# Patient Record
Sex: FEMALE | Race: WHITE | NOT HISPANIC OR LATINO | Employment: OTHER | URBAN - METROPOLITAN AREA
[De-identification: names, ages, dates, MRNs, and addresses within clinical notes are randomized per-mention and may not be internally consistent; named-entity substitution may affect disease eponyms.]

---

## 2017-10-09 ENCOUNTER — CONVERSION ENCOUNTER (OUTPATIENT)
Dept: GENERAL RADIOLOGY | Facility: HOSPITAL | Age: 82
End: 2017-10-09

## 2019-02-08 ENCOUNTER — HOSPITAL ENCOUNTER (OUTPATIENT)
Dept: OTHER | Facility: HOSPITAL | Age: 84
Discharge: HOME | End: 2019-02-08
Attending: INTERNAL MEDICINE

## 2019-02-08 LAB
CRP SERPL HS-MCNC: 1.24 MG/DL (ref 0–0.5)
ERYTHROCYTE [SEDIMENTATION RATE] IN BLOOD: 12 MM/H (ref 0–30)
EST. AVERAGE GLUCOSE BLD GHB EST-MCNC: 180 MG/DL
HBA1C MFR BLD: 7.9 % (ref 3.5–5.7)

## 2019-03-22 ENCOUNTER — HOSPITAL ENCOUNTER (OUTPATIENT)
Dept: LAB | Facility: HOSPITAL | Age: 84
Discharge: HOME | End: 2019-03-22
Attending: INTERNAL MEDICINE

## 2019-03-22 LAB
25(OH)D3 SERPL-MCNC: 63.2 NG/ML (ref 30–100)
ALBUMIN SERPL-MCNC: 4 G/DL (ref 3.5–5)
ALBUMIN/GLOB SERPL: 1.7 {RATIO} (ref 1.4–2.6)
ALP SERPL-CCNC: 90 U/L (ref 43–160)
ALT SERPL-CCNC: 18 U/L (ref 10–40)
ANION GAP SERPL CALC-SCNC: 18 MMOL/L (ref 8–19)
APPEARANCE UR: CLEAR
AST SERPL-CCNC: 11 U/L (ref 15–50)
BASOPHILS # BLD AUTO: 0.04 10*3/UL (ref 0–0.2)
BASOPHILS NFR BLD AUTO: 0.6 % (ref 0–3)
BILIRUB SERPL-MCNC: 0.44 MG/DL (ref 0.2–1.3)
BILIRUB UR QL: NEGATIVE
BUN SERPL-MCNC: 15 MG/DL (ref 5–25)
BUN/CREAT SERPL: 19 {RATIO} (ref 6–20)
CALCIUM SERPL-MCNC: 10 MG/DL (ref 8.7–10.4)
CHLORIDE SERPL-SCNC: 105 MMOL/L (ref 99–111)
CHOLEST SERPL-MCNC: 162 MG/DL (ref 107–200)
CHOLEST/HDLC SERPL: 3.4 {RATIO} (ref 3–6)
COLOR UR: YELLOW
CONV ABS IMM GRAN: 0.03 10*3/UL (ref 0–0.2)
CONV BACTERIA: NEGATIVE
CONV CO2: 25 MMOL/L (ref 22–32)
CONV COLLECTION SOURCE (UA): ABNORMAL
CONV CREATININE URINE, RANDOM: 83.2 MG/DL (ref 10–300)
CONV IMMATURE GRAN: 0.4 % (ref 0–1.8)
CONV MICROALBUM.,U,RANDOM: <12 MG/L (ref 0–20)
CONV TOTAL PROTEIN: 6.4 G/DL (ref 6.3–8.2)
CONV UROBILINOGEN IN URINE BY AUTOMATED TEST STRIP: 0.2 {EHRLICHU}/DL (ref 0.1–1)
CREAT UR-MCNC: 0.8 MG/DL (ref 0.5–0.9)
CRP SERPL HS-MCNC: 0.51 MG/DL (ref 0–0.5)
DEPRECATED RDW RBC AUTO: 44.6 FL (ref 36.4–46.3)
EOSINOPHIL # BLD AUTO: 0.1 10*3/UL (ref 0–0.7)
EOSINOPHIL # BLD AUTO: 1.5 % (ref 0–7)
ERYTHROCYTE [DISTWIDTH] IN BLOOD BY AUTOMATED COUNT: 13.5 % (ref 11.7–14.4)
ERYTHROCYTE [SEDIMENTATION RATE] IN BLOOD: 7 MM/H (ref 0–30)
EST. AVERAGE GLUCOSE BLD GHB EST-MCNC: 189 MG/DL
FOLATE SERPL-MCNC: >20 NG/ML (ref 4.8–20)
GFR SERPLBLD BASED ON 1.73 SQ M-ARVRAT: >60 ML/MIN/{1.73_M2}
GLOBULIN UR ELPH-MCNC: 2.4 G/DL (ref 2–3.5)
GLUCOSE SERPL-MCNC: 152 MG/DL (ref 65–99)
GLUCOSE UR QL: NEGATIVE MG/DL
HBA1C MFR BLD: 13.2 G/DL (ref 12–16)
HBA1C MFR BLD: 8.2 % (ref 3.5–5.7)
HCT VFR BLD AUTO: 41.6 % (ref 37–47)
HDLC SERPL-MCNC: 48 MG/DL (ref 40–60)
HGB UR QL STRIP: NEGATIVE
KETONES UR QL STRIP: NEGATIVE MG/DL
LDLC SERPL CALC-MCNC: 84 MG/DL (ref 70–100)
LEUKOCYTE ESTERASE UR QL STRIP: ABNORMAL
LYMPHOCYTES # BLD AUTO: 1.76 10*3/UL (ref 1–5)
MCH RBC QN AUTO: 28.6 PG (ref 27–31)
MCHC RBC AUTO-ENTMCNC: 31.7 G/DL (ref 33–37)
MCV RBC AUTO: 90.2 FL (ref 81–99)
MICROALBUMIN/CREAT UR: 14.4 MG/G{CRE} (ref 0–35)
MONOCYTES # BLD AUTO: 0.78 10*3/UL (ref 0.2–1.2)
MONOCYTES NFR BLD AUTO: 11.4 % (ref 3–10)
NEUTROPHILS # BLD AUTO: 4.15 10*3/UL (ref 2–8)
NEUTROPHILS NFR BLD AUTO: 60.4 % (ref 30–85)
NITRITE UR QL STRIP: NEGATIVE
NRBC CBCN: 0 % (ref 0–0.7)
OSMOLALITY SERPL CALC.SUM OF ELEC: 302 MOSM/KG (ref 273–304)
PH UR STRIP.AUTO: 7.5 [PH] (ref 5–8)
PLATELET # BLD AUTO: 234 10*3/UL (ref 130–400)
PMV BLD AUTO: 10.1 FL (ref 9.4–12.3)
POTASSIUM SERPL-SCNC: 3.9 MMOL/L (ref 3.5–5.3)
PROT UR QL: NEGATIVE MG/DL
RBC # BLD AUTO: 4.61 10*6/UL (ref 4.2–5.4)
RBC #/AREA URNS HPF: ABNORMAL /[HPF]
SODIUM SERPL-SCNC: 144 MMOL/L (ref 135–147)
SP GR UR: 1.02 (ref 1–1.03)
SQUAMOUS SPT QL MICRO: ABNORMAL /[HPF]
TRIGL SERPL-MCNC: 149 MG/DL (ref 40–150)
VARIANT LYMPHS NFR BLD MANUAL: 25.7 % (ref 20–45)
VIT B12 SERPL-MCNC: 590 PG/ML (ref 211–911)
VLDLC SERPL-MCNC: 30 MG/DL (ref 5–37)
WBC # BLD AUTO: 6.86 10*3/UL (ref 4.8–10.8)
WBC #/AREA URNS HPF: ABNORMAL /[HPF]

## 2019-04-23 ENCOUNTER — HOSPITAL ENCOUNTER (OUTPATIENT)
Dept: SURGERY | Facility: HOSPITAL | Age: 84
Setting detail: HOSPITAL OUTPATIENT SURGERY
Discharge: HOME | End: 2019-04-23
Attending: OPHTHALMOLOGY

## 2019-04-23 LAB — GLUCOSE BLD-MCNC: 158 MG/DL (ref 65–99)

## 2019-04-24 ENCOUNTER — HOSPITAL ENCOUNTER (OUTPATIENT)
Dept: GENERAL RADIOLOGY | Facility: HOSPITAL | Age: 84
Discharge: HOME | End: 2019-04-24
Attending: INTERNAL MEDICINE

## 2019-05-07 ENCOUNTER — HOSPITAL ENCOUNTER (OUTPATIENT)
Dept: LAB | Facility: HOSPITAL | Age: 84
Discharge: HOME | End: 2019-05-07
Attending: INTERNAL MEDICINE

## 2019-05-07 LAB — ERYTHROCYTE [SEDIMENTATION RATE] IN BLOOD: 11 MM/H (ref 0–30)

## 2019-05-08 LAB — CRP SERPL HS-MCNC: 0.9 MG/DL (ref 0–0.5)

## 2019-05-14 ENCOUNTER — HOSPITAL ENCOUNTER (OUTPATIENT)
Dept: SURGERY | Facility: HOSPITAL | Age: 84
Setting detail: HOSPITAL OUTPATIENT SURGERY
Discharge: HOME | End: 2019-05-14
Attending: OPHTHALMOLOGY

## 2019-05-14 LAB — GLUCOSE BLD-MCNC: 189 MG/DL (ref 65–99)

## 2019-06-20 ENCOUNTER — HOSPITAL ENCOUNTER (OUTPATIENT)
Dept: LAB | Facility: HOSPITAL | Age: 84
Discharge: HOME | End: 2019-06-20
Attending: INTERNAL MEDICINE

## 2019-06-20 LAB
ALBUMIN SERPL-MCNC: 3.8 G/DL (ref 3.5–5)
ALBUMIN/GLOB SERPL: 1.5 {RATIO} (ref 1.4–2.6)
ALP SERPL-CCNC: 90 U/L (ref 43–160)
ALT SERPL-CCNC: 15 U/L (ref 10–40)
ANION GAP SERPL CALC-SCNC: 18 MMOL/L (ref 8–19)
AST SERPL-CCNC: 14 U/L (ref 15–50)
BASOPHILS # BLD AUTO: 0.04 10*3/UL (ref 0–0.2)
BASOPHILS NFR BLD AUTO: 0.6 % (ref 0–3)
BILIRUB SERPL-MCNC: 0.34 MG/DL (ref 0.2–1.3)
BUN SERPL-MCNC: 12 MG/DL (ref 5–25)
BUN/CREAT SERPL: 13 {RATIO} (ref 6–20)
CALCIUM SERPL-MCNC: 10 MG/DL (ref 8.7–10.4)
CHLORIDE SERPL-SCNC: 100 MMOL/L (ref 99–111)
CHOLEST SERPL-MCNC: 159 MG/DL (ref 107–200)
CHOLEST/HDLC SERPL: 3.9 {RATIO} (ref 3–6)
CONV ABS IMM GRAN: 0.02 10*3/UL (ref 0–0.2)
CONV CO2: 25 MMOL/L (ref 22–32)
CONV IMMATURE GRAN: 0.3 % (ref 0–1.8)
CONV TOTAL PROTEIN: 6.4 G/DL (ref 6.3–8.2)
CREAT UR-MCNC: 0.94 MG/DL (ref 0.5–0.9)
CRP SERPL HS-MCNC: 0.8 MG/DL (ref 0–0.5)
DEPRECATED RDW RBC AUTO: 44.7 FL (ref 36.4–46.3)
EOSINOPHIL # BLD AUTO: 0.07 10*3/UL (ref 0–0.7)
EOSINOPHIL # BLD AUTO: 1 % (ref 0–7)
ERYTHROCYTE [DISTWIDTH] IN BLOOD BY AUTOMATED COUNT: 13.3 % (ref 11.7–14.4)
ERYTHROCYTE [SEDIMENTATION RATE] IN BLOOD: 12 MM/H (ref 0–30)
EST. AVERAGE GLUCOSE BLD GHB EST-MCNC: 154 MG/DL
FOLATE SERPL-MCNC: >20 NG/ML (ref 4.8–20)
GFR SERPLBLD BASED ON 1.73 SQ M-ARVRAT: 56 ML/MIN/{1.73_M2}
GLOBULIN UR ELPH-MCNC: 2.6 G/DL (ref 2–3.5)
GLUCOSE SERPL-MCNC: 192 MG/DL (ref 65–99)
HBA1C MFR BLD: 13.3 G/DL (ref 12–16)
HBA1C MFR BLD: 7 % (ref 3.5–5.7)
HCT VFR BLD AUTO: 42 % (ref 37–47)
HDLC SERPL-MCNC: 41 MG/DL (ref 40–60)
LDLC SERPL CALC-MCNC: 77 MG/DL (ref 70–100)
LYMPHOCYTES # BLD AUTO: 1.45 10*3/UL (ref 1–5)
MAGNESIUM SERPL-MCNC: 1.91 MG/DL (ref 1.6–2.3)
MCH RBC QN AUTO: 29.1 PG (ref 27–31)
MCHC RBC AUTO-ENTMCNC: 31.7 G/DL (ref 33–37)
MCV RBC AUTO: 91.9 FL (ref 81–99)
MONOCYTES # BLD AUTO: 0.82 10*3/UL (ref 0.2–1.2)
MONOCYTES NFR BLD AUTO: 11.5 % (ref 3–10)
NEUTROPHILS # BLD AUTO: 4.74 10*3/UL (ref 2–8)
NEUTROPHILS NFR BLD AUTO: 66.3 % (ref 30–85)
NRBC CBCN: 0 % (ref 0–0.7)
OSMOLALITY SERPL CALC.SUM OF ELEC: 293 MOSM/KG (ref 273–304)
PLATELET # BLD AUTO: 238 10*3/UL (ref 130–400)
PMV BLD AUTO: 9.9 FL (ref 9.4–12.3)
POTASSIUM SERPL-SCNC: 4 MMOL/L (ref 3.5–5.3)
RBC # BLD AUTO: 4.57 10*6/UL (ref 4.2–5.4)
SODIUM SERPL-SCNC: 139 MMOL/L (ref 135–147)
T4 FREE SERPL-MCNC: 1.2 NG/DL (ref 0.9–1.8)
TRIGL SERPL-MCNC: 204 MG/DL (ref 40–150)
TSH SERPL-ACNC: 3.83 M[IU]/L (ref 0.27–4.2)
VARIANT LYMPHS NFR BLD MANUAL: 20.3 % (ref 20–45)
VIT B12 SERPL-MCNC: 664 PG/ML (ref 211–911)
VLDLC SERPL-MCNC: 41 MG/DL (ref 5–37)
WBC # BLD AUTO: 7.14 10*3/UL (ref 4.8–10.8)

## 2019-09-23 ENCOUNTER — HOSPITAL ENCOUNTER (OUTPATIENT)
Dept: LAB | Facility: HOSPITAL | Age: 84
Discharge: HOME | End: 2019-09-23
Attending: INTERNAL MEDICINE

## 2019-09-23 LAB
ALBUMIN SERPL-MCNC: 4.2 G/DL (ref 3.5–5)
ALBUMIN/GLOB SERPL: 1.7 {RATIO} (ref 1.4–2.6)
ALP SERPL-CCNC: 110 U/L (ref 43–160)
ALT SERPL-CCNC: 18 U/L (ref 10–40)
ANION GAP SERPL CALC-SCNC: 14 MMOL/L (ref 8–19)
AST SERPL-CCNC: 14 U/L (ref 15–50)
BASOPHILS # BLD AUTO: 0.04 10*3/UL (ref 0–0.2)
BASOPHILS NFR BLD AUTO: 0.6 % (ref 0–3)
BILIRUB SERPL-MCNC: 0.36 MG/DL (ref 0.2–1.3)
BUN SERPL-MCNC: 14 MG/DL (ref 5–25)
BUN/CREAT SERPL: 20 {RATIO} (ref 6–20)
CALCIUM SERPL-MCNC: 10 MG/DL (ref 8.7–10.4)
CHLORIDE SERPL-SCNC: 104 MMOL/L (ref 99–111)
CONV ABS IMM GRAN: 0.02 10*3/UL (ref 0–0.2)
CONV CO2: 27 MMOL/L (ref 22–32)
CONV IMMATURE GRAN: 0.3 % (ref 0–1.8)
CONV TOTAL PROTEIN: 6.7 G/DL (ref 6.3–8.2)
CREAT UR-MCNC: 0.69 MG/DL (ref 0.5–0.9)
CRP SERPL HS-MCNC: 0.74 MG/DL (ref 0–0.5)
DEPRECATED RDW RBC AUTO: 43.1 FL (ref 36.4–46.3)
EOSINOPHIL # BLD AUTO: 0.09 10*3/UL (ref 0–0.7)
EOSINOPHIL # BLD AUTO: 1.5 % (ref 0–7)
ERYTHROCYTE [DISTWIDTH] IN BLOOD BY AUTOMATED COUNT: 13.2 % (ref 11.7–14.4)
ERYTHROCYTE [SEDIMENTATION RATE] IN BLOOD: 11 MM/H (ref 0–30)
EST. AVERAGE GLUCOSE BLD GHB EST-MCNC: 154 MG/DL
GFR SERPLBLD BASED ON 1.73 SQ M-ARVRAT: >60 ML/MIN/{1.73_M2}
GLOBULIN UR ELPH-MCNC: 2.5 G/DL (ref 2–3.5)
GLUCOSE SERPL-MCNC: 173 MG/DL (ref 65–99)
HBA1C MFR BLD: 7 % (ref 3.5–5.7)
HCT VFR BLD AUTO: 40 % (ref 37–47)
HGB BLD-MCNC: 13.3 G/DL (ref 12–16)
LYMPHOCYTES # BLD AUTO: 1.49 10*3/UL (ref 1–5)
LYMPHOCYTES NFR BLD AUTO: 24 % (ref 20–45)
MCH RBC QN AUTO: 29.5 PG (ref 27–31)
MCHC RBC AUTO-ENTMCNC: 33.3 G/DL (ref 33–37)
MCV RBC AUTO: 88.7 FL (ref 81–99)
MONOCYTES # BLD AUTO: 0.77 10*3/UL (ref 0.2–1.2)
MONOCYTES NFR BLD AUTO: 12.4 % (ref 3–10)
NEUTROPHILS # BLD AUTO: 3.79 10*3/UL (ref 2–8)
NEUTROPHILS NFR BLD AUTO: 61.2 % (ref 30–85)
NRBC CBCN: 0 % (ref 0–0.7)
OSMOLALITY SERPL CALC.SUM OF ELEC: 297 MOSM/KG (ref 273–304)
PLATELET # BLD AUTO: 236 10*3/UL (ref 130–400)
PMV BLD AUTO: 9.7 FL (ref 9.4–12.3)
POTASSIUM SERPL-SCNC: 4.2 MMOL/L (ref 3.5–5.3)
RBC # BLD AUTO: 4.51 10*6/UL (ref 4.2–5.4)
SODIUM SERPL-SCNC: 141 MMOL/L (ref 135–147)
WBC # BLD AUTO: 6.2 10*3/UL (ref 4.8–10.8)

## 2019-10-18 ENCOUNTER — HOSPITAL ENCOUNTER (OUTPATIENT)
Facility: HOSPITAL | Age: 84
Setting detail: OBSERVATION
LOS: 1 days | Discharge: HOME | End: 2019-10-20
Attending: HOSPITALIST | Admitting: HOSPITALIST

## 2019-10-18 PROBLEM — S22.010A COMPRESSION FRACTURE OF T1 VERTEBRA (HCC): Status: ACTIVE | Noted: 2019-10-18

## 2019-10-18 PROBLEM — F32.A DEPRESSION: Status: ACTIVE | Noted: 2019-10-18

## 2019-10-18 PROBLEM — E11.9 DM (DIABETES MELLITUS): Status: ACTIVE | Noted: 2019-10-18

## 2019-10-18 PROBLEM — J45.909 ASTHMA: Status: ACTIVE | Noted: 2019-10-18

## 2019-10-18 PROBLEM — S09.90XA CLOSED HEAD INJURY: Status: ACTIVE | Noted: 2019-10-18

## 2019-10-18 PROBLEM — S00.03XA CONTUSION OF SCALP: Status: ACTIVE | Noted: 2019-10-18
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
## 2019-10-18 PROBLEM — I10 HTN (HYPERTENSION): Status: ACTIVE | Noted: 2019-10-18

## 2019-10-18 LAB
ALBUMIN SERPL-MCNC: 3.6 G/DL (ref 3.5–5.2)
ALBUMIN/GLOB SERPL: 1.2 G/DL
ALP SERPL-CCNC: 100 U/L (ref 39–117)
ALT SERPL W P-5'-P-CCNC: 22 U/L (ref 1–33)
ANION GAP SERPL CALCULATED.3IONS-SCNC: 12 MMOL/L (ref 5–15)
AST SERPL-CCNC: 16 U/L (ref 1–32)
BASOPHILS # BLD AUTO: 0.04 10*3/MM3 (ref 0–0.2)
BASOPHILS NFR BLD AUTO: 0.5 % (ref 0–1.5)
BILIRUB SERPL-MCNC: 0.6 MG/DL (ref 0.2–1.2)
BUN BLD-MCNC: 12 MG/DL (ref 8–23)
BUN/CREAT SERPL: 16 (ref 7–25)
CALCIUM SPEC-SCNC: 10.1 MG/DL (ref 8.6–10.5)
CHLORIDE SERPL-SCNC: 101 MMOL/L (ref 98–107)
CO2 SERPL-SCNC: 26 MMOL/L (ref 22–29)
CREAT BLD-MCNC: 0.75 MG/DL (ref 0.57–1)
DEPRECATED RDW RBC AUTO: 42 FL (ref 37–54)
EOSINOPHIL # BLD AUTO: 0.1 10*3/MM3 (ref 0–0.4)
EOSINOPHIL NFR BLD AUTO: 1.3 % (ref 0.3–6.2)
ERYTHROCYTE [DISTWIDTH] IN BLOOD BY AUTOMATED COUNT: 13.2 % (ref 12.3–15.4)
GFR SERPL CREATININE-BSD FRML MDRD: 74 ML/MIN/1.73
GLOBULIN UR ELPH-MCNC: 3 GM/DL
GLUCOSE BLD-MCNC: 114 MG/DL (ref 65–99)
GLUCOSE BLDC GLUCOMTR-MCNC: 116 MG/DL (ref 70–130)
HBA1C MFR BLD: 6.9 % (ref 4.8–5.6)
HCT VFR BLD AUTO: 37.8 % (ref 34–46.6)
HGB BLD-MCNC: 13.1 G/DL (ref 12–15.9)
IMM GRANULOCYTES # BLD AUTO: 0.03 10*3/MM3 (ref 0–0.05)
IMM GRANULOCYTES NFR BLD AUTO: 0.4 % (ref 0–0.5)
INR PPP: 1.01 (ref 0.9–1.1)
LYMPHOCYTES # BLD AUTO: 1.73 10*3/MM3 (ref 0.7–3.1)
LYMPHOCYTES NFR BLD AUTO: 22.8 % (ref 19.6–45.3)
MCH RBC QN AUTO: 30.1 PG (ref 26.6–33)
MCHC RBC AUTO-ENTMCNC: 34.7 G/DL (ref 31.5–35.7)
MCV RBC AUTO: 86.9 FL (ref 79–97)
MONOCYTES # BLD AUTO: 1.06 10*3/MM3 (ref 0.1–0.9)
MONOCYTES NFR BLD AUTO: 13.9 % (ref 5–12)
NEUTROPHILS # BLD AUTO: 4.64 10*3/MM3 (ref 1.7–7)
NEUTROPHILS NFR BLD AUTO: 61.1 % (ref 42.7–76)
NRBC BLD AUTO-RTO: 0 /100 WBC (ref 0–0.2)
PLATELET # BLD AUTO: 195 10*3/MM3 (ref 140–450)
PMV BLD AUTO: 9.5 FL (ref 6–12)
POTASSIUM BLD-SCNC: 3.7 MMOL/L (ref 3.5–5.2)
PROT SERPL-MCNC: 6.6 G/DL (ref 6–8.5)
PROTHROMBIN TIME: 13 SECONDS (ref 11.7–14.2)
RBC # BLD AUTO: 4.35 10*6/MM3 (ref 3.77–5.28)
SODIUM BLD-SCNC: 139 MMOL/L (ref 136–145)
WBC NRBC COR # BLD: 7.6 10*3/MM3 (ref 3.4–10.8)

## 2019-10-18 PROCEDURE — G0379 DIRECT REFER HOSPITAL OBSERV: HCPCS

## 2019-10-18 PROCEDURE — G0378 HOSPITAL OBSERVATION PER HR: HCPCS

## 2019-10-18 PROCEDURE — 85025 COMPLETE CBC W/AUTO DIFF WBC: CPT | Performed by: INTERNAL MEDICINE

## 2019-10-18 PROCEDURE — 80053 COMPREHEN METABOLIC PANEL: CPT | Performed by: INTERNAL MEDICINE

## 2019-10-18 PROCEDURE — 85610 PROTHROMBIN TIME: CPT | Performed by: INTERNAL MEDICINE

## 2019-10-18 PROCEDURE — 83036 HEMOGLOBIN GLYCOSYLATED A1C: CPT | Performed by: INTERNAL MEDICINE

## 2019-10-18 PROCEDURE — 82962 GLUCOSE BLOOD TEST: CPT

## 2019-10-18 RX ORDER — HYDROCHLOROTHIAZIDE 12.5 MG/1
12.5 TABLET ORAL 3 TIMES WEEKLY
COMMUNITY
End: 2021-11-01 | Stop reason: HOSPADM

## 2019-10-18 RX ORDER — SODIUM CHLORIDE 0.9 % (FLUSH) 0.9 %
10 SYRINGE (ML) INJECTION EVERY 12 HOURS SCHEDULED
Status: DISCONTINUED | OUTPATIENT
Start: 2019-10-18 | End: 2019-10-20 | Stop reason: HOSPADM

## 2019-10-18 RX ORDER — SODIUM CHLORIDE 0.9 % (FLUSH) 0.9 %
10 SYRINGE (ML) INJECTION AS NEEDED
Status: DISCONTINUED | OUTPATIENT
Start: 2019-10-18 | End: 2019-10-20 | Stop reason: HOSPADM

## 2019-10-18 RX ORDER — POLYETHYLENE GLYCOL 3350 17 G/17G
17 POWDER, FOR SOLUTION ORAL DAILY PRN
COMMUNITY
End: 2022-03-07 | Stop reason: SDUPTHER

## 2019-10-18 RX ORDER — FAMOTIDINE 20 MG/1
20 TABLET, FILM COATED ORAL 2 TIMES DAILY
Status: ON HOLD | COMMUNITY
End: 2021-10-21

## 2019-10-18 RX ORDER — GLIMEPIRIDE 4 MG/1
4 TABLET ORAL
COMMUNITY
End: 2021-10-14

## 2019-10-18 RX ORDER — POTASSIUM CHLORIDE 750 MG/1
10 CAPSULE, EXTENDED RELEASE ORAL 3 TIMES WEEKLY
Status: ON HOLD | COMMUNITY
End: 2021-10-21

## 2019-10-18 RX ORDER — ACETAMINOPHEN 325 MG/1
650 TABLET ORAL EVERY 4 HOURS PRN
Status: DISCONTINUED | OUTPATIENT
Start: 2019-10-18 | End: 2019-10-20 | Stop reason: HOSPADM

## 2019-10-18 RX ORDER — NICOTINE POLACRILEX 4 MG
15 LOZENGE BUCCAL
Status: DISCONTINUED | OUTPATIENT
Start: 2019-10-18 | End: 2019-10-20 | Stop reason: HOSPADM

## 2019-10-18 RX ORDER — DULOXETIN HYDROCHLORIDE 30 MG/1
30 CAPSULE, DELAYED RELEASE ORAL DAILY
COMMUNITY
End: 2022-02-09

## 2019-10-18 RX ORDER — DILTIAZEM HYDROCHLORIDE EXTENDED-RELEASE TABLETS 240 MG/1
240 TABLET, EXTENDED RELEASE ORAL DAILY
Status: ON HOLD | COMMUNITY
End: 2021-10-21

## 2019-10-18 RX ORDER — LISINOPRIL 20 MG/1
20 TABLET ORAL DAILY
Status: ON HOLD | COMMUNITY
End: 2021-10-21

## 2019-10-18 RX ORDER — METOPROLOL SUCCINATE 25 MG/1
25 TABLET, EXTENDED RELEASE ORAL DAILY
COMMUNITY
End: 2021-07-30 | Stop reason: SDUPTHER

## 2019-10-18 RX ORDER — ASPIRIN 81 MG/1
81 TABLET, CHEWABLE ORAL DAILY
COMMUNITY
End: 2022-03-07 | Stop reason: SDUPTHER

## 2019-10-18 RX ORDER — ACETAMINOPHEN 650 MG/1
650 SUPPOSITORY RECTAL EVERY 4 HOURS PRN
Status: DISCONTINUED | OUTPATIENT
Start: 2019-10-18 | End: 2019-10-20 | Stop reason: HOSPADM

## 2019-10-18 RX ORDER — NITROGLYCERIN 0.4 MG/1
0.4 TABLET SUBLINGUAL
Status: DISCONTINUED | OUTPATIENT
Start: 2019-10-18 | End: 2019-10-20 | Stop reason: HOSPADM

## 2019-10-18 RX ORDER — PREDNISONE 1 MG/1
1 TABLET ORAL DAILY
Status: ON HOLD | COMMUNITY
End: 2021-10-21

## 2019-10-18 RX ORDER — DEXTROSE MONOHYDRATE 25 G/50ML
25 INJECTION, SOLUTION INTRAVENOUS
Status: DISCONTINUED | OUTPATIENT
Start: 2019-10-18 | End: 2019-10-20 | Stop reason: HOSPADM

## 2019-10-18 RX ORDER — ONDANSETRON 2 MG/ML
4 INJECTION INTRAMUSCULAR; INTRAVENOUS EVERY 6 HOURS PRN
Status: DISCONTINUED | OUTPATIENT
Start: 2019-10-18 | End: 2019-10-20 | Stop reason: HOSPADM

## 2019-10-18 RX ORDER — ACETAMINOPHEN 160 MG/5ML
650 SOLUTION ORAL EVERY 4 HOURS PRN
Status: DISCONTINUED | OUTPATIENT
Start: 2019-10-18 | End: 2019-10-20 | Stop reason: HOSPADM

## 2019-10-18 RX ORDER — HYDROCODONE BITARTRATE AND ACETAMINOPHEN 5; 325 MG/1; MG/1
1 TABLET ORAL EVERY 4 HOURS PRN
Status: DISCONTINUED | OUTPATIENT
Start: 2019-10-18 | End: 2019-10-20 | Stop reason: HOSPADM

## 2019-10-18 RX ORDER — ATORVASTATIN CALCIUM 20 MG/1
20 TABLET, FILM COATED ORAL DAILY
COMMUNITY
End: 2021-09-30

## 2019-10-18 RX ADMIN — SODIUM CHLORIDE, PRESERVATIVE FREE 10 ML: 5 INJECTION INTRAVENOUS at 23:29

## 2019-10-19 ENCOUNTER — APPOINTMENT (OUTPATIENT)
Dept: MRI IMAGING | Facility: HOSPITAL | Age: 84
End: 2019-10-19

## 2019-10-19 ENCOUNTER — APPOINTMENT (OUTPATIENT)
Dept: CT IMAGING | Facility: HOSPITAL | Age: 84
End: 2019-10-19

## 2019-10-19 LAB
ALBUMIN SERPL-MCNC: 3.5 G/DL (ref 3.5–5.2)
ALBUMIN/GLOB SERPL: 1.3 G/DL
ALP SERPL-CCNC: 98 U/L (ref 39–117)
ALT SERPL W P-5'-P-CCNC: 20 U/L (ref 1–33)
ANION GAP SERPL CALCULATED.3IONS-SCNC: 10.6 MMOL/L (ref 5–15)
AST SERPL-CCNC: 16 U/L (ref 1–32)
BASOPHILS # BLD AUTO: 0.04 10*3/MM3 (ref 0–0.2)
BASOPHILS NFR BLD AUTO: 0.6 % (ref 0–1.5)
BILIRUB SERPL-MCNC: 0.5 MG/DL (ref 0.2–1.2)
BUN BLD-MCNC: 12 MG/DL (ref 8–23)
BUN/CREAT SERPL: 16.9 (ref 7–25)
CALCIUM SPEC-SCNC: 9.7 MG/DL (ref 8.6–10.5)
CHLORIDE SERPL-SCNC: 96 MMOL/L (ref 98–107)
CHOLEST SERPL-MCNC: 176 MG/DL (ref 0–200)
CK SERPL-CCNC: 75 U/L (ref 20–180)
CO2 SERPL-SCNC: 26.4 MMOL/L (ref 22–29)
CREAT BLD-MCNC: 0.71 MG/DL (ref 0.57–1)
DEPRECATED RDW RBC AUTO: 40.7 FL (ref 37–54)
EOSINOPHIL # BLD AUTO: 0.13 10*3/MM3 (ref 0–0.4)
EOSINOPHIL NFR BLD AUTO: 1.9 % (ref 0.3–6.2)
ERYTHROCYTE [DISTWIDTH] IN BLOOD BY AUTOMATED COUNT: 13 % (ref 12.3–15.4)
GFR SERPL CREATININE-BSD FRML MDRD: 79 ML/MIN/1.73
GLOBULIN UR ELPH-MCNC: 2.7 GM/DL
GLUCOSE BLD-MCNC: 161 MG/DL (ref 65–99)
GLUCOSE BLDC GLUCOMTR-MCNC: 163 MG/DL (ref 70–130)
GLUCOSE BLDC GLUCOMTR-MCNC: 177 MG/DL (ref 70–130)
GLUCOSE BLDC GLUCOMTR-MCNC: 194 MG/DL (ref 70–130)
GLUCOSE BLDC GLUCOMTR-MCNC: 225 MG/DL (ref 70–130)
HCT VFR BLD AUTO: 37.7 % (ref 34–46.6)
HDLC SERPL-MCNC: 40 MG/DL (ref 40–60)
HGB BLD-MCNC: 12.9 G/DL (ref 12–15.9)
IMM GRANULOCYTES # BLD AUTO: 0.04 10*3/MM3 (ref 0–0.05)
IMM GRANULOCYTES NFR BLD AUTO: 0.6 % (ref 0–0.5)
LDLC SERPL CALC-MCNC: 99 MG/DL (ref 0–100)
LDLC/HDLC SERPL: 2.47 {RATIO}
LYMPHOCYTES # BLD AUTO: 1.29 10*3/MM3 (ref 0.7–3.1)
LYMPHOCYTES NFR BLD AUTO: 19.1 % (ref 19.6–45.3)
MCH RBC QN AUTO: 29.9 PG (ref 26.6–33)
MCHC RBC AUTO-ENTMCNC: 34.2 G/DL (ref 31.5–35.7)
MCV RBC AUTO: 87.3 FL (ref 79–97)
MONOCYTES # BLD AUTO: 0.86 10*3/MM3 (ref 0.1–0.9)
MONOCYTES NFR BLD AUTO: 12.8 % (ref 5–12)
NEUTROPHILS # BLD AUTO: 4.38 10*3/MM3 (ref 1.7–7)
NEUTROPHILS NFR BLD AUTO: 65 % (ref 42.7–76)
NRBC BLD AUTO-RTO: 0 /100 WBC (ref 0–0.2)
PLATELET # BLD AUTO: 192 10*3/MM3 (ref 140–450)
PMV BLD AUTO: 9.6 FL (ref 6–12)
POTASSIUM BLD-SCNC: 3.8 MMOL/L (ref 3.5–5.2)
PROT SERPL-MCNC: 6.2 G/DL (ref 6–8.5)
RBC # BLD AUTO: 4.32 10*6/MM3 (ref 3.77–5.28)
SODIUM BLD-SCNC: 133 MMOL/L (ref 136–145)
TRIGL SERPL-MCNC: 186 MG/DL (ref 0–150)
VLDLC SERPL-MCNC: 37.2 MG/DL (ref 5–40)
WBC NRBC COR # BLD: 6.74 10*3/MM3 (ref 3.4–10.8)

## 2019-10-19 PROCEDURE — 80053 COMPREHEN METABOLIC PANEL: CPT | Performed by: INTERNAL MEDICINE

## 2019-10-19 PROCEDURE — 96374 THER/PROPH/DIAG INJ IV PUSH: CPT

## 2019-10-19 PROCEDURE — 85025 COMPLETE CBC W/AUTO DIFF WBC: CPT | Performed by: INTERNAL MEDICINE

## 2019-10-19 PROCEDURE — 25010000002 ONDANSETRON PER 1 MG: Performed by: INTERNAL MEDICINE

## 2019-10-19 PROCEDURE — G0378 HOSPITAL OBSERVATION PER HR: HCPCS

## 2019-10-19 PROCEDURE — 0 GADOBENATE DIMEGLUMINE 529 MG/ML SOLUTION: Performed by: HOSPITALIST

## 2019-10-19 PROCEDURE — 63710000001 INSULIN LISPRO (HUMAN) PER 5 UNITS: Performed by: INTERNAL MEDICINE

## 2019-10-19 PROCEDURE — 82962 GLUCOSE BLOOD TEST: CPT

## 2019-10-19 PROCEDURE — 72157 MRI CHEST SPINE W/O & W/DYE: CPT

## 2019-10-19 PROCEDURE — A9577 INJ MULTIHANCE: HCPCS | Performed by: HOSPITALIST

## 2019-10-19 PROCEDURE — 82550 ASSAY OF CK (CPK): CPT | Performed by: INTERNAL MEDICINE

## 2019-10-19 PROCEDURE — 70450 CT HEAD/BRAIN W/O DYE: CPT

## 2019-10-19 PROCEDURE — 80061 LIPID PANEL: CPT | Performed by: INTERNAL MEDICINE

## 2019-10-19 PROCEDURE — 99202 OFFICE O/P NEW SF 15 MIN: CPT | Performed by: NEUROLOGICAL SURGERY

## 2019-10-19 RX ADMIN — ONDANSETRON 4 MG: 2 INJECTION INTRAMUSCULAR; INTRAVENOUS at 01:52

## 2019-10-19 RX ADMIN — SODIUM CHLORIDE, PRESERVATIVE FREE 10 ML: 5 INJECTION INTRAVENOUS at 08:20

## 2019-10-19 RX ADMIN — SODIUM CHLORIDE, PRESERVATIVE FREE 10 ML: 5 INJECTION INTRAVENOUS at 21:56

## 2019-10-19 RX ADMIN — INSULIN LISPRO 2 UNITS: 100 INJECTION, SOLUTION INTRAVENOUS; SUBCUTANEOUS at 21:56

## 2019-10-19 RX ADMIN — GADOBENATE DIMEGLUMINE 15 ML: 529 INJECTION, SOLUTION INTRAVENOUS at 15:11

## 2019-10-19 RX ADMIN — INSULIN LISPRO 2 UNITS: 100 INJECTION, SOLUTION INTRAVENOUS; SUBCUTANEOUS at 08:20

## 2019-10-19 RX ADMIN — INSULIN LISPRO 2 UNITS: 100 INJECTION, SOLUTION INTRAVENOUS; SUBCUTANEOUS at 17:34

## 2019-10-19 RX ADMIN — INSULIN LISPRO 4 UNITS: 100 INJECTION, SOLUTION INTRAVENOUS; SUBCUTANEOUS at 12:02

## 2019-10-19 NOTE — PLAN OF CARE
Problem: Patient Care Overview  Goal: Plan of Care Review  Outcome: Ongoing (interventions implemented as appropriate)   10/19/19 3186   Coping/Psychosocial   Plan of Care Reviewed With patient   Plan of Care Review   Progress improving   OTHER   Outcome Summary Denies pain . Had Mri of thoracic spine. Remains on sliding scale. Usinf [place] for incontinence issues.

## 2019-10-19 NOTE — PLAN OF CARE
Problem: Patient Care Overview  Goal: Plan of Care Review  Outcome: Ongoing (interventions implemented as appropriate)   10/19/19 0521   Coping/Psychosocial   Plan of Care Reviewed With patient   Plan of Care Review   Progress improving   OTHER   Outcome Summary Diabetes treated with sliding scale.no c/o pain       Problem: Fall Risk (Adult)  Goal: Absence of Fall  Outcome: Ongoing (interventions implemented as appropriate)   10/19/19 0521   Fall Risk (Adult)   Absence of Fall making progress toward outcome

## 2019-10-19 NOTE — PROGRESS NOTES
"DAILY PROGRESS NOTE  [facility]    Patient Identification:  Name: Tita Jiménez  Age: 84 y.o.  Sex: female  :  1935  MRN: [id number]         Primary Care Physician: Angela Doherty MD    Subjective:  Interval History:She has no pain.    Objective:    Scheduled Meds:  insulin lispro 0-9 Units Subcutaneous 4x Daily With Meals & Nightly   sodium chloride 10 mL Intravenous Q12H     Continuous Infusions:     Vital signs in last 24 hours:  Temp:  [97.9 °F (36.6 °C)-99 °F (37.2 °C)] 97.9 °F (36.6 °C)  Heart Rate:  [100-110] 110  Resp:  [16-18] 18  BP: (147-169)/() 149/81    Intake/Output:  No intake or output data in the 24 hours ending 10/19/19 1114    Exam:  /81 (BP Location: Right arm, Patient Position: Lying)   Pulse 110   Temp 97.9 °F (36.6 °C) (Oral)   Resp 18   Ht 162.6 cm (64\")   Wt 77.3 kg (170 lb 6.4 oz)   SpO2 92%   BMI 29.25 kg/m²     General Appearance:    Alert, cooperative, no distress   Head:    Normocephalic, without obvious abnormality, atraumatic   Eyes:       Throat:   Lips, tongue, gums normal   Neck:   Supple, symmetrical, trachea midline, no JVD   Lungs:     Clear to auscultation bilaterally, respirations unlabored   Chest Wall:    No tenderness or deformity    Heart:    Regular rate and rhythm, S1 and S2 normal, no murmur,no  Rub or gallop   Abdomen:     Soft, non-tender, bowel sounds active, no masses, no organomegaly    Extremities:   Extremities normal, atraumatic, no cyanosis or edema   Pulses:      Skin:   Skin is warm and dry,  no rashes or palpable lesions   Neurologic:   no focal deficits noted      Lab Results (last 72 hours)     Procedure Component Value Units Date/Time    POC Glucose Once [[id number]]  (Abnormal) Collected:  10/19/19 1102    Specimen:  Blood Updated:  10/19/19 1104     Glucose 225 mg/dL     POC Glucose Once [[id number]]  (Abnormal) Collected:  10/19/19 0742    Specimen:  Blood Updated:  10/19/19 0743     Glucose 177 mg/dL     " Comprehensive Metabolic Panel [[id number]]  (Abnormal) Collected:  10/19/19 0444    Specimen:  Blood Updated:  10/19/19 0621     Glucose 161 mg/dL      BUN 12 mg/dL      Creatinine 0.71 mg/dL      Sodium 133 mmol/L      Potassium 3.8 mmol/L      Chloride 96 mmol/L      CO2 26.4 mmol/L      Calcium 9.7 mg/dL      Total Protein 6.2 g/dL      Albumin 3.50 g/dL      ALT (SGPT) 20 U/L      AST (SGOT) 16 U/L      Alkaline Phosphatase 98 U/L      Total Bilirubin 0.5 mg/dL      eGFR Non African Amer 79 mL/min/1.73      Globulin 2.7 gm/dL      A/G Ratio 1.3 g/dL      BUN/Creatinine Ratio 16.9     Anion Gap 10.6 mmol/L     Narrative:       GFR Normal >60  Chronic Kidney Disease <60  Kidney Failure <15    CK [[id number]]  (Normal) Collected:  10/19/19 0444    Specimen:  Blood Updated:  10/19/19 0621     Creatine Kinase 75 U/L     Lipid Panel [[id number]]  (Abnormal) Collected:  10/19/19 0444    Specimen:  Blood Updated:  10/19/19 0621     Total Cholesterol 176 mg/dL      Triglycerides 186 mg/dL      HDL Cholesterol 40 mg/dL      LDL Cholesterol  99 mg/dL      VLDL Cholesterol 37.2 mg/dL      LDL/HDL Ratio 2.47    Narrative:       Cholesterol Reference Ranges  (U.S. Department of Health and Human Services ATP III Classifications)    Desirable          <200 mg/dL  Borderline High    200-239 mg/dL  High Risk          >240 mg/dL      Triglyceride Reference Ranges  (U.S. Department of Health and Human Services ATP III Classifications)    Normal           <150 mg/dL  Borderline High  150-199 mg/dL  High             200-499 mg/dL  Very High        >500 mg/dL    HDL Reference Ranges  (U.S. Department of Health and Human Services ATP III Classifcations)    Low     <40 mg/dl (major risk factor for CHD)  High    >60 mg/dl ('negative' risk factor for CHD)        LDL Reference Ranges  (U.S. Department of Health and Human Services ATP III Classifcations)    Optimal          <100 mg/dL  Near Optimal     100-129 mg/dL  Borderline High  130-159  mg/dL  High             160-189 mg/dL  Very High        >189 mg/dL    CBC Auto Differential [[id number]]  (Abnormal) Collected:  10/19/19 0444    Specimen:  Blood Updated:  10/19/19 0541     WBC 6.74 10*3/mm3      RBC 4.32 10*6/mm3      Hemoglobin 12.9 g/dL      Hematocrit 37.7 %      MCV 87.3 fL      MCH 29.9 pg      MCHC 34.2 g/dL      RDW 13.0 %      RDW-SD 40.7 fl      MPV 9.6 fL      Platelets 192 10*3/mm3      Neutrophil % 65.0 %      Lymphocyte % 19.1 %      Monocyte % 12.8 %      Eosinophil % 1.9 %      Basophil % 0.6 %      Immature Grans % 0.6 %      Neutrophils, Absolute 4.38 10*3/mm3      Lymphocytes, Absolute 1.29 10*3/mm3      Monocytes, Absolute 0.86 10*3/mm3      Eosinophils, Absolute 0.13 10*3/mm3      Basophils, Absolute 0.04 10*3/mm3      Immature Grans, Absolute 0.04 10*3/mm3      nRBC 0.0 /100 WBC     Hemoglobin A1c [[id number]]  (Abnormal) Collected:  10/18/19 2242    Specimen:  Blood Updated:  10/18/19 2348     Hemoglobin A1C 6.90 %     Narrative:       Hemoglobin A1C Ranges:    Increased Risk for Diabetes  5.7% to 6.4%  Diabetes                     >= 6.5%  Diabetic Goal                < 7.0%    Comprehensive Metabolic Panel [[id number]]  (Abnormal) Collected:  10/18/19 2242    Specimen:  Blood Updated:  10/18/19 2319     Glucose 114 mg/dL      BUN 12 mg/dL      Creatinine 0.75 mg/dL      Sodium 139 mmol/L      Potassium 3.7 mmol/L      Chloride 101 mmol/L      CO2 26.0 mmol/L      Calcium 10.1 mg/dL      Total Protein 6.6 g/dL      Albumin 3.60 g/dL      ALT (SGPT) 22 U/L      AST (SGOT) 16 U/L      Alkaline Phosphatase 100 U/L      Total Bilirubin 0.6 mg/dL      eGFR Non African Amer 74 mL/min/1.73      Globulin 3.0 gm/dL      A/G Ratio 1.2 g/dL      BUN/Creatinine Ratio 16.0     Anion Gap 12.0 mmol/L     Narrative:       GFR Normal >60  Chronic Kidney Disease <60  Kidney Failure <15    Protime-INR [[id number]]  (Normal) Collected:  10/18/19 2242    Specimen:  Blood Updated:  10/18/19 2868      Protime 13.0 Seconds      INR 1.01    CBC Auto Differential [[id number]]  (Abnormal) Collected:  10/18/19 2242    Specimen:  Blood Updated:  10/18/19 2304     WBC 7.60 10*3/mm3      RBC 4.35 10*6/mm3      Hemoglobin 13.1 g/dL      Hematocrit 37.8 %      MCV 86.9 fL      MCH 30.1 pg      MCHC 34.7 g/dL      RDW 13.2 %      RDW-SD 42.0 fl      MPV 9.5 fL      Platelets 195 10*3/mm3      Neutrophil % 61.1 %      Lymphocyte % 22.8 %      Monocyte % 13.9 %      Eosinophil % 1.3 %      Basophil % 0.5 %      Immature Grans % 0.4 %      Neutrophils, Absolute 4.64 10*3/mm3      Lymphocytes, Absolute 1.73 10*3/mm3      Monocytes, Absolute 1.06 10*3/mm3      Eosinophils, Absolute 0.10 10*3/mm3      Basophils, Absolute 0.04 10*3/mm3      Immature Grans, Absolute 0.03 10*3/mm3      nRBC 0.0 /100 WBC     POC Glucose Once [[id number]]  (Normal) Collected:  10/18/19 2245    Specimen:  Blood Updated:  10/18/19 2247     Glucose 116 mg/dL         Data Review:  Results from last 7 days   Lab Units 10/19/19  0444 10/18/19  2242   SODIUM mmol/L 133* 139   POTASSIUM mmol/L 3.8 3.7   CHLORIDE mmol/L 96* 101   CO2 mmol/L 26.4 26.0   BUN mg/dL 12 12   CREATININE mg/dL 0.71 0.75   GLUCOSE mg/dL 161* 114*   CALCIUM mg/dL 9.7 10.1     Results from last 7 days   Lab Units 10/19/19  0444 10/18/19  2242   WBC 10*3/mm3 6.74 7.60   HEMOGLOBIN g/dL 12.9 13.1   HEMATOCRIT % 37.7 37.8   PLATELETS 10*3/mm3 192 195         Results from last 7 days   Lab Units 10/18/19  2242   HEMOGLOBIN A1C % 6.90*     No results found for: TROPONINT  Results from last 7 days   Lab Units 10/19/19  0444   CHOLESTEROL mg/dL 176   TRIGLYCERIDES mg/dL 186*   HDL CHOL mg/dL 40   LDL CHOL mg/dL 99     Results from last 7 days   Lab Units 10/19/19  0444 10/18/19  2242   ALK PHOS U/L 98 100   BILIRUBIN mg/dL 0.5 0.6   ALT (SGPT) U/L 20 22   AST (SGOT) U/L 16 16         Results from last 7 days   Lab Units 10/18/19  2242   HEMOGLOBIN A1C % 6.90*     Glucose   Date/Time Value Ref  Range Status   10/19/2019 1102 225 (H) 70 - 130 mg/dL Final   10/19/2019 0742 177 (H) 70 - 130 mg/dL Final   10/18/2019 2245 116 70 - 130 mg/dL Final     Results from last 7 days   Lab Units 10/18/19  2242   INR  1.01       Past Medical History:   Diagnosis Date   • Asthma    • Contusion     small on scalp. skin intact. D/T fall on 10/18/2019   • Depression    • Diabetes mellitus (CMS/HCC)    • Hyperlipidemia    • Hypertension    • Mitral valve problem    • Polymyalgia rheumatica (CMS/HCC)    • Stroke (CMS/HCC)     short term memory loss   • Urinary incontinence        Assessment:  Active Hospital Problems    Diagnosis  POA   • **Compression fracture of T1 -T2 vertebra (CMS/HCC) [S22.010A]  Unknown   • DM (diabetes mellitus) (CMS/HCC) [E11.9]  Unknown   • HTN (hypertension) [I10]  Unknown   • Fall [W19.XXXA]  Unknown   • Contusion of scalp [S00.03XA]  Unknown   • Closed head injury [S09.90XA]  Unknown   • Asthma [J45.909]  Unknown   • Depression [F32.9]  Unknown      Resolved Hospital Problems   No resolved problems to display.       Plan:  Await neurosurgery consult.    Will Holloway MD  10/19/2019  11:14 AM

## 2019-10-19 NOTE — CONSULTS
Patient Care Team:  Angela Doherty MD as PCP - General (Internal Medicine)    Chief complaint fall    Subjective .     History of present illness: This patient apparently had a fall yesterday.  After that she had some pain in the back of her head where she hit her head but no other pain.  Since that time the pain in her head is gone away and the goose egg is gone away as well.  She has no pain in her neck and no pain in her upper thoracic spine.  She has no other neurologic symptoms.    Review of Systems  All systems were reviewed and negative except for:  Cardiovascular: positive for  irregular pulse    History  Past Medical History:   Diagnosis Date   • Asthma    • Contusion     small on scalp. skin intact. D/T fall on 10/18/2019   • Depression    • Diabetes mellitus (CMS/HCC)    • Hyperlipidemia    • Hypertension    • Mitral valve problem    • Polymyalgia rheumatica (CMS/HCC)    • Stroke (CMS/HCC)     short term memory loss   • Urinary incontinence    ,   Past Surgical History:   Procedure Laterality Date   • ADENOIDECTOMY     • APPENDECTOMY     • BREAST SURGERY      ( L4-L5)   • COLON SURGERY      colon resection   • HYSTERECTOMY     • OOPHORECTOMY     • TONSILLECTOMY     , History reviewed. No pertinent family history.,   Social History     Tobacco Use   • Smoking status: Never Smoker   • Smokeless tobacco: Never Used   Substance Use Topics   • Alcohol use: No     Frequency: Never   • Drug use: No   ,   Medications Prior to Admission   Medication Sig Dispense Refill Last Dose   • aspirin 81 MG chewable tablet Chew 81 mg Daily.      • atorvastatin (LIPITOR) 20 MG tablet Take 20 mg by mouth Daily.      • Calcium Carb-Cholecalciferol (CALCIUM + D3 PO) Take 5,000 Units by mouth.      • dilTIAZem LA (CARDIZEM LA) 240 MG 24 hr tablet Take 240 mg by mouth Daily.      • DULoxetine (CYMBALTA) 30 MG capsule Take 30 mg by mouth Daily.      • famotidine (PEPCID) 20 MG tablet Take 20 mg by mouth 2 (Two) Times a Day.       • glimepiride (AMARYL) 4 MG tablet Take 8 mg by mouth Every Morning Before Breakfast.   10/18/2019 at Unknown time   • hydroCHLOROthiazide (HYDRODIURIL) 12.5 MG tablet Take 12.5 mg by mouth 3 (Three) Times a Week.      • lisinopril (PRINIVIL,ZESTRIL) 20 MG tablet Take 20 mg by mouth Daily.      • metFORMIN (GLUCOPHAGE) 500 MG tablet Take 500 mg by mouth Daily With Breakfast.      • metoprolol succinate XL (TOPROL-XL) 25 MG 24 hr tablet Take 25 mg by mouth Daily.      • Multiple Vitamins-Minerals (MULTIVITAMIN ADULT PO) Take  by mouth Daily.      • polyethylene glycol (MIRALAX) packet Take 17 g by mouth Daily As Needed.      • potassium chloride (MICRO-K) 10 MEQ CR capsule Take 10 mEq by mouth 3 (Three) Times a Week.      • predniSONE (DELTASONE) 1 MG tablet Take 1 mg by mouth Daily.      • SITagliptin (JANUVIA) 100 MG tablet Take 100 mg by mouth Daily.       and Allergies:  Patient has no known allergies.    Objective     Vital Signs   Temp:  [97.8 °F (36.6 °C)-99 °F (37.2 °C)] 97.8 °F (36.6 °C)  Heart Rate:  [100-116] 116  Resp:  [16-18] 18  BP: ()/() 97/54    Physical Exam:   Physical Exam   Constitutional: She is oriented to person, place, and time. She appears well-developed and well-nourished.   HENT:   Head: Normocephalic.   Eyes: EOM are normal. Pupils are equal, round, and reactive to light.   Neck: Normal range of motion.   Cardiovascular: Normal rate.   Pulmonary/Chest: Effort normal.   Abdominal: Soft.   Musculoskeletal: Normal range of motion.   Neurological: She is alert and oriented to person, place, and time. She displays normal reflexes. No cranial nerve deficit or sensory deficit. She exhibits normal muscle tone. Coordination normal.   Skin: Skin is warm and dry.   Psychiatric: She has a normal mood and affect.        Neurologic Exam     Mental Status   Oriented to person, place, and time.     Cranial Nerves     CN III, IV, VI   Pupils are equal, round, and reactive to  light.  Extraocular motions are normal.       Results Review:   I reviewed the patient's new clinical results.  I reviewed her CT of the head done at [facility] and another one done here this morning.  Both of these show no evidence of intracranial problems.  According the report from the ER doctor she had a compression fracture of T1 and T2 but they did not send those films.      Assessment/Plan       Compression fracture of T1 -T2 vertebra (CMS/HCC)    DM (diabetes mellitus) (CMS/HCC)    HTN (hypertension)    Fall    Contusion of scalp    Closed head injury    Asthma    Depression      I told the patient we should go ahead and check an MRI of her thoracic spine.  If that looks okay then we can probably just let her go.  I did debate on whether to do the MRI since she is completely asymptomatic but I think we need to be sure there is not some evidence of another process going on there.    I discussed the patients findings and my recommendations with patient and family    Jose Luis Torres MD  10/19/19  1:45 PM

## 2019-10-19 NOTE — H&P
"Internal medicine history and physical  INTERNAL MEDICINE   [facility]       Patient Identification:  Name: Tita Jiménez  Age: 84 y.o.  Sex: female  :  1935  MRN: [id number]                   Primary Care Physician: Angela Doherty MD                                   Chief Complaint: Transferred from [facility] emergency room for evaluation of T1 and T2 compression fracture noted in the imaging studies as a part of evaluation of the fall earlier today.    History of Present Illness:   Patient is a 84-year-old female who resides in the assisted care facility has history of hypertension, diabetes mellitus, previous CVA, history of polymyalgia rheumatica with episodes of neck discomfort and muscle spasms as well as history of asthma depression and mitral valve problems was in her usual state of health until 930 or so this morning when while combing her hair and changing her clothes she lost her balance and fell through the bathroom door on the floor and hit the back of her head and developed a goose egg.  \"It hurt like hell\" as she said at that time.  She did not lose consciousness and remembers the whole sequence of events.  EMS was called because she thinks that there is a rule at [facility] that if you hit your head you need to go to the emergency room.  In the emergency room patient was extensively evaluated and no specific intracranial acute abnormalities noted.  As a part of the fall protocol evaluation and history of headache and neck pain because of polymyalgia rheumatica patient had a CT scan of the C-spine performed which revealed T1 and T2 vertebral compression fracture of acute or subacute type.  Patient ironically does not have any upper back and root of the neck pain.  She is able to move her arm and legs and turn her neck either way without any problem.  She is in fact questioning why she is being referred to this  hospital instead of being sent back to [facility] as she " "does not think that she has any issues or problems at this time.  Patient denies any specific weakness of arm or legs.  And she thinks that the \"goose egg\" that she had on the back of her head after the fall earlier today is also improving.      Past Medical History:  No past medical history on file.  Past Surgical History:  No past surgical history on file.   Home Meds:  No medications prior to admission.     Current Meds:   No current facility-administered medications for this encounter.   Allergies:  Allergies not on file  Social History:   Social History     Tobacco Use   • Smoking status: Not on file   Substance Use Topics   • Alcohol use: Not on file      Family History:  No family history on file.       Review of Systems  See history of present illness and past medical history.   Constitutional: Remarkable for no fever or chills  Cardiovascular: Remarkable for no chest pain or shortness of breath  GI: Remarkable for no nausea vomiting or diarrhea  : Remarkable for no burning in urination frequency urgency  Muscular skeletal: Remarkable for she has polymyalgia rheumatica and she has chronic headache and neck pain that comes and go.  Neurological: Remarkable for headache and discomfort in the back of her left side of the head after the fall when she developed a knot/goose egg earlier today.  She denies any focal weakness of arm or legs and she denies any incontinence..      Vitals:   /100 (BP Location: Left arm, Patient Position: Lying)   Pulse 100   Temp 98.2 °F (36.8 °C) (Oral)   Resp 18   Wt 77.3 kg (170 lb 6.4 oz)   SpO2 94%   I/O: No intake or output data in the 24 hours ending 10/18/19 5777  Exam:  General Appearance:    Alert, cooperative, no distress, appears stated age does not appear to be toxic or septic.   Head:    Normocephalic, without obvious abnormality, mild bruising and tenderness in the left side of the occipital parietal area with no skin breakdown noted.   Eyes:    PERRL, " conjunctiva/corneas clear, EOM's intact, both eyes   Ears:    Normal external ear canals, both ears   Nose:   Nares normal, septum midline, mucosa normal, no drainage    or sinus tenderness   Throat:   Lips, tongue, gums normal; oral mucosa pink and moist   Neck:   Supple, nontender symmetrical, trachea midline, no adenopathy; preserved range of motion    thyroid:  no enlargement/tenderness/nodules; no carotid    bruit or JVD   Back:     Symmetric, no curvature, ROM normal, no CVA tenderness   Lungs:     Clear to auscultation bilaterally, respirations unlabored   Chest Wall:    No tenderness or deformity    Heart:    Regular rate and rhythm, S1 and S2 normal, no murmur, rub   or gallop   Abdomen:     Soft, non-tender, bowel sounds active all four quadrants,     no masses, no hepatomegaly, no splenomegaly   Extremities:   Extremities normal, atraumatic, no cyanosis or edema   Pulses:   Pulses palpable in all extremities; symmetric all extremities   Skin:   Skin color normal, Skin is warm and dry,  no rashes or palpable lesions   Neurologic:   CNII-XII intact, motor strength grossly intact, sensation grossly intact to light touch, no focal deficits noted       Data Review:      I reviewed the patient's new clinical results.    CT cervical spine shows acute to subacute mild compression fracture deformities at the superior endplates of T1 and T2 vertebral bodies there is no retroperitoneal of the fracture fragments.  There is no malalignment.  No evidence of acute fracture or subluxation of the cervical spine noted no evidence for significant acute soft tissue abnormality noted multilevel cervical spondylosis and degenerative uncovertebral joint changes are noted.  CT scan of the head did not show any acute intracranial abnormality moderate nonspecific white matter changes bilaterally due to chronic hemic changes.  Changes.        Assessment:  Active Hospital Problems    Diagnosis POA   • **Compression fracture of T1  -T2 vertebra (CMS/HCC) [S22.010A] Unknown   • DM (diabetes mellitus) (CMS/HCC) [E11.9] Unknown   • HTN (hypertension) [I10] Unknown   • Fall [W19.XXXA] Unknown   • Contusion of scalp [S00.03XA] Unknown   • Closed head injury [S09.90XA] Unknown   • Asthma [J45.909] Unknown   • Depression [F32.9] Unknown       Medical decision making:  Fall resulting in:   -Contusion of the scalp with closed head injury without loss of consciousness-initial imaging studies did not show any intracranial abnormalities except for soft tissue contusion in the left occipital area   -Possible nonsymptomatic T1-T2 endplate compression fracture of indeterminate acuity  Plan is to admit the patient perform neuro checks neuro spine consultation as case was discussed with Dr. Torres by the ER physician at [facility].  Will defer to neurosurgery service based on their assessment if further imaging studies are needed.  In the interim provide the patient with fall precautions.  Provide her with symptomatic relief for pain.  Diabetes mellitus-continue with her home regimen provided with Accu-Cheks and sliding scale coverage.  Check hemoglobin A1c.  Hypertension-continue her antihypertensive regimen and avoid hypotensive episodes.  History of asthma-continue with her as needed nebulizer treatment and provided with continuous pulse ox.  Polymyalgia rheumatica continue her low-dose prednisone.  Anxiety and depression-monitor and continue her regimen..  Adams Nicole MD   10/18/2019  9:53 PM  Much of this encounter note is an electronic transcription/translation of spoken language to printed text. The electronic translation of spoken language may permit erroneous, or at times, nonsensical words or phrases to be inadvertently transcribed; Although I have reviewed the note for such errors, some may still exist

## 2019-10-20 VITALS
BODY MASS INDEX: 29.09 KG/M2 | OXYGEN SATURATION: 95 % | WEIGHT: 170.4 LBS | DIASTOLIC BLOOD PRESSURE: 70 MMHG | RESPIRATION RATE: 18 BRPM | TEMPERATURE: 98.3 F | HEIGHT: 64 IN | SYSTOLIC BLOOD PRESSURE: 125 MMHG | HEART RATE: 116 BPM

## 2019-10-20 LAB
GLUCOSE BLDC GLUCOMTR-MCNC: 214 MG/DL (ref 70–130)
GLUCOSE BLDC GLUCOMTR-MCNC: 224 MG/DL (ref 70–130)

## 2019-10-20 PROCEDURE — 82962 GLUCOSE BLOOD TEST: CPT

## 2019-10-20 PROCEDURE — G0378 HOSPITAL OBSERVATION PER HR: HCPCS

## 2019-10-20 PROCEDURE — 63710000001 PREDNISONE PER 5 MG: Performed by: HOSPITALIST

## 2019-10-20 PROCEDURE — 63710000001 INSULIN LISPRO (HUMAN) PER 5 UNITS: Performed by: INTERNAL MEDICINE

## 2019-10-20 PROCEDURE — 99212 OFFICE O/P EST SF 10 MIN: CPT | Performed by: NEUROLOGICAL SURGERY

## 2019-10-20 RX ORDER — FAMOTIDINE 20 MG/1
20 TABLET, FILM COATED ORAL
Status: DISCONTINUED | OUTPATIENT
Start: 2019-10-20 | End: 2019-10-20 | Stop reason: HOSPADM

## 2019-10-20 RX ORDER — METOPROLOL SUCCINATE 25 MG/1
25 TABLET, EXTENDED RELEASE ORAL DAILY
Status: DISCONTINUED | OUTPATIENT
Start: 2019-10-20 | End: 2019-10-20 | Stop reason: HOSPADM

## 2019-10-20 RX ORDER — ATORVASTATIN CALCIUM 20 MG/1
20 TABLET, FILM COATED ORAL DAILY
Status: DISCONTINUED | OUTPATIENT
Start: 2019-10-20 | End: 2019-10-20 | Stop reason: HOSPADM

## 2019-10-20 RX ORDER — LISINOPRIL 20 MG/1
20 TABLET ORAL DAILY
Status: DISCONTINUED | OUTPATIENT
Start: 2019-10-20 | End: 2019-10-20 | Stop reason: HOSPADM

## 2019-10-20 RX ORDER — GLIPIZIDE 10 MG/1
10 TABLET ORAL
Status: DISCONTINUED | OUTPATIENT
Start: 2019-10-20 | End: 2019-10-20 | Stop reason: HOSPADM

## 2019-10-20 RX ORDER — DULOXETIN HYDROCHLORIDE 30 MG/1
30 CAPSULE, DELAYED RELEASE ORAL DAILY
Status: DISCONTINUED | OUTPATIENT
Start: 2019-10-20 | End: 2019-10-20 | Stop reason: HOSPADM

## 2019-10-20 RX ORDER — PREDNISONE 1 MG/1
1 TABLET ORAL DAILY
Status: DISCONTINUED | OUTPATIENT
Start: 2019-10-20 | End: 2019-10-20 | Stop reason: HOSPADM

## 2019-10-20 RX ORDER — DILTIAZEM HYDROCHLORIDE 240 MG/1
240 CAPSULE, COATED, EXTENDED RELEASE ORAL
Status: DISCONTINUED | OUTPATIENT
Start: 2019-10-20 | End: 2019-10-20 | Stop reason: HOSPADM

## 2019-10-20 RX ORDER — POTASSIUM CHLORIDE 750 MG/1
10 CAPSULE, EXTENDED RELEASE ORAL 3 TIMES WEEKLY
Status: DISCONTINUED | OUTPATIENT
Start: 2019-10-21 | End: 2019-10-20 | Stop reason: HOSPADM

## 2019-10-20 RX ORDER — ASPIRIN 81 MG/1
81 TABLET, CHEWABLE ORAL DAILY
Status: DISCONTINUED | OUTPATIENT
Start: 2019-10-20 | End: 2019-10-20 | Stop reason: HOSPADM

## 2019-10-20 RX ADMIN — LINAGLIPTIN 5 MG: 5 TABLET, FILM COATED ORAL at 11:24

## 2019-10-20 RX ADMIN — LISINOPRIL 20 MG: 20 TABLET ORAL at 11:24

## 2019-10-20 RX ADMIN — GLIPIZIDE 10 MG: 10 TABLET ORAL at 11:24

## 2019-10-20 RX ADMIN — SODIUM CHLORIDE, PRESERVATIVE FREE 10 ML: 5 INJECTION INTRAVENOUS at 08:40

## 2019-10-20 RX ADMIN — DILTIAZEM HYDROCHLORIDE 240 MG: 240 CAPSULE, COATED, EXTENDED RELEASE ORAL at 11:24

## 2019-10-20 RX ADMIN — DULOXETINE HYDROCHLORIDE 30 MG: 30 CAPSULE, DELAYED RELEASE ORAL at 11:25

## 2019-10-20 RX ADMIN — METFORMIN HYDROCHLORIDE 500 MG: 500 TABLET ORAL at 11:23

## 2019-10-20 RX ADMIN — PREDNISONE 1 MG: 1 TABLET ORAL at 11:23

## 2019-10-20 RX ADMIN — ASPIRIN 81 MG: 81 TABLET, CHEWABLE ORAL at 09:50

## 2019-10-20 RX ADMIN — INSULIN LISPRO 4 UNITS: 100 INJECTION, SOLUTION INTRAVENOUS; SUBCUTANEOUS at 11:53

## 2019-10-20 RX ADMIN — FAMOTIDINE 20 MG: 20 TABLET, FILM COATED ORAL at 11:23

## 2019-10-20 RX ADMIN — METOPROLOL SUCCINATE 25 MG: 25 TABLET, FILM COATED, EXTENDED RELEASE ORAL at 11:23

## 2019-10-20 RX ADMIN — INSULIN LISPRO 4 UNITS: 100 INJECTION, SOLUTION INTRAVENOUS; SUBCUTANEOUS at 08:39

## 2019-10-20 RX ADMIN — ATORVASTATIN CALCIUM 20 MG: 20 TABLET, FILM COATED ORAL at 11:24

## 2019-10-20 NOTE — PLAN OF CARE
Problem: Patient Care Overview  Goal: Plan of Care Review  Outcome: Ongoing (interventions implemented as appropriate)   10/20/19 6741   Coping/Psychosocial   Plan of Care Reviewed With patient   Plan of Care Review   Progress improving   OTHER   Outcome Summary Pt VSS, denies pain. Pt has been sleepy most of shift. MRI shows no fracture. D/C likely in AM, will continue to monitor.      Goal: Individualization and Mutuality  Outcome: Ongoing (interventions implemented as appropriate)    Goal: Discharge Needs Assessment  Outcome: Ongoing (interventions implemented as appropriate)    Goal: Interprofessional Rounds/Family Conf  Outcome: Ongoing (interventions implemented as appropriate)      Problem: Fall Risk (Adult)  Goal: Identify Related Risk Factors and Signs and Symptoms  Outcome: Ongoing (interventions implemented as appropriate)

## 2019-10-20 NOTE — DISCHARGE SUMMARY
PHYSICIAN DISCHARGE SUMMARY                                                                        [facility]    Patient Identification:  Name: Tita Jiménez  Age: 84 y.o.  Sex: female  :  1935  MRN: [id number]  Primary Care Physician: Angela Doherty MD    Admit date: 10/18/2019  Discharge date and time:10/20/2019  Discharged Condition: good    Discharge Diagnoses:  Active Hospital Problems    Diagnosis  POA   • **Fall [W19.XXXA]  Unknown   • DM (diabetes mellitus) (CMS/HCC) [E11.9]  Unknown   • HTN (hypertension) [I10]  Unknown   • Contusion of scalp [S00.03XA]  Unknown   • Closed head injury [S09.90XA]  Unknown   • Asthma [J45.909]  Unknown   • Depression [F32.9]  Unknown      Resolved Hospital Problems   No resolved problems to display.          PMHX:   Past Medical History:   Diagnosis Date   • Asthma    • Contusion     small on scalp. skin intact. D/T fall on 10/18/2019   • Depression    • Diabetes mellitus (CMS/HCC)    • Hyperlipidemia    • Hypertension    • Mitral valve problem    • Polymyalgia rheumatica (CMS/HCC)    • Stroke (CMS/HCC)     short term memory loss   • Urinary incontinence      PSHX:   Past Surgical History:   Procedure Laterality Date   • ADENOIDECTOMY     • APPENDECTOMY     • BREAST SURGERY      ( L4-L5)   • COLON SURGERY      colon resection   • HYSTERECTOMY     • OOPHORECTOMY     • TONSILLECTOMY         Hospital Course: Tita Jiménez  is a 84-year-old female who resides in the assisted care facility has history of hypertension, diabetes mellitus, previous CVA, history of polymyalgia rheumatica with episodes of neck discomfort and muscle spasms as well as history of asthma depression and mitral valve problems was in her usual state of health until 930 or so this morning when while combing her hair and changing her clothes she lost her balance and fell through the bathroom door on the floor and hit  "the back of her head and developed a goose egg.  \"It hurt like hell\" as she said at that time.  She did not lose consciousness and remembers the whole sequence of events.  EMS was called because she thinks that there is a rule at [facility] that if you hit your head you need to go to the emergency room.  In the emergency room patient was extensively evaluated and no specific intracranial acute abnormalities noted.  As a part of the fall protocol evaluation and history of headache and neck pain because of polymyalgia rheumatica patient had a CT scan of the C-spine performed which revealed T1 and T2 vertebral compression fracture of acute or subacute type.  Patient ironically does not have any upper back and root of the neck pain.  She is able to move her arm and legs and turn her neck either way without any problem.  She is in fact questioning why she is being referred to this  hospital instead of being sent back to [facility] as she does not think that she has any issues or problems at this time.  Patient denies any specific weakness of arm or legs.  And she thinks that the \"goose egg\" that she had on the back of her head after the fall earlier today is also improving.         The patient was admitted to the hospital and seen by neurosurgery.  She was not having any back pain and neurosurgery did MRI of thoracic spine which did not show any evidence of any acute compression fracture.  The patient also had CT of head which was negative for any significant head injury.  The patient looked well enough to go back home and she will follow-up with her primary care.  She will continue with her usual home medicines.      Consults:     Consults     Date and Time Order Name Status Description    10/18/2019 2202 Inpatient Neurosurgery Consult Completed         Results from last 7 days   Lab Units 10/19/19  0444   WBC 10*3/mm3 6.74   HEMOGLOBIN g/dL 12.9   HEMATOCRIT % 37.7   PLATELETS 10*3/mm3 192     Results from last 7 days   Lab " Units 10/19/19  0444   SODIUM mmol/L 133*   POTASSIUM mmol/L 3.8   CHLORIDE mmol/L 96*   CO2 mmol/L 26.4   BUN mg/dL 12   CREATININE mg/dL 0.71   GLUCOSE mg/dL 161*   CALCIUM mg/dL 9.7     Significant Diagnostic Studies:   Lab Results   Component Value Date    WBC 6.74 10/19/2019    HGB 12.9 10/19/2019    HCT 37.7 10/19/2019     10/19/2019     Lab Results   Component Value Date     (L) 10/19/2019    K 3.8 10/19/2019    CL 96 (L) 10/19/2019    CO2 26.4 10/19/2019    BUN 12 10/19/2019    CREATININE 0.71 10/19/2019    GLUCOSE 161 (H) 10/19/2019     Lab Results   Component Value Date    CALCIUM 9.7 10/19/2019     Lab Results   Component Value Date    AST 16 10/19/2019    ALT 20 10/19/2019    ALKPHOS 98 10/19/2019     Lab Results   Component Value Date    INR 1.01 10/18/2019     No results found for: COLORU, CLARITYU, SPECGRAV, PHUR, PROTEINUR, GLUCOSEU, KETONESU, BLOODU, NITRITE, LEUKOCYTESUR, BILIRUBINUR, UROBILINOGEN, RBCUA, WBCUA, BACTERIA, UACOMMENT  No results found for: TROPONINT, TROPONINI, BNP  No components found for: HGBA1C;2  No components found for: TSH;2  Imaging Results (all)     Procedure Component Value Units Date/Time    MRI Thoracic Spine With & Without Contrast [[id number]] Collected:  10/19/19 1549     Updated:  10/19/19 1602    Narrative:       EXAMINATION: MRI THORACIC SPINE WITHOUT CONTRAST     HISTORY: 84-year-old female with a suspected thoracic spine fracture,  status post fall a couple of days ago with back pain.     TECHNIQUE: Multiplanar and multisequence images of the thoracic spine  were obtained pre and post intravenous administration of gadolinium.     COMPARISON: None.     FINDINGS: There is mild rounded kyphosis of the thoracic spine that is  not focal. There is preservation of normal vertebral body height at  every level of the thoracic spine. There is no retropulsion. A 1.3 cm  T11 vertebral body hemangioma is noted. No other abnormality of the  thoracic spinal cord is  appreciated.       Impression:       There is no evidence for an active or acute abnormality of  the thoracic spine. Specifically, there is no evidence for a fracture of  the thoracic spine. The T11 vertebral body 1.3 cm hemangioma is noted     This report was finalized on 10/19/2019 3:59 PM by Dr. Renny Cuellar M.D.       CT Head Without Contrast [[id number]] Collected:  10/19/19 0823     Updated:  10/19/19 0841    Narrative:       CT BRAIN WITHOUT CONTRAST     HISTORY: Recent fall with posterior head trauma. Headache.     TECHNIQUE/FINDINGS: The CT scan was performed through the brain without  contrast. There is moderate diffuse atrophy and chronic small vessel  ischemic change. There is localized scalp hematoma high in the left  posterior parietal region, but no underlying fracture is seen. The  overall appearance shows no change from yesterday's outside CT scan.  There is no evidence of intracranial hemorrhage or mass effect and the  visualized sinuses are clear.     Radiation dose reduction techniques were utilized, including automated  exposure control and exposure modulation based on body size.     This report was finalized on 10/19/2019 8:38 AM by Dr. Austin Perales M.D.           Lab Results (last 7 days)     Procedure Component Value Units Date/Time    POC Glucose Once [[id number]]  (Abnormal) Collected:  10/20/19 0729    Specimen:  Blood Updated:  10/20/19 0732     Glucose 214 mg/dL     POC Glucose Once [[id number]]  (Abnormal) Collected:  10/19/19 2038    Specimen:  Blood Updated:  10/19/19 2045     Glucose 194 mg/dL     POC Glucose Once [[id number]]  (Abnormal) Collected:  10/19/19 1647    Specimen:  Blood Updated:  10/19/19 1648     Glucose 163 mg/dL     POC Glucose Once [[id number]]  (Abnormal) Collected:  10/19/19 1102    Specimen:  Blood Updated:  10/19/19 1104     Glucose 225 mg/dL     POC Glucose Once [[id number]]  (Abnormal) Collected:  10/19/19 0742    Specimen:  Blood Updated:  10/19/19 0743      Glucose 177 mg/dL     Comprehensive Metabolic Panel [[id number]]  (Abnormal) Collected:  10/19/19 0444    Specimen:  Blood Updated:  10/19/19 0621     Glucose 161 mg/dL      BUN 12 mg/dL      Creatinine 0.71 mg/dL      Sodium 133 mmol/L      Potassium 3.8 mmol/L      Chloride 96 mmol/L      CO2 26.4 mmol/L      Calcium 9.7 mg/dL      Total Protein 6.2 g/dL      Albumin 3.50 g/dL      ALT (SGPT) 20 U/L      AST (SGOT) 16 U/L      Alkaline Phosphatase 98 U/L      Total Bilirubin 0.5 mg/dL      eGFR Non African Amer 79 mL/min/1.73      Globulin 2.7 gm/dL      A/G Ratio 1.3 g/dL      BUN/Creatinine Ratio 16.9     Anion Gap 10.6 mmol/L     Narrative:       GFR Normal >60  Chronic Kidney Disease <60  Kidney Failure <15    CK [[id number]]  (Normal) Collected:  10/19/19 0444    Specimen:  Blood Updated:  10/19/19 0621     Creatine Kinase 75 U/L     Lipid Panel [[id number]]  (Abnormal) Collected:  10/19/19 0444    Specimen:  Blood Updated:  10/19/19 0621     Total Cholesterol 176 mg/dL      Triglycerides 186 mg/dL      HDL Cholesterol 40 mg/dL      LDL Cholesterol  99 mg/dL      VLDL Cholesterol 37.2 mg/dL      LDL/HDL Ratio 2.47    Narrative:       Cholesterol Reference Ranges  (U.S. Department of Health and Human Services ATP III Classifications)    Desirable          <200 mg/dL  Borderline High    200-239 mg/dL  High Risk          >240 mg/dL      Triglyceride Reference Ranges  (U.S. Department of Health and Human Services ATP III Classifications)    Normal           <150 mg/dL  Borderline High  150-199 mg/dL  High             200-499 mg/dL  Very High        >500 mg/dL    HDL Reference Ranges  (U.S. Department of Health and Human Services ATP III Classifcations)    Low     <40 mg/dl (major risk factor for CHD)  High    >60 mg/dl ('negative' risk factor for CHD)        LDL Reference Ranges  (U.S. Department of Health and Human Services ATP III Classifcations)    Optimal          <100 mg/dL  Near Optimal     100-129  mg/dL  Borderline High  130-159 mg/dL  High             160-189 mg/dL  Very High        >189 mg/dL    CBC Auto Differential [[id number]]  (Abnormal) Collected:  10/19/19 0444    Specimen:  Blood Updated:  10/19/19 0541     WBC 6.74 10*3/mm3      RBC 4.32 10*6/mm3      Hemoglobin 12.9 g/dL      Hematocrit 37.7 %      MCV 87.3 fL      MCH 29.9 pg      MCHC 34.2 g/dL      RDW 13.0 %      RDW-SD 40.7 fl      MPV 9.6 fL      Platelets 192 10*3/mm3      Neutrophil % 65.0 %      Lymphocyte % 19.1 %      Monocyte % 12.8 %      Eosinophil % 1.9 %      Basophil % 0.6 %      Immature Grans % 0.6 %      Neutrophils, Absolute 4.38 10*3/mm3      Lymphocytes, Absolute 1.29 10*3/mm3      Monocytes, Absolute 0.86 10*3/mm3      Eosinophils, Absolute 0.13 10*3/mm3      Basophils, Absolute 0.04 10*3/mm3      Immature Grans, Absolute 0.04 10*3/mm3      nRBC 0.0 /100 WBC     Hemoglobin A1c [[id number]]  (Abnormal) Collected:  10/18/19 2242    Specimen:  Blood Updated:  10/18/19 2348     Hemoglobin A1C 6.90 %     Narrative:       Hemoglobin A1C Ranges:    Increased Risk for Diabetes  5.7% to 6.4%  Diabetes                     >= 6.5%  Diabetic Goal                < 7.0%    Comprehensive Metabolic Panel [[id number]]  (Abnormal) Collected:  10/18/19 2242    Specimen:  Blood Updated:  10/18/19 2319     Glucose 114 mg/dL      BUN 12 mg/dL      Creatinine 0.75 mg/dL      Sodium 139 mmol/L      Potassium 3.7 mmol/L      Chloride 101 mmol/L      CO2 26.0 mmol/L      Calcium 10.1 mg/dL      Total Protein 6.6 g/dL      Albumin 3.60 g/dL      ALT (SGPT) 22 U/L      AST (SGOT) 16 U/L      Alkaline Phosphatase 100 U/L      Total Bilirubin 0.6 mg/dL      eGFR Non African Amer 74 mL/min/1.73      Globulin 3.0 gm/dL      A/G Ratio 1.2 g/dL      BUN/Creatinine Ratio 16.0     Anion Gap 12.0 mmol/L     Narrative:       GFR Normal >60  Chronic Kidney Disease <60  Kidney Failure <15    Protime-INR [[id number]]  (Normal) Collected:  10/18/19 9170    Specimen:   "Blood Updated:  10/18/19 2307     Protime 13.0 Seconds      INR 1.01    CBC Auto Differential [[id number]]  (Abnormal) Collected:  10/18/19 2242    Specimen:  Blood Updated:  10/18/19 2304     WBC 7.60 10*3/mm3      RBC 4.35 10*6/mm3      Hemoglobin 13.1 g/dL      Hematocrit 37.8 %      MCV 86.9 fL      MCH 30.1 pg      MCHC 34.7 g/dL      RDW 13.2 %      RDW-SD 42.0 fl      MPV 9.5 fL      Platelets 195 10*3/mm3      Neutrophil % 61.1 %      Lymphocyte % 22.8 %      Monocyte % 13.9 %      Eosinophil % 1.3 %      Basophil % 0.5 %      Immature Grans % 0.4 %      Neutrophils, Absolute 4.64 10*3/mm3      Lymphocytes, Absolute 1.73 10*3/mm3      Monocytes, Absolute 1.06 10*3/mm3      Eosinophils, Absolute 0.10 10*3/mm3      Basophils, Absolute 0.04 10*3/mm3      Immature Grans, Absolute 0.03 10*3/mm3      nRBC 0.0 /100 WBC     POC Glucose Once [[id number]]  (Normal) Collected:  10/18/19 2245    Specimen:  Blood Updated:  10/18/19 2247     Glucose 116 mg/dL         /70   Pulse 116   Temp 98.3 °F (36.8 °C) (Oral)   Resp 18   Ht 162.6 cm (64\")   Wt 77.3 kg (170 lb 6.4 oz)   SpO2 95%   BMI 29.25 kg/m²     Discharge Exam:  General Appearance:    Alert, cooperative, no distress                          Head:    Normocephalic, without obvious abnormality, atraumatic                          Eyes:                            Throat:   Lips, tongue, gums normal                          Neck:   Supple, symmetrical, trachea midline, no JVD                        Lungs:     Clear to auscultation bilaterally, respirations unlabored                Chest Wall:    No tenderness or deformity                        Heart:    Regular rate and rhythm, S1 and S2 normal, no murmur,no  Rub or gallop                  Abdomen:     Soft, non-tender, bowel sounds active, no masses, no organomegaly                  Extremities:   Extremities normal, atraumatic, no cyanosis or edema                             Skin:   Skin is warm and " dry,  no rashes or palpable lesions                  Neurologic:   no focal deficits noted     Disposition:  Home    Patient Instructions:      Discharge Medications      Continue These Medications      Instructions Start Date   aspirin 81 MG chewable tablet   81 mg, Oral, Daily      atorvastatin 20 MG tablet  Commonly known as:  LIPITOR   20 mg, Oral, Daily      CALCIUM + D3 PO   5,000 Units, Oral      CARDIZEM  MG 24 hr tablet  Generic drug:  dilTIAZem LA   240 mg, Oral, Daily      DULoxetine 30 MG capsule  Commonly known as:  CYMBALTA   30 mg, Oral, Daily      famotidine 20 MG tablet  Commonly known as:  PEPCID   20 mg, Oral, 2 Times Daily      glimepiride 4 MG tablet  Commonly known as:  AMARYL   8 mg, Oral, Every Morning Before Breakfast      hydroCHLOROthiazide 12.5 MG tablet  Commonly known as:  HYDRODIURIL   12.5 mg, Oral, 3 Times Weekly      lisinopril 20 MG tablet  Commonly known as:  PRINIVIL,ZESTRIL   20 mg, Oral, Daily      metFORMIN 500 MG tablet  Commonly known as:  GLUCOPHAGE   500 mg, Oral, Daily With Breakfast      metoprolol succinate XL 25 MG 24 hr tablet  Commonly known as:  TOPROL-XL   25 mg, Oral, Daily      MULTIVITAMIN ADULT PO   Oral, Daily      polyethylene glycol packet  Commonly known as:  MIRALAX   17 g, Oral, Daily PRN      potassium chloride 10 MEQ CR capsule  Commonly known as:  MICRO-K   10 mEq, Oral, 3 Times Weekly      predniSONE 1 MG tablet  Commonly known as:  DELTASONE   1 mg, Oral, Daily      SITagliptin 100 MG tablet  Commonly known as:  JANUVIA   100 mg, Oral, Daily           No future appointments.  Follow-up Information     Angela Doherty MD Follow up in 1 week(s).    Specialty:  Internal Medicine  Contact information:  [address]  [address] [id number] [phone]                 Discharge Order (From admission, onward)    Start     Ordered    10/20/19 1148  Discharge patient  Once     Expected Discharge Date:  10/20/19    Discharge Disposition:   Home or Self Care    Physician of Record for Attribution - Please select from Treatment Team:  WILL HOLLOWAY [[id number]]    Review needed by CMO to determine Physician of Record:  No       Question Answer Comment   Physician of Record for Attribution - Please select from Treatment Team WILL HOLLOWAY    Review needed by CMO to determine Physician of Record No        10/20/19 1148          Total time spent discharging patient including evaluation,post hospitalization follow up,  medication and post hospitalization instructions and education total time exceeds 30 minutes.    Signed:  Will Holloway MD  10/20/2019  11:49 AM

## 2019-10-20 NOTE — PROGRESS NOTES
LOS: 1 day   Patient Care Team:  Angela Doherty MD as PCP - General (Internal Medicine)    Chief Complaint: Fall    Subjective     This patient continues with no complaints of pain.    Interval History:     History taken from: patient chart    Objective      She has good movement in all 4 extremities.    Vital Signs  Temp:  [97.5 °F (36.4 °C)-99.6 °F (37.6 °C)] 98.3 °F (36.8 °C)  Heart Rate:  [114-123] 117  Resp:  [16-18] 18  BP: ()/(54-94) 125/70       Results Review:     I reviewed the patient's new clinical results.  I reviewed her thoracic MRI.  This shows no evidence of fracture at all.      Assessment/Plan       Compression fracture of T1 -T2 vertebra (CMS/HCC)    DM (diabetes mellitus) (CMS/HCC)    HTN (hypertension)    Fall    Contusion of scalp    Closed head injury    Asthma    Depression      I told the patient about the imaging.  I told her that from my point of view she does not require any follow-up.  She did have some head trauma but no abnormalities on the CT of her head at all.  Consequently I think she can return to normal activities and just be careful about falling again.  She is in agreement with that plan.      Jose Luis Torres MD  10/20/19  11:16 AM

## 2019-10-21 NOTE — PROGRESS NOTES
Case Management Discharge Note    Final Note: Discharged home         Transportation Services  Private: Car    Final Discharge Disposition Code: 01 - home or self-care

## 2020-01-02 ENCOUNTER — HOSPITAL ENCOUNTER (OUTPATIENT)
Dept: LAB | Facility: HOSPITAL | Age: 85
Discharge: HOME | End: 2020-01-02
Attending: INTERNAL MEDICINE

## 2020-01-02 LAB
25(OH)D3 SERPL-MCNC: 52 NG/ML (ref 30–100)
ALBUMIN SERPL-MCNC: 3.9 G/DL (ref 3.5–5)
ALBUMIN/GLOB SERPL: 1.4 {RATIO} (ref 1.4–2.6)
ALP SERPL-CCNC: 124 U/L (ref 43–160)
ALT SERPL-CCNC: 25 U/L (ref 10–40)
ANION GAP SERPL CALC-SCNC: 18 MMOL/L (ref 8–19)
AST SERPL-CCNC: 23 U/L (ref 15–50)
BASOPHILS # BLD AUTO: 0.04 10*3/UL (ref 0–0.2)
BASOPHILS NFR BLD AUTO: 0.6 % (ref 0–3)
BILIRUB SERPL-MCNC: 0.35 MG/DL (ref 0.2–1.3)
BUN SERPL-MCNC: 12 MG/DL (ref 5–25)
BUN/CREAT SERPL: 16 {RATIO} (ref 6–20)
CALCIUM SERPL-MCNC: 9.9 MG/DL (ref 8.7–10.4)
CHLORIDE SERPL-SCNC: 99 MMOL/L (ref 99–111)
CHOLEST SERPL-MCNC: 182 MG/DL (ref 107–200)
CHOLEST/HDLC SERPL: 5.7 {RATIO} (ref 3–6)
CONV ABS IMM GRAN: 0.02 10*3/UL (ref 0–0.2)
CONV CO2: 24 MMOL/L (ref 22–32)
CONV IMMATURE GRAN: 0.3 % (ref 0–1.8)
CONV TOTAL PROTEIN: 6.6 G/DL (ref 6.3–8.2)
CREAT UR-MCNC: 0.75 MG/DL (ref 0.5–0.9)
CRP SERPL HS-MCNC: 0.92 MG/DL (ref 0–0.5)
DEPRECATED RDW RBC AUTO: 43 FL (ref 36.4–46.3)
EOSINOPHIL # BLD AUTO: 0.15 10*3/UL (ref 0–0.7)
EOSINOPHIL # BLD AUTO: 2.2 % (ref 0–7)
ERYTHROCYTE [DISTWIDTH] IN BLOOD BY AUTOMATED COUNT: 13.5 % (ref 11.7–14.4)
ERYTHROCYTE [SEDIMENTATION RATE] IN BLOOD: 16 MM/H (ref 0–30)
EST. AVERAGE GLUCOSE BLD GHB EST-MCNC: 154 MG/DL
FOLATE SERPL-MCNC: >20 NG/ML (ref 4.8–20)
GFR SERPLBLD BASED ON 1.73 SQ M-ARVRAT: >60 ML/MIN/{1.73_M2}
GLOBULIN UR ELPH-MCNC: 2.7 G/DL (ref 2–3.5)
GLUCOSE SERPL-MCNC: 179 MG/DL (ref 65–99)
HBA1C MFR BLD: 7 % (ref 3.5–5.7)
HCT VFR BLD AUTO: 40.1 % (ref 37–47)
HDLC SERPL-MCNC: 32 MG/DL (ref 40–60)
HGB BLD-MCNC: 13.5 G/DL (ref 12–16)
LDLC SERPL CALC-MCNC: 108 MG/DL (ref 70–100)
LYMPHOCYTES # BLD AUTO: 1.53 10*3/UL (ref 1–5)
LYMPHOCYTES NFR BLD AUTO: 22.4 % (ref 20–45)
MAGNESIUM SERPL-MCNC: 1.76 MG/DL (ref 1.6–2.3)
MCH RBC QN AUTO: 29.2 PG (ref 27–31)
MCHC RBC AUTO-ENTMCNC: 33.7 G/DL (ref 33–37)
MCV RBC AUTO: 86.8 FL (ref 81–99)
MONOCYTES # BLD AUTO: 0.78 10*3/UL (ref 0.2–1.2)
MONOCYTES NFR BLD AUTO: 11.4 % (ref 3–10)
NEUTROPHILS # BLD AUTO: 4.31 10*3/UL (ref 2–8)
NEUTROPHILS NFR BLD AUTO: 63.1 % (ref 30–85)
NRBC CBCN: 0 % (ref 0–0.7)
OSMOLALITY SERPL CALC.SUM OF ELEC: 288 MOSM/KG (ref 273–304)
PLATELET # BLD AUTO: 269 10*3/UL (ref 130–400)
PMV BLD AUTO: 10 FL (ref 9.4–12.3)
POTASSIUM SERPL-SCNC: 4.2 MMOL/L (ref 3.5–5.3)
RBC # BLD AUTO: 4.62 10*6/UL (ref 4.2–5.4)
SODIUM SERPL-SCNC: 137 MMOL/L (ref 135–147)
T4 FREE SERPL-MCNC: 1.2 NG/DL (ref 0.9–1.8)
TRIGL SERPL-MCNC: 209 MG/DL (ref 40–150)
TSH SERPL-ACNC: 4.43 M[IU]/L (ref 0.27–4.2)
VIT B12 SERPL-MCNC: 645 PG/ML (ref 211–911)
VLDLC SERPL-MCNC: 42 MG/DL (ref 5–37)
WBC # BLD AUTO: 6.83 10*3/UL (ref 4.8–10.8)

## 2020-07-10 ENCOUNTER — HOSPITAL ENCOUNTER (OUTPATIENT)
Dept: LAB | Facility: HOSPITAL | Age: 85
Discharge: HOME | End: 2020-07-10
Attending: INTERNAL MEDICINE

## 2020-07-10 LAB
25(OH)D3 SERPL-MCNC: 56.7 NG/ML (ref 30–100)
ALBUMIN SERPL-MCNC: 3.9 G/DL (ref 3.5–5)
ALBUMIN/GLOB SERPL: 1.4 {RATIO} (ref 1.4–2.6)
ALP SERPL-CCNC: 139 U/L (ref 43–160)
ALT SERPL-CCNC: 24 U/L (ref 10–40)
ANION GAP SERPL CALC-SCNC: 18 MMOL/L (ref 8–19)
APPEARANCE UR: ABNORMAL
AST SERPL-CCNC: 16 U/L (ref 15–50)
BASOPHILS # BLD AUTO: 0.03 10*3/UL (ref 0–0.2)
BASOPHILS NFR BLD AUTO: 0.4 % (ref 0–3)
BILIRUB SERPL-MCNC: 0.58 MG/DL (ref 0.2–1.3)
BILIRUB UR QL: NEGATIVE
BUN SERPL-MCNC: 15 MG/DL (ref 5–25)
BUN/CREAT SERPL: 19 {RATIO} (ref 6–20)
CALCIUM SERPL-MCNC: 10.4 MG/DL (ref 8.7–10.4)
CHLORIDE SERPL-SCNC: 104 MMOL/L (ref 99–111)
CHOLEST SERPL-MCNC: 180 MG/DL (ref 107–200)
CHOLEST/HDLC SERPL: 4.7 {RATIO} (ref 3–6)
COLOR UR: YELLOW
CONV ABS IMM GRAN: 0.02 10*3/UL (ref 0–0.2)
CONV BACTERIA: ABNORMAL
CONV CO2: 21 MMOL/L (ref 22–32)
CONV COLLECTION SOURCE (UA): ABNORMAL
CONV CREATININE URINE, RANDOM: 110.9 MG/DL (ref 10–300)
CONV CRYSTALS: ABNORMAL /[HPF]
CONV IMMATURE GRAN: 0.3 % (ref 0–1.8)
CONV MICROALBUM.,U,RANDOM: 45.3 MG/L (ref 0–20)
CONV TOTAL PROTEIN: 6.6 G/DL (ref 6.3–8.2)
CONV UROBILINOGEN IN URINE BY AUTOMATED TEST STRIP: 1 {EHRLICHU}/DL (ref 0.1–1)
CREAT UR-MCNC: 0.81 MG/DL (ref 0.5–0.9)
CRP SERPL HS-MCNC: 1.28 MG/DL (ref 0–0.5)
DEPRECATED RDW RBC AUTO: 46.8 FL (ref 36.4–46.3)
EOSINOPHIL # BLD AUTO: 0.14 10*3/UL (ref 0–0.7)
EOSINOPHIL # BLD AUTO: 2.1 % (ref 0–7)
ERYTHROCYTE [DISTWIDTH] IN BLOOD BY AUTOMATED COUNT: 15 % (ref 11.7–14.4)
ERYTHROCYTE [SEDIMENTATION RATE] IN BLOOD: 15 MM/H (ref 0–30)
EST. AVERAGE GLUCOSE BLD GHB EST-MCNC: 177 MG/DL
FOLATE SERPL-MCNC: >20 NG/ML (ref 4.8–20)
GFR SERPLBLD BASED ON 1.73 SQ M-ARVRAT: >60 ML/MIN/{1.73_M2}
GLOBULIN UR ELPH-MCNC: 2.7 G/DL (ref 2–3.5)
GLUCOSE SERPL-MCNC: 197 MG/DL (ref 65–99)
GLUCOSE UR QL: NEGATIVE MG/DL
HBA1C MFR BLD: 7.8 % (ref 3.5–5.7)
HCT VFR BLD AUTO: 42.2 % (ref 37–47)
HDLC SERPL-MCNC: 38 MG/DL (ref 40–60)
HGB BLD-MCNC: 13.4 G/DL (ref 12–16)
HGB UR QL STRIP: NEGATIVE
KETONES UR QL STRIP: NEGATIVE MG/DL
LDLC SERPL CALC-MCNC: 99 MG/DL (ref 70–100)
LEUKOCYTE ESTERASE UR QL STRIP: ABNORMAL
LYMPHOCYTES # BLD AUTO: 1.33 10*3/UL (ref 1–5)
LYMPHOCYTES NFR BLD AUTO: 19.7 % (ref 20–45)
MAGNESIUM SERPL-MCNC: 1.79 MG/DL (ref 1.6–2.3)
MCH RBC QN AUTO: 27.2 PG (ref 27–31)
MCHC RBC AUTO-ENTMCNC: 31.8 G/DL (ref 33–37)
MCV RBC AUTO: 85.8 FL (ref 81–99)
MICROALBUMIN/CREAT UR: 40.8 MG/G{CRE} (ref 0–35)
MONOCYTES # BLD AUTO: 0.83 10*3/UL (ref 0.2–1.2)
MONOCYTES NFR BLD AUTO: 12.3 % (ref 3–10)
NEUTROPHILS # BLD AUTO: 4.39 10*3/UL (ref 2–8)
NEUTROPHILS NFR BLD AUTO: 65.2 % (ref 30–85)
NITRITE UR QL STRIP: NEGATIVE
NRBC CBCN: 0 % (ref 0–0.7)
OSMOLALITY SERPL CALC.SUM OF ELEC: 294 MOSM/KG (ref 273–304)
PH UR STRIP.AUTO: 6 [PH] (ref 5–8)
PLATELET # BLD AUTO: 243 10*3/UL (ref 130–400)
PMV BLD AUTO: 10.4 FL (ref 9.4–12.3)
POTASSIUM SERPL-SCNC: 4.2 MMOL/L (ref 3.5–5.3)
PROT UR QL: NEGATIVE MG/DL
RBC # BLD AUTO: 4.92 10*6/UL (ref 4.2–5.4)
RBC #/AREA URNS HPF: ABNORMAL /[HPF]
SODIUM SERPL-SCNC: 139 MMOL/L (ref 135–147)
SP GR UR: 1.02 (ref 1–1.03)
TRIGL SERPL-MCNC: 217 MG/DL (ref 40–150)
VIT B12 SERPL-MCNC: 769 PG/ML (ref 211–911)
VLDLC SERPL-MCNC: 43 MG/DL (ref 5–37)
WBC # BLD AUTO: 6.74 10*3/UL (ref 4.8–10.8)
WBC #/AREA URNS HPF: ABNORMAL /[HPF]

## 2020-10-26 ENCOUNTER — HOSPITAL ENCOUNTER (OUTPATIENT)
Dept: LAB | Facility: HOSPITAL | Age: 85
Discharge: HOME | End: 2020-10-26
Attending: INTERNAL MEDICINE

## 2020-10-26 LAB
ALBUMIN SERPL-MCNC: 3.8 G/DL (ref 3.5–5)
ALBUMIN/GLOB SERPL: 1.4 {RATIO} (ref 1.4–2.6)
ALP SERPL-CCNC: 140 U/L (ref 43–160)
ALT SERPL-CCNC: 17 U/L (ref 10–40)
ANION GAP SERPL CALC-SCNC: 17 MMOL/L (ref 8–19)
AST SERPL-CCNC: 31 U/L (ref 15–50)
BASOPHILS # BLD AUTO: 0.04 10*3/UL (ref 0–0.2)
BASOPHILS NFR BLD AUTO: 0.6 % (ref 0–3)
BILIRUB SERPL-MCNC: 0.45 MG/DL (ref 0.2–1.3)
BUN SERPL-MCNC: 15 MG/DL (ref 5–25)
BUN/CREAT SERPL: 17 {RATIO} (ref 6–20)
CALCIUM SERPL-MCNC: 10 MG/DL (ref 8.7–10.4)
CHLORIDE SERPL-SCNC: 101 MMOL/L (ref 99–111)
CHOLEST SERPL-MCNC: 178 MG/DL (ref 107–200)
CHOLEST/HDLC SERPL: 4.6 {RATIO} (ref 3–6)
CONV ABS IMM GRAN: 0.02 10*3/UL (ref 0–0.2)
CONV CO2: 24 MMOL/L (ref 22–32)
CONV IMMATURE GRAN: 0.3 % (ref 0–1.8)
CONV TOTAL PROTEIN: 6.6 G/DL (ref 6.3–8.2)
CREAT UR-MCNC: 0.87 MG/DL (ref 0.5–0.9)
CRP SERPL HS-MCNC: 1.16 MG/DL (ref 0–0.5)
DEPRECATED RDW RBC AUTO: 44.9 FL (ref 36.4–46.3)
EOSINOPHIL # BLD AUTO: 0.12 10*3/UL (ref 0–0.7)
EOSINOPHIL # BLD AUTO: 1.9 % (ref 0–7)
ERYTHROCYTE [DISTWIDTH] IN BLOOD BY AUTOMATED COUNT: 14.1 % (ref 11.7–14.4)
ERYTHROCYTE [SEDIMENTATION RATE] IN BLOOD: 13 MM/H (ref 0–30)
EST. AVERAGE GLUCOSE BLD GHB EST-MCNC: 171 MG/DL
GFR SERPLBLD BASED ON 1.73 SQ M-ARVRAT: >60 ML/MIN/{1.73_M2}
GLOBULIN UR ELPH-MCNC: 2.8 G/DL (ref 2–3.5)
GLUCOSE SERPL-MCNC: 210 MG/DL (ref 65–99)
HBA1C MFR BLD: 7.6 % (ref 3.5–5.7)
HCT VFR BLD AUTO: 42.5 % (ref 37–47)
HDLC SERPL-MCNC: 39 MG/DL (ref 40–60)
HGB BLD-MCNC: 13.4 G/DL (ref 12–16)
LDLC SERPL CALC-MCNC: 104 MG/DL (ref 70–100)
LYMPHOCYTES # BLD AUTO: 1.41 10*3/UL (ref 1–5)
LYMPHOCYTES NFR BLD AUTO: 22 % (ref 20–45)
MCH RBC QN AUTO: 27.6 PG (ref 27–31)
MCHC RBC AUTO-ENTMCNC: 31.5 G/DL (ref 33–37)
MCV RBC AUTO: 87.6 FL (ref 81–99)
MONOCYTES # BLD AUTO: 0.81 10*3/UL (ref 0.2–1.2)
MONOCYTES NFR BLD AUTO: 12.7 % (ref 3–10)
NEUTROPHILS # BLD AUTO: 4 10*3/UL (ref 2–8)
NEUTROPHILS NFR BLD AUTO: 62.5 % (ref 30–85)
NRBC CBCN: 0 % (ref 0–0.7)
OSMOLALITY SERPL CALC.SUM OF ELEC: 291 MOSM/KG (ref 273–304)
PLATELET # BLD AUTO: 256 10*3/UL (ref 130–400)
PMV BLD AUTO: 10.3 FL (ref 9.4–12.3)
POTASSIUM SERPL-SCNC: 4.5 MMOL/L (ref 3.5–5.3)
RBC # BLD AUTO: 4.85 10*6/UL (ref 4.2–5.4)
SODIUM SERPL-SCNC: 137 MMOL/L (ref 135–147)
TRIGL SERPL-MCNC: 174 MG/DL (ref 40–150)
VLDLC SERPL-MCNC: 35 MG/DL (ref 5–37)
WBC # BLD AUTO: 6.4 10*3/UL (ref 4.8–10.8)

## 2020-10-27 LAB
FOLATE SERPL-MCNC: >20 NG/ML (ref 4.8–20)
VIT B12 SERPL-MCNC: 772 PG/ML (ref 211–911)

## 2021-03-11 ENCOUNTER — HOSPITAL ENCOUNTER (OUTPATIENT)
Dept: LAB | Facility: HOSPITAL | Age: 86
Discharge: HOME | End: 2021-03-11
Attending: INTERNAL MEDICINE

## 2021-03-11 LAB
25(OH)D3 SERPL-MCNC: 55.8 NG/ML (ref 30–100)
ALBUMIN SERPL-MCNC: 3.7 G/DL (ref 3.5–5)
ALBUMIN/GLOB SERPL: 1.4 {RATIO} (ref 1.4–2.6)
ALP SERPL-CCNC: 119 U/L (ref 43–160)
ALT SERPL-CCNC: 17 U/L (ref 10–40)
ANION GAP SERPL CALC-SCNC: 15 MMOL/L (ref 8–19)
AST SERPL-CCNC: 14 U/L (ref 15–50)
BASOPHILS # BLD AUTO: 0.04 10*3/UL (ref 0–0.2)
BASOPHILS NFR BLD AUTO: 0.7 % (ref 0–3)
BILIRUB SERPL-MCNC: 0.44 MG/DL (ref 0.2–1.3)
BUN SERPL-MCNC: 15 MG/DL (ref 5–25)
BUN/CREAT SERPL: 19 {RATIO} (ref 6–20)
CALCIUM SERPL-MCNC: 9.9 MG/DL (ref 8.7–10.4)
CHLORIDE SERPL-SCNC: 103 MMOL/L (ref 99–111)
CHOLEST SERPL-MCNC: 171 MG/DL (ref 107–200)
CHOLEST/HDLC SERPL: 4.2 {RATIO} (ref 3–6)
CONV ABS IMM GRAN: 0.02 10*3/UL (ref 0–0.2)
CONV CO2: 24 MMOL/L (ref 22–32)
CONV IMMATURE GRAN: 0.4 % (ref 0–1.8)
CONV TOTAL PROTEIN: 6.3 G/DL (ref 6.3–8.2)
CREAT UR-MCNC: 0.77 MG/DL (ref 0.5–0.9)
CRP SERPL HS-MCNC: 0.56 MG/DL (ref 0–0.5)
DEPRECATED RDW RBC AUTO: 44.6 FL (ref 36.4–46.3)
EOSINOPHIL # BLD AUTO: 0.11 10*3/UL (ref 0–0.7)
EOSINOPHIL # BLD AUTO: 2 % (ref 0–7)
ERYTHROCYTE [DISTWIDTH] IN BLOOD BY AUTOMATED COUNT: 13.7 % (ref 11.7–14.4)
EST. AVERAGE GLUCOSE BLD GHB EST-MCNC: 157 MG/DL
GFR SERPLBLD BASED ON 1.73 SQ M-ARVRAT: >60 ML/MIN/{1.73_M2}
GLOBULIN UR ELPH-MCNC: 2.6 G/DL (ref 2–3.5)
GLUCOSE SERPL-MCNC: 172 MG/DL (ref 65–99)
HBA1C MFR BLD: 7.1 % (ref 3.5–5.7)
HCT VFR BLD AUTO: 41.7 % (ref 37–47)
HDLC SERPL-MCNC: 41 MG/DL (ref 40–60)
HGB BLD-MCNC: 13.2 G/DL (ref 12–16)
LDLC SERPL CALC-MCNC: 92 MG/DL (ref 70–100)
LYMPHOCYTES # BLD AUTO: 1.33 10*3/UL (ref 1–5)
LYMPHOCYTES NFR BLD AUTO: 24.6 % (ref 20–45)
MAGNESIUM SERPL-MCNC: 1.81 MG/DL (ref 1.6–2.3)
MCH RBC QN AUTO: 28 PG (ref 27–31)
MCHC RBC AUTO-ENTMCNC: 31.7 G/DL (ref 33–37)
MCV RBC AUTO: 88.3 FL (ref 81–99)
MONOCYTES # BLD AUTO: 0.59 10*3/UL (ref 0.2–1.2)
MONOCYTES NFR BLD AUTO: 10.9 % (ref 3–10)
NEUTROPHILS # BLD AUTO: 3.31 10*3/UL (ref 2–8)
NEUTROPHILS NFR BLD AUTO: 61.4 % (ref 30–85)
NRBC CBCN: 0 % (ref 0–0.7)
OSMOLALITY SERPL CALC.SUM OF ELEC: 291 MOSM/KG (ref 273–304)
PLATELET # BLD AUTO: 242 10*3/UL (ref 130–400)
PMV BLD AUTO: 10.2 FL (ref 9.4–12.3)
POTASSIUM SERPL-SCNC: 4.3 MMOL/L (ref 3.5–5.3)
RBC # BLD AUTO: 4.72 10*6/UL (ref 4.2–5.4)
SODIUM SERPL-SCNC: 138 MMOL/L (ref 135–147)
T4 FREE SERPL-MCNC: 1.1 NG/DL (ref 0.9–1.8)
TRIGL SERPL-MCNC: 190 MG/DL (ref 40–150)
TSH SERPL-ACNC: 4.78 M[IU]/L (ref 0.27–4.2)
VLDLC SERPL-MCNC: 38 MG/DL (ref 5–37)
WBC # BLD AUTO: 5.4 10*3/UL (ref 4.8–10.8)

## 2021-09-15 RX ORDER — LISINOPRIL 10 MG/1
TABLET ORAL
Qty: 30 TABLET | Refills: 6 | Status: SHIPPED | OUTPATIENT
Start: 2021-09-15 | End: 2022-03-07 | Stop reason: SDUPTHER

## 2021-09-17 ENCOUNTER — LAB (OUTPATIENT)
Dept: LAB | Facility: HOSPITAL | Age: 86
End: 2021-09-17

## 2021-09-17 ENCOUNTER — TRANSCRIBE ORDERS (OUTPATIENT)
Dept: LAB | Facility: HOSPITAL | Age: 86
End: 2021-09-17

## 2021-09-17 DIAGNOSIS — R63.5 ABNORMAL WEIGHT GAIN: ICD-10-CM

## 2021-09-17 DIAGNOSIS — I10 ESSENTIAL HYPERTENSION, MALIGNANT: ICD-10-CM

## 2021-09-17 DIAGNOSIS — E11.9 DIABETES MELLITUS WITHOUT COMPLICATION (HCC): ICD-10-CM

## 2021-09-17 DIAGNOSIS — Z79.899 ENCOUNTER FOR LONG-TERM (CURRENT) USE OF OTHER MEDICATIONS: ICD-10-CM

## 2021-09-17 DIAGNOSIS — E11.9 DIABETES MELLITUS WITHOUT COMPLICATION (HCC): Primary | ICD-10-CM

## 2021-09-17 DIAGNOSIS — E78.5 HYPERLIPIDEMIA, UNSPECIFIED HYPERLIPIDEMIA TYPE: ICD-10-CM

## 2021-09-17 LAB
ALBUMIN SERPL-MCNC: 4 G/DL (ref 3.5–5.2)
ALBUMIN/GLOB SERPL: 1.7 G/DL
ALP SERPL-CCNC: 136 U/L (ref 39–117)
ALT SERPL W P-5'-P-CCNC: 20 U/L (ref 1–33)
ANION GAP SERPL CALCULATED.3IONS-SCNC: 11 MMOL/L (ref 5–15)
AST SERPL-CCNC: 11 U/L (ref 1–32)
BASOPHILS # BLD AUTO: 0.04 10*3/MM3 (ref 0–0.2)
BASOPHILS NFR BLD AUTO: 0.7 % (ref 0–1.5)
BILIRUB SERPL-MCNC: 0.5 MG/DL (ref 0–1.2)
BUN SERPL-MCNC: 13 MG/DL (ref 8–23)
BUN/CREAT SERPL: 16 (ref 7–25)
CALCIUM SPEC-SCNC: 9.9 MG/DL (ref 8.6–10.5)
CHLORIDE SERPL-SCNC: 102 MMOL/L (ref 98–107)
CHOLEST SERPL-MCNC: 192 MG/DL (ref 0–200)
CO2 SERPL-SCNC: 23 MMOL/L (ref 22–29)
CREAT SERPL-MCNC: 0.81 MG/DL (ref 0.57–1)
CRP SERPL-MCNC: 1.41 MG/DL (ref 0–0.5)
DEPRECATED RDW RBC AUTO: 42.1 FL (ref 37–54)
EOSINOPHIL # BLD AUTO: 0.13 10*3/MM3 (ref 0–0.4)
EOSINOPHIL NFR BLD AUTO: 2.1 % (ref 0.3–6.2)
ERYTHROCYTE [DISTWIDTH] IN BLOOD BY AUTOMATED COUNT: 13.1 % (ref 12.3–15.4)
ERYTHROCYTE [SEDIMENTATION RATE] IN BLOOD: 15 MM/HR (ref 0–30)
FOLATE SERPL-MCNC: >20 NG/ML (ref 4.78–24.2)
GFR SERPL CREATININE-BSD FRML MDRD: 67 ML/MIN/1.73
GLOBULIN UR ELPH-MCNC: 2.4 GM/DL
GLUCOSE SERPL-MCNC: 243 MG/DL (ref 65–99)
HBA1C MFR BLD: 9.63 % (ref 4.8–5.6)
HCT VFR BLD AUTO: 43.3 % (ref 34–46.6)
HDLC SERPL-MCNC: 41 MG/DL (ref 40–60)
HGB BLD-MCNC: 13.9 G/DL (ref 12–15.9)
IMM GRANULOCYTES # BLD AUTO: 0.02 10*3/MM3 (ref 0–0.05)
IMM GRANULOCYTES NFR BLD AUTO: 0.3 % (ref 0–0.5)
IRON 24H UR-MRATE: 67 MCG/DL (ref 37–145)
IRON SATN MFR SERPL: 15 % (ref 20–50)
LDLC SERPL CALC-MCNC: 120 MG/DL (ref 0–100)
LDLC/HDLC SERPL: 2.82 {RATIO}
LYMPHOCYTES # BLD AUTO: 1.44 10*3/MM3 (ref 0.7–3.1)
LYMPHOCYTES NFR BLD AUTO: 23.5 % (ref 19.6–45.3)
MAGNESIUM SERPL-MCNC: 1.9 MG/DL (ref 1.6–2.4)
MCH RBC QN AUTO: 28.2 PG (ref 26.6–33)
MCHC RBC AUTO-ENTMCNC: 32.1 G/DL (ref 31.5–35.7)
MCV RBC AUTO: 87.8 FL (ref 79–97)
MONOCYTES # BLD AUTO: 0.82 10*3/MM3 (ref 0.1–0.9)
MONOCYTES NFR BLD AUTO: 13.4 % (ref 5–12)
NEUTROPHILS NFR BLD AUTO: 3.68 10*3/MM3 (ref 1.7–7)
NEUTROPHILS NFR BLD AUTO: 60 % (ref 42.7–76)
NRBC BLD AUTO-RTO: 0 /100 WBC (ref 0–0.2)
PLATELET # BLD AUTO: 242 10*3/MM3 (ref 140–450)
PMV BLD AUTO: 9.9 FL (ref 6–12)
POTASSIUM SERPL-SCNC: 4.5 MMOL/L (ref 3.5–5.2)
PROT SERPL-MCNC: 6.4 G/DL (ref 6–8.5)
RBC # BLD AUTO: 4.93 10*6/MM3 (ref 3.77–5.28)
SODIUM SERPL-SCNC: 136 MMOL/L (ref 136–145)
T4 FREE SERPL-MCNC: 1.16 NG/DL (ref 0.93–1.7)
TIBC SERPL-MCNC: 443 MCG/DL (ref 298–536)
TRANSFERRIN SERPL-MCNC: 297 MG/DL (ref 200–360)
TRIGL SERPL-MCNC: 176 MG/DL (ref 0–150)
TSH SERPL DL<=0.05 MIU/L-ACNC: 3.35 UIU/ML (ref 0.27–4.2)
VIT B12 BLD-MCNC: 1026 PG/ML (ref 211–946)
VLDLC SERPL-MCNC: 31 MG/DL (ref 5–40)
WBC # BLD AUTO: 6.13 10*3/MM3 (ref 3.4–10.8)

## 2021-09-17 PROCEDURE — 85652 RBC SED RATE AUTOMATED: CPT

## 2021-09-17 PROCEDURE — 84443 ASSAY THYROID STIM HORMONE: CPT

## 2021-09-17 PROCEDURE — 86140 C-REACTIVE PROTEIN: CPT

## 2021-09-17 PROCEDURE — 84466 ASSAY OF TRANSFERRIN: CPT

## 2021-09-17 PROCEDURE — 80053 COMPREHEN METABOLIC PANEL: CPT

## 2021-09-17 PROCEDURE — 85025 COMPLETE CBC W/AUTO DIFF WBC: CPT

## 2021-09-17 PROCEDURE — 82607 VITAMIN B-12: CPT

## 2021-09-17 PROCEDURE — 36415 COLL VENOUS BLD VENIPUNCTURE: CPT

## 2021-09-17 PROCEDURE — 83735 ASSAY OF MAGNESIUM: CPT

## 2021-09-17 PROCEDURE — 82746 ASSAY OF FOLIC ACID SERUM: CPT

## 2021-09-17 PROCEDURE — 84439 ASSAY OF FREE THYROXINE: CPT

## 2021-09-17 PROCEDURE — 83036 HEMOGLOBIN GLYCOSYLATED A1C: CPT

## 2021-09-17 PROCEDURE — 80061 LIPID PANEL: CPT

## 2021-09-17 PROCEDURE — 83540 ASSAY OF IRON: CPT

## 2021-09-23 ENCOUNTER — OFFICE VISIT (OUTPATIENT)
Dept: INTERNAL MEDICINE | Facility: CLINIC | Age: 86
End: 2021-09-23

## 2021-09-23 VITALS
SYSTOLIC BLOOD PRESSURE: 126 MMHG | RESPIRATION RATE: 16 BRPM | WEIGHT: 158 LBS | HEIGHT: 64 IN | TEMPERATURE: 97.4 F | HEART RATE: 84 BPM | DIASTOLIC BLOOD PRESSURE: 78 MMHG | BODY MASS INDEX: 26.98 KG/M2

## 2021-09-23 DIAGNOSIS — R63.5 WEIGHT GAIN: ICD-10-CM

## 2021-09-23 DIAGNOSIS — Z12.39 ENCOUNTER FOR SCREENING FOR MALIGNANT NEOPLASM OF BREAST, UNSPECIFIED SCREENING MODALITY: ICD-10-CM

## 2021-09-23 DIAGNOSIS — E11.9 TYPE 2 DIABETES MELLITUS WITHOUT COMPLICATION, UNSPECIFIED WHETHER LONG TERM INSULIN USE (HCC): ICD-10-CM

## 2021-09-23 DIAGNOSIS — E78.5 HYPERLIPIDEMIA, UNSPECIFIED HYPERLIPIDEMIA TYPE: ICD-10-CM

## 2021-09-23 DIAGNOSIS — E53.8 B12 DEFICIENCY: Primary | ICD-10-CM

## 2021-09-23 DIAGNOSIS — R27.0 ATAXIA: ICD-10-CM

## 2021-09-23 DIAGNOSIS — Z79.899 ENCOUNTER FOR LONG-TERM (CURRENT) USE OF OTHER MEDICATIONS: ICD-10-CM

## 2021-09-23 DIAGNOSIS — E61.1 IRON DEFICIENCY: ICD-10-CM

## 2021-09-23 DIAGNOSIS — E55.9 VITAMIN D DEFICIENCY: ICD-10-CM

## 2021-09-23 DIAGNOSIS — F34.1 DYSTHYMIA: ICD-10-CM

## 2021-09-23 DIAGNOSIS — E83.42 HYPOMAGNESEMIA: ICD-10-CM

## 2021-09-23 PROBLEM — K21.9 GERD (GASTROESOPHAGEAL REFLUX DISEASE): Status: ACTIVE | Noted: 2021-09-23

## 2021-09-23 PROBLEM — R15.9 BOWEL INCONTINENCE: Status: ACTIVE | Noted: 2021-09-23

## 2021-09-23 PROBLEM — G25.0 BENIGN ESSENTIAL TREMOR: Status: ACTIVE | Noted: 2021-09-23

## 2021-09-23 PROBLEM — R32 BLADDER INCONTINENCE: Status: ACTIVE | Noted: 2021-09-23

## 2021-09-23 PROBLEM — K21.00 REFLUX ESOPHAGITIS: Status: ACTIVE | Noted: 2021-09-23

## 2021-09-23 PROBLEM — K51.90 ULCERATIVE COLITIS: Status: ACTIVE | Noted: 2021-09-23

## 2021-09-23 PROBLEM — I10 HYPERTENSION: Status: ACTIVE | Noted: 2021-09-23

## 2021-09-23 PROBLEM — E78.00 HIGH CHOLESTEROL: Status: ACTIVE | Noted: 2021-09-23

## 2021-09-23 PROCEDURE — 99214 OFFICE O/P EST MOD 30 MIN: CPT | Performed by: INTERNAL MEDICINE

## 2021-09-23 RX ORDER — LISINOPRIL 2.5 MG/1
10 TABLET ORAL DAILY
Status: ON HOLD | COMMUNITY
End: 2021-10-21

## 2021-09-23 RX ORDER — MULTIVITAMIN WITH IRON
TABLET ORAL
Status: ON HOLD | COMMUNITY
End: 2021-10-21

## 2021-09-23 RX ORDER — ASPIRIN 81 MG/1
81 TABLET ORAL DAILY
Status: ON HOLD | COMMUNITY
End: 2021-10-21

## 2021-09-23 RX ORDER — DILTIAZEM HYDROCHLORIDE 240 MG/1
240 CAPSULE, COATED, EXTENDED RELEASE ORAL
COMMUNITY
Start: 2021-07-15 | End: 2021-10-19

## 2021-09-23 RX ORDER — PANTOPRAZOLE SODIUM 40 MG/1
40 TABLET, DELAYED RELEASE ORAL DAILY
COMMUNITY
Start: 2021-08-29 | End: 2022-02-21

## 2021-09-23 RX ORDER — FAMOTIDINE 20 MG/1
20 TABLET, FILM COATED ORAL DAILY
COMMUNITY

## 2021-09-23 RX ORDER — DULOXETIN HYDROCHLORIDE 30 MG/1
30 CAPSULE, DELAYED RELEASE ORAL DAILY
Status: ON HOLD | COMMUNITY
End: 2021-10-21

## 2021-09-23 NOTE — ASSESSMENT & PLAN NOTE
Patient's depression is doing fair.  She remains on Cymbalta 30 mg daily but is having some peripheral neuropathy symptoms.  Plan: Increase Cymbalta to 60 mg daily.  New prescription sent.  May help her depression as well as her peripheral neuropathy pain.

## 2021-09-23 NOTE — ASSESSMENT & PLAN NOTE
Hypertension is well controlled today.  Plan: Continue Cardizem  mg daily, lisinopril 20 mg daily, Toprol-XL 25 mg daily.  Reviewed medications with the sister who manages her medications.

## 2021-09-23 NOTE — PROGRESS NOTES
"Chief Complaint  Labs Only and Depression    Subjective      Tita Jiménez presents to [facility]  History of Present Illness  Patient comes in routine visit.  Her sister comes with her.  She adjust her medications.  Noncompliant with diet.  No falls.  Memory is about the same.  Her sister has to handle all of her affairs.  Objective   Vital Signs:   /78 (BP Location: Left arm, Patient Position: Sitting, Cuff Size: Adult)   Pulse 84   Temp 97.4 °F (36.3 °C) (Temporal)   Resp 16   Ht 162.6 cm (64\")   Wt 71.7 kg (158 lb)   BMI 27.12 kg/m²     Physical Exam  Vitals and nursing note reviewed.   Constitutional:       Appearance: Normal appearance.   HENT:      Head: Normocephalic.   Eyes:      Extraocular Movements: Extraocular movements intact.      Conjunctiva/sclera: Conjunctivae normal.   Cardiovascular:      Rate and Rhythm: Normal rate and regular rhythm.      Heart sounds: Normal heart sounds. No murmur heard.     Pulmonary:      Effort: Pulmonary effort is normal.      Breath sounds: Normal breath sounds. No wheezing or rales.   Abdominal:      General: Bowel sounds are normal.      Palpations: Abdomen is soft.      Tenderness: There is no abdominal tenderness. There is no guarding.   Musculoskeletal:         General: No swelling. Normal range of motion.   Skin:     General: Skin is warm and dry.   Neurological:      Mental Status: She is alert.      Comments: Patient has mild dysarthria.  She tends to ramble.  Is pleasant.  Has some pleasant sense of humor about getting older.  She is very unsteady when she gets up to walk.  Has peripheral neuropathy.  Has DJD and previous back surgery all causing difficulty with her gait.   Psychiatric:         Mood and Affect: Mood normal.         Thought Content: Thought content normal.        Result Review :  The following data was reviewed by: Angela Doherty MD on 09/23/2021:  CMP    CMP 10/26/20 3/11/21 9/17/21 "   Glucose   243 (A)   Glucose 210 (A) 172 (A)    BUN 15 15 13   Creatinine 0.87 0.77 0.81   eGFR Non African Am   67   Sodium 137 138 136   Potassium 4.5 4.3 4.5   Chloride 101 103 102   Calcium 10.0 9.9 9.9   Albumin 3.8 3.7 4.00   Total Bilirubin 0.45 0.44 0.5   Alkaline Phosphatase 140 119 136 (A)   AST (SGOT) 31 14 (A) 11   ALT (SGPT) 17 17 20   (A) Abnormal value       Comments are available for some flowsheets but are not being displayed.           CBC    CBC 10/26/20 3/11/21 9/17/21   WBC 6.40 5.40 6.13   RBC 4.85 4.72 4.93   Hemoglobin 13.4 13.2 13.9   Hematocrit 42.5 41.7 43.3   MCV 87.6 88.3 87.8   MCH 27.6 28.0 28.2   MCHC 31.5 (A) 31.7 (A) 32.1   RDW 14.1 13.7 13.1   Platelets 256 242 242   (A) Abnormal value            CBC w/diff    CBC w/Diff 10/26/20 3/11/21 9/17/21   WBC 6.40 5.40 6.13   RBC 4.85 4.72 4.93   Hemoglobin 13.4 13.2 13.9   Hematocrit 42.5 41.7 43.3   MCV 87.6 88.3 87.8   MCH 27.6 28.0 28.2   MCHC 31.5 (A) 31.7 (A) 32.1   RDW 14.1 13.7 13.1   Platelets 256 242 242   Neutrophil Rel % 62.5 61.4 60.0   Immature Granulocyte Rel %   0.3   Lymphocyte Rel % 22.0 24.6 23.5   Monocyte Rel % 12.7 (A) 10.9 (A) 13.4 (A)   Eosinophil Rel % 1.9 2.0 2.1   Basophil Rel % 0.6 0.7 0.7   (A) Abnormal value            Lipid Panel    Lipid Panel 10/26/20 3/11/21 9/17/21   Total Cholesterol   192   Total Cholesterol 178 171    Triglycerides 174 (A) 190 (A) 176 (A)   HDL Cholesterol 39 (A) 41 41   VLDL Cholesterol 35 38 (A) 31   LDL Cholesterol  104 (A) 92 120 (A)   LDL/HDL Ratio   2.82   (A) Abnormal value       Comments are available for some flowsheets but are not being displayed.           TSH    TSH 3/11/21 9/17/21   TSH 4.780 (A) 3.350   (A) Abnormal value            Most Recent A1C    HGBA1C Most Recent 9/17/21   Hemoglobin A1C 9.63 (A)   (A) Abnormal value                                      Assessment and Plan   Diagnoses and all orders for this visit:    1. B12 deficiency (Primary)  -     Vitamin  B12; Future  -     Folate; Future    2. Encounter for long-term (current) use of other medications  -     CBC w AUTO Differential; Future  -     Comprehensive metabolic panel; Future    3. Type 2 diabetes mellitus without complication, unspecified whether long term insulin use (HCC)  Assessment & Plan:  Diabetes mellitus is not well controlled.  Hemoglobin A1c is over 9.0.  The patient admits to eating sweets every day.  She lives in assisted living at [facility].  Ice cream every day  She is on Amaryl 4 mg daily and Januvia 100 mg daily.  She is on Metformin 500 mg daily.  Assessment: Diabetes mellitus type 2 not controlled in part due to dietary noncompliance.  Plan: Increase Metformin to 1000 mg daily and minimize sweets.    Orders:  -     Hemoglobin A1c; Future    4. Iron deficiency  -     Iron and TIBC; Future  -     Ferritin; Future    5. Hyperlipidemia, unspecified hyperlipidemia type  -     Lipid panel; Future    6. Hypomagnesemia  -     Magnesium; Future    7. Weight gain  -     TSH+Free T4; Future    8. Vitamin D deficiency  -     Vitamin D 25 hydroxy; Future    9. Dysthymia  Assessment & Plan:  Patient's depression is doing fair.  She remains on Cymbalta 30 mg daily but is having some peripheral neuropathy symptoms.  Plan: Increase Cymbalta to 60 mg daily.  New prescription sent.  May help her depression as well as her peripheral neuropathy pain.      10. Ataxia    11. Encounter for screening for malignant neoplasm of breast, unspecified screening modality  Assessment & Plan:  Patient politely declines to get another mammogram.  Her last one was in 2019.  She also declines bone density.  She thinks she is getting too old get mammograms bone densities.          Follow Up Return in about 3 months (around 12/23/2021).  Patient was given instructions and counseling regarding her condition or for health maintenance advice. Please see specific information pulled into the AVS if appropriate.

## 2021-09-23 NOTE — ASSESSMENT & PLAN NOTE
Patient politely declines to get another mammogram.  Her last one was in 2019.  She also declines bone density.  She thinks she is getting too old get mammograms bone densities.

## 2021-09-23 NOTE — ASSESSMENT & PLAN NOTE
Diabetes mellitus is not well controlled.  Hemoglobin A1c is over 9.0.  The patient admits to eating sweets every day.  She lives in assisted living at [facility].  Ice cream every day  She is on Amaryl 4 mg daily and Januvia 100 mg daily.  She is on Metformin 500 mg daily.  Assessment: Diabetes mellitus type 2 not controlled in part due to dietary noncompliance.  Plan: Increase Metformin to 1000 mg daily and minimize sweets.

## 2021-09-30 RX ORDER — ATORVASTATIN CALCIUM 20 MG/1
TABLET, FILM COATED ORAL
Qty: 90 TABLET | Refills: 3 | Status: SHIPPED | OUTPATIENT
Start: 2021-09-30 | End: 2022-03-07 | Stop reason: SDUPTHER

## 2021-10-14 RX ORDER — GLIMEPIRIDE 4 MG/1
TABLET ORAL
Qty: 90 TABLET | Refills: 3 | Status: SHIPPED | OUTPATIENT
Start: 2021-10-14 | End: 2021-11-01 | Stop reason: HOSPADM

## 2021-10-19 RX ORDER — DILTIAZEM HYDROCHLORIDE 240 MG/1
CAPSULE, COATED, EXTENDED RELEASE ORAL
Qty: 90 CAPSULE | Refills: 1 | Status: SHIPPED | OUTPATIENT
Start: 2021-10-19 | End: 2022-03-07 | Stop reason: SDUPTHER

## 2021-10-20 ENCOUNTER — HOSPITAL ENCOUNTER (INPATIENT)
Facility: HOSPITAL | Age: 86
LOS: 12 days | Discharge: NURSING, REHAB OR LONG-TERM CARE | End: 2021-11-01
Attending: EMERGENCY MEDICINE | Admitting: FAMILY MEDICINE

## 2021-10-20 ENCOUNTER — APPOINTMENT (OUTPATIENT)
Dept: GENERAL RADIOLOGY | Facility: HOSPITAL | Age: 86
End: 2021-10-20

## 2021-10-20 ENCOUNTER — APPOINTMENT (OUTPATIENT)
Dept: CT IMAGING | Facility: HOSPITAL | Age: 86
End: 2021-10-20

## 2021-10-20 DIAGNOSIS — R29.6 MULTIPLE FALLS: ICD-10-CM

## 2021-10-20 DIAGNOSIS — R53.1 WEAKNESS: ICD-10-CM

## 2021-10-20 DIAGNOSIS — R09.02 HYPOXIA: ICD-10-CM

## 2021-10-20 DIAGNOSIS — Z78.9 DECREASED ACTIVITIES OF DAILY LIVING (ADL): ICD-10-CM

## 2021-10-20 DIAGNOSIS — R11.2 NON-INTRACTABLE VOMITING WITH NAUSEA, UNSPECIFIED VOMITING TYPE: ICD-10-CM

## 2021-10-20 DIAGNOSIS — U07.1 COVID-19: Primary | ICD-10-CM

## 2021-10-20 DIAGNOSIS — R26.2 DIFFICULTY WALKING: ICD-10-CM

## 2021-10-20 LAB
ALBUMIN SERPL-MCNC: 4.1 G/DL (ref 3.5–5.2)
ALBUMIN/GLOB SERPL: 1.4 G/DL
ALP SERPL-CCNC: 139 U/L (ref 39–117)
ALT SERPL W P-5'-P-CCNC: 28 U/L (ref 1–33)
ANION GAP SERPL CALCULATED.3IONS-SCNC: 13.4 MMOL/L (ref 5–15)
AST SERPL-CCNC: 25 U/L (ref 1–32)
BACTERIA UR QL AUTO: ABNORMAL /HPF
BASOPHILS # BLD AUTO: 0.02 10*3/MM3 (ref 0–0.2)
BASOPHILS NFR BLD AUTO: 0.3 % (ref 0–1.5)
BILIRUB SERPL-MCNC: 0.5 MG/DL (ref 0–1.2)
BILIRUB UR QL STRIP: NEGATIVE
BUN SERPL-MCNC: 15 MG/DL (ref 8–23)
BUN/CREAT SERPL: 18.1 (ref 7–25)
CALCIUM SPEC-SCNC: 9.6 MG/DL (ref 8.6–10.5)
CHLORIDE SERPL-SCNC: 98 MMOL/L (ref 98–107)
CLARITY UR: CLEAR
CO2 SERPL-SCNC: 22.6 MMOL/L (ref 22–29)
COLOR UR: YELLOW
CREAT SERPL-MCNC: 0.83 MG/DL (ref 0.57–1)
D-LACTATE SERPL-SCNC: 2.2 MMOL/L (ref 0.5–2)
DEPRECATED RDW RBC AUTO: 42.1 FL (ref 37–54)
EOSINOPHIL # BLD AUTO: 0.06 10*3/MM3 (ref 0–0.4)
EOSINOPHIL NFR BLD AUTO: 0.9 % (ref 0.3–6.2)
ERYTHROCYTE [DISTWIDTH] IN BLOOD BY AUTOMATED COUNT: 13.4 % (ref 12.3–15.4)
FLUAV AG NPH QL: NEGATIVE
FLUBV AG NPH QL IA: NEGATIVE
GFR SERPL CREATININE-BSD FRML MDRD: 65 ML/MIN/1.73
GLOBULIN UR ELPH-MCNC: 2.9 GM/DL
GLUCOSE SERPL-MCNC: 250 MG/DL (ref 65–99)
GLUCOSE UR STRIP-MCNC: ABNORMAL MG/DL
HCT VFR BLD AUTO: 40.6 % (ref 34–46.6)
HGB BLD-MCNC: 13.6 G/DL (ref 12–15.9)
HGB UR QL STRIP.AUTO: NEGATIVE
HOLD SPECIMEN: NORMAL
HOLD SPECIMEN: NORMAL
HYALINE CASTS UR QL AUTO: ABNORMAL /LPF
IMM GRANULOCYTES # BLD AUTO: 0.02 10*3/MM3 (ref 0–0.05)
IMM GRANULOCYTES NFR BLD AUTO: 0.3 % (ref 0–0.5)
KETONES UR QL STRIP: ABNORMAL
LEUKOCYTE ESTERASE UR QL STRIP.AUTO: NEGATIVE
LIPASE SERPL-CCNC: 32 U/L (ref 13–60)
LYMPHOCYTES # BLD AUTO: 0.43 10*3/MM3 (ref 0.7–3.1)
LYMPHOCYTES NFR BLD AUTO: 6.8 % (ref 19.6–45.3)
MAGNESIUM SERPL-MCNC: 1.8 MG/DL (ref 1.6–2.4)
MCH RBC QN AUTO: 28.8 PG (ref 26.6–33)
MCHC RBC AUTO-ENTMCNC: 33.5 G/DL (ref 31.5–35.7)
MCV RBC AUTO: 85.8 FL (ref 79–97)
MONOCYTES # BLD AUTO: 0.97 10*3/MM3 (ref 0.1–0.9)
MONOCYTES NFR BLD AUTO: 15.3 % (ref 5–12)
NEUTROPHILS NFR BLD AUTO: 4.86 10*3/MM3 (ref 1.7–7)
NEUTROPHILS NFR BLD AUTO: 76.4 % (ref 42.7–76)
NITRITE UR QL STRIP: NEGATIVE
NRBC BLD AUTO-RTO: 0 /100 WBC (ref 0–0.2)
PH UR STRIP.AUTO: 6.5 [PH] (ref 5–8)
PLATELET # BLD AUTO: 174 10*3/MM3 (ref 140–450)
PMV BLD AUTO: 9.6 FL (ref 6–12)
POTASSIUM SERPL-SCNC: 3.8 MMOL/L (ref 3.5–5.2)
PROT SERPL-MCNC: 7 G/DL (ref 6–8.5)
PROT UR QL STRIP: ABNORMAL
RBC # BLD AUTO: 4.73 10*6/MM3 (ref 3.77–5.28)
RBC # UR: ABNORMAL /HPF
REF LAB TEST METHOD: ABNORMAL
SARS-COV-2 N GENE RESP QL NAA+PROBE: DETECTED
SODIUM SERPL-SCNC: 134 MMOL/L (ref 136–145)
SP GR UR STRIP: 1.02 (ref 1–1.03)
SQUAMOUS #/AREA URNS HPF: ABNORMAL /HPF
TROPONIN T SERPL-MCNC: <0.01 NG/ML (ref 0–0.03)
UROBILINOGEN UR QL STRIP: ABNORMAL
WBC # BLD AUTO: 6.36 10*3/MM3 (ref 3.4–10.8)
WBC UR QL AUTO: ABNORMAL /HPF
WHOLE BLOOD HOLD SPECIMEN: NORMAL
WHOLE BLOOD HOLD SPECIMEN: NORMAL

## 2021-10-20 PROCEDURE — 71045 X-RAY EXAM CHEST 1 VIEW: CPT

## 2021-10-20 PROCEDURE — 81001 URINALYSIS AUTO W/SCOPE: CPT | Performed by: EMERGENCY MEDICINE

## 2021-10-20 PROCEDURE — 99285 EMERGENCY DEPT VISIT HI MDM: CPT

## 2021-10-20 PROCEDURE — 85025 COMPLETE CBC W/AUTO DIFF WBC: CPT | Performed by: EMERGENCY MEDICINE

## 2021-10-20 PROCEDURE — 83735 ASSAY OF MAGNESIUM: CPT | Performed by: EMERGENCY MEDICINE

## 2021-10-20 PROCEDURE — 80053 COMPREHEN METABOLIC PANEL: CPT | Performed by: EMERGENCY MEDICINE

## 2021-10-20 PROCEDURE — 99223 1ST HOSP IP/OBS HIGH 75: CPT | Performed by: FAMILY MEDICINE

## 2021-10-20 PROCEDURE — 70450 CT HEAD/BRAIN W/O DYE: CPT

## 2021-10-20 PROCEDURE — 87804 INFLUENZA ASSAY W/OPTIC: CPT | Performed by: EMERGENCY MEDICINE

## 2021-10-20 PROCEDURE — 25010000002 ONDANSETRON PER 1 MG: Performed by: EMERGENCY MEDICINE

## 2021-10-20 PROCEDURE — 87635 SARS-COV-2 COVID-19 AMP PRB: CPT | Performed by: EMERGENCY MEDICINE

## 2021-10-20 PROCEDURE — 25010000002 DEXAMETHASONE PER 1 MG: Performed by: EMERGENCY MEDICINE

## 2021-10-20 PROCEDURE — P9612 CATHETERIZE FOR URINE SPEC: HCPCS

## 2021-10-20 PROCEDURE — 83605 ASSAY OF LACTIC ACID: CPT | Performed by: EMERGENCY MEDICINE

## 2021-10-20 PROCEDURE — 84484 ASSAY OF TROPONIN QUANT: CPT | Performed by: EMERGENCY MEDICINE

## 2021-10-20 PROCEDURE — 36415 COLL VENOUS BLD VENIPUNCTURE: CPT

## 2021-10-20 PROCEDURE — 93005 ELECTROCARDIOGRAM TRACING: CPT | Performed by: EMERGENCY MEDICINE

## 2021-10-20 PROCEDURE — 83690 ASSAY OF LIPASE: CPT | Performed by: EMERGENCY MEDICINE

## 2021-10-20 RX ORDER — SODIUM CHLORIDE 0.9 % (FLUSH) 0.9 %
10 SYRINGE (ML) INJECTION AS NEEDED
Status: DISCONTINUED | OUTPATIENT
Start: 2021-10-20 | End: 2021-11-01 | Stop reason: HOSPADM

## 2021-10-20 RX ORDER — DEXAMETHASONE SODIUM PHOSPHATE 10 MG/ML
10 INJECTION INTRAMUSCULAR; INTRAVENOUS ONCE
Status: COMPLETED | OUTPATIENT
Start: 2021-10-20 | End: 2021-10-20

## 2021-10-20 RX ORDER — ONDANSETRON 2 MG/ML
4 INJECTION INTRAMUSCULAR; INTRAVENOUS ONCE
Status: COMPLETED | OUTPATIENT
Start: 2021-10-20 | End: 2021-10-20

## 2021-10-20 RX ORDER — FAMOTIDINE 10 MG/ML
20 INJECTION, SOLUTION INTRAVENOUS ONCE
Status: COMPLETED | OUTPATIENT
Start: 2021-10-20 | End: 2021-10-20

## 2021-10-20 RX ADMIN — DEXAMETHASONE SODIUM PHOSPHATE 10 MG: 10 INJECTION INTRAMUSCULAR; INTRAVENOUS at 21:39

## 2021-10-20 RX ADMIN — FAMOTIDINE 20 MG: 10 INJECTION INTRAVENOUS at 15:40

## 2021-10-20 RX ADMIN — ONDANSETRON 4 MG: 2 INJECTION INTRAMUSCULAR; INTRAVENOUS at 15:40

## 2021-10-21 LAB
BASOPHILS # BLD AUTO: 0.01 10*3/MM3 (ref 0–0.2)
BASOPHILS # BLD AUTO: 0.01 10*3/MM3 (ref 0–0.2)
BASOPHILS NFR BLD AUTO: 0.2 % (ref 0–1.5)
BASOPHILS NFR BLD AUTO: 0.3 % (ref 0–1.5)
CRP SERPL-MCNC: 6.24 MG/DL (ref 0–0.5)
D DIMER PPP FEU-MCNC: 0.56 MG/L (FEU) (ref 0–0.59)
D-LACTATE SERPL-SCNC: 0.8 MMOL/L (ref 0.5–2)
DEPRECATED RDW RBC AUTO: 41.4 FL (ref 37–54)
DEPRECATED RDW RBC AUTO: 42.4 FL (ref 37–54)
EOSINOPHIL # BLD AUTO: 0 10*3/MM3 (ref 0–0.4)
EOSINOPHIL # BLD AUTO: 0.02 10*3/MM3 (ref 0–0.4)
EOSINOPHIL NFR BLD AUTO: 0 % (ref 0.3–6.2)
EOSINOPHIL NFR BLD AUTO: 0.6 % (ref 0.3–6.2)
ERYTHROCYTE [DISTWIDTH] IN BLOOD BY AUTOMATED COUNT: 13.4 % (ref 12.3–15.4)
ERYTHROCYTE [DISTWIDTH] IN BLOOD BY AUTOMATED COUNT: 13.6 % (ref 12.3–15.4)
GLUCOSE BLDC GLUCOMTR-MCNC: 233 MG/DL (ref 70–99)
GLUCOSE BLDC GLUCOMTR-MCNC: 271 MG/DL (ref 70–99)
GLUCOSE BLDC GLUCOMTR-MCNC: 282 MG/DL (ref 70–99)
GLUCOSE BLDC GLUCOMTR-MCNC: 302 MG/DL (ref 70–99)
HCT VFR BLD AUTO: 41.8 % (ref 34–46.6)
HCT VFR BLD AUTO: 42 % (ref 34–46.6)
HGB BLD-MCNC: 14 G/DL (ref 12–15.9)
HGB BLD-MCNC: 14.2 G/DL (ref 12–15.9)
IMM GRANULOCYTES # BLD AUTO: 0.01 10*3/MM3 (ref 0–0.05)
IMM GRANULOCYTES # BLD AUTO: 0.02 10*3/MM3 (ref 0–0.05)
IMM GRANULOCYTES NFR BLD AUTO: 0.3 % (ref 0–0.5)
IMM GRANULOCYTES NFR BLD AUTO: 0.4 % (ref 0–0.5)
LARGE PLATELETS: NORMAL
LYMPHOCYTES # BLD AUTO: 0.47 10*3/MM3 (ref 0.7–3.1)
LYMPHOCYTES # BLD AUTO: 0.53 10*3/MM3 (ref 0.7–3.1)
LYMPHOCYTES NFR BLD AUTO: 11.4 % (ref 19.6–45.3)
LYMPHOCYTES NFR BLD AUTO: 13.1 % (ref 19.6–45.3)
MCH RBC QN AUTO: 28.7 PG (ref 26.6–33)
MCH RBC QN AUTO: 29 PG (ref 26.6–33)
MCHC RBC AUTO-ENTMCNC: 33.5 G/DL (ref 31.5–35.7)
MCHC RBC AUTO-ENTMCNC: 33.8 G/DL (ref 31.5–35.7)
MCV RBC AUTO: 85.7 FL (ref 79–97)
MCV RBC AUTO: 85.9 FL (ref 79–97)
MONOCYTES # BLD AUTO: 0.22 10*3/MM3 (ref 0.1–0.9)
MONOCYTES # BLD AUTO: 0.38 10*3/MM3 (ref 0.1–0.9)
MONOCYTES NFR BLD AUTO: 6.1 % (ref 5–12)
MONOCYTES NFR BLD AUTO: 8.2 % (ref 5–12)
NEUTROPHILS NFR BLD AUTO: 2.87 10*3/MM3 (ref 1.7–7)
NEUTROPHILS NFR BLD AUTO: 3.72 10*3/MM3 (ref 1.7–7)
NEUTROPHILS NFR BLD AUTO: 79.6 % (ref 42.7–76)
NEUTROPHILS NFR BLD AUTO: 79.8 % (ref 42.7–76)
NRBC BLD AUTO-RTO: 0 /100 WBC (ref 0–0.2)
NRBC BLD AUTO-RTO: 0 /100 WBC (ref 0–0.2)
PLATELET # BLD AUTO: 182 10*3/MM3 (ref 140–450)
PLATELET # BLD AUTO: 185 10*3/MM3 (ref 140–450)
PMV BLD AUTO: 10.1 FL (ref 6–12)
PMV BLD AUTO: 9.5 FL (ref 6–12)
PROCALCITONIN SERPL-MCNC: 0.07 NG/ML (ref 0–0.25)
RBC # BLD AUTO: 4.88 10*6/MM3 (ref 3.77–5.28)
RBC # BLD AUTO: 4.89 10*6/MM3 (ref 3.77–5.28)
RBC MORPH BLD: NORMAL
SMALL PLATELETS BLD QL SMEAR: ADEQUATE
WBC # BLD AUTO: 3.6 10*3/MM3 (ref 3.4–10.8)
WBC # BLD AUTO: 4.66 10*3/MM3 (ref 3.4–10.8)
WBC MORPH BLD: NORMAL

## 2021-10-21 PROCEDURE — 94799 UNLISTED PULMONARY SVC/PX: CPT

## 2021-10-21 PROCEDURE — 94760 N-INVAS EAR/PLS OXIMETRY 1: CPT

## 2021-10-21 PROCEDURE — XW033E5 INTRODUCTION OF REMDESIVIR ANTI-INFECTIVE INTO PERIPHERAL VEIN, PERCUTANEOUS APPROACH, NEW TECHNOLOGY GROUP 5: ICD-10-PCS | Performed by: INTERNAL MEDICINE

## 2021-10-21 PROCEDURE — 85025 COMPLETE CBC W/AUTO DIFF WBC: CPT | Performed by: INTERNAL MEDICINE

## 2021-10-21 PROCEDURE — 25010000002 DEXAMETHASONE PER 1 MG: Performed by: FAMILY MEDICINE

## 2021-10-21 PROCEDURE — 82962 GLUCOSE BLOOD TEST: CPT

## 2021-10-21 PROCEDURE — 84145 PROCALCITONIN (PCT): CPT | Performed by: INTERNAL MEDICINE

## 2021-10-21 PROCEDURE — 85025 COMPLETE CBC W/AUTO DIFF WBC: CPT | Performed by: FAMILY MEDICINE

## 2021-10-21 PROCEDURE — 63710000001 INSULIN LISPRO (HUMAN) PER 5 UNITS: Performed by: FAMILY MEDICINE

## 2021-10-21 PROCEDURE — 97161 PT EVAL LOW COMPLEX 20 MIN: CPT

## 2021-10-21 PROCEDURE — 99233 SBSQ HOSP IP/OBS HIGH 50: CPT | Performed by: INTERNAL MEDICINE

## 2021-10-21 PROCEDURE — 86140 C-REACTIVE PROTEIN: CPT | Performed by: INTERNAL MEDICINE

## 2021-10-21 PROCEDURE — 85007 BL SMEAR W/DIFF WBC COUNT: CPT | Performed by: FAMILY MEDICINE

## 2021-10-21 PROCEDURE — 25010000002 ENOXAPARIN PER 10 MG: Performed by: INTERNAL MEDICINE

## 2021-10-21 PROCEDURE — 85379 FIBRIN DEGRADATION QUANT: CPT | Performed by: INTERNAL MEDICINE

## 2021-10-21 RX ORDER — DEXAMETHASONE SODIUM PHOSPHATE 10 MG/ML
6 INJECTION INTRAMUSCULAR; INTRAVENOUS DAILY
Status: DISCONTINUED | OUTPATIENT
Start: 2021-10-21 | End: 2021-10-24

## 2021-10-21 RX ORDER — METOPROLOL SUCCINATE 25 MG/1
25 TABLET, EXTENDED RELEASE ORAL DAILY
Status: DISCONTINUED | OUTPATIENT
Start: 2021-10-21 | End: 2021-11-01 | Stop reason: HOSPADM

## 2021-10-21 RX ORDER — ACETAMINOPHEN 650 MG/1
650 SUPPOSITORY RECTAL EVERY 4 HOURS PRN
Status: DISCONTINUED | OUTPATIENT
Start: 2021-10-21 | End: 2021-11-01 | Stop reason: HOSPADM

## 2021-10-21 RX ORDER — GLIPIZIDE 10 MG/1
10 TABLET ORAL
Refills: 3 | Status: DISCONTINUED | OUTPATIENT
Start: 2021-10-21 | End: 2021-11-01 | Stop reason: HOSPADM

## 2021-10-21 RX ORDER — POLYETHYLENE GLYCOL 3350 17 G/17G
17 POWDER, FOR SOLUTION ORAL DAILY PRN
Status: DISCONTINUED | OUTPATIENT
Start: 2021-10-21 | End: 2021-11-01 | Stop reason: HOSPADM

## 2021-10-21 RX ORDER — BISACODYL 10 MG
10 SUPPOSITORY, RECTAL RECTAL DAILY PRN
Status: DISCONTINUED | OUTPATIENT
Start: 2021-10-21 | End: 2021-11-01 | Stop reason: HOSPADM

## 2021-10-21 RX ORDER — ACETAMINOPHEN 160 MG/5ML
650 SOLUTION ORAL EVERY 4 HOURS PRN
Status: DISCONTINUED | OUTPATIENT
Start: 2021-10-21 | End: 2021-11-01 | Stop reason: HOSPADM

## 2021-10-21 RX ORDER — ACETAMINOPHEN 325 MG/1
650 TABLET ORAL EVERY 4 HOURS PRN
Status: DISCONTINUED | OUTPATIENT
Start: 2021-10-21 | End: 2021-11-01 | Stop reason: HOSPADM

## 2021-10-21 RX ORDER — DILTIAZEM HYDROCHLORIDE 240 MG/1
240 CAPSULE, COATED, EXTENDED RELEASE ORAL
Status: DISCONTINUED | OUTPATIENT
Start: 2021-10-21 | End: 2021-11-01 | Stop reason: HOSPADM

## 2021-10-21 RX ORDER — ONDANSETRON 2 MG/ML
4 INJECTION INTRAMUSCULAR; INTRAVENOUS EVERY 6 HOURS PRN
Status: DISCONTINUED | OUTPATIENT
Start: 2021-10-21 | End: 2021-11-01 | Stop reason: HOSPADM

## 2021-10-21 RX ORDER — CHOLECALCIFEROL (VITAMIN D3) 125 MCG
5 CAPSULE ORAL NIGHTLY PRN
Status: DISCONTINUED | OUTPATIENT
Start: 2021-10-21 | End: 2021-11-01 | Stop reason: HOSPADM

## 2021-10-21 RX ORDER — DULOXETIN HYDROCHLORIDE 30 MG/1
30 CAPSULE, DELAYED RELEASE ORAL DAILY
Status: DISCONTINUED | OUTPATIENT
Start: 2021-10-21 | End: 2021-11-01 | Stop reason: HOSPADM

## 2021-10-21 RX ORDER — BISACODYL 5 MG/1
5 TABLET, DELAYED RELEASE ORAL DAILY PRN
Status: DISCONTINUED | OUTPATIENT
Start: 2021-10-21 | End: 2021-11-01 | Stop reason: HOSPADM

## 2021-10-21 RX ORDER — SODIUM CHLORIDE 0.9 % (FLUSH) 0.9 %
10 SYRINGE (ML) INJECTION EVERY 12 HOURS SCHEDULED
Status: DISCONTINUED | OUTPATIENT
Start: 2021-10-21 | End: 2021-11-01 | Stop reason: HOSPADM

## 2021-10-21 RX ORDER — AMOXICILLIN 250 MG
2 CAPSULE ORAL 2 TIMES DAILY
Status: DISCONTINUED | OUTPATIENT
Start: 2021-10-21 | End: 2021-11-01 | Stop reason: HOSPADM

## 2021-10-21 RX ORDER — PANTOPRAZOLE SODIUM 40 MG/1
40 TABLET, DELAYED RELEASE ORAL DAILY
Status: DISCONTINUED | OUTPATIENT
Start: 2021-10-21 | End: 2021-11-01 | Stop reason: HOSPADM

## 2021-10-21 RX ORDER — ASPIRIN 81 MG/1
81 TABLET ORAL DAILY
Status: DISCONTINUED | OUTPATIENT
Start: 2021-10-21 | End: 2021-11-01 | Stop reason: HOSPADM

## 2021-10-21 RX ORDER — DEXTROSE MONOHYDRATE 100 MG/ML
25 INJECTION, SOLUTION INTRAVENOUS
Status: DISCONTINUED | OUTPATIENT
Start: 2021-10-21 | End: 2021-11-01 | Stop reason: HOSPADM

## 2021-10-21 RX ORDER — ATORVASTATIN CALCIUM 20 MG/1
20 TABLET, FILM COATED ORAL NIGHTLY
Status: DISCONTINUED | OUTPATIENT
Start: 2021-10-21 | End: 2021-11-01 | Stop reason: HOSPADM

## 2021-10-21 RX ORDER — LISINOPRIL 10 MG/1
10 TABLET ORAL DAILY
Status: DISCONTINUED | OUTPATIENT
Start: 2021-10-21 | End: 2021-11-01 | Stop reason: HOSPADM

## 2021-10-21 RX ORDER — FAMOTIDINE 20 MG/1
20 TABLET, FILM COATED ORAL DAILY
Status: DISCONTINUED | OUTPATIENT
Start: 2021-10-21 | End: 2021-11-01 | Stop reason: HOSPADM

## 2021-10-21 RX ORDER — NICOTINE POLACRILEX 4 MG
15 LOZENGE BUCCAL
Status: DISCONTINUED | OUTPATIENT
Start: 2021-10-21 | End: 2021-11-01 | Stop reason: HOSPADM

## 2021-10-21 RX ORDER — SODIUM CHLORIDE 0.9 % (FLUSH) 0.9 %
10 SYRINGE (ML) INJECTION AS NEEDED
Status: DISCONTINUED | OUTPATIENT
Start: 2021-10-21 | End: 2021-11-01 | Stop reason: HOSPADM

## 2021-10-21 RX ORDER — HYDROCHLOROTHIAZIDE 12.5 MG/1
12.5 TABLET ORAL 3 TIMES WEEKLY
Status: DISCONTINUED | OUTPATIENT
Start: 2021-10-22 | End: 2021-10-28

## 2021-10-21 RX ADMIN — DILTIAZEM HYDROCHLORIDE 240 MG: 240 CAPSULE, COATED, EXTENDED RELEASE ORAL at 11:21

## 2021-10-21 RX ADMIN — INSULIN LISPRO 7 UNITS: 100 INJECTION, SOLUTION INTRAVENOUS; SUBCUTANEOUS at 17:16

## 2021-10-21 RX ADMIN — GLIPIZIDE 10 MG: 10 TABLET ORAL at 11:21

## 2021-10-21 RX ADMIN — INSULIN LISPRO 6 UNITS: 100 INJECTION, SOLUTION INTRAVENOUS; SUBCUTANEOUS at 12:29

## 2021-10-21 RX ADMIN — INSULIN LISPRO 6 UNITS: 100 INJECTION, SOLUTION INTRAVENOUS; SUBCUTANEOUS at 08:38

## 2021-10-21 RX ADMIN — REMDESIVIR 200 MG: 100 INJECTION, POWDER, LYOPHILIZED, FOR SOLUTION INTRAVENOUS at 11:49

## 2021-10-21 RX ADMIN — SODIUM CHLORIDE, PRESERVATIVE FREE 10 ML: 5 INJECTION INTRAVENOUS at 08:40

## 2021-10-21 RX ADMIN — SODIUM CHLORIDE, PRESERVATIVE FREE 10 ML: 5 INJECTION INTRAVENOUS at 21:15

## 2021-10-21 RX ADMIN — FAMOTIDINE 20 MG: 20 TABLET, FILM COATED ORAL at 11:21

## 2021-10-21 RX ADMIN — METOPROLOL SUCCINATE 25 MG: 25 TABLET, EXTENDED RELEASE ORAL at 11:21

## 2021-10-21 RX ADMIN — ATORVASTATIN CALCIUM 20 MG: 20 TABLET, FILM COATED ORAL at 21:15

## 2021-10-21 RX ADMIN — DULOXETINE HYDROCHLORIDE 30 MG: 30 CAPSULE, DELAYED RELEASE ORAL at 11:21

## 2021-10-21 RX ADMIN — ASPIRIN 81 MG: 81 TABLET, COATED ORAL at 11:21

## 2021-10-21 RX ADMIN — PANTOPRAZOLE SODIUM 40 MG: 40 TABLET, DELAYED RELEASE ORAL at 11:21

## 2021-10-21 RX ADMIN — DEXAMETHASONE SODIUM PHOSPHATE 6 MG: 10 INJECTION INTRAMUSCULAR; INTRAVENOUS at 08:39

## 2021-10-21 RX ADMIN — ENOXAPARIN SODIUM 40 MG: 40 INJECTION SUBCUTANEOUS at 11:48

## 2021-10-21 RX ADMIN — LINAGLIPTIN 5 MG: 5 TABLET, FILM COATED ORAL at 11:21

## 2021-10-21 RX ADMIN — LISINOPRIL 10 MG: 10 TABLET ORAL at 11:21

## 2021-10-22 LAB
GLUCOSE BLDC GLUCOMTR-MCNC: 155 MG/DL (ref 70–99)
GLUCOSE BLDC GLUCOMTR-MCNC: 201 MG/DL (ref 70–99)
GLUCOSE BLDC GLUCOMTR-MCNC: 216 MG/DL (ref 70–99)
GLUCOSE BLDC GLUCOMTR-MCNC: 231 MG/DL (ref 70–99)

## 2021-10-22 PROCEDURE — 97530 THERAPEUTIC ACTIVITIES: CPT

## 2021-10-22 PROCEDURE — 25010000002 DEXAMETHASONE PER 1 MG: Performed by: FAMILY MEDICINE

## 2021-10-22 PROCEDURE — 25010000002 ENOXAPARIN PER 10 MG: Performed by: INTERNAL MEDICINE

## 2021-10-22 PROCEDURE — 99232 SBSQ HOSP IP/OBS MODERATE 35: CPT | Performed by: INTERNAL MEDICINE

## 2021-10-22 PROCEDURE — 94799 UNLISTED PULMONARY SVC/PX: CPT

## 2021-10-22 PROCEDURE — 97110 THERAPEUTIC EXERCISES: CPT

## 2021-10-22 PROCEDURE — 97165 OT EVAL LOW COMPLEX 30 MIN: CPT

## 2021-10-22 PROCEDURE — 82962 GLUCOSE BLOOD TEST: CPT

## 2021-10-22 PROCEDURE — 63710000001 INSULIN LISPRO (HUMAN) PER 5 UNITS: Performed by: FAMILY MEDICINE

## 2021-10-22 RX ADMIN — METOPROLOL SUCCINATE 25 MG: 25 TABLET, EXTENDED RELEASE ORAL at 09:47

## 2021-10-22 RX ADMIN — LISINOPRIL 10 MG: 10 TABLET ORAL at 09:44

## 2021-10-22 RX ADMIN — ATORVASTATIN CALCIUM 20 MG: 20 TABLET, FILM COATED ORAL at 21:23

## 2021-10-22 RX ADMIN — HYDROCHLOROTHIAZIDE 12.5 MG: 12.5 CAPSULE ORAL at 09:44

## 2021-10-22 RX ADMIN — LINAGLIPTIN 5 MG: 5 TABLET, FILM COATED ORAL at 09:45

## 2021-10-22 RX ADMIN — DULOXETINE HYDROCHLORIDE 30 MG: 30 CAPSULE, DELAYED RELEASE ORAL at 09:44

## 2021-10-22 RX ADMIN — SODIUM CHLORIDE, PRESERVATIVE FREE 10 ML: 5 INJECTION INTRAVENOUS at 09:47

## 2021-10-22 RX ADMIN — SODIUM CHLORIDE, PRESERVATIVE FREE 10 ML: 5 INJECTION INTRAVENOUS at 21:23

## 2021-10-22 RX ADMIN — PANTOPRAZOLE SODIUM 40 MG: 40 TABLET, DELAYED RELEASE ORAL at 09:45

## 2021-10-22 RX ADMIN — FAMOTIDINE 20 MG: 20 TABLET, FILM COATED ORAL at 09:45

## 2021-10-22 RX ADMIN — ASPIRIN 81 MG: 81 TABLET, COATED ORAL at 09:45

## 2021-10-22 RX ADMIN — Medication 5 MG: at 21:23

## 2021-10-22 RX ADMIN — INSULIN LISPRO 6 UNITS: 100 INJECTION, SOLUTION INTRAVENOUS; SUBCUTANEOUS at 13:58

## 2021-10-22 RX ADMIN — GLIPIZIDE 10 MG: 10 TABLET ORAL at 09:44

## 2021-10-22 RX ADMIN — ENOXAPARIN SODIUM 40 MG: 40 INJECTION SUBCUTANEOUS at 09:44

## 2021-10-22 RX ADMIN — DEXAMETHASONE SODIUM PHOSPHATE 6 MG: 10 INJECTION INTRAMUSCULAR; INTRAVENOUS at 09:44

## 2021-10-22 RX ADMIN — INSULIN LISPRO 4 UNITS: 100 INJECTION, SOLUTION INTRAVENOUS; SUBCUTANEOUS at 09:45

## 2021-10-22 RX ADMIN — DOCUSATE SODIUM 50MG AND SENNOSIDES 8.6MG 2 TABLET: 8.6; 5 TABLET, FILM COATED ORAL at 21:23

## 2021-10-22 RX ADMIN — DOCUSATE SODIUM 50MG AND SENNOSIDES 8.6MG 2 TABLET: 8.6; 5 TABLET, FILM COATED ORAL at 09:45

## 2021-10-22 RX ADMIN — REMDESIVIR 100 MG: 100 INJECTION, POWDER, LYOPHILIZED, FOR SOLUTION INTRAVENOUS at 13:55

## 2021-10-22 RX ADMIN — DILTIAZEM HYDROCHLORIDE 240 MG: 240 CAPSULE, COATED, EXTENDED RELEASE ORAL at 09:45

## 2021-10-23 LAB
ALBUMIN SERPL-MCNC: 3.3 G/DL (ref 3.5–5.2)
ALP SERPL-CCNC: 110 U/L (ref 39–117)
ALT SERPL W P-5'-P-CCNC: 25 U/L (ref 1–33)
AST SERPL-CCNC: 27 U/L (ref 1–32)
BILIRUB CONJ SERPL-MCNC: <0.2 MG/DL (ref 0–0.3)
BILIRUB INDIRECT SERPL-MCNC: ABNORMAL MG/DL
BILIRUB SERPL-MCNC: 0.3 MG/DL (ref 0–1.2)
CREAT SERPL-MCNC: 0.81 MG/DL (ref 0.57–1)
GFR SERPL CREATININE-BSD FRML MDRD: 67 ML/MIN/1.73
GLUCOSE BLDC GLUCOMTR-MCNC: 168 MG/DL (ref 70–99)
GLUCOSE BLDC GLUCOMTR-MCNC: 179 MG/DL (ref 70–99)
GLUCOSE BLDC GLUCOMTR-MCNC: 191 MG/DL (ref 70–99)
PROT SERPL-MCNC: 5.8 G/DL (ref 6–8.5)
QT INTERVAL: 370 MS

## 2021-10-23 PROCEDURE — 94799 UNLISTED PULMONARY SVC/PX: CPT

## 2021-10-23 PROCEDURE — 97530 THERAPEUTIC ACTIVITIES: CPT

## 2021-10-23 PROCEDURE — 25010000002 DEXAMETHASONE PER 1 MG: Performed by: FAMILY MEDICINE

## 2021-10-23 PROCEDURE — 63710000001 INSULIN LISPRO (HUMAN) PER 5 UNITS: Performed by: FAMILY MEDICINE

## 2021-10-23 PROCEDURE — 25010000002 ENOXAPARIN PER 10 MG: Performed by: INTERNAL MEDICINE

## 2021-10-23 PROCEDURE — 82962 GLUCOSE BLOOD TEST: CPT

## 2021-10-23 PROCEDURE — 99232 SBSQ HOSP IP/OBS MODERATE 35: CPT | Performed by: INTERNAL MEDICINE

## 2021-10-23 PROCEDURE — 94760 N-INVAS EAR/PLS OXIMETRY 1: CPT

## 2021-10-23 PROCEDURE — 80076 HEPATIC FUNCTION PANEL: CPT | Performed by: INTERNAL MEDICINE

## 2021-10-23 PROCEDURE — 82565 ASSAY OF CREATININE: CPT | Performed by: INTERNAL MEDICINE

## 2021-10-23 RX ADMIN — LISINOPRIL 10 MG: 10 TABLET ORAL at 08:28

## 2021-10-23 RX ADMIN — FAMOTIDINE 20 MG: 20 TABLET, FILM COATED ORAL at 08:28

## 2021-10-23 RX ADMIN — DULOXETINE HYDROCHLORIDE 30 MG: 30 CAPSULE, DELAYED RELEASE ORAL at 08:28

## 2021-10-23 RX ADMIN — ATORVASTATIN CALCIUM 20 MG: 20 TABLET, FILM COATED ORAL at 20:04

## 2021-10-23 RX ADMIN — PANTOPRAZOLE SODIUM 40 MG: 40 TABLET, DELAYED RELEASE ORAL at 08:28

## 2021-10-23 RX ADMIN — SODIUM CHLORIDE, PRESERVATIVE FREE 10 ML: 5 INJECTION INTRAVENOUS at 20:04

## 2021-10-23 RX ADMIN — INSULIN LISPRO 2 UNITS: 100 INJECTION, SOLUTION INTRAVENOUS; SUBCUTANEOUS at 17:29

## 2021-10-23 RX ADMIN — ENOXAPARIN SODIUM 40 MG: 40 INJECTION SUBCUTANEOUS at 12:20

## 2021-10-23 RX ADMIN — DEXAMETHASONE SODIUM PHOSPHATE 6 MG: 10 INJECTION INTRAMUSCULAR; INTRAVENOUS at 08:28

## 2021-10-23 RX ADMIN — Medication 5 MG: at 20:04

## 2021-10-23 RX ADMIN — INSULIN LISPRO 2 UNITS: 100 INJECTION, SOLUTION INTRAVENOUS; SUBCUTANEOUS at 12:20

## 2021-10-23 RX ADMIN — SODIUM CHLORIDE, PRESERVATIVE FREE 10 ML: 5 INJECTION INTRAVENOUS at 08:29

## 2021-10-23 RX ADMIN — DOCUSATE SODIUM 50MG AND SENNOSIDES 8.6MG 2 TABLET: 8.6; 5 TABLET, FILM COATED ORAL at 20:04

## 2021-10-23 RX ADMIN — DOCUSATE SODIUM 50MG AND SENNOSIDES 8.6MG 2 TABLET: 8.6; 5 TABLET, FILM COATED ORAL at 08:28

## 2021-10-23 RX ADMIN — DILTIAZEM HYDROCHLORIDE 240 MG: 240 CAPSULE, COATED, EXTENDED RELEASE ORAL at 08:29

## 2021-10-23 RX ADMIN — METOPROLOL SUCCINATE 25 MG: 25 TABLET, EXTENDED RELEASE ORAL at 08:28

## 2021-10-23 RX ADMIN — LINAGLIPTIN 5 MG: 5 TABLET, FILM COATED ORAL at 08:28

## 2021-10-23 RX ADMIN — GLIPIZIDE 10 MG: 10 TABLET ORAL at 08:28

## 2021-10-23 RX ADMIN — INSULIN LISPRO 2 UNITS: 100 INJECTION, SOLUTION INTRAVENOUS; SUBCUTANEOUS at 08:27

## 2021-10-23 RX ADMIN — REMDESIVIR 100 MG: 100 INJECTION, POWDER, LYOPHILIZED, FOR SOLUTION INTRAVENOUS at 17:29

## 2021-10-23 RX ADMIN — ASPIRIN 81 MG: 81 TABLET, COATED ORAL at 08:28

## 2021-10-24 LAB
ALBUMIN SERPL-MCNC: 2.9 G/DL (ref 3.5–5.2)
ALBUMIN/GLOB SERPL: 1.1 G/DL
ALP SERPL-CCNC: 106 U/L (ref 39–117)
ALT SERPL W P-5'-P-CCNC: 23 U/L (ref 1–33)
ANION GAP SERPL CALCULATED.3IONS-SCNC: 13.8 MMOL/L (ref 5–15)
AST SERPL-CCNC: 20 U/L (ref 1–32)
BASOPHILS # BLD AUTO: 0.02 10*3/MM3 (ref 0–0.2)
BASOPHILS NFR BLD AUTO: 0.5 % (ref 0–1.5)
BILIRUB CONJ SERPL-MCNC: <0.2 MG/DL (ref 0–0.3)
BILIRUB SERPL-MCNC: 0.2 MG/DL (ref 0–1.2)
BUN SERPL-MCNC: 18 MG/DL (ref 8–23)
BUN/CREAT SERPL: 28.6 (ref 7–25)
CALCIUM SPEC-SCNC: 8.7 MG/DL (ref 8.6–10.5)
CHLORIDE SERPL-SCNC: 102 MMOL/L (ref 98–107)
CO2 SERPL-SCNC: 18.2 MMOL/L (ref 22–29)
CREAT SERPL-MCNC: 0.63 MG/DL (ref 0.57–1)
DEPRECATED RDW RBC AUTO: 43.6 FL (ref 37–54)
EOSINOPHIL # BLD AUTO: 0.1 10*3/MM3 (ref 0–0.4)
EOSINOPHIL NFR BLD AUTO: 2.6 % (ref 0.3–6.2)
ERYTHROCYTE [DISTWIDTH] IN BLOOD BY AUTOMATED COUNT: 13.5 % (ref 12.3–15.4)
GFR SERPL CREATININE-BSD FRML MDRD: 90 ML/MIN/1.73
GLOBULIN UR ELPH-MCNC: 2.6 GM/DL
GLUCOSE BLDC GLUCOMTR-MCNC: 199 MG/DL (ref 70–99)
GLUCOSE BLDC GLUCOMTR-MCNC: 292 MG/DL (ref 70–99)
GLUCOSE BLDC GLUCOMTR-MCNC: 457 MG/DL (ref 70–99)
GLUCOSE BLDC GLUCOMTR-MCNC: 469 MG/DL (ref 70–99)
GLUCOSE SERPL-MCNC: 220 MG/DL (ref 65–99)
HCT VFR BLD AUTO: 40.6 % (ref 34–46.6)
HGB BLD-MCNC: 13.2 G/DL (ref 12–15.9)
IMM GRANULOCYTES # BLD AUTO: 0.01 10*3/MM3 (ref 0–0.05)
IMM GRANULOCYTES NFR BLD AUTO: 0.3 % (ref 0–0.5)
LYMPHOCYTES # BLD AUTO: 1 10*3/MM3 (ref 0.7–3.1)
LYMPHOCYTES NFR BLD AUTO: 25.5 % (ref 19.6–45.3)
MAGNESIUM SERPL-MCNC: 1.8 MG/DL (ref 1.6–2.4)
MCH RBC QN AUTO: 28.7 PG (ref 26.6–33)
MCHC RBC AUTO-ENTMCNC: 32.5 G/DL (ref 31.5–35.7)
MCV RBC AUTO: 88.3 FL (ref 79–97)
MONOCYTES # BLD AUTO: 0.59 10*3/MM3 (ref 0.1–0.9)
MONOCYTES NFR BLD AUTO: 15.1 % (ref 5–12)
NEUTROPHILS NFR BLD AUTO: 2.2 10*3/MM3 (ref 1.7–7)
NEUTROPHILS NFR BLD AUTO: 56 % (ref 42.7–76)
NRBC BLD AUTO-RTO: 0 /100 WBC (ref 0–0.2)
PLATELET # BLD AUTO: 157 10*3/MM3 (ref 140–450)
PMV BLD AUTO: 9.5 FL (ref 6–12)
POTASSIUM SERPL-SCNC: 3.4 MMOL/L (ref 3.5–5.2)
PROT SERPL-MCNC: 5.5 G/DL (ref 6–8.5)
RBC # BLD AUTO: 4.6 10*6/MM3 (ref 3.77–5.28)
SODIUM SERPL-SCNC: 134 MMOL/L (ref 136–145)
WBC # BLD AUTO: 3.92 10*3/MM3 (ref 3.4–10.8)

## 2021-10-24 PROCEDURE — 25010000002 ENOXAPARIN PER 10 MG: Performed by: INTERNAL MEDICINE

## 2021-10-24 PROCEDURE — 99233 SBSQ HOSP IP/OBS HIGH 50: CPT | Performed by: INTERNAL MEDICINE

## 2021-10-24 PROCEDURE — 97530 THERAPEUTIC ACTIVITIES: CPT

## 2021-10-24 PROCEDURE — 82962 GLUCOSE BLOOD TEST: CPT

## 2021-10-24 PROCEDURE — 94760 N-INVAS EAR/PLS OXIMETRY 1: CPT

## 2021-10-24 PROCEDURE — 94799 UNLISTED PULMONARY SVC/PX: CPT

## 2021-10-24 PROCEDURE — 80053 COMPREHEN METABOLIC PANEL: CPT | Performed by: INTERNAL MEDICINE

## 2021-10-24 PROCEDURE — 63710000001 INSULIN DETEMIR PER 5 UNITS: Performed by: INTERNAL MEDICINE

## 2021-10-24 PROCEDURE — 83735 ASSAY OF MAGNESIUM: CPT | Performed by: INTERNAL MEDICINE

## 2021-10-24 PROCEDURE — 82248 BILIRUBIN DIRECT: CPT | Performed by: INTERNAL MEDICINE

## 2021-10-24 PROCEDURE — 25010000002 DEXAMETHASONE PER 1 MG: Performed by: FAMILY MEDICINE

## 2021-10-24 PROCEDURE — 63710000001 INSULIN REGULAR HUMAN PER 5 UNITS: Performed by: INTERNAL MEDICINE

## 2021-10-24 PROCEDURE — 63710000001 INSULIN LISPRO (HUMAN) PER 5 UNITS: Performed by: FAMILY MEDICINE

## 2021-10-24 PROCEDURE — 25010000002 DEXAMETHASONE PER 1 MG: Performed by: INTERNAL MEDICINE

## 2021-10-24 PROCEDURE — 85025 COMPLETE CBC W/AUTO DIFF WBC: CPT | Performed by: INTERNAL MEDICINE

## 2021-10-24 RX ORDER — DEXAMETHASONE SODIUM PHOSPHATE 10 MG/ML
6 INJECTION INTRAMUSCULAR; INTRAVENOUS DAILY
Status: DISCONTINUED | OUTPATIENT
Start: 2021-10-24 | End: 2021-10-27

## 2021-10-24 RX ADMIN — GLIPIZIDE 10 MG: 10 TABLET ORAL at 08:12

## 2021-10-24 RX ADMIN — ASPIRIN 81 MG: 81 TABLET, COATED ORAL at 08:11

## 2021-10-24 RX ADMIN — DOCUSATE SODIUM 50MG AND SENNOSIDES 8.6MG 2 TABLET: 8.6; 5 TABLET, FILM COATED ORAL at 08:11

## 2021-10-24 RX ADMIN — ATORVASTATIN CALCIUM 20 MG: 20 TABLET, FILM COATED ORAL at 20:21

## 2021-10-24 RX ADMIN — DILTIAZEM HYDROCHLORIDE 240 MG: 240 CAPSULE, COATED, EXTENDED RELEASE ORAL at 08:11

## 2021-10-24 RX ADMIN — FAMOTIDINE 20 MG: 20 TABLET, FILM COATED ORAL at 08:11

## 2021-10-24 RX ADMIN — ENOXAPARIN SODIUM 40 MG: 40 INJECTION SUBCUTANEOUS at 11:46

## 2021-10-24 RX ADMIN — Medication 5 MG: at 20:21

## 2021-10-24 RX ADMIN — INSULIN LISPRO 2 UNITS: 100 INJECTION, SOLUTION INTRAVENOUS; SUBCUTANEOUS at 08:10

## 2021-10-24 RX ADMIN — DULOXETINE HYDROCHLORIDE 30 MG: 30 CAPSULE, DELAYED RELEASE ORAL at 08:10

## 2021-10-24 RX ADMIN — DEXAMETHASONE SODIUM PHOSPHATE 6 MG: 10 INJECTION INTRAMUSCULAR; INTRAVENOUS at 08:10

## 2021-10-24 RX ADMIN — SODIUM CHLORIDE, PRESERVATIVE FREE 10 ML: 5 INJECTION INTRAVENOUS at 08:12

## 2021-10-24 RX ADMIN — SODIUM CHLORIDE, PRESERVATIVE FREE 10 ML: 5 INJECTION INTRAVENOUS at 20:21

## 2021-10-24 RX ADMIN — INSULIN HUMAN 3 UNITS: 100 INJECTION, SOLUTION PARENTERAL at 16:39

## 2021-10-24 RX ADMIN — METOPROLOL SUCCINATE 25 MG: 25 TABLET, EXTENDED RELEASE ORAL at 08:11

## 2021-10-24 RX ADMIN — INSULIN DETEMIR 10 UNITS: 100 INJECTION, SOLUTION SUBCUTANEOUS at 16:39

## 2021-10-24 RX ADMIN — LISINOPRIL 10 MG: 10 TABLET ORAL at 08:10

## 2021-10-24 RX ADMIN — LINAGLIPTIN 5 MG: 5 TABLET, FILM COATED ORAL at 08:12

## 2021-10-24 RX ADMIN — DEXAMETHASONE SODIUM PHOSPHATE 6 MG: 10 INJECTION INTRAMUSCULAR; INTRAVENOUS at 16:40

## 2021-10-24 RX ADMIN — INSULIN LISPRO 9 UNITS: 100 INJECTION, SOLUTION INTRAVENOUS; SUBCUTANEOUS at 16:39

## 2021-10-24 RX ADMIN — PANTOPRAZOLE SODIUM 40 MG: 40 TABLET, DELAYED RELEASE ORAL at 08:10

## 2021-10-24 RX ADMIN — INSULIN LISPRO 6 UNITS: 100 INJECTION, SOLUTION INTRAVENOUS; SUBCUTANEOUS at 11:46

## 2021-10-24 RX ADMIN — REMDESIVIR 100 MG: 100 INJECTION, POWDER, LYOPHILIZED, FOR SOLUTION INTRAVENOUS at 11:47

## 2021-10-25 LAB
ALBUMIN SERPL-MCNC: 3.2 G/DL (ref 3.5–5.2)
ALBUMIN/GLOB SERPL: 1.2 G/DL
ALP SERPL-CCNC: 111 U/L (ref 39–117)
ALT SERPL W P-5'-P-CCNC: 24 U/L (ref 1–33)
ANION GAP SERPL CALCULATED.3IONS-SCNC: 12.4 MMOL/L (ref 5–15)
AST SERPL-CCNC: 21 U/L (ref 1–32)
BASOPHILS # BLD AUTO: 0 10*3/MM3 (ref 0–0.2)
BASOPHILS NFR BLD AUTO: 0 % (ref 0–1.5)
BILIRUB CONJ SERPL-MCNC: <0.2 MG/DL (ref 0–0.3)
BILIRUB SERPL-MCNC: 0.3 MG/DL (ref 0–1.2)
BUN SERPL-MCNC: 19 MG/DL (ref 8–23)
BUN/CREAT SERPL: 31.7 (ref 7–25)
CALCIUM SPEC-SCNC: 9 MG/DL (ref 8.6–10.5)
CHLORIDE SERPL-SCNC: 100 MMOL/L (ref 98–107)
CO2 SERPL-SCNC: 21.6 MMOL/L (ref 22–29)
CREAT SERPL-MCNC: 0.6 MG/DL (ref 0.57–1)
DEPRECATED RDW RBC AUTO: 40.3 FL (ref 37–54)
EOSINOPHIL # BLD AUTO: 0 10*3/MM3 (ref 0–0.4)
EOSINOPHIL NFR BLD AUTO: 0 % (ref 0.3–6.2)
ERYTHROCYTE [DISTWIDTH] IN BLOOD BY AUTOMATED COUNT: 13.2 % (ref 12.3–15.4)
GFR SERPL CREATININE-BSD FRML MDRD: 95 ML/MIN/1.73
GLOBULIN UR ELPH-MCNC: 2.7 GM/DL
GLUCOSE BLDC GLUCOMTR-MCNC: 250 MG/DL (ref 70–99)
GLUCOSE BLDC GLUCOMTR-MCNC: 267 MG/DL (ref 70–99)
GLUCOSE BLDC GLUCOMTR-MCNC: 301 MG/DL (ref 70–99)
GLUCOSE BLDC GLUCOMTR-MCNC: 329 MG/DL (ref 70–99)
GLUCOSE SERPL-MCNC: 275 MG/DL (ref 65–99)
HCT VFR BLD AUTO: 37.9 % (ref 34–46.6)
HGB BLD-MCNC: 13 G/DL (ref 12–15.9)
IMM GRANULOCYTES # BLD AUTO: 0.01 10*3/MM3 (ref 0–0.05)
IMM GRANULOCYTES NFR BLD AUTO: 0.3 % (ref 0–0.5)
LYMPHOCYTES # BLD AUTO: 0.55 10*3/MM3 (ref 0.7–3.1)
LYMPHOCYTES NFR BLD AUTO: 18.8 % (ref 19.6–45.3)
MAGNESIUM SERPL-MCNC: 1.9 MG/DL (ref 1.6–2.4)
MCH RBC QN AUTO: 28.6 PG (ref 26.6–33)
MCHC RBC AUTO-ENTMCNC: 34.3 G/DL (ref 31.5–35.7)
MCV RBC AUTO: 83.5 FL (ref 79–97)
MONOCYTES # BLD AUTO: 0.37 10*3/MM3 (ref 0.1–0.9)
MONOCYTES NFR BLD AUTO: 12.7 % (ref 5–12)
NEUTROPHILS NFR BLD AUTO: 1.99 10*3/MM3 (ref 1.7–7)
NEUTROPHILS NFR BLD AUTO: 68.2 % (ref 42.7–76)
NRBC BLD AUTO-RTO: 0 /100 WBC (ref 0–0.2)
PLATELET # BLD AUTO: 199 10*3/MM3 (ref 140–450)
PMV BLD AUTO: 10 FL (ref 6–12)
POTASSIUM SERPL-SCNC: 3.8 MMOL/L (ref 3.5–5.2)
PROT SERPL-MCNC: 5.9 G/DL (ref 6–8.5)
RBC # BLD AUTO: 4.54 10*6/MM3 (ref 3.77–5.28)
SODIUM SERPL-SCNC: 134 MMOL/L (ref 136–145)
WBC # BLD AUTO: 2.92 10*3/MM3 (ref 3.4–10.8)

## 2021-10-25 PROCEDURE — 99232 SBSQ HOSP IP/OBS MODERATE 35: CPT | Performed by: INTERNAL MEDICINE

## 2021-10-25 PROCEDURE — 94799 UNLISTED PULMONARY SVC/PX: CPT

## 2021-10-25 PROCEDURE — 25010000002 ENOXAPARIN PER 10 MG: Performed by: INTERNAL MEDICINE

## 2021-10-25 PROCEDURE — 63710000001 INSULIN DETEMIR PER 5 UNITS: Performed by: INTERNAL MEDICINE

## 2021-10-25 PROCEDURE — 82962 GLUCOSE BLOOD TEST: CPT

## 2021-10-25 PROCEDURE — 83735 ASSAY OF MAGNESIUM: CPT | Performed by: INTERNAL MEDICINE

## 2021-10-25 PROCEDURE — 97110 THERAPEUTIC EXERCISES: CPT

## 2021-10-25 PROCEDURE — 63710000001 INSULIN LISPRO (HUMAN) PER 5 UNITS: Performed by: FAMILY MEDICINE

## 2021-10-25 PROCEDURE — 80053 COMPREHEN METABOLIC PANEL: CPT | Performed by: INTERNAL MEDICINE

## 2021-10-25 PROCEDURE — 63710000001 INSULIN REGULAR HUMAN PER 5 UNITS: Performed by: INTERNAL MEDICINE

## 2021-10-25 PROCEDURE — 85025 COMPLETE CBC W/AUTO DIFF WBC: CPT | Performed by: INTERNAL MEDICINE

## 2021-10-25 PROCEDURE — 25010000002 DEXAMETHASONE PER 1 MG: Performed by: INTERNAL MEDICINE

## 2021-10-25 PROCEDURE — 82248 BILIRUBIN DIRECT: CPT | Performed by: INTERNAL MEDICINE

## 2021-10-25 PROCEDURE — 97116 GAIT TRAINING THERAPY: CPT

## 2021-10-25 PROCEDURE — 97535 SELF CARE MNGMENT TRAINING: CPT

## 2021-10-25 RX ADMIN — DOCUSATE SODIUM 50MG AND SENNOSIDES 8.6MG 2 TABLET: 8.6; 5 TABLET, FILM COATED ORAL at 08:41

## 2021-10-25 RX ADMIN — METOPROLOL SUCCINATE 25 MG: 25 TABLET, EXTENDED RELEASE ORAL at 08:40

## 2021-10-25 RX ADMIN — LISINOPRIL 10 MG: 10 TABLET ORAL at 08:40

## 2021-10-25 RX ADMIN — DILTIAZEM HYDROCHLORIDE 240 MG: 240 CAPSULE, COATED, EXTENDED RELEASE ORAL at 08:41

## 2021-10-25 RX ADMIN — LINAGLIPTIN 5 MG: 5 TABLET, FILM COATED ORAL at 08:41

## 2021-10-25 RX ADMIN — GLIPIZIDE 10 MG: 10 TABLET ORAL at 08:41

## 2021-10-25 RX ADMIN — DEXAMETHASONE SODIUM PHOSPHATE 6 MG: 10 INJECTION INTRAMUSCULAR; INTRAVENOUS at 08:41

## 2021-10-25 RX ADMIN — INSULIN LISPRO 6 UNITS: 100 INJECTION, SOLUTION INTRAVENOUS; SUBCUTANEOUS at 08:42

## 2021-10-25 RX ADMIN — SODIUM CHLORIDE, PRESERVATIVE FREE 10 ML: 5 INJECTION INTRAVENOUS at 08:43

## 2021-10-25 RX ADMIN — HYDROCHLOROTHIAZIDE 12.5 MG: 12.5 CAPSULE ORAL at 08:40

## 2021-10-25 RX ADMIN — INSULIN HUMAN 3 UNITS: 100 INJECTION, SOLUTION PARENTERAL at 17:27

## 2021-10-25 RX ADMIN — REMDESIVIR 100 MG: 100 INJECTION, POWDER, LYOPHILIZED, FOR SOLUTION INTRAVENOUS at 14:33

## 2021-10-25 RX ADMIN — INSULIN HUMAN 3 UNITS: 100 INJECTION, SOLUTION PARENTERAL at 08:42

## 2021-10-25 RX ADMIN — INSULIN DETEMIR 10 UNITS: 100 INJECTION, SOLUTION SUBCUTANEOUS at 20:25

## 2021-10-25 RX ADMIN — DULOXETINE HYDROCHLORIDE 30 MG: 30 CAPSULE, DELAYED RELEASE ORAL at 08:41

## 2021-10-25 RX ADMIN — ASPIRIN 81 MG: 81 TABLET, COATED ORAL at 08:41

## 2021-10-25 RX ADMIN — INSULIN HUMAN 3 UNITS: 100 INJECTION, SOLUTION PARENTERAL at 12:47

## 2021-10-25 RX ADMIN — INSULIN LISPRO 6 UNITS: 100 INJECTION, SOLUTION INTRAVENOUS; SUBCUTANEOUS at 17:21

## 2021-10-25 RX ADMIN — ENOXAPARIN SODIUM 40 MG: 40 INJECTION SUBCUTANEOUS at 12:47

## 2021-10-25 RX ADMIN — PANTOPRAZOLE SODIUM 40 MG: 40 TABLET, DELAYED RELEASE ORAL at 08:41

## 2021-10-25 RX ADMIN — SODIUM CHLORIDE, PRESERVATIVE FREE 10 ML: 5 INJECTION INTRAVENOUS at 20:25

## 2021-10-25 RX ADMIN — INSULIN LISPRO 7 UNITS: 100 INJECTION, SOLUTION INTRAVENOUS; SUBCUTANEOUS at 12:47

## 2021-10-25 RX ADMIN — ATORVASTATIN CALCIUM 20 MG: 20 TABLET, FILM COATED ORAL at 20:25

## 2021-10-25 RX ADMIN — FAMOTIDINE 20 MG: 20 TABLET, FILM COATED ORAL at 08:40

## 2021-10-26 LAB
GLUCOSE BLDC GLUCOMTR-MCNC: 203 MG/DL (ref 70–99)
GLUCOSE BLDC GLUCOMTR-MCNC: 219 MG/DL (ref 70–99)
GLUCOSE BLDC GLUCOMTR-MCNC: 424 MG/DL (ref 70–99)

## 2021-10-26 PROCEDURE — 97116 GAIT TRAINING THERAPY: CPT

## 2021-10-26 PROCEDURE — 25010000002 DEXAMETHASONE SODIUM PHOSPHATE 10 MG/ML SOLUTION: Performed by: INTERNAL MEDICINE

## 2021-10-26 PROCEDURE — 94799 UNLISTED PULMONARY SVC/PX: CPT

## 2021-10-26 PROCEDURE — 99232 SBSQ HOSP IP/OBS MODERATE 35: CPT | Performed by: INTERNAL MEDICINE

## 2021-10-26 PROCEDURE — 97110 THERAPEUTIC EXERCISES: CPT

## 2021-10-26 PROCEDURE — 63710000001 INSULIN REGULAR HUMAN PER 5 UNITS: Performed by: INTERNAL MEDICINE

## 2021-10-26 PROCEDURE — 25010000002 ENOXAPARIN PER 10 MG: Performed by: INTERNAL MEDICINE

## 2021-10-26 PROCEDURE — 82962 GLUCOSE BLOOD TEST: CPT

## 2021-10-26 PROCEDURE — 63710000001 INSULIN DETEMIR PER 5 UNITS: Performed by: INTERNAL MEDICINE

## 2021-10-26 PROCEDURE — 63710000001 INSULIN LISPRO (HUMAN) PER 5 UNITS: Performed by: FAMILY MEDICINE

## 2021-10-26 RX ADMIN — METOPROLOL SUCCINATE 25 MG: 25 TABLET, EXTENDED RELEASE ORAL at 09:26

## 2021-10-26 RX ADMIN — LINAGLIPTIN 5 MG: 5 TABLET, FILM COATED ORAL at 09:25

## 2021-10-26 RX ADMIN — ENOXAPARIN SODIUM 40 MG: 40 INJECTION SUBCUTANEOUS at 09:24

## 2021-10-26 RX ADMIN — SODIUM CHLORIDE, PRESERVATIVE FREE 10 ML: 5 INJECTION INTRAVENOUS at 09:24

## 2021-10-26 RX ADMIN — PANTOPRAZOLE SODIUM 40 MG: 40 TABLET, DELAYED RELEASE ORAL at 09:25

## 2021-10-26 RX ADMIN — INSULIN LISPRO 4 UNITS: 100 INJECTION, SOLUTION INTRAVENOUS; SUBCUTANEOUS at 09:23

## 2021-10-26 RX ADMIN — ENOXAPARIN SODIUM 40 MG: 40 INJECTION SUBCUTANEOUS at 09:33

## 2021-10-26 RX ADMIN — DULOXETINE HYDROCHLORIDE 30 MG: 30 CAPSULE, DELAYED RELEASE ORAL at 09:25

## 2021-10-26 RX ADMIN — SODIUM CHLORIDE, PRESERVATIVE FREE 10 ML: 5 INJECTION INTRAVENOUS at 21:22

## 2021-10-26 RX ADMIN — DEXAMETHASONE SODIUM PHOSPHATE 6 MG: 10 INJECTION INTRAMUSCULAR; INTRAVENOUS at 09:25

## 2021-10-26 RX ADMIN — DOCUSATE SODIUM 50MG AND SENNOSIDES 8.6MG 2 TABLET: 8.6; 5 TABLET, FILM COATED ORAL at 09:25

## 2021-10-26 RX ADMIN — INSULIN HUMAN 3 UNITS: 100 INJECTION, SOLUTION PARENTERAL at 11:35

## 2021-10-26 RX ADMIN — FAMOTIDINE 20 MG: 20 TABLET, FILM COATED ORAL at 09:26

## 2021-10-26 RX ADMIN — ATORVASTATIN CALCIUM 20 MG: 20 TABLET, FILM COATED ORAL at 21:17

## 2021-10-26 RX ADMIN — INSULIN HUMAN 3 UNITS: 100 INJECTION, SOLUTION PARENTERAL at 09:25

## 2021-10-26 RX ADMIN — INSULIN HUMAN 3 UNITS: 100 INJECTION, SOLUTION PARENTERAL at 21:17

## 2021-10-26 RX ADMIN — INSULIN LISPRO 9 UNITS: 100 INJECTION, SOLUTION INTRAVENOUS; SUBCUTANEOUS at 21:17

## 2021-10-26 RX ADMIN — INSULIN DETEMIR 10 UNITS: 100 INJECTION, SOLUTION SUBCUTANEOUS at 21:17

## 2021-10-26 RX ADMIN — INSULIN LISPRO 4 UNITS: 100 INJECTION, SOLUTION INTRAVENOUS; SUBCUTANEOUS at 11:35

## 2021-10-26 RX ADMIN — LISINOPRIL 10 MG: 10 TABLET ORAL at 09:25

## 2021-10-26 RX ADMIN — ASPIRIN 81 MG: 81 TABLET, COATED ORAL at 09:26

## 2021-10-26 RX ADMIN — GLIPIZIDE 10 MG: 10 TABLET ORAL at 09:25

## 2021-10-26 RX ADMIN — DILTIAZEM HYDROCHLORIDE 240 MG: 240 CAPSULE, COATED, EXTENDED RELEASE ORAL at 09:25

## 2021-10-27 LAB
ALBUMIN SERPL-MCNC: 3.5 G/DL (ref 3.5–5.2)
ANION GAP SERPL CALCULATED.3IONS-SCNC: 9.6 MMOL/L (ref 5–15)
BASOPHILS # BLD AUTO: 0 10*3/MM3 (ref 0–0.2)
BASOPHILS NFR BLD AUTO: 0 % (ref 0–1.5)
BUN SERPL-MCNC: 21 MG/DL (ref 8–23)
BUN/CREAT SERPL: 27.3 (ref 7–25)
CALCIUM SPEC-SCNC: 9.5 MG/DL (ref 8.6–10.5)
CHLORIDE SERPL-SCNC: 100 MMOL/L (ref 98–107)
CO2 SERPL-SCNC: 25.4 MMOL/L (ref 22–29)
CREAT SERPL-MCNC: 0.77 MG/DL (ref 0.57–1)
DEPRECATED RDW RBC AUTO: 42.3 FL (ref 37–54)
EOSINOPHIL # BLD AUTO: 0 10*3/MM3 (ref 0–0.4)
EOSINOPHIL NFR BLD AUTO: 0 % (ref 0.3–6.2)
ERYTHROCYTE [DISTWIDTH] IN BLOOD BY AUTOMATED COUNT: 13.5 % (ref 12.3–15.4)
GFR SERPL CREATININE-BSD FRML MDRD: 71 ML/MIN/1.73
GLUCOSE BLDC GLUCOMTR-MCNC: 261 MG/DL (ref 70–99)
GLUCOSE BLDC GLUCOMTR-MCNC: 310 MG/DL (ref 70–99)
GLUCOSE BLDC GLUCOMTR-MCNC: 394 MG/DL (ref 70–99)
GLUCOSE BLDC GLUCOMTR-MCNC: 394 MG/DL (ref 70–99)
GLUCOSE BLDC GLUCOMTR-MCNC: 460 MG/DL (ref 70–99)
GLUCOSE SERPL-MCNC: 299 MG/DL (ref 65–99)
HCT VFR BLD AUTO: 40.6 % (ref 34–46.6)
HGB BLD-MCNC: 13.6 G/DL (ref 12–15.9)
IMM GRANULOCYTES # BLD AUTO: 0.07 10*3/MM3 (ref 0–0.05)
IMM GRANULOCYTES NFR BLD AUTO: 1 % (ref 0–0.5)
LYMPHOCYTES # BLD AUTO: 0.82 10*3/MM3 (ref 0.7–3.1)
LYMPHOCYTES NFR BLD AUTO: 12.1 % (ref 19.6–45.3)
MCH RBC QN AUTO: 28.5 PG (ref 26.6–33)
MCHC RBC AUTO-ENTMCNC: 33.5 G/DL (ref 31.5–35.7)
MCV RBC AUTO: 85.1 FL (ref 79–97)
MONOCYTES # BLD AUTO: 0.68 10*3/MM3 (ref 0.1–0.9)
MONOCYTES NFR BLD AUTO: 10.1 % (ref 5–12)
NEUTROPHILS NFR BLD AUTO: 5.18 10*3/MM3 (ref 1.7–7)
NEUTROPHILS NFR BLD AUTO: 76.8 % (ref 42.7–76)
NRBC BLD AUTO-RTO: 0 /100 WBC (ref 0–0.2)
PHOSPHATE SERPL-MCNC: 3.1 MG/DL (ref 2.5–4.5)
PLATELET # BLD AUTO: 229 10*3/MM3 (ref 140–450)
PMV BLD AUTO: 9.8 FL (ref 6–12)
POTASSIUM SERPL-SCNC: 4.3 MMOL/L (ref 3.5–5.2)
RBC # BLD AUTO: 4.77 10*6/MM3 (ref 3.77–5.28)
SODIUM SERPL-SCNC: 135 MMOL/L (ref 136–145)
WBC # BLD AUTO: 6.75 10*3/MM3 (ref 3.4–10.8)

## 2021-10-27 PROCEDURE — 25010000002 ENOXAPARIN PER 10 MG: Performed by: INTERNAL MEDICINE

## 2021-10-27 PROCEDURE — 85025 COMPLETE CBC W/AUTO DIFF WBC: CPT | Performed by: INTERNAL MEDICINE

## 2021-10-27 PROCEDURE — 63710000001 INSULIN REGULAR HUMAN PER 5 UNITS: Performed by: INTERNAL MEDICINE

## 2021-10-27 PROCEDURE — 82962 GLUCOSE BLOOD TEST: CPT

## 2021-10-27 PROCEDURE — 63710000001 INSULIN LISPRO (HUMAN) PER 5 UNITS: Performed by: FAMILY MEDICINE

## 2021-10-27 PROCEDURE — 99232 SBSQ HOSP IP/OBS MODERATE 35: CPT | Performed by: INTERNAL MEDICINE

## 2021-10-27 PROCEDURE — 63710000001 INSULIN DETEMIR PER 5 UNITS: Performed by: INTERNAL MEDICINE

## 2021-10-27 PROCEDURE — 80069 RENAL FUNCTION PANEL: CPT | Performed by: INTERNAL MEDICINE

## 2021-10-27 PROCEDURE — 25010000002 DEXAMETHASONE PER 1 MG: Performed by: INTERNAL MEDICINE

## 2021-10-27 PROCEDURE — 94799 UNLISTED PULMONARY SVC/PX: CPT

## 2021-10-27 PROCEDURE — 97535 SELF CARE MNGMENT TRAINING: CPT

## 2021-10-27 RX ADMIN — INSULIN LISPRO 8 UNITS: 100 INJECTION, SOLUTION INTRAVENOUS; SUBCUTANEOUS at 11:42

## 2021-10-27 RX ADMIN — LINAGLIPTIN 5 MG: 5 TABLET, FILM COATED ORAL at 08:56

## 2021-10-27 RX ADMIN — SODIUM CHLORIDE, PRESERVATIVE FREE 10 ML: 5 INJECTION INTRAVENOUS at 08:57

## 2021-10-27 RX ADMIN — GLIPIZIDE 10 MG: 10 TABLET ORAL at 08:56

## 2021-10-27 RX ADMIN — DULOXETINE HYDROCHLORIDE 30 MG: 30 CAPSULE, DELAYED RELEASE ORAL at 08:57

## 2021-10-27 RX ADMIN — ASPIRIN 81 MG: 81 TABLET, COATED ORAL at 08:56

## 2021-10-27 RX ADMIN — DILTIAZEM HYDROCHLORIDE 240 MG: 240 CAPSULE, COATED, EXTENDED RELEASE ORAL at 08:56

## 2021-10-27 RX ADMIN — LISINOPRIL 10 MG: 10 TABLET ORAL at 08:56

## 2021-10-27 RX ADMIN — INSULIN HUMAN 5 UNITS: 100 INJECTION, SOLUTION PARENTERAL at 11:41

## 2021-10-27 RX ADMIN — INSULIN HUMAN 3 UNITS: 100 INJECTION, SOLUTION PARENTERAL at 08:55

## 2021-10-27 RX ADMIN — SODIUM CHLORIDE, PRESERVATIVE FREE 10 ML: 5 INJECTION INTRAVENOUS at 21:11

## 2021-10-27 RX ADMIN — FAMOTIDINE 20 MG: 20 TABLET, FILM COATED ORAL at 08:57

## 2021-10-27 RX ADMIN — DEXAMETHASONE SODIUM PHOSPHATE 6 MG: 10 INJECTION INTRAMUSCULAR; INTRAVENOUS at 08:56

## 2021-10-27 RX ADMIN — ATORVASTATIN CALCIUM 20 MG: 20 TABLET, FILM COATED ORAL at 21:11

## 2021-10-27 RX ADMIN — INSULIN LISPRO 6 UNITS: 100 INJECTION, SOLUTION INTRAVENOUS; SUBCUTANEOUS at 08:55

## 2021-10-27 RX ADMIN — INSULIN LISPRO 8 UNITS: 100 INJECTION, SOLUTION INTRAVENOUS; SUBCUTANEOUS at 17:44

## 2021-10-27 RX ADMIN — INSULIN DETEMIR 15 UNITS: 100 INJECTION, SOLUTION SUBCUTANEOUS at 21:10

## 2021-10-27 RX ADMIN — HYDROCHLOROTHIAZIDE 12.5 MG: 12.5 CAPSULE ORAL at 08:56

## 2021-10-27 RX ADMIN — METOPROLOL SUCCINATE 25 MG: 25 TABLET, EXTENDED RELEASE ORAL at 08:57

## 2021-10-27 RX ADMIN — DOCUSATE SODIUM 50MG AND SENNOSIDES 8.6MG 2 TABLET: 8.6; 5 TABLET, FILM COATED ORAL at 00:30

## 2021-10-27 RX ADMIN — ENOXAPARIN SODIUM 40 MG: 40 INJECTION SUBCUTANEOUS at 11:41

## 2021-10-27 RX ADMIN — INSULIN HUMAN 5 UNITS: 100 INJECTION, SOLUTION PARENTERAL at 17:43

## 2021-10-27 RX ADMIN — PANTOPRAZOLE SODIUM 40 MG: 40 TABLET, DELAYED RELEASE ORAL at 08:56

## 2021-10-28 LAB
ALBUMIN SERPL-MCNC: 3.4 G/DL (ref 3.5–5.2)
ANION GAP SERPL CALCULATED.3IONS-SCNC: 11.6 MMOL/L (ref 5–15)
ANION GAP SERPL CALCULATED.3IONS-SCNC: 9.8 MMOL/L (ref 5–15)
BACTERIA UR QL AUTO: ABNORMAL /HPF
BASOPHILS # BLD AUTO: 0.01 10*3/MM3 (ref 0–0.2)
BASOPHILS NFR BLD AUTO: 0.1 % (ref 0–1.5)
BILIRUB UR QL STRIP: NEGATIVE
BUN SERPL-MCNC: 21 MG/DL (ref 8–23)
BUN SERPL-MCNC: 24 MG/DL (ref 8–23)
BUN/CREAT SERPL: 24.1 (ref 7–25)
BUN/CREAT SERPL: 28.2 (ref 7–25)
CALCIUM SPEC-SCNC: 9.2 MG/DL (ref 8.6–10.5)
CALCIUM SPEC-SCNC: 9.5 MG/DL (ref 8.6–10.5)
CHLORIDE SERPL-SCNC: 94 MMOL/L (ref 98–107)
CHLORIDE SERPL-SCNC: 94 MMOL/L (ref 98–107)
CLARITY UR: CLEAR
CO2 SERPL-SCNC: 20.4 MMOL/L (ref 22–29)
CO2 SERPL-SCNC: 23.2 MMOL/L (ref 22–29)
COLOR UR: YELLOW
CREAT SERPL-MCNC: 0.85 MG/DL (ref 0.57–1)
CREAT SERPL-MCNC: 0.87 MG/DL (ref 0.57–1)
DEPRECATED RDW RBC AUTO: 41.3 FL (ref 37–54)
EOSINOPHIL # BLD AUTO: 0.01 10*3/MM3 (ref 0–0.4)
EOSINOPHIL NFR BLD AUTO: 0.1 % (ref 0.3–6.2)
ERYTHROCYTE [DISTWIDTH] IN BLOOD BY AUTOMATED COUNT: 13.4 % (ref 12.3–15.4)
GFR SERPL CREATININE-BSD FRML MDRD: 62 ML/MIN/1.73
GFR SERPL CREATININE-BSD FRML MDRD: 64 ML/MIN/1.73
GLUCOSE BLDC GLUCOMTR-MCNC: 219 MG/DL (ref 70–99)
GLUCOSE BLDC GLUCOMTR-MCNC: 275 MG/DL (ref 70–99)
GLUCOSE BLDC GLUCOMTR-MCNC: 282 MG/DL (ref 70–99)
GLUCOSE BLDC GLUCOMTR-MCNC: 358 MG/DL (ref 70–99)
GLUCOSE SERPL-MCNC: 369 MG/DL (ref 65–99)
GLUCOSE SERPL-MCNC: 376 MG/DL (ref 65–99)
GLUCOSE UR STRIP-MCNC: ABNORMAL MG/DL
HCT VFR BLD AUTO: 43.4 % (ref 34–46.6)
HGB BLD-MCNC: 14.7 G/DL (ref 12–15.9)
HGB UR QL STRIP.AUTO: NEGATIVE
HYALINE CASTS UR QL AUTO: ABNORMAL /LPF
IMM GRANULOCYTES # BLD AUTO: 0.14 10*3/MM3 (ref 0–0.05)
IMM GRANULOCYTES NFR BLD AUTO: 1.4 % (ref 0–0.5)
KETONES UR QL STRIP: NEGATIVE
LEUKOCYTE ESTERASE UR QL STRIP.AUTO: ABNORMAL
LYMPHOCYTES # BLD AUTO: 1.39 10*3/MM3 (ref 0.7–3.1)
LYMPHOCYTES NFR BLD AUTO: 14.2 % (ref 19.6–45.3)
MCH RBC QN AUTO: 28.6 PG (ref 26.6–33)
MCHC RBC AUTO-ENTMCNC: 33.9 G/DL (ref 31.5–35.7)
MCV RBC AUTO: 84.4 FL (ref 79–97)
MONOCYTES # BLD AUTO: 1.04 10*3/MM3 (ref 0.1–0.9)
MONOCYTES NFR BLD AUTO: 10.6 % (ref 5–12)
NEUTROPHILS NFR BLD AUTO: 7.22 10*3/MM3 (ref 1.7–7)
NEUTROPHILS NFR BLD AUTO: 73.6 % (ref 42.7–76)
NITRITE UR QL STRIP: NEGATIVE
NRBC BLD AUTO-RTO: 0 /100 WBC (ref 0–0.2)
PH UR STRIP.AUTO: 6.5 [PH] (ref 5–8)
PHOSPHATE SERPL-MCNC: 2.7 MG/DL (ref 2.5–4.5)
PLATELET # BLD AUTO: 288 10*3/MM3 (ref 140–450)
PMV BLD AUTO: 9.6 FL (ref 6–12)
POTASSIUM SERPL-SCNC: 4 MMOL/L (ref 3.5–5.2)
POTASSIUM SERPL-SCNC: 4.1 MMOL/L (ref 3.5–5.2)
PROT UR QL STRIP: NEGATIVE
RBC # BLD AUTO: 5.14 10*6/MM3 (ref 3.77–5.28)
RBC # UR: ABNORMAL /HPF
REF LAB TEST METHOD: ABNORMAL
SODIUM SERPL-SCNC: 126 MMOL/L (ref 136–145)
SODIUM SERPL-SCNC: 127 MMOL/L (ref 136–145)
SP GR UR STRIP: 1.01 (ref 1–1.03)
SQUAMOUS #/AREA URNS HPF: ABNORMAL /HPF
UROBILINOGEN UR QL STRIP: ABNORMAL
WBC # BLD AUTO: 9.81 10*3/MM3 (ref 3.4–10.8)
WBC UR QL AUTO: ABNORMAL /HPF

## 2021-10-28 PROCEDURE — 25010000002 CEFTRIAXONE PER 250 MG: Performed by: INTERNAL MEDICINE

## 2021-10-28 PROCEDURE — 80048 BASIC METABOLIC PNL TOTAL CA: CPT | Performed by: INTERNAL MEDICINE

## 2021-10-28 PROCEDURE — 87086 URINE CULTURE/COLONY COUNT: CPT | Performed by: INTERNAL MEDICINE

## 2021-10-28 PROCEDURE — 63710000001 INSULIN LISPRO (HUMAN) PER 5 UNITS: Performed by: FAMILY MEDICINE

## 2021-10-28 PROCEDURE — 82962 GLUCOSE BLOOD TEST: CPT

## 2021-10-28 PROCEDURE — 25010000002 ENOXAPARIN PER 10 MG: Performed by: INTERNAL MEDICINE

## 2021-10-28 PROCEDURE — 99232 SBSQ HOSP IP/OBS MODERATE 35: CPT | Performed by: INTERNAL MEDICINE

## 2021-10-28 PROCEDURE — 97530 THERAPEUTIC ACTIVITIES: CPT

## 2021-10-28 PROCEDURE — 81001 URINALYSIS AUTO W/SCOPE: CPT | Performed by: INTERNAL MEDICINE

## 2021-10-28 PROCEDURE — 80069 RENAL FUNCTION PANEL: CPT | Performed by: INTERNAL MEDICINE

## 2021-10-28 PROCEDURE — 63710000001 INSULIN REGULAR HUMAN PER 5 UNITS: Performed by: INTERNAL MEDICINE

## 2021-10-28 PROCEDURE — 94799 UNLISTED PULMONARY SVC/PX: CPT

## 2021-10-28 PROCEDURE — 63710000001 INSULIN DETEMIR PER 5 UNITS: Performed by: INTERNAL MEDICINE

## 2021-10-28 PROCEDURE — 97110 THERAPEUTIC EXERCISES: CPT

## 2021-10-28 PROCEDURE — 85025 COMPLETE CBC W/AUTO DIFF WBC: CPT | Performed by: INTERNAL MEDICINE

## 2021-10-28 RX ORDER — SODIUM CHLORIDE 9 MG/ML
75 INJECTION, SOLUTION INTRAVENOUS ONCE
Status: COMPLETED | OUTPATIENT
Start: 2021-10-28 | End: 2021-10-28

## 2021-10-28 RX ORDER — CEFTRIAXONE SODIUM 1 G/50ML
1 INJECTION, SOLUTION INTRAVENOUS EVERY 24 HOURS
Status: DISCONTINUED | OUTPATIENT
Start: 2021-10-28 | End: 2021-11-01

## 2021-10-28 RX ADMIN — LISINOPRIL 10 MG: 10 TABLET ORAL at 09:03

## 2021-10-28 RX ADMIN — METOPROLOL SUCCINATE 25 MG: 25 TABLET, EXTENDED RELEASE ORAL at 09:03

## 2021-10-28 RX ADMIN — SODIUM CHLORIDE 75 ML/HR: 9 INJECTION, SOLUTION INTRAVENOUS at 12:40

## 2021-10-28 RX ADMIN — SODIUM CHLORIDE, PRESERVATIVE FREE 10 ML: 5 INJECTION INTRAVENOUS at 20:28

## 2021-10-28 RX ADMIN — DULOXETINE HYDROCHLORIDE 30 MG: 30 CAPSULE, DELAYED RELEASE ORAL at 09:03

## 2021-10-28 RX ADMIN — INSULIN HUMAN 5 UNITS: 100 INJECTION, SOLUTION PARENTERAL at 09:05

## 2021-10-28 RX ADMIN — PANTOPRAZOLE SODIUM 40 MG: 40 TABLET, DELAYED RELEASE ORAL at 09:03

## 2021-10-28 RX ADMIN — ENOXAPARIN SODIUM 40 MG: 40 INJECTION SUBCUTANEOUS at 10:56

## 2021-10-28 RX ADMIN — INSULIN LISPRO 6 UNITS: 100 INJECTION, SOLUTION INTRAVENOUS; SUBCUTANEOUS at 09:05

## 2021-10-28 RX ADMIN — INSULIN LISPRO 6 UNITS: 100 INJECTION, SOLUTION INTRAVENOUS; SUBCUTANEOUS at 17:54

## 2021-10-28 RX ADMIN — INSULIN HUMAN 5 UNITS: 100 INJECTION, SOLUTION PARENTERAL at 12:39

## 2021-10-28 RX ADMIN — DILTIAZEM HYDROCHLORIDE 240 MG: 240 CAPSULE, COATED, EXTENDED RELEASE ORAL at 09:03

## 2021-10-28 RX ADMIN — DOCUSATE SODIUM 50MG AND SENNOSIDES 8.6MG 2 TABLET: 8.6; 5 TABLET, FILM COATED ORAL at 20:28

## 2021-10-28 RX ADMIN — FAMOTIDINE 20 MG: 20 TABLET, FILM COATED ORAL at 09:03

## 2021-10-28 RX ADMIN — INSULIN DETEMIR 15 UNITS: 100 INJECTION, SOLUTION SUBCUTANEOUS at 20:30

## 2021-10-28 RX ADMIN — ATORVASTATIN CALCIUM 20 MG: 20 TABLET, FILM COATED ORAL at 20:28

## 2021-10-28 RX ADMIN — SODIUM CHLORIDE, PRESERVATIVE FREE 10 ML: 5 INJECTION INTRAVENOUS at 09:06

## 2021-10-28 RX ADMIN — INSULIN LISPRO 8 UNITS: 100 INJECTION, SOLUTION INTRAVENOUS; SUBCUTANEOUS at 12:39

## 2021-10-28 RX ADMIN — LINAGLIPTIN 5 MG: 5 TABLET, FILM COATED ORAL at 09:03

## 2021-10-28 RX ADMIN — GLIPIZIDE 10 MG: 10 TABLET ORAL at 06:30

## 2021-10-28 RX ADMIN — ASPIRIN 81 MG: 81 TABLET, COATED ORAL at 09:03

## 2021-10-28 RX ADMIN — INSULIN HUMAN 5 UNITS: 100 INJECTION, SOLUTION PARENTERAL at 17:54

## 2021-10-28 RX ADMIN — CEFTRIAXONE SODIUM 1 G: 1 INJECTION, SOLUTION INTRAVENOUS at 10:57

## 2021-10-29 ENCOUNTER — APPOINTMENT (OUTPATIENT)
Dept: CT IMAGING | Facility: HOSPITAL | Age: 86
End: 2021-10-29

## 2021-10-29 LAB
ALBUMIN SERPL-MCNC: 2.9 G/DL (ref 3.5–5.2)
ANION GAP SERPL CALCULATED.3IONS-SCNC: 9.6 MMOL/L (ref 5–15)
BACTERIA SPEC AEROBE CULT: NORMAL
BASOPHILS # BLD AUTO: 0.04 10*3/MM3 (ref 0–0.2)
BASOPHILS NFR BLD AUTO: 0.5 % (ref 0–1.5)
BUN SERPL-MCNC: 25 MG/DL (ref 8–23)
BUN/CREAT SERPL: 27.8 (ref 7–25)
CALCIUM SPEC-SCNC: 9 MG/DL (ref 8.6–10.5)
CHLORIDE SERPL-SCNC: 101 MMOL/L (ref 98–107)
CO2 SERPL-SCNC: 23.4 MMOL/L (ref 22–29)
CREAT SERPL-MCNC: 0.9 MG/DL (ref 0.57–1)
DEPRECATED RDW RBC AUTO: 41.1 FL (ref 37–54)
EOSINOPHIL # BLD AUTO: 0.06 10*3/MM3 (ref 0–0.4)
EOSINOPHIL NFR BLD AUTO: 0.8 % (ref 0.3–6.2)
ERYTHROCYTE [DISTWIDTH] IN BLOOD BY AUTOMATED COUNT: 13.3 % (ref 12.3–15.4)
GFR SERPL CREATININE-BSD FRML MDRD: 60 ML/MIN/1.73
GLUCOSE BLDC GLUCOMTR-MCNC: 155 MG/DL (ref 70–99)
GLUCOSE BLDC GLUCOMTR-MCNC: 242 MG/DL (ref 70–99)
GLUCOSE BLDC GLUCOMTR-MCNC: 258 MG/DL (ref 70–99)
GLUCOSE BLDC GLUCOMTR-MCNC: 297 MG/DL (ref 70–99)
GLUCOSE SERPL-MCNC: 191 MG/DL (ref 65–99)
HCT VFR BLD AUTO: 41.4 % (ref 34–46.6)
HGB BLD-MCNC: 13.9 G/DL (ref 12–15.9)
IMM GRANULOCYTES # BLD AUTO: 0.16 10*3/MM3 (ref 0–0.05)
IMM GRANULOCYTES NFR BLD AUTO: 2.1 % (ref 0–0.5)
LYMPHOCYTES # BLD AUTO: 2.04 10*3/MM3 (ref 0.7–3.1)
LYMPHOCYTES NFR BLD AUTO: 26.7 % (ref 19.6–45.3)
MCH RBC QN AUTO: 28.7 PG (ref 26.6–33)
MCHC RBC AUTO-ENTMCNC: 33.6 G/DL (ref 31.5–35.7)
MCV RBC AUTO: 85.4 FL (ref 79–97)
MONOCYTES # BLD AUTO: 0.94 10*3/MM3 (ref 0.1–0.9)
MONOCYTES NFR BLD AUTO: 12.3 % (ref 5–12)
NEUTROPHILS NFR BLD AUTO: 4.39 10*3/MM3 (ref 1.7–7)
NEUTROPHILS NFR BLD AUTO: 57.6 % (ref 42.7–76)
NRBC BLD AUTO-RTO: 0 /100 WBC (ref 0–0.2)
PHOSPHATE SERPL-MCNC: 3.1 MG/DL (ref 2.5–4.5)
PLATELET # BLD AUTO: 221 10*3/MM3 (ref 140–450)
PMV BLD AUTO: 9.6 FL (ref 6–12)
POTASSIUM SERPL-SCNC: 4.3 MMOL/L (ref 3.5–5.2)
RBC # BLD AUTO: 4.85 10*6/MM3 (ref 3.77–5.28)
SODIUM SERPL-SCNC: 134 MMOL/L (ref 136–145)
WBC # BLD AUTO: 7.63 10*3/MM3 (ref 3.4–10.8)

## 2021-10-29 PROCEDURE — 63710000001 INSULIN LISPRO (HUMAN) PER 5 UNITS: Performed by: FAMILY MEDICINE

## 2021-10-29 PROCEDURE — 63710000001 INSULIN DETEMIR PER 5 UNITS: Performed by: INTERNAL MEDICINE

## 2021-10-29 PROCEDURE — 25010000002 ENOXAPARIN PER 10 MG: Performed by: INTERNAL MEDICINE

## 2021-10-29 PROCEDURE — 99232 SBSQ HOSP IP/OBS MODERATE 35: CPT | Performed by: INTERNAL MEDICINE

## 2021-10-29 PROCEDURE — 85025 COMPLETE CBC W/AUTO DIFF WBC: CPT | Performed by: INTERNAL MEDICINE

## 2021-10-29 PROCEDURE — 63710000001 INSULIN REGULAR HUMAN PER 5 UNITS: Performed by: INTERNAL MEDICINE

## 2021-10-29 PROCEDURE — 82962 GLUCOSE BLOOD TEST: CPT

## 2021-10-29 PROCEDURE — 97110 THERAPEUTIC EXERCISES: CPT

## 2021-10-29 PROCEDURE — 70450 CT HEAD/BRAIN W/O DYE: CPT

## 2021-10-29 PROCEDURE — 80069 RENAL FUNCTION PANEL: CPT | Performed by: INTERNAL MEDICINE

## 2021-10-29 PROCEDURE — 25010000002 CEFTRIAXONE PER 250 MG: Performed by: INTERNAL MEDICINE

## 2021-10-29 PROCEDURE — 94799 UNLISTED PULMONARY SVC/PX: CPT

## 2021-10-29 RX ADMIN — LINAGLIPTIN 5 MG: 5 TABLET, FILM COATED ORAL at 09:35

## 2021-10-29 RX ADMIN — DOCUSATE SODIUM 50MG AND SENNOSIDES 8.6MG 2 TABLET: 8.6; 5 TABLET, FILM COATED ORAL at 20:12

## 2021-10-29 RX ADMIN — LISINOPRIL 10 MG: 10 TABLET ORAL at 09:36

## 2021-10-29 RX ADMIN — CEFTRIAXONE SODIUM 1 G: 1 INJECTION, SOLUTION INTRAVENOUS at 13:43

## 2021-10-29 RX ADMIN — INSULIN HUMAN 3 UNITS: 100 INJECTION, SOLUTION PARENTERAL at 17:36

## 2021-10-29 RX ADMIN — ASPIRIN 81 MG: 81 TABLET, COATED ORAL at 09:35

## 2021-10-29 RX ADMIN — PANTOPRAZOLE SODIUM 40 MG: 40 TABLET, DELAYED RELEASE ORAL at 09:35

## 2021-10-29 RX ADMIN — DULOXETINE HYDROCHLORIDE 30 MG: 30 CAPSULE, DELAYED RELEASE ORAL at 09:36

## 2021-10-29 RX ADMIN — METOPROLOL SUCCINATE 25 MG: 25 TABLET, EXTENDED RELEASE ORAL at 09:36

## 2021-10-29 RX ADMIN — DILTIAZEM HYDROCHLORIDE 240 MG: 240 CAPSULE, COATED, EXTENDED RELEASE ORAL at 09:35

## 2021-10-29 RX ADMIN — INSULIN LISPRO 4 UNITS: 100 INJECTION, SOLUTION INTRAVENOUS; SUBCUTANEOUS at 13:52

## 2021-10-29 RX ADMIN — INSULIN DETEMIR 12 UNITS: 100 INJECTION, SOLUTION SUBCUTANEOUS at 20:18

## 2021-10-29 RX ADMIN — INSULIN LISPRO 6 UNITS: 100 INJECTION, SOLUTION INTRAVENOUS; SUBCUTANEOUS at 17:36

## 2021-10-29 RX ADMIN — ENOXAPARIN SODIUM 40 MG: 40 INJECTION SUBCUTANEOUS at 09:36

## 2021-10-29 RX ADMIN — INSULIN HUMAN 3 UNITS: 100 INJECTION, SOLUTION PARENTERAL at 13:52

## 2021-10-29 RX ADMIN — ATORVASTATIN CALCIUM 20 MG: 20 TABLET, FILM COATED ORAL at 20:12

## 2021-10-29 RX ADMIN — INSULIN LISPRO 2 UNITS: 100 INJECTION, SOLUTION INTRAVENOUS; SUBCUTANEOUS at 09:37

## 2021-10-29 RX ADMIN — SODIUM CHLORIDE, PRESERVATIVE FREE 10 ML: 5 INJECTION INTRAVENOUS at 20:13

## 2021-10-29 RX ADMIN — GLIPIZIDE 10 MG: 10 TABLET ORAL at 09:34

## 2021-10-30 LAB
ALBUMIN SERPL-MCNC: 3 G/DL (ref 3.5–5.2)
ALBUMIN/GLOB SERPL: 1.4 G/DL
ALP SERPL-CCNC: 97 U/L (ref 39–117)
ALT SERPL W P-5'-P-CCNC: 19 U/L (ref 1–33)
ANION GAP SERPL CALCULATED.3IONS-SCNC: 10.2 MMOL/L (ref 5–15)
AST SERPL-CCNC: 9 U/L (ref 1–32)
BASOPHILS # BLD AUTO: 0.01 10*3/MM3 (ref 0–0.2)
BASOPHILS NFR BLD AUTO: 0.1 % (ref 0–1.5)
BILIRUB SERPL-MCNC: 0.4 MG/DL (ref 0–1.2)
BUN SERPL-MCNC: 21 MG/DL (ref 8–23)
BUN/CREAT SERPL: 26.3 (ref 7–25)
CALCIUM SPEC-SCNC: 9 MG/DL (ref 8.6–10.5)
CHLORIDE SERPL-SCNC: 102 MMOL/L (ref 98–107)
CO2 SERPL-SCNC: 22.8 MMOL/L (ref 22–29)
CREAT SERPL-MCNC: 0.8 MG/DL (ref 0.57–1)
DEPRECATED RDW RBC AUTO: 42.4 FL (ref 37–54)
EOSINOPHIL # BLD AUTO: 0.12 10*3/MM3 (ref 0–0.4)
EOSINOPHIL NFR BLD AUTO: 1.8 % (ref 0.3–6.2)
ERYTHROCYTE [DISTWIDTH] IN BLOOD BY AUTOMATED COUNT: 13.6 % (ref 12.3–15.4)
GFR SERPL CREATININE-BSD FRML MDRD: 68 ML/MIN/1.73
GLOBULIN UR ELPH-MCNC: 2.2 GM/DL
GLUCOSE BLDC GLUCOMTR-MCNC: 210 MG/DL (ref 70–99)
GLUCOSE BLDC GLUCOMTR-MCNC: 227 MG/DL (ref 70–99)
GLUCOSE BLDC GLUCOMTR-MCNC: 253 MG/DL (ref 70–99)
GLUCOSE BLDC GLUCOMTR-MCNC: 258 MG/DL (ref 70–99)
GLUCOSE SERPL-MCNC: 238 MG/DL (ref 65–99)
HCT VFR BLD AUTO: 40.2 % (ref 34–46.6)
HGB BLD-MCNC: 13.5 G/DL (ref 12–15.9)
IMM GRANULOCYTES # BLD AUTO: 0.22 10*3/MM3 (ref 0–0.05)
IMM GRANULOCYTES NFR BLD AUTO: 3.2 % (ref 0–0.5)
LYMPHOCYTES # BLD AUTO: 1.72 10*3/MM3 (ref 0.7–3.1)
LYMPHOCYTES NFR BLD AUTO: 25.4 % (ref 19.6–45.3)
MCH RBC QN AUTO: 28.6 PG (ref 26.6–33)
MCHC RBC AUTO-ENTMCNC: 33.6 G/DL (ref 31.5–35.7)
MCV RBC AUTO: 85.2 FL (ref 79–97)
MONOCYTES # BLD AUTO: 0.78 10*3/MM3 (ref 0.1–0.9)
MONOCYTES NFR BLD AUTO: 11.5 % (ref 5–12)
NEUTROPHILS NFR BLD AUTO: 3.92 10*3/MM3 (ref 1.7–7)
NEUTROPHILS NFR BLD AUTO: 58 % (ref 42.7–76)
NRBC BLD AUTO-RTO: 0 /100 WBC (ref 0–0.2)
PLATELET # BLD AUTO: 212 10*3/MM3 (ref 140–450)
PMV BLD AUTO: 9.7 FL (ref 6–12)
POTASSIUM SERPL-SCNC: 4.3 MMOL/L (ref 3.5–5.2)
PROT SERPL-MCNC: 5.2 G/DL (ref 6–8.5)
RBC # BLD AUTO: 4.72 10*6/MM3 (ref 3.77–5.28)
SODIUM SERPL-SCNC: 135 MMOL/L (ref 136–145)
WBC # BLD AUTO: 6.77 10*3/MM3 (ref 3.4–10.8)

## 2021-10-30 PROCEDURE — 80053 COMPREHEN METABOLIC PANEL: CPT | Performed by: INTERNAL MEDICINE

## 2021-10-30 PROCEDURE — 99232 SBSQ HOSP IP/OBS MODERATE 35: CPT | Performed by: INTERNAL MEDICINE

## 2021-10-30 PROCEDURE — 85025 COMPLETE CBC W/AUTO DIFF WBC: CPT | Performed by: INTERNAL MEDICINE

## 2021-10-30 PROCEDURE — 63710000001 INSULIN DETEMIR PER 5 UNITS: Performed by: INTERNAL MEDICINE

## 2021-10-30 PROCEDURE — 25010000002 CEFTRIAXONE PER 250 MG: Performed by: INTERNAL MEDICINE

## 2021-10-30 PROCEDURE — 25010000002 ENOXAPARIN PER 10 MG: Performed by: INTERNAL MEDICINE

## 2021-10-30 PROCEDURE — 82962 GLUCOSE BLOOD TEST: CPT

## 2021-10-30 PROCEDURE — 94799 UNLISTED PULMONARY SVC/PX: CPT

## 2021-10-30 PROCEDURE — 63710000001 INSULIN REGULAR HUMAN PER 5 UNITS: Performed by: INTERNAL MEDICINE

## 2021-10-30 PROCEDURE — 94760 N-INVAS EAR/PLS OXIMETRY 1: CPT

## 2021-10-30 PROCEDURE — 63710000001 INSULIN LISPRO (HUMAN) PER 5 UNITS: Performed by: FAMILY MEDICINE

## 2021-10-30 RX ADMIN — INSULIN LISPRO 4 UNITS: 100 INJECTION, SOLUTION INTRAVENOUS; SUBCUTANEOUS at 12:12

## 2021-10-30 RX ADMIN — DOCUSATE SODIUM 50MG AND SENNOSIDES 8.6MG 2 TABLET: 8.6; 5 TABLET, FILM COATED ORAL at 08:43

## 2021-10-30 RX ADMIN — DILTIAZEM HYDROCHLORIDE 240 MG: 240 CAPSULE, COATED, EXTENDED RELEASE ORAL at 08:43

## 2021-10-30 RX ADMIN — GLIPIZIDE 10 MG: 10 TABLET ORAL at 08:43

## 2021-10-30 RX ADMIN — LISINOPRIL 10 MG: 10 TABLET ORAL at 08:43

## 2021-10-30 RX ADMIN — PANTOPRAZOLE SODIUM 40 MG: 40 TABLET, DELAYED RELEASE ORAL at 08:43

## 2021-10-30 RX ADMIN — DULOXETINE HYDROCHLORIDE 30 MG: 30 CAPSULE, DELAYED RELEASE ORAL at 08:43

## 2021-10-30 RX ADMIN — INSULIN DETEMIR 12 UNITS: 100 INJECTION, SOLUTION SUBCUTANEOUS at 20:07

## 2021-10-30 RX ADMIN — INSULIN HUMAN 3 UNITS: 100 INJECTION, SOLUTION PARENTERAL at 08:44

## 2021-10-30 RX ADMIN — ENOXAPARIN SODIUM 40 MG: 40 INJECTION SUBCUTANEOUS at 10:33

## 2021-10-30 RX ADMIN — INSULIN LISPRO 4 UNITS: 100 INJECTION, SOLUTION INTRAVENOUS; SUBCUTANEOUS at 08:44

## 2021-10-30 RX ADMIN — INSULIN HUMAN 3 UNITS: 100 INJECTION, SOLUTION PARENTERAL at 12:12

## 2021-10-30 RX ADMIN — FAMOTIDINE 20 MG: 20 TABLET, FILM COATED ORAL at 08:43

## 2021-10-30 RX ADMIN — SODIUM CHLORIDE, PRESERVATIVE FREE 10 ML: 5 INJECTION INTRAVENOUS at 08:44

## 2021-10-30 RX ADMIN — METOPROLOL SUCCINATE 25 MG: 25 TABLET, EXTENDED RELEASE ORAL at 08:43

## 2021-10-30 RX ADMIN — SODIUM CHLORIDE, PRESERVATIVE FREE 10 ML: 5 INJECTION INTRAVENOUS at 20:05

## 2021-10-30 RX ADMIN — LINAGLIPTIN 5 MG: 5 TABLET, FILM COATED ORAL at 08:43

## 2021-10-30 RX ADMIN — ASPIRIN 81 MG: 81 TABLET, COATED ORAL at 08:43

## 2021-10-30 RX ADMIN — INSULIN HUMAN 3 UNITS: 100 INJECTION, SOLUTION PARENTERAL at 17:02

## 2021-10-30 RX ADMIN — ATORVASTATIN CALCIUM 20 MG: 20 TABLET, FILM COATED ORAL at 20:05

## 2021-10-30 RX ADMIN — INSULIN LISPRO 6 UNITS: 100 INJECTION, SOLUTION INTRAVENOUS; SUBCUTANEOUS at 17:02

## 2021-10-30 RX ADMIN — DOCUSATE SODIUM 50MG AND SENNOSIDES 8.6MG 2 TABLET: 8.6; 5 TABLET, FILM COATED ORAL at 20:05

## 2021-10-30 RX ADMIN — CEFTRIAXONE SODIUM 1 G: 1 INJECTION, SOLUTION INTRAVENOUS at 10:34

## 2021-10-31 ENCOUNTER — APPOINTMENT (OUTPATIENT)
Dept: GENERAL RADIOLOGY | Facility: HOSPITAL | Age: 86
End: 2021-10-31

## 2021-10-31 LAB
ALBUMIN SERPL-MCNC: 3.1 G/DL (ref 3.5–5.2)
ALBUMIN SERPL-MCNC: 3.1 G/DL (ref 3.5–5.2)
ALP SERPL-CCNC: 90 U/L (ref 39–117)
ALT SERPL W P-5'-P-CCNC: 15 U/L (ref 1–33)
ANION GAP SERPL CALCULATED.3IONS-SCNC: 10 MMOL/L (ref 5–15)
AST SERPL-CCNC: 14 U/L (ref 1–32)
BASOPHILS # BLD AUTO: 0.02 10*3/MM3 (ref 0–0.2)
BASOPHILS NFR BLD AUTO: 0.2 % (ref 0–1.5)
BILIRUB CONJ SERPL-MCNC: <0.2 MG/DL (ref 0–0.3)
BILIRUB INDIRECT SERPL-MCNC: ABNORMAL MG/DL
BILIRUB SERPL-MCNC: 0.5 MG/DL (ref 0–1.2)
BILIRUB UR QL STRIP: NEGATIVE
BUN SERPL-MCNC: 16 MG/DL (ref 8–23)
BUN/CREAT SERPL: 23.2 (ref 7–25)
CALCIUM SPEC-SCNC: 9.2 MG/DL (ref 8.6–10.5)
CHLORIDE SERPL-SCNC: 100 MMOL/L (ref 98–107)
CLARITY UR: CLEAR
CO2 SERPL-SCNC: 23 MMOL/L (ref 22–29)
COLOR UR: YELLOW
CREAT SERPL-MCNC: 0.69 MG/DL (ref 0.57–1)
DEPRECATED RDW RBC AUTO: 42.4 FL (ref 37–54)
EOSINOPHIL # BLD AUTO: 0.15 10*3/MM3 (ref 0–0.4)
EOSINOPHIL NFR BLD AUTO: 1.7 % (ref 0.3–6.2)
ERYTHROCYTE [DISTWIDTH] IN BLOOD BY AUTOMATED COUNT: 13.8 % (ref 12.3–15.4)
GFR SERPL CREATININE-BSD FRML MDRD: 81 ML/MIN/1.73
GLUCOSE BLDC GLUCOMTR-MCNC: 185 MG/DL (ref 70–99)
GLUCOSE BLDC GLUCOMTR-MCNC: 259 MG/DL (ref 70–99)
GLUCOSE BLDC GLUCOMTR-MCNC: 270 MG/DL (ref 70–99)
GLUCOSE BLDC GLUCOMTR-MCNC: 279 MG/DL (ref 70–99)
GLUCOSE SERPL-MCNC: 206 MG/DL (ref 65–99)
GLUCOSE UR STRIP-MCNC: ABNORMAL MG/DL
HCT VFR BLD AUTO: 39.8 % (ref 34–46.6)
HGB BLD-MCNC: 13.5 G/DL (ref 12–15.9)
HGB UR QL STRIP.AUTO: NEGATIVE
IMM GRANULOCYTES # BLD AUTO: 0.18 10*3/MM3 (ref 0–0.05)
IMM GRANULOCYTES NFR BLD AUTO: 2.1 % (ref 0–0.5)
KETONES UR QL STRIP: NEGATIVE
LEUKOCYTE ESTERASE UR QL STRIP.AUTO: NEGATIVE
LYMPHOCYTES # BLD AUTO: 2.04 10*3/MM3 (ref 0.7–3.1)
LYMPHOCYTES NFR BLD AUTO: 23.4 % (ref 19.6–45.3)
MCH RBC QN AUTO: 28.8 PG (ref 26.6–33)
MCHC RBC AUTO-ENTMCNC: 33.9 G/DL (ref 31.5–35.7)
MCV RBC AUTO: 84.9 FL (ref 79–97)
MONOCYTES # BLD AUTO: 1 10*3/MM3 (ref 0.1–0.9)
MONOCYTES NFR BLD AUTO: 11.5 % (ref 5–12)
NEUTROPHILS NFR BLD AUTO: 5.32 10*3/MM3 (ref 1.7–7)
NEUTROPHILS NFR BLD AUTO: 61.1 % (ref 42.7–76)
NITRITE UR QL STRIP: NEGATIVE
NRBC BLD AUTO-RTO: 0 /100 WBC (ref 0–0.2)
PH UR STRIP.AUTO: 6 [PH] (ref 5–8)
PHOSPHATE SERPL-MCNC: 3.3 MG/DL (ref 2.5–4.5)
PLATELET # BLD AUTO: 213 10*3/MM3 (ref 140–450)
PMV BLD AUTO: 9.5 FL (ref 6–12)
POTASSIUM SERPL-SCNC: 4.4 MMOL/L (ref 3.5–5.2)
PROT SERPL-MCNC: 5.5 G/DL (ref 6–8.5)
PROT UR QL STRIP: NEGATIVE
RBC # BLD AUTO: 4.69 10*6/MM3 (ref 3.77–5.28)
SODIUM SERPL-SCNC: 133 MMOL/L (ref 136–145)
SP GR UR STRIP: 1.02 (ref 1–1.03)
UROBILINOGEN UR QL STRIP: ABNORMAL
WBC # BLD AUTO: 8.71 10*3/MM3 (ref 3.4–10.8)

## 2021-10-31 PROCEDURE — 80069 RENAL FUNCTION PANEL: CPT | Performed by: INTERNAL MEDICINE

## 2021-10-31 PROCEDURE — 97116 GAIT TRAINING THERAPY: CPT

## 2021-10-31 PROCEDURE — 74019 RADEX ABDOMEN 2 VIEWS: CPT

## 2021-10-31 PROCEDURE — 97164 PT RE-EVAL EST PLAN CARE: CPT

## 2021-10-31 PROCEDURE — 25010000002 ENOXAPARIN PER 10 MG: Performed by: INTERNAL MEDICINE

## 2021-10-31 PROCEDURE — 81003 URINALYSIS AUTO W/O SCOPE: CPT | Performed by: INTERNAL MEDICINE

## 2021-10-31 PROCEDURE — 99232 SBSQ HOSP IP/OBS MODERATE 35: CPT | Performed by: INTERNAL MEDICINE

## 2021-10-31 PROCEDURE — 85025 COMPLETE CBC W/AUTO DIFF WBC: CPT | Performed by: INTERNAL MEDICINE

## 2021-10-31 PROCEDURE — 63710000001 INSULIN REGULAR HUMAN PER 5 UNITS: Performed by: INTERNAL MEDICINE

## 2021-10-31 PROCEDURE — 82962 GLUCOSE BLOOD TEST: CPT

## 2021-10-31 PROCEDURE — 80076 HEPATIC FUNCTION PANEL: CPT | Performed by: INTERNAL MEDICINE

## 2021-10-31 PROCEDURE — 94799 UNLISTED PULMONARY SVC/PX: CPT

## 2021-10-31 PROCEDURE — 63710000001 INSULIN LISPRO (HUMAN) PER 5 UNITS: Performed by: FAMILY MEDICINE

## 2021-10-31 PROCEDURE — 63710000001 INSULIN DETEMIR PER 5 UNITS: Performed by: INTERNAL MEDICINE

## 2021-10-31 PROCEDURE — 25010000002 CEFTRIAXONE PER 250 MG: Performed by: INTERNAL MEDICINE

## 2021-10-31 RX ORDER — SITAGLIPTIN 100 MG/1
TABLET, FILM COATED ORAL
Qty: 90 TABLET | Refills: 3 | Status: SHIPPED | OUTPATIENT
Start: 2021-10-31 | End: 2022-02-23 | Stop reason: DRUGHIGH

## 2021-10-31 RX ORDER — BISACODYL 10 MG
10 SUPPOSITORY, RECTAL RECTAL ONCE AS NEEDED
Status: COMPLETED | OUTPATIENT
Start: 2021-10-31 | End: 2021-10-31

## 2021-10-31 RX ADMIN — INSULIN LISPRO 4 UNITS: 100 INJECTION, SOLUTION INTRAVENOUS; SUBCUTANEOUS at 09:02

## 2021-10-31 RX ADMIN — DILTIAZEM HYDROCHLORIDE 240 MG: 240 CAPSULE, COATED, EXTENDED RELEASE ORAL at 09:05

## 2021-10-31 RX ADMIN — INSULIN LISPRO 6 UNITS: 100 INJECTION, SOLUTION INTRAVENOUS; SUBCUTANEOUS at 17:28

## 2021-10-31 RX ADMIN — PANTOPRAZOLE SODIUM 40 MG: 40 TABLET, DELAYED RELEASE ORAL at 09:01

## 2021-10-31 RX ADMIN — SODIUM CHLORIDE, PRESERVATIVE FREE 10 ML: 5 INJECTION INTRAVENOUS at 11:46

## 2021-10-31 RX ADMIN — DULOXETINE HYDROCHLORIDE 30 MG: 30 CAPSULE, DELAYED RELEASE ORAL at 09:06

## 2021-10-31 RX ADMIN — GLIPIZIDE 10 MG: 10 TABLET ORAL at 09:06

## 2021-10-31 RX ADMIN — BISACODYL 10 MG: 10 SUPPOSITORY RECTAL at 23:43

## 2021-10-31 RX ADMIN — POLYETHYLENE GLYCOL 3350 17 G: 17 POWDER, FOR SOLUTION ORAL at 09:00

## 2021-10-31 RX ADMIN — LISINOPRIL 10 MG: 10 TABLET ORAL at 09:05

## 2021-10-31 RX ADMIN — INSULIN HUMAN 3 UNITS: 100 INJECTION, SOLUTION PARENTERAL at 09:01

## 2021-10-31 RX ADMIN — INSULIN HUMAN 3 UNITS: 100 INJECTION, SOLUTION PARENTERAL at 17:22

## 2021-10-31 RX ADMIN — FAMOTIDINE 20 MG: 20 TABLET, FILM COATED ORAL at 09:01

## 2021-10-31 RX ADMIN — ATORVASTATIN CALCIUM 20 MG: 20 TABLET, FILM COATED ORAL at 20:14

## 2021-10-31 RX ADMIN — INSULIN DETEMIR 12 UNITS: 100 INJECTION, SOLUTION SUBCUTANEOUS at 20:14

## 2021-10-31 RX ADMIN — DOCUSATE SODIUM 50MG AND SENNOSIDES 8.6MG 2 TABLET: 8.6; 5 TABLET, FILM COATED ORAL at 09:01

## 2021-10-31 RX ADMIN — ASPIRIN 81 MG: 81 TABLET, COATED ORAL at 09:01

## 2021-10-31 RX ADMIN — METOPROLOL SUCCINATE 25 MG: 25 TABLET, EXTENDED RELEASE ORAL at 09:01

## 2021-10-31 RX ADMIN — INSULIN HUMAN 3 UNITS: 100 INJECTION, SOLUTION PARENTERAL at 11:48

## 2021-10-31 RX ADMIN — SODIUM CHLORIDE, PRESERVATIVE FREE 10 ML: 5 INJECTION INTRAVENOUS at 20:24

## 2021-10-31 RX ADMIN — SODIUM CHLORIDE, PRESERVATIVE FREE 10 ML: 5 INJECTION INTRAVENOUS at 09:02

## 2021-10-31 RX ADMIN — INSULIN LISPRO 2 UNITS: 100 INJECTION, SOLUTION INTRAVENOUS; SUBCUTANEOUS at 12:38

## 2021-10-31 RX ADMIN — LINAGLIPTIN 5 MG: 5 TABLET, FILM COATED ORAL at 09:05

## 2021-10-31 RX ADMIN — CEFTRIAXONE SODIUM 1 G: 1 INJECTION, SOLUTION INTRAVENOUS at 11:46

## 2021-10-31 RX ADMIN — BISACODYL 5 MG: 5 TABLET, COATED ORAL at 14:04

## 2021-10-31 RX ADMIN — DOCUSATE SODIUM 50MG AND SENNOSIDES 8.6MG 2 TABLET: 8.6; 5 TABLET, FILM COATED ORAL at 20:14

## 2021-10-31 RX ADMIN — ENOXAPARIN SODIUM 40 MG: 40 INJECTION SUBCUTANEOUS at 11:46

## 2021-11-01 VITALS
RESPIRATION RATE: 16 BRPM | OXYGEN SATURATION: 95 % | BODY MASS INDEX: 27.51 KG/M2 | DIASTOLIC BLOOD PRESSURE: 78 MMHG | TEMPERATURE: 97.9 F | SYSTOLIC BLOOD PRESSURE: 142 MMHG | HEIGHT: 64 IN | WEIGHT: 161.16 LBS | HEART RATE: 91 BPM

## 2021-11-01 PROBLEM — D89.831 CYTOKINE RELEASE SYNDROME, GRADE 1: Status: ACTIVE | Noted: 2021-11-01

## 2021-11-01 LAB
GLUCOSE BLDC GLUCOMTR-MCNC: 169 MG/DL (ref 70–99)
GLUCOSE BLDC GLUCOMTR-MCNC: 206 MG/DL (ref 70–99)

## 2021-11-01 PROCEDURE — 25010000002 ENOXAPARIN PER 10 MG: Performed by: INTERNAL MEDICINE

## 2021-11-01 PROCEDURE — 82962 GLUCOSE BLOOD TEST: CPT

## 2021-11-01 PROCEDURE — 63710000001 INSULIN LISPRO (HUMAN) PER 5 UNITS: Performed by: FAMILY MEDICINE

## 2021-11-01 PROCEDURE — 97530 THERAPEUTIC ACTIVITIES: CPT

## 2021-11-01 PROCEDURE — 99239 HOSP IP/OBS DSCHRG MGMT >30: CPT | Performed by: FAMILY MEDICINE

## 2021-11-01 PROCEDURE — 63710000001 INSULIN REGULAR HUMAN PER 5 UNITS: Performed by: INTERNAL MEDICINE

## 2021-11-01 RX ORDER — GLIPIZIDE 10 MG/1
10 TABLET ORAL
Start: 2021-11-02

## 2021-11-01 RX ADMIN — LINAGLIPTIN 5 MG: 5 TABLET, FILM COATED ORAL at 08:46

## 2021-11-01 RX ADMIN — INSULIN LISPRO 4 UNITS: 100 INJECTION, SOLUTION INTRAVENOUS; SUBCUTANEOUS at 11:32

## 2021-11-01 RX ADMIN — DILTIAZEM HYDROCHLORIDE 240 MG: 240 CAPSULE, COATED, EXTENDED RELEASE ORAL at 08:46

## 2021-11-01 RX ADMIN — LISINOPRIL 10 MG: 10 TABLET ORAL at 08:47

## 2021-11-01 RX ADMIN — ASPIRIN 81 MG: 81 TABLET, COATED ORAL at 08:46

## 2021-11-01 RX ADMIN — DOCUSATE SODIUM 50MG AND SENNOSIDES 8.6MG 2 TABLET: 8.6; 5 TABLET, FILM COATED ORAL at 08:47

## 2021-11-01 RX ADMIN — GLIPIZIDE 10 MG: 10 TABLET ORAL at 08:47

## 2021-11-01 RX ADMIN — INSULIN LISPRO 2 UNITS: 100 INJECTION, SOLUTION INTRAVENOUS; SUBCUTANEOUS at 08:45

## 2021-11-01 RX ADMIN — INSULIN HUMAN 3 UNITS: 100 INJECTION, SOLUTION PARENTERAL at 11:32

## 2021-11-01 RX ADMIN — PANTOPRAZOLE SODIUM 40 MG: 40 TABLET, DELAYED RELEASE ORAL at 08:46

## 2021-11-01 RX ADMIN — INSULIN HUMAN 3 UNITS: 100 INJECTION, SOLUTION PARENTERAL at 08:46

## 2021-11-01 RX ADMIN — FAMOTIDINE 20 MG: 20 TABLET, FILM COATED ORAL at 08:47

## 2021-11-01 RX ADMIN — METOPROLOL SUCCINATE 25 MG: 25 TABLET, EXTENDED RELEASE ORAL at 08:47

## 2021-11-01 RX ADMIN — DULOXETINE HYDROCHLORIDE 30 MG: 30 CAPSULE, DELAYED RELEASE ORAL at 08:46

## 2021-11-01 RX ADMIN — ENOXAPARIN SODIUM 40 MG: 40 INJECTION SUBCUTANEOUS at 11:33

## 2021-11-01 NOTE — DISCHARGE SUMMARY
[facility]         HOSPITALIST  DISCHARGE SUMMARY    Patient Name: Tita Jiménez  : 1935  MRN: [id number]    Date of Admission: 10/20/2021  Date of Discharge:  2021    Primary Care Physician: Angela Doherty MD    Consults     Date and Time Order Name Status Description    10/20/2021  9:36 PM Inpatient Hospitalist Consult Completed           Active and Resolved Hospital Problems:  Active Hospital Problems    Diagnosis POA   • Cytokine release syndrome, grade 1 [D89.831] Unknown   • Hypoxia [R09.02] Yes      Resolved Hospital Problems   No resolved problems to display.       Hospital Course     Hospital Course:  86-year-old female was hospitalized on 10/20/2021 with acute hypoxemic respiratory failure, COVID-19 pneumonia, SARS-CoV-2 infection, with other associated symptoms of several falls at home, vomiting, shortness of breath and cough.  Supplemental oxygen, steroids, had some delirium associated with steroids, remdesivir, nebs, concern for urinary tract infection, placed on antibiotics, symptoms improved, weaned down to room air, disposition to rehab prior to returning back to assisted living facility, to follow up with PCP after DC from rehab.      Day of Discharge     Vital Signs:  Temp:  [97.7 °F (36.5 °C)-98.7 °F (37.1 °C)] 97.7 °F (36.5 °C)  Heart Rate:  [76-97] 97  Resp:  [14-18] 16  BP: (107-148)/(54-82) 148/82    Discharge Details        Discharge Medications      New Medications      Instructions Start Date   glipizide 10 MG tablet  Commonly known as: GLUCOTROL  Replaces: glimepiride 4 MG tablet   10 mg, Oral, Every Morning Before Breakfast   Start Date: 2021     insulin detemir 100 UNIT/ML injection  Commonly known as: LEVEMIR   12 Units, Subcutaneous, Nightly         Changes to Medications      Instructions Start Date   metFORMIN 500 MG tablet  Commonly known as: GLUCOPHAGE  What changed:   how much to take  when to take this   TAKE 1 TABLET BY MOUTH  TWICE DAILY         Continue These Medications      Instructions Start Date   aspirin 81 MG chewable tablet   81 mg, Oral, Daily      atorvastatin 20 MG tablet  Commonly known as: LIPITOR   TAKE 1 TABLET BY MOUTH DAILY AT BEDTIME      CALCIUM + D3 PO   5,000 Units, Oral      dilTIAZem  MG 24 hr capsule  Commonly known as: CARDIZEM CD   TAKE 1 CAPSULE BY MOUTH EVERY NIGHT AT BEDTIME      DULoxetine 30 MG capsule  Commonly known as: CYMBALTA   30 mg, Oral, Daily      famotidine 20 MG tablet  Commonly known as: PEPCID   20 mg, Oral, Daily      Januvia 100 MG tablet  Generic drug: SITagliptin   TAKE 1 TABLET BY MOUTH DAILY      lisinopril 10 MG tablet  Commonly known as: PRINIVIL,ZESTRIL   TAKE 1 TABLET(10 MG) BY MOUTH EVERY DAY      metoprolol succinate XL 25 MG 24 hr tablet  Commonly known as: TOPROL-XL   25 mg, Oral, Daily      multivitamin with minerals tablet tablet   Oral, Daily      pantoprazole 40 MG EC tablet  Commonly known as: PROTONIX   40 mg, Oral, Daily      polyethylene glycol packet  Commonly known as: MIRALAX   17 g, Oral, Daily PRN         Stop These Medications    glimepiride 4 MG tablet  Commonly known as: AMARYL  Replaced by: glipizide 10 MG tablet     hydroCHLOROthiazide 12.5 MG tablet  Commonly known as: HYDRODIURIL            No Known Allergies    Discharge Disposition:  Rehab Facility or Unit (DC - External)    Diet:  Hospital:  Diet Order   Procedures   • Diet Regular; Consistent Carbohydrate       Discharge Activity: As tolerates      CODE STATUS:  Code Status and Medical Interventions:   Ordered at: 10/21/21 0204     Code Status:    CPR     Medical Interventions (Level of Support Prior to Arrest):    Full         Future Appointments   Date Time Provider Department Center   1/19/2022 11:00 AM Angela Doherty MD [facility]       Additional Instructions for the Follow-ups that You Need to Schedule     Discharge Follow-up with PCP   As directed       Currently Documented PCP:    Bessy  Angela REA MD    PCP Phone Number:    [phone]     Follow Up Details: 3 to 7 days               Pertinent  and/or Most Recent Results     PROCEDURES:   XR Abdomen Flat & Upright    Result Date: 10/31/2021  PROCEDURE: XR ABDOMEN FLAT AND UPRIGHT  COMPARISON: None  INDICATIONS: WEAKNESS, VOMITING  FINDINGS:  No free air.  Nonobstructed bowel gas pattern.  Moderately large to large colonic stool burden.  CONCLUSION: Constipation.  No obstruction     JANETH NOLAN MD       Electronically Signed and Approved By: JANETH NOLAN MD on 10/31/2021 at 13:36             CT Head Without Contrast    Result Date: 10/29/2021  PROCEDURE: CT HEAD WO CONTRAST  COMPARISON:  [facility], CT, CT HEAD WO CONTRAST, 10/20/2021, 15:55. INDICATIONS: Dizziness, unsteady gait  PROTOCOL:   Standard imaging protocol performed    RADIATION:   DLP: 999mGy*cm   MA and/or KV was adjusted to minimize radiation dose.     TECHNIQUE: After obtaining the patient's consent, CT images were obtained without non-ionic intravenous contrast material.  FINDINGS:  There is no evidence for acute intracranial hemorrhage. No definitive acute focal ischemia is observed. Nonspecific white matter changes are noted bilaterally likely due to chronic small vessel ischemic changes or age related changes. Associated diffuse volume loss is noted.  There is evidence for remote infarct involving the region of the right basal ganglia.  There is no evidence for abnormal cerebral edema. No mass effect or midline shift is seen. The basilar cisterns are patent. The skull is intact. The paranasal sinuses and mastoid air cells are clear.  CONCLUSION:  1. No evidence for acute intracranial abnormality. 2. Nonspecific white matter changes bilaterally likely related to chronic small vessel ischemic changes or age related changes. Associated diffuse volume loss is noted. 3. Evidence for remote infarction involving the right basal ganglia.    SARAH CERON MD        Electronically Signed and Approved By: SARAH CERON MD on 10/29/2021 at 19:06             CT Head Without Contrast    Result Date: 10/20/2021  PROCEDURE: CT HEAD WO CONTRAST  COMPARISON:  [facility], CT, HEAD W/O CSPINE W/O CONTRAST, 10/18/2019, 11:47. INDICATIONS: trauma. generalized weakness, vomiting, h/o cva  PROTOCOL:   Standard imaging protocol performed    RADIATION:   DLP: 1017.2mGy*cm   MA and/or KV was adjusted to minimize radiation dose.     TECHNIQUE: After obtaining the patient's consent, CT images were obtained without non-ionic intravenous contrast material.  FINDINGS:  There is enlargement of the sulci, fissures, ventricles, and basal cisterns indicating volume loss due to atrophy.  There are areas of decreased density in the white matter tracts and basal ganglia consistent with a combination of chronic microvascular ischemia and old lacunar infarcts.  There is no acute hemorrhage, midline shift, or suspicious extra-axial fluid collections.  There are atherosclerotic vascular calcifications in the cavernous carotid arteries, vertebral arteries, and basilar artery.  The patient has had prior cataract surgery.  There is mucosal thickening in the paranasal sinuses indicating chronic sinus disease.  The mastoid sinuses are clear.  CONCLUSION:  1. No acute findings. 2. Volume loss due to atrophy. 3. Chronic ischemic changes. 4. Chronic paranasal sinus disease.     GAGANDEEP GABRIEL MD       Electronically Signed and Approved By: GAGANDEEP GABRIEL MD on 10/20/2021 at 16:04             XR Chest 1 View    Result Date: 10/20/2021  PROCEDURE: XR CHEST 1 VW  COMPARISON: [facility], CR, CHEST PA/AP & LAT 2V, 12/13/2018, 18:53.  INDICATIONS: cough  FINDINGS:  There is no pneumothorax, pleural effusion or focal airspace consolidation. The heart size and pulmonary vasculature appear within normal limits. There are no acute osseous abnormalities.  CONCLUSION: No acute cardiopulmonary abnormality.        TAYA DUBOSE MD       Electronically Signed and Approved By: TAYA DUBOSE MD on 10/20/2021 at 16:38               LAB RESULTS:      Lab 10/31/21  0559 10/30/21  0538 10/29/21  0532 10/28/21  0950 10/27/21  0633   WBC 8.71 6.77 7.63 9.81 6.75   HEMOGLOBIN 13.5 13.5 13.9 14.7 13.6   HEMATOCRIT 39.8 40.2 41.4 43.4 40.6   PLATELETS 213 212 221 288 229   NEUTROS ABS 5.32 3.92 4.39 7.22* 5.18   IMMATURE GRANS (ABS) 0.18* 0.22* 0.16* 0.14* 0.07*   LYMPHS ABS 2.04 1.72 2.04 1.39 0.82   MONOS ABS 1.00* 0.78 0.94* 1.04* 0.68   EOS ABS 0.15 0.12 0.06 0.01 0.00   MCV 84.9 85.2 85.4 84.4 85.1         Lab 10/31/21  0559 10/30/21  0538 10/29/21  0532 10/28/21  1826 10/28/21  0950 10/27/21  0633 10/27/21  0633   SODIUM 133* 135* 134* 126* 127*   < > 135*   POTASSIUM 4.4 4.3 4.3 4.0 4.1   < > 4.3   CHLORIDE 100 102 101 94* 94*   < > 100   CO2 23.0 22.8 23.4 20.4* 23.2   < > 25.4   ANION GAP 10.0 10.2 9.6 11.6 9.8   < > 9.6   BUN 16 21 25* 24* 21   < > 21   CREATININE 0.69 0.80 0.90 0.85 0.87   < > 0.77   GLUCOSE 206* 238* 191* 369* 376*   < > 299*   CALCIUM 9.2 9.0 9.0 9.2 9.5   < > 9.5   PHOSPHORUS 3.3  --  3.1  --  2.7  --  3.1    < > = values in this interval not displayed.         Lab 10/31/21  0559 10/30/21  0538 10/29/21  0532 10/28/21  0950 10/27/21  0633   TOTAL PROTEIN 5.5* 5.2*  --   --   --    ALBUMIN 3.10*  3.10* 3.00* 2.90* 3.40* 3.50   GLOBULIN  --  2.2  --   --   --    ALT (SGPT) 15 19  --   --   --    AST (SGOT) 14 9  --   --   --    BILIRUBIN 0.5 0.4  --   --   --    BILIRUBIN DIRECT <0.2  --   --   --   --    ALK PHOS 90 97  --   --   --                      Brief Urine Lab Results  (Last result in the past 365 days)      Color   Clarity   Blood   Leuk Est   Nitrite   Protein   CREAT   Urine HCG        10/31/21 1600 Yellow   Clear   Negative   Negative   Negative   Negative               Microbiology Results (last 10 days)     Procedure Component Value - Date/Time    Urine Culture - Urine, Urine, Clean Catch  [[id number]] Collected: 10/28/21 0433    Lab Status: Final result Specimen: Urine, Clean Catch Updated: 10/29/21 1423     Urine Culture >100,000 CFU/mL Mixed Anuj Isolated    Narrative:      Specimen contains mixed organisms of questionable pathogenicity which indicates contamination with commensal anuj.  Further identification is unlikely to provide clinically useful information.  Suggest recollection.            Time spent on Discharge including face to face service:  36 minutes    Electronically signed by Sonia Monroe MD, 11/01/21, 9:59 AM EDT.

## 2021-11-01 NOTE — SIGNIFICANT NOTE
11/01/21 0906   Plan   Plan Comments Patient has a rehab bed today at [facility]. MD is aware. Pt to discharge to their facility today.   Final Discharge Disposition Code 62 - inpatient rehab facility

## 2021-11-01 NOTE — THERAPY RE-EVALUATION
Patient Name: Tita Jiménez  : 1935    MRN: [id number]                              Today's Date: 2021       Admit Date: 10/20/2021    Visit Dx:     ICD-10-CM ICD-9-CM   1. COVID-19  U07.1 079.89   2. Hypoxia  R09.02 799.02   3. Weakness  R53.1 780.79   4. Multiple falls  R29.6 V15.88   5. Non-intractable vomiting with nausea, unspecified vomiting type  R11.2 787.01   6. Difficulty walking  R26.2 719.7   7. Decreased activities of daily living (ADL)  Z78.9 V49.89     Patient Active Problem List   Diagnosis   • Compression fracture of T1 -T2 vertebra (CMS/HCC)   • HTN (hypertension)   • Fall   • Contusion of scalp   • Closed head injury   • Asthma   • Depression   • Reflux esophagitis   • Bladder incontinence   • Bowel incontinence   • High cholesterol   • Diabetes mellitus, type II (HCC)   • Hypertension   • Ulcerative colitis (HCC)   • GERD (gastroesophageal reflux disease)   • Benign essential tremor   • Carpal tunnel syndrome   • Ataxia   • Breast cancer screening   • Hypoxia   • Cytokine release syndrome, grade 1     Past Medical History:   Diagnosis Date   • Asthma    • Benign essential tremor 2021   • Carpal tunnel syndrome    • Contusion     small on scalp. skin intact. D/T fall on 10/18/2019   • Depression    • Diabetes mellitus (HCC)    • GERD (gastroesophageal reflux disease) 2021   • Hyperlipidemia    • Hypertension    • Mitral valve problem    • Polymyalgia rheumatica (HCC)    • Stroke (HCC)     short term memory loss   • Ulcerative colitis (HCC) 2021   • Urinary incontinence      Past Surgical History:   Procedure Laterality Date   • ADENOIDECTOMY     • APPENDECTOMY     • BREAST SURGERY      ( L4-L5)   • COLON SURGERY      colon resection   • HYSTERECTOMY     • OOPHORECTOMY     • TONSILLECTOMY        General Information     Row Name 21 1306 21 1304       OT Time and Intention    Document Type re-evaluation  -PG therapy note (daily note)  -PG    Mode of  Treatment -- individual therapy; occupational therapy  -PG          User Key  (r) = Recorded By, (t) = Taken By, (c) = Cosigned By    Initials Name Provider Type    PG Damion Mathews OT Occupational Therapist                 Mobility/ADL's     Row Name 11/01/21 1304          Transfers    Transfers bed-chair transfer; sit-stand transfer; toilet transfer  -PG     Bed-Chair [place] (Transfers) contact guard  -PG     Assistive Device (Bed-Chair Transfers) walker, front-wheeled  -PG     [place] Level (Toilet Transfer) verbal cues; contact guard  -PG     Assistive Device (Toilet Transfer) commode, 3-in-1  -PG     Row Name 11/01/21 1304          Toilet Transfer    Type (Toilet Transfer) sit-stand  -PG           User Key  (r) = Recorded By, (t) = Taken By, (c) = Cosigned By    Initials Name Provider Type    PG Damion Mathews OT Occupational Therapist               Obj/Interventions    No documentation.                Goals/Plan     Row Name 11/01/21 1307          Transfer Goal 1 (OT)    Progress/Outcome (Transfer Goal 1, OT) good progress toward goal  -PG     Row Name 11/01/21 1307          Bathing Goal 1 (OT)    Progress/Outcomes (Bathing Goal 1, OT) good progress toward goal  -PG     Row Name 11/01/21 1307          Dressing Goal 1 (OT)    Progress/Outcome (Dressing Goal 1, OT) good progress toward goal  -PG     Row Name 11/01/21 1307          Toileting Goal 1 (OT)    Progress/Outcome (Toileting Goal 1, OT) good progress toward goal  -PG     Row Name 11/01/21 1307          Grooming Goal 1 (OT)    Progress/Outcome (Grooming Goal 1, OT) good progress toward goal  -PG           User Key  (r) = Recorded By, (t) = Taken By, (c) = Cosigned By    Initials Name Provider Type    PG Damion Mathews OT Occupational Therapist               Clinical Impression     Row Name 11/01/21 1305          Plan of Care Review    Progress no change  -PG           User Key  (r) = Recorded By, (t) = Taken By, (c) = Cosigned By    Initials  Name Provider Type    PG Damion Mathews OT Occupational Therapist               Outcome Measures     Row Name 11/01/21 1305          How much help from another is currently needed...    Putting on and taking off regular lower body clothing? 2  -PG     Bathing (including washing, rinsing, and drying) 2  -PG     Toileting (which includes using toilet bed pan or urinal) 2  -PG     Putting on and taking off regular upper body clothing 3  -PG     Taking care of personal grooming (such as brushing teeth) 3  -PG     Eating meals 4  -PG     AM-PAC 6 Clicks Score (OT) 16  -PG     Row Name 11/01/21 0830          How much help from another person do you currently need...    Turning from your back to your side while in flat bed without using bedrails? 4  -TB     Moving from lying on back to sitting on the side of a flat bed without bedrails? 4  -TB     Moving to and from a bed to a chair (including a wheelchair)? 3  -TB     Standing up from a chair using your arms (e.g., wheelchair, bedside chair)? 3  -TB     Climbing 3-5 steps with a railing? 3  -TB     To walk in hospital room? 3  -TB     AM-PAC 6 Clicks Score (PT) 20  -TB     Row Name 11/01/21 1305          Functional Assessment    Outcome Measure Options AM-PAC 6 Clicks Daily Activity (OT); Optimal Instrument  -PG     Row Name 11/01/21 1305          Optimal Instrument    Optimal Instrument Optimal - 3  -PG     Bending/Stooping 3  -PG     Standing 2  -PG     Reaching 1  -PG           User Key  (r) = Recorded By, (t) = Taken By, (c) = Cosigned By    Initials Name Provider Type    TB Lupe Givens, RN Registered Nurse    Damion Martinez OT Occupational Therapist                Occupational Therapy Education                 Title: PT OT SLP Therapies (Done)     Topic: Occupational Therapy (Done)     Point: ADL training (Done)     Description:   Instruct learner(s) on proper safety adaptation and remediation techniques during self care or transfers.   Instruct in proper use of  assistive devices.              Learning Progress Summary           Patient Acceptance, E,TB, VU by  at 10/27/2021 0553      Show all documentation for this point (4)                 Point: Home exercise program (Done)     Description:   Instruct learner(s) on appropriate technique for monitoring, assisting and/or progressing therapeutic exercises/activities.              Learning Progress Summary           Patient Acceptance, E,TB, VU by  at 10/27/2021 0553      Show all documentation for this point (4)                 Point: Precautions (Done)     Description:   Instruct learner(s) on prescribed precautions during self-care and functional transfers.              Learning Progress Summary           Patient Acceptance, E,TB, VU by  at 10/27/2021 0553      Show all documentation for this point (4)                 Point: Body mechanics (Done)     Description:   Instruct learner(s) on proper positioning and spine alignment during self-care, functional mobility activities and/or exercises.              Learning Progress Summary           Patient Acceptance, E,TB, VU by  at 10/27/2021 0553      Show all documentation for this point (4)                             User Key     Initials Effective Dates Name Provider Type Discipline     06/16/21 -  Trinidad Anthony, RN Registered Nurse Nurse              OT Recommendation and Plan  Planned Therapy Interventions (OT): activity tolerance training, BADL retraining, occupation/activity based interventions, transfer/mobility retraining, strengthening exercise, patient/caregiver education/training  Therapy Frequency (OT): 5 times/wk  Plan of Care Review  Plan of Care Reviewed With: patient  Progress: no change  Outcome Summary: Patient remains confused but was able to walk throughout the room over 50 ft. and complete grooming activity in standing with contact-guard assistance     Time Calculation:    Time Calculation- OT     Row Name 11/01/21 1306             Time  Calculation- OT    OT Received On 11/01/21  -PG      OT Goal Re-Cert Due Date 11/10/21  -PG              Timed Charges    19791 - OT Therapeutic Activity Minutes 15  -PG              Total Minutes    Timed Charges Total Minutes 15  -PG       Total Minutes 15  -PG            User Key  (r) = Recorded By, (t) = Taken By, (c) = Cosigned By    Initials Name Provider Type    PG Damion Mathews OT Occupational Therapist              Therapy Charges for Today     Code Description Service Date Service Provider Modifiers Qty    [id number]  OT THERAPEUTIC ACT EA 15 MIN 11/1/2021 Damion Mathews OT GO 1               Damion Mathews OT  11/1/2021

## 2021-11-01 NOTE — PROGRESS NOTES
[facility]   Hospitalist Progress Note  Date: 2021  Patient Name: Tita Jiménez  : 1935  MRN: [id number]  Date of admission: 10/20/2021      Subjective   Subjective     Admitted: 10/20/2021  Chief complaint: Shortness of breath  Antibiotics: Ceftriaxone  Summary:  86-year-old female was hospitalized on 10/20/2021 with acute hypoxemic respiratory failure, COVID-19 pneumonia, SARS-CoV-2 infection, with other associated symptoms of several falls at home, vomiting, shortness of breath and cough.  Supplemental oxygen, steroids, had some delirium associated with steroids, remdesivir, nebs, concern for urinary tract infection, placed on antibiotics, symptoms improved, weaned down to room air, anticipate disposition to rehab prior to returning back to assisted living facility    Interval follow-up: Patient seen and examined this morning, no acute distress, no acute major overnight events, patient woken up from sleeping, she continues to have significant tremors, continues to have improving cough and shortness of breath, she is not on nasal cannula oxygen, her sats have been stable, she denied any chest pain or palpitations, possible disposition later today to acute rehab.  Urine culture is grown mixed anuj, not further speciated by lab blood sugars have been trending better.    Review of Systems:  All systems were reviewed and negative except for tremors, cough, shortness of breath with exertion, fatigue, weakness.    Objective   Objective     Vitals:   Temp:  [97.7 °F (36.5 °C)-98.7 °F (37.1 °C)] 98.7 °F (37.1 °C)  Heart Rate:  [76-93] 80  Resp:  [14-18] 18  BP: (107-132)/(54-89) 132/75  Physical Exam    Constitutional: No acute distress, awoken from sleep, speaking full sentences              Eyes: Pupils equal, sclerae anicteric, no conjunctival injection              HENT: NCAT, mucous membranes moist              Neck: Supple, full range of motion              Respiratory: Air entry bilaterally,  coarse breath sounds throughout              Cardiovascular: RRR, no murmurs, rubs, or gallops, palpable pedal pulses bilaterally              Gastrointestinal: Positive bowel sounds, soft, nontender, nondistended              Musculoskeletal: No bilateral ankle edema, no clubbing or cyanosis to extremities              Psychiatric: Mood and affect appropriate              Neurologic: Alert and oriented x3 today, no focal major deficits, cranial nerves II through XII intact, speech clear, strength equal intact bilaterally, tremors bilaterally unintentional              Skin: No rashes     Result Review    Result Review:  I have personally reviewed the results from the time of this admission to 11/1/2021 06:04 EDT and agree with these findings:  [x]  Laboratory  [x]  Microbiology  [x]  Radiology  [x]  EKG/Telemetry   []  Cardiology/Vascular   []  Pathology  [x]  Old records  []  Other:    Assessment/Plan   Assessment / Plan     Assessment/Plan:  Assessment:  Acute hypoxemic respiratory failure secondary COVID-19  SARS-CoV-2 infection  COVID-19 pneumonia  Diabetes mellitus with hyperglycemia  Essential hypertension  Dyslipidemia  Delirium steroid-induced  History of falls  Concern for urinary tract infection  Depression  GERD without esophagitis  Short-term memory issues    Plan:  Labs and imaging reviewed  Continue with PT/OT  Continue to follow blood sugars  Continue glipizide 10 mg daily  Continue insulin sliding scale coverage  Continue 3 units of Humulin R 3 times a day with meals  Continue Tradjenta 5 mg daily  Continue metoprolol 25 mg daily  Continue diltiazem 240 mg daily  Discontinued ceftriaxone 1 g completed 3 days for concern for urinary tract infection, uncomplicated  Possible disposition to acute rehab later today if confirmed by social work  A.m. labs  Full code  VTE prophylaxis with Lovenox  Clinical course to dictate further management  Discussed with nurse at the bedside.    DVT  prophylaxis:  Medical and mechanical DVT prophylaxis orders are present.    CODE STATUS:   Code Status: CPR  Medical Interventions (Level of Support Prior to Arrest): Full        Electronically signed by Sonia Monroe MD, 11/01/21, 6:04 AM EDT.

## 2021-11-18 ENCOUNTER — OFFICE VISIT (OUTPATIENT)
Dept: INTERNAL MEDICINE | Facility: CLINIC | Age: 86
End: 2021-11-18

## 2021-11-18 VITALS
OXYGEN SATURATION: 96 % | TEMPERATURE: 97.8 F | SYSTOLIC BLOOD PRESSURE: 140 MMHG | DIASTOLIC BLOOD PRESSURE: 82 MMHG | HEART RATE: 84 BPM | WEIGHT: 160.8 LBS | BODY MASS INDEX: 27.45 KG/M2 | RESPIRATION RATE: 16 BRPM | HEIGHT: 64 IN

## 2021-11-18 DIAGNOSIS — J12.82 PNEUMONIA DUE TO COVID-19 VIRUS: ICD-10-CM

## 2021-11-18 DIAGNOSIS — E11.9 TYPE 2 DIABETES MELLITUS WITHOUT COMPLICATION, UNSPECIFIED WHETHER LONG TERM INSULIN USE (HCC): ICD-10-CM

## 2021-11-18 DIAGNOSIS — Z79.899 ENCOUNTER FOR LONG-TERM (CURRENT) USE OF OTHER MEDICATIONS: ICD-10-CM

## 2021-11-18 DIAGNOSIS — U07.1 PNEUMONIA DUE TO COVID-19 VIRUS: ICD-10-CM

## 2021-11-18 DIAGNOSIS — E78.5 HYPERLIPIDEMIA, UNSPECIFIED HYPERLIPIDEMIA TYPE: ICD-10-CM

## 2021-11-18 DIAGNOSIS — E83.42 HYPOMAGNESEMIA: ICD-10-CM

## 2021-11-18 DIAGNOSIS — E55.9 VITAMIN D DEFICIENCY: ICD-10-CM

## 2021-11-18 DIAGNOSIS — J45.21 MILD INTERMITTENT ASTHMA WITH ACUTE EXACERBATION: ICD-10-CM

## 2021-11-18 DIAGNOSIS — E53.8 B12 DEFICIENCY: Primary | ICD-10-CM

## 2021-11-18 DIAGNOSIS — E61.1 IRON DEFICIENCY: ICD-10-CM

## 2021-11-18 DIAGNOSIS — R63.5 WEIGHT GAIN: ICD-10-CM

## 2021-11-18 PROCEDURE — 99214 OFFICE O/P EST MOD 30 MIN: CPT | Performed by: INTERNAL MEDICINE

## 2021-11-18 RX ORDER — MULTIVITAMIN WITH IRON
250 TABLET ORAL AS NEEDED
COMMUNITY
End: 2022-03-07 | Stop reason: SDUPTHER

## 2021-11-18 RX ORDER — MOMETASONE FUROATE AND FORMOTEROL FUMARATE DIHYDRATE 200; 5 UG/1; UG/1
2 AEROSOL RESPIRATORY (INHALATION)
Qty: 1 EACH | Refills: 1 | Status: SHIPPED | OUTPATIENT
Start: 2021-11-18 | End: 2021-12-27

## 2021-11-18 RX ORDER — ALBUTEROL SULFATE 90 UG/1
2 AEROSOL, METERED RESPIRATORY (INHALATION) EVERY 4 HOURS PRN
Qty: 6.7 G | Refills: 1 | Status: SHIPPED | OUTPATIENT
Start: 2021-11-18 | End: 2022-03-07 | Stop reason: SDUPTHER

## 2021-11-18 RX ORDER — MONTELUKAST SODIUM 10 MG/1
10 TABLET ORAL NIGHTLY
Qty: 30 TABLET | Refills: 1 | Status: SHIPPED | OUTPATIENT
Start: 2021-11-18 | End: 2021-12-14

## 2021-11-18 NOTE — ASSESSMENT & PLAN NOTE
Patient had recent Covid pneumonia was hospitalized in October.  She then went to [facility] rehab hospital.  She finally was strong enough to go back to [facility] living where she resides.  She is much happier there.  She is using a Rollator.  She is getting home health nurse with PT and OT.  She is still wheezing and short of breath.  I reviewed her chest x-ray from her hospital admission.  A chest CT was not done.  Her Covid test was positive.  Head CT was done and just showed atrophy small vessel ischemic changes.  She did have confusion in the hospital and still has some confusion.  Hopefully that will continue to improve.  Assessment: Covid pneumonia slowly getting better.  Exacerbation of asthma that has not resolved still short of breath.  Plan: Add Singulair 10 mg daily, albuterol MDI, Dulera MDI twice daily,.  Use an AeroChamber

## 2021-11-18 NOTE — ASSESSMENT & PLAN NOTE
Patient needs a refill on albuterol MDI.  She has been having more wheezing.  She is short of breath.  Does not have a nebulizer.  No sputum production.  She was in the hospital with Covid pneumonia in late October 2021.  She then went to [facility] rehab hospital.  She did not have a nice experience there.  She eventually went back to [facility] living.  Assessment: Asthma exacerbation by Covid pneumonia recently.  Plan: New prescription sent for albuterol MDI, Dulera 200, 2 puffs twice daily.  Have the patient's sister  an AeroChamber for the MDIs.  Add Singulair 10 mg daily which was also sent to the pharmacy.  Will not add prednisone because she has diabetes.  Keep her routine appointment in January unless she does get worse or does not clear in the next few weeks.

## 2021-11-18 NOTE — PROGRESS NOTES
"Chief Complaint  Hospital Follow Up Visit (Pt was hospitalized with COVID-19. Pt had went into ER due to falls and vomiting and was tested for covid-19 there which was positive and was them admitted. ) and Shortness of Breath (Pt states she is still SOA and has had several asthma attacks.)    Subjective      Tita Jiménez presents to [facility]  History of Present Illness  Patient is making progress after being quite ill with Covid pneumonia.  Was in the hospital.  Received IV remdesivir, nebulizers, steroids, oxygen, antibiotics.  She went to rehab.  Slowly improved.  Had brain fog still.  Confusion in the hospital.  Has some cognitive impairment prior to gait coming ill.  Objective   Vital Signs:   /82 (BP Location: Left arm, Patient Position: Sitting, Cuff Size: Adult)   Pulse 84   Temp 97.8 °F (36.6 °C) (Temporal)   Resp 16   Ht 162.6 cm (64\")   Wt 72.9 kg (160 lb 12.8 oz)   SpO2 96%   BMI 27.60 kg/m²     Physical Exam  Vitals and nursing note reviewed.   Constitutional:       Appearance: Normal appearance.   HENT:      Head: Normocephalic.   Eyes:      Extraocular Movements: Extraocular movements intact.      Conjunctiva/sclera: Conjunctivae normal.   Cardiovascular:      Rate and Rhythm: Normal rate and regular rhythm.      Heart sounds: Normal heart sounds. No murmur heard.      Pulmonary:      Effort: Pulmonary effort is normal.      Breath sounds: Normal breath sounds. No wheezing or rales.   Abdominal:      General: Bowel sounds are normal.      Palpations: Abdomen is soft.      Tenderness: There is no abdominal tenderness. There is no guarding.   Musculoskeletal:         General: No swelling. Normal range of motion.   Skin:     General: Skin is warm and dry.   Neurological:      Mental Status: She is alert.      Motor: Weakness present.      Gait: Gait abnormal.      Comments: Patient is almost back to baseline.  She is pleasantly forgetful.  Acts like " "she has \"brain fog\"   Psychiatric:         Mood and Affect: Mood normal.         Thought Content: Thought content normal.        Result Review :                    Assessment and Plan   Diagnoses and all orders for this visit:    1. B12 deficiency (Primary)  -     Vitamin B12; Future  -     Folate; Future    2. Encounter for long-term (current) use of other medications  -     CBC w AUTO Differential; Future  -     Comprehensive metabolic panel; Future    3. Type 2 diabetes mellitus without complication, unspecified whether long term insulin use (HCC)  -     MicroAlbumin, Urine, Random - Urine, Clean Catch; Future  -     Hemoglobin A1c; Future    4. Iron deficiency  -     Iron and TIBC; Future  -     Ferritin; Future    5. Hyperlipidemia, unspecified hyperlipidemia type  -     Lipid panel; Future    6. Hypomagnesemia  -     Magnesium; Future    7. Weight gain  -     TSH+Free T4; Future    8. Vitamin D deficiency  -     Vitamin D 25 hydroxy; Future    9. Mild intermittent asthma with acute exacerbation  Assessment & Plan:  Patient needs a refill on albuterol MDI.  She has been having more wheezing.  She is short of breath.  Does not have a nebulizer.  No sputum production.  She was in the hospital with Covid pneumonia in late October 2021.  She then went to [facility] rehab hospital.  She did not have a nice experience there.  She eventually went back to [facility] living.  Assessment: Asthma exacerbation by Covid pneumonia recently.  Plan: New prescription sent for albuterol MDI, Dulera 200, 2 puffs twice daily.  Have the patient's sister  an AeroChamber for the MDIs.  Add Singulair 10 mg daily which was also sent to the pharmacy.  Will not add prednisone because she has diabetes.  Keep her routine appointment in January unless she does get worse or does not clear in the next few weeks.      Orders:  -     mometasone-formoterol (Dulera) 200-5 MCG/ACT inhaler; Inhale 2 puffs 2 (Two) Times a Day.  Dispense: 1 " each; Refill: 1  -     albuterol sulfate  (90 Base) MCG/ACT inhaler; Inhale 2 puffs Every 4 (Four) Hours As Needed for Wheezing.  Dispense: 6.7 g; Refill: 1  -     montelukast (Singulair) 10 MG tablet; Take 1 tablet by mouth Every Night.  Dispense: 30 tablet; Refill: 1  -     XR Chest PA & Lateral; Future    10. Pneumonia due to COVID-19 virus  Assessment & Plan:  Patient had recent Covid pneumonia was hospitalized in October.  She then went to [facility] rehab hospital.  She finally was strong enough to go back to [facility] where she resides.  She is much happier there.  She is using a Rollator.  She is getting home health nurse with PT and OT.  She is still wheezing and short of breath.  I reviewed her chest x-ray from her hospital admission.  A chest CT was not done.  Her Covid test was positive.  Head CT was done and just showed atrophy small vessel ischemic changes.  She did have confusion in the hospital and still has some confusion.  Hopefully that will continue to improve.  Assessment: Covid pneumonia slowly getting better.  Exacerbation of asthma that has not resolved still short of breath.  Plan: Add Singulair 10 mg daily, albuterol MDI, Dulera MDI twice daily,.  Use an AeroChamber          Follow Up Return for Next scheduled follow up.  Patient was given instructions and counseling regarding her condition or for health maintenance advice. Please see specific information pulled into the AVS if appropriate.

## 2021-11-19 ENCOUNTER — TELEPHONE (OUTPATIENT)
Dept: INTERNAL MEDICINE | Facility: CLINIC | Age: 86
End: 2021-11-19

## 2021-11-19 NOTE — TELEPHONE ENCOUNTER
Patient's sister Tessa came by. [facility] will cover the cost of an AeroChamber. Would like a Rx sent please.

## 2021-12-06 ENCOUNTER — HOSPITAL ENCOUNTER (OUTPATIENT)
Dept: GENERAL RADIOLOGY | Facility: HOSPITAL | Age: 86
Discharge: HOME | End: 2021-12-06
Admitting: INTERNAL MEDICINE

## 2021-12-06 DIAGNOSIS — J45.21 MILD INTERMITTENT ASTHMA WITH ACUTE EXACERBATION: ICD-10-CM

## 2021-12-06 PROCEDURE — 71046 X-RAY EXAM CHEST 2 VIEWS: CPT

## 2021-12-13 ENCOUNTER — LAB REQUISITION (OUTPATIENT)
Dept: LAB | Facility: HOSPITAL | Age: 86
End: 2021-12-13

## 2021-12-13 DIAGNOSIS — N39.0 URINARY TRACT INFECTION, SITE NOT SPECIFIED: ICD-10-CM

## 2021-12-13 LAB
BACTERIA UR QL AUTO: ABNORMAL /HPF
BILIRUB UR QL STRIP: NEGATIVE
CLARITY UR: ABNORMAL
COLOR UR: YELLOW
GLUCOSE UR STRIP-MCNC: ABNORMAL MG/DL
HGB UR QL STRIP.AUTO: ABNORMAL
HYALINE CASTS UR QL AUTO: ABNORMAL /LPF
KETONES UR QL STRIP: NEGATIVE
LEUKOCYTE ESTERASE UR QL STRIP.AUTO: ABNORMAL
NITRITE UR QL STRIP: NEGATIVE
PH UR STRIP.AUTO: 7.5 [PH] (ref 5–8)
PROT UR QL STRIP: ABNORMAL
RBC # UR STRIP: ABNORMAL /HPF
REF LAB TEST METHOD: ABNORMAL
SP GR UR STRIP: 1.02 (ref 1–1.03)
SQUAMOUS #/AREA URNS HPF: ABNORMAL /HPF
UROBILINOGEN UR QL STRIP: ABNORMAL
WBC # UR STRIP: ABNORMAL /HPF
WBC CLUMPS # UR AUTO: ABNORMAL /HPF

## 2021-12-13 PROCEDURE — 81001 URINALYSIS AUTO W/SCOPE: CPT | Performed by: INTERNAL MEDICINE

## 2021-12-13 PROCEDURE — 87086 URINE CULTURE/COLONY COUNT: CPT | Performed by: INTERNAL MEDICINE

## 2021-12-14 DIAGNOSIS — J45.21 MILD INTERMITTENT ASTHMA WITH ACUTE EXACERBATION: ICD-10-CM

## 2021-12-14 LAB — BACTERIA SPEC AEROBE CULT: NORMAL

## 2021-12-14 RX ORDER — MONTELUKAST SODIUM 10 MG/1
10 TABLET ORAL NIGHTLY
Qty: 30 TABLET | Refills: 1 | Status: SHIPPED | OUTPATIENT
Start: 2021-12-14 | End: 2022-01-19 | Stop reason: SDUPTHER

## 2021-12-23 ENCOUNTER — TELEPHONE (OUTPATIENT)
Dept: INTERNAL MEDICINE | Facility: CLINIC | Age: 86
End: 2021-12-23

## 2021-12-23 PROCEDURE — 81001 URINALYSIS AUTO W/SCOPE: CPT | Performed by: INTERNAL MEDICINE

## 2021-12-23 PROCEDURE — 87086 URINE CULTURE/COLONY COUNT: CPT | Performed by: INTERNAL MEDICINE

## 2021-12-23 NOTE — TELEPHONE ENCOUNTER
Liliam with [facility] health called and states that pt has U/A and culture done on 12/13/21 and does have UTI. Please advise,    [phone]    Pt uses [facility] on [facility].

## 2021-12-24 ENCOUNTER — LAB REQUISITION (OUTPATIENT)
Dept: LAB | Facility: HOSPITAL | Age: 86
End: 2021-12-24

## 2021-12-24 DIAGNOSIS — R82.998 OTHER ABNORMAL FINDINGS IN URINE: ICD-10-CM

## 2021-12-24 LAB
BACTERIA UR QL AUTO: ABNORMAL /HPF
BILIRUB UR QL STRIP: NEGATIVE
CLARITY UR: ABNORMAL
COD CRY URNS QL: ABNORMAL /HPF
COLOR UR: YELLOW
GLUCOSE UR STRIP-MCNC: ABNORMAL MG/DL
HGB UR QL STRIP.AUTO: NEGATIVE
HYALINE CASTS UR QL AUTO: ABNORMAL /LPF
KETONES UR QL STRIP: ABNORMAL
LEUKOCYTE ESTERASE UR QL STRIP.AUTO: ABNORMAL
NITRITE UR QL STRIP: NEGATIVE
PH UR STRIP.AUTO: 5.5 [PH] (ref 5–8)
PROT UR QL STRIP: ABNORMAL
RBC # UR STRIP: ABNORMAL /HPF
REF LAB TEST METHOD: ABNORMAL
SP GR UR STRIP: >=1.03 (ref 1–1.03)
SQUAMOUS #/AREA URNS HPF: ABNORMAL /HPF
UROBILINOGEN UR QL STRIP: ABNORMAL
WBC # UR STRIP: ABNORMAL /HPF
WBC CLUMPS # UR AUTO: ABNORMAL /HPF

## 2021-12-25 DIAGNOSIS — J45.21 MILD INTERMITTENT ASTHMA WITH ACUTE EXACERBATION: ICD-10-CM

## 2021-12-26 LAB
BACTERIA UR CULT: NORMAL
BACTERIA UR CULT: NORMAL

## 2021-12-27 RX ORDER — MOMETASONE FUROATE AND FORMOTEROL FUMARATE DIHYDRATE 200; 5 UG/1; UG/1
AEROSOL RESPIRATORY (INHALATION)
Qty: 13 G | Refills: 3 | Status: SHIPPED | OUTPATIENT
Start: 2021-12-27 | End: 2022-03-07 | Stop reason: SDUPTHER

## 2021-12-27 NOTE — TELEPHONE ENCOUNTER
Patient's urine culture was contaminated and no sensitivity was ordered.  Unless the patient is symptomatic, we will not add antibiotic at this time.

## 2022-01-14 ENCOUNTER — TELEPHONE (OUTPATIENT)
Dept: INTERNAL MEDICINE | Facility: CLINIC | Age: 87
End: 2022-01-14

## 2022-01-14 NOTE — TELEPHONE ENCOUNTER
REMI FROM [employer] CALLED. STATED SHE WAS CONTACTED BY FAMILY TO START UP HOME HEALTH AS PATIENT IS MORE CONFUSED, SLEEPING A LOT, AND LETHARGIC.  [facility] IS ASKING FOR UA WITH C&S RIGHT AWAY TO R/O UTI. VERBAL OK GIVE BY UMESH AGUILA.      ALSO ASKING FOR SOME BLOOD WORK ORDERS - BMP CBC AND ANYTHING ELSE SHE MIGHT NEED.      ASKS TO ADMIT FOR HOME HEALTH FOR ALL OF THE ABOVE. VERBAL OK GIVEN BY MYSELF.      REMI @ [employer] # [phone]

## 2022-01-15 ENCOUNTER — LAB REQUISITION (OUTPATIENT)
Dept: LAB | Facility: HOSPITAL | Age: 87
End: 2022-01-15

## 2022-01-15 DIAGNOSIS — N39.0 URINARY TRACT INFECTION, SITE NOT SPECIFIED: ICD-10-CM

## 2022-01-15 DIAGNOSIS — R53.1 WEAKNESS: ICD-10-CM

## 2022-01-15 DIAGNOSIS — Z79.899 OTHER LONG TERM (CURRENT) DRUG THERAPY: ICD-10-CM

## 2022-01-15 LAB
BILIRUB UR QL STRIP: NEGATIVE
CLARITY UR: ABNORMAL
COLOR UR: YELLOW
GLUCOSE UR STRIP-MCNC: ABNORMAL MG/DL
HGB UR QL STRIP.AUTO: NEGATIVE
KETONES UR QL STRIP: NEGATIVE
LEUKOCYTE ESTERASE UR QL STRIP.AUTO: NEGATIVE
NITRITE UR QL STRIP: NEGATIVE
PH UR STRIP.AUTO: 6.5 [PH] (ref 5–8)
PROT UR QL STRIP: NEGATIVE
SP GR UR STRIP: 1.02 (ref 1–1.03)
UROBILINOGEN UR QL STRIP: ABNORMAL

## 2022-01-15 PROCEDURE — 81003 URINALYSIS AUTO W/O SCOPE: CPT | Performed by: INTERNAL MEDICINE

## 2022-01-19 ENCOUNTER — LAB (OUTPATIENT)
Dept: LAB | Facility: HOSPITAL | Age: 87
End: 2022-01-19

## 2022-01-19 ENCOUNTER — HOSPITAL ENCOUNTER (OUTPATIENT)
Dept: CT IMAGING | Facility: HOSPITAL | Age: 87
Discharge: HOME | End: 2022-01-19

## 2022-01-19 ENCOUNTER — TELEPHONE (OUTPATIENT)
Dept: INTERNAL MEDICINE | Facility: CLINIC | Age: 87
End: 2022-01-19

## 2022-01-19 ENCOUNTER — OFFICE VISIT (OUTPATIENT)
Dept: INTERNAL MEDICINE | Facility: CLINIC | Age: 87
End: 2022-01-19

## 2022-01-19 VITALS
SYSTOLIC BLOOD PRESSURE: 120 MMHG | HEIGHT: 64 IN | TEMPERATURE: 98.2 F | RESPIRATION RATE: 16 BRPM | OXYGEN SATURATION: 97 % | HEART RATE: 67 BPM | WEIGHT: 160.8 LBS | DIASTOLIC BLOOD PRESSURE: 68 MMHG | BODY MASS INDEX: 27.45 KG/M2

## 2022-01-19 DIAGNOSIS — J45.21 MILD INTERMITTENT ASTHMA WITH ACUTE EXACERBATION: ICD-10-CM

## 2022-01-19 DIAGNOSIS — R10.30 LOWER ABDOMINAL PAIN: ICD-10-CM

## 2022-01-19 DIAGNOSIS — E78.5 HYPERLIPIDEMIA, UNSPECIFIED HYPERLIPIDEMIA TYPE: ICD-10-CM

## 2022-01-19 DIAGNOSIS — E53.8 B12 DEFICIENCY: ICD-10-CM

## 2022-01-19 DIAGNOSIS — E11.43 TYPE 2 DIABETES MELLITUS WITH DIABETIC AUTONOMIC NEUROPATHY, WITHOUT LONG-TERM CURRENT USE OF INSULIN: ICD-10-CM

## 2022-01-19 DIAGNOSIS — Z79.899 ENCOUNTER FOR LONG-TERM (CURRENT) USE OF OTHER MEDICATIONS: ICD-10-CM

## 2022-01-19 DIAGNOSIS — E78.00 HIGH CHOLESTEROL: ICD-10-CM

## 2022-01-19 DIAGNOSIS — E55.9 VITAMIN D DEFICIENCY: ICD-10-CM

## 2022-01-19 DIAGNOSIS — Z12.31 ENCOUNTER FOR SCREENING MAMMOGRAM FOR MALIGNANT NEOPLASM OF BREAST: ICD-10-CM

## 2022-01-19 DIAGNOSIS — I10 PRIMARY HYPERTENSION: ICD-10-CM

## 2022-01-19 DIAGNOSIS — M85.852 OSTEOPENIA OF BOTH HIPS: ICD-10-CM

## 2022-01-19 DIAGNOSIS — E11.9 TYPE 2 DIABETES MELLITUS WITHOUT COMPLICATION, UNSPECIFIED WHETHER LONG TERM INSULIN USE: ICD-10-CM

## 2022-01-19 DIAGNOSIS — M85.851 OSTEOPENIA OF BOTH HIPS: ICD-10-CM

## 2022-01-19 DIAGNOSIS — Z13.9 SCREENING DUE: Primary | ICD-10-CM

## 2022-01-19 DIAGNOSIS — E83.42 HYPOMAGNESEMIA: ICD-10-CM

## 2022-01-19 DIAGNOSIS — E61.1 IRON DEFICIENCY: ICD-10-CM

## 2022-01-19 DIAGNOSIS — R63.5 WEIGHT GAIN: ICD-10-CM

## 2022-01-19 DIAGNOSIS — I69.314 FRONTAL LOBE AND EXECUTIVE FUNCTION DEFICIT FOLLOWING CEREBRAL INFARCTION: ICD-10-CM

## 2022-01-19 PROBLEM — Z91.89 AT RISK FOR VENOUS THROMBOEMBOLISM (VTE): Status: ACTIVE | Noted: 2021-11-02

## 2022-01-19 PROBLEM — R41.89 IMPAIRED COGNITION: Status: ACTIVE | Noted: 2022-01-19

## 2022-01-19 PROBLEM — R53.1 WEAKNESS: Status: ACTIVE | Noted: 2022-01-19

## 2022-01-19 PROBLEM — R26.2 DIFFICULTY WALKING: Status: ACTIVE | Noted: 2022-01-19

## 2022-01-19 LAB
25(OH)D3 SERPL-MCNC: 55.2 NG/ML (ref 30–100)
ALBUMIN SERPL-MCNC: 4 G/DL (ref 3.5–5.2)
ALBUMIN/GLOB SERPL: 1.7 G/DL
ALP SERPL-CCNC: 125 U/L (ref 39–117)
ALT SERPL W P-5'-P-CCNC: 15 U/L (ref 1–33)
ANION GAP SERPL CALCULATED.3IONS-SCNC: 12.8 MMOL/L (ref 5–15)
AST SERPL-CCNC: 10 U/L (ref 1–32)
BASOPHILS # BLD AUTO: 0.03 10*3/MM3 (ref 0–0.2)
BASOPHILS NFR BLD AUTO: 0.5 % (ref 0–1.5)
BILIRUB SERPL-MCNC: 0.4 MG/DL (ref 0–1.2)
BUN SERPL-MCNC: 15 MG/DL (ref 8–23)
BUN/CREAT SERPL: 18.1 (ref 7–25)
CALCIUM SPEC-SCNC: 10.1 MG/DL (ref 8.6–10.5)
CHLORIDE SERPL-SCNC: 101 MMOL/L (ref 98–107)
CHOLEST SERPL-MCNC: 183 MG/DL (ref 0–200)
CO2 SERPL-SCNC: 23.2 MMOL/L (ref 22–29)
CREAT SERPL-MCNC: 0.83 MG/DL (ref 0.57–1)
DEPRECATED RDW RBC AUTO: 40.9 FL (ref 37–54)
EOSINOPHIL # BLD AUTO: 0.07 10*3/MM3 (ref 0–0.4)
EOSINOPHIL NFR BLD AUTO: 1.3 % (ref 0.3–6.2)
ERYTHROCYTE [DISTWIDTH] IN BLOOD BY AUTOMATED COUNT: 13.2 % (ref 12.3–15.4)
FERRITIN SERPL-MCNC: 68.7 NG/ML (ref 13–150)
FOLATE SERPL-MCNC: >20 NG/ML (ref 4.78–24.2)
GFR SERPL CREATININE-BSD FRML MDRD: 65 ML/MIN/1.73
GLOBULIN UR ELPH-MCNC: 2.3 GM/DL
GLUCOSE SERPL-MCNC: 204 MG/DL (ref 65–99)
HBA1C MFR BLD: 9.2 % (ref 4.8–5.6)
HCT VFR BLD AUTO: 41.4 % (ref 34–46.6)
HDLC SERPL-MCNC: 43 MG/DL (ref 40–60)
HGB BLD-MCNC: 13.8 G/DL (ref 12–15.9)
IMM GRANULOCYTES # BLD AUTO: 0.02 10*3/MM3 (ref 0–0.05)
IMM GRANULOCYTES NFR BLD AUTO: 0.4 % (ref 0–0.5)
IRON 24H UR-MRATE: 58 MCG/DL (ref 37–145)
IRON SATN MFR SERPL: 12 % (ref 20–50)
LDLC SERPL CALC-MCNC: 114 MG/DL (ref 0–100)
LDLC/HDLC SERPL: 2.57 {RATIO}
LYMPHOCYTES # BLD AUTO: 1.31 10*3/MM3 (ref 0.7–3.1)
LYMPHOCYTES NFR BLD AUTO: 23.8 % (ref 19.6–45.3)
MAGNESIUM SERPL-MCNC: 2 MG/DL (ref 1.6–2.4)
MCH RBC QN AUTO: 28.7 PG (ref 26.6–33)
MCHC RBC AUTO-ENTMCNC: 33.3 G/DL (ref 31.5–35.7)
MCV RBC AUTO: 86.1 FL (ref 79–97)
MONOCYTES # BLD AUTO: 0.81 10*3/MM3 (ref 0.1–0.9)
MONOCYTES NFR BLD AUTO: 14.7 % (ref 5–12)
NEUTROPHILS NFR BLD AUTO: 3.26 10*3/MM3 (ref 1.7–7)
NEUTROPHILS NFR BLD AUTO: 59.3 % (ref 42.7–76)
NRBC BLD AUTO-RTO: 0 /100 WBC (ref 0–0.2)
PLATELET # BLD AUTO: 287 10*3/MM3 (ref 140–450)
PMV BLD AUTO: 9.7 FL (ref 6–12)
POTASSIUM SERPL-SCNC: 4.1 MMOL/L (ref 3.5–5.2)
PROT SERPL-MCNC: 6.3 G/DL (ref 6–8.5)
RBC # BLD AUTO: 4.81 10*6/MM3 (ref 3.77–5.28)
SODIUM SERPL-SCNC: 137 MMOL/L (ref 136–145)
T4 FREE SERPL-MCNC: 1.22 NG/DL (ref 0.93–1.7)
TIBC SERPL-MCNC: 483 MCG/DL (ref 298–536)
TRANSFERRIN SERPL-MCNC: 324 MG/DL (ref 200–360)
TRIGL SERPL-MCNC: 148 MG/DL (ref 0–150)
TSH SERPL DL<=0.05 MIU/L-ACNC: 3 UIU/ML (ref 0.27–4.2)
VIT B12 BLD-MCNC: 645 PG/ML (ref 211–946)
VLDLC SERPL-MCNC: 26 MG/DL (ref 5–40)
WBC NRBC COR # BLD: 5.5 10*3/MM3 (ref 3.4–10.8)

## 2022-01-19 PROCEDURE — 84443 ASSAY THYROID STIM HORMONE: CPT

## 2022-01-19 PROCEDURE — 82607 VITAMIN B-12: CPT

## 2022-01-19 PROCEDURE — 80053 COMPREHEN METABOLIC PANEL: CPT

## 2022-01-19 PROCEDURE — 83540 ASSAY OF IRON: CPT

## 2022-01-19 PROCEDURE — 36415 COLL VENOUS BLD VENIPUNCTURE: CPT

## 2022-01-19 PROCEDURE — 84439 ASSAY OF FREE THYROXINE: CPT

## 2022-01-19 PROCEDURE — 82746 ASSAY OF FOLIC ACID SERUM: CPT

## 2022-01-19 PROCEDURE — 85025 COMPLETE CBC W/AUTO DIFF WBC: CPT

## 2022-01-19 PROCEDURE — 80061 LIPID PANEL: CPT

## 2022-01-19 PROCEDURE — 83036 HEMOGLOBIN GLYCOSYLATED A1C: CPT

## 2022-01-19 PROCEDURE — 82728 ASSAY OF FERRITIN: CPT

## 2022-01-19 PROCEDURE — 84466 ASSAY OF TRANSFERRIN: CPT

## 2022-01-19 PROCEDURE — 82306 VITAMIN D 25 HYDROXY: CPT

## 2022-01-19 PROCEDURE — 74176 CT ABD & PELVIS W/O CONTRAST: CPT

## 2022-01-19 PROCEDURE — 83735 ASSAY OF MAGNESIUM: CPT

## 2022-01-19 PROCEDURE — 99214 OFFICE O/P EST MOD 30 MIN: CPT | Performed by: INTERNAL MEDICINE

## 2022-01-19 RX ORDER — MONTELUKAST SODIUM 10 MG/1
10 TABLET ORAL NIGHTLY
Qty: 30 TABLET | Refills: 1 | Status: SHIPPED | OUTPATIENT
Start: 2022-01-19 | End: 2022-03-07 | Stop reason: SDUPTHER

## 2022-01-19 RX ORDER — METRONIDAZOLE 250 MG/1
250 TABLET ORAL 3 TIMES DAILY
Qty: 21 TABLET | Refills: 0 | Status: SHIPPED | OUTPATIENT
Start: 2022-01-19

## 2022-01-19 RX ORDER — CEPHALEXIN 500 MG/1
500 CAPSULE ORAL 3 TIMES DAILY
Qty: 21 CAPSULE | Refills: 0 | Status: SHIPPED | OUTPATIENT
Start: 2022-01-19

## 2022-01-20 ENCOUNTER — TELEPHONE (OUTPATIENT)
Dept: INTERNAL MEDICINE | Facility: CLINIC | Age: 87
End: 2022-01-20

## 2022-01-20 NOTE — TELEPHONE ENCOUNTER
YIMI FROM [facility] CALLED.  STATED PT WAS SEEN HERE YESTERDAY (01/19)  AND A  UA WAS ORDERED BUT PT UNABLE TO OBTAIN. [facility] STATES THEY OBTAINED A UA  ON PT ON 01/15 - SO THEY WOULD LIKE VERIFICATION IF ANOTHER ONE IS REALLY NEEDED...AND IF SO THEY NEED AN ORDER.      YIMI @ [facility] # [phone]

## 2022-01-24 ENCOUNTER — TELEPHONE (OUTPATIENT)
Dept: INTERNAL MEDICINE | Facility: CLINIC | Age: 87
End: 2022-01-24

## 2022-01-24 ENCOUNTER — LAB REQUISITION (OUTPATIENT)
Dept: LAB | Facility: HOSPITAL | Age: 87
End: 2022-01-24

## 2022-01-24 DIAGNOSIS — E11.9 TYPE 2 DIABETES MELLITUS WITHOUT COMPLICATIONS: ICD-10-CM

## 2022-01-24 DIAGNOSIS — R30.0 DYSURIA: ICD-10-CM

## 2022-01-24 LAB
AMORPH URATE CRY URNS QL MICRO: ABNORMAL /HPF
ANION GAP SERPL CALCULATED.3IONS-SCNC: 7.2 MMOL/L (ref 5–15)
BACTERIA UR QL AUTO: ABNORMAL /HPF
BILIRUB UR QL STRIP: NEGATIVE
BUN SERPL-MCNC: 14 MG/DL (ref 8–23)
BUN/CREAT SERPL: 14.7 (ref 7–25)
CALCIUM SPEC-SCNC: 10.7 MG/DL (ref 8.6–10.5)
CHLORIDE SERPL-SCNC: 101 MMOL/L (ref 98–107)
CLARITY UR: ABNORMAL
CO2 SERPL-SCNC: 26.8 MMOL/L (ref 22–29)
COLOR UR: YELLOW
CREAT SERPL-MCNC: 0.95 MG/DL (ref 0.57–1)
GFR SERPL CREATININE-BSD FRML MDRD: 56 ML/MIN/1.73
GLUCOSE SERPL-MCNC: 233 MG/DL (ref 65–99)
GLUCOSE UR STRIP-MCNC: ABNORMAL MG/DL
HBA1C MFR BLD: 9.71 % (ref 4.8–5.6)
HGB UR QL STRIP.AUTO: NEGATIVE
HYALINE CASTS UR QL AUTO: ABNORMAL /LPF
KETONES UR QL STRIP: NEGATIVE
LEUKOCYTE ESTERASE UR QL STRIP.AUTO: ABNORMAL
NITRITE UR QL STRIP: NEGATIVE
PH UR STRIP.AUTO: 6.5 [PH] (ref 5–8)
POTASSIUM SERPL-SCNC: 4.3 MMOL/L (ref 3.5–5.2)
PROT UR QL STRIP: NEGATIVE
RBC # UR STRIP: ABNORMAL /HPF
REF LAB TEST METHOD: ABNORMAL
SODIUM SERPL-SCNC: 135 MMOL/L (ref 136–145)
SP GR UR STRIP: 1.02 (ref 1–1.03)
SQUAMOUS #/AREA URNS HPF: ABNORMAL /HPF
UROBILINOGEN UR QL STRIP: ABNORMAL
WBC # UR STRIP: ABNORMAL /HPF
YEAST URNS QL MICRO: ABNORMAL /HPF

## 2022-01-24 PROCEDURE — 80048 BASIC METABOLIC PNL TOTAL CA: CPT | Performed by: INTERNAL MEDICINE

## 2022-01-24 PROCEDURE — 81001 URINALYSIS AUTO W/SCOPE: CPT | Performed by: INTERNAL MEDICINE

## 2022-01-24 PROCEDURE — 83036 HEMOGLOBIN GLYCOSYLATED A1C: CPT | Performed by: INTERNAL MEDICINE

## 2022-01-24 NOTE — TELEPHONE ENCOUNTER
I called and spoke to Zully.  Blood sugar was drawn at random after she had eaten.  Lab work from today is pending

## 2022-01-24 NOTE — TELEPHONE ENCOUNTER
Caller: VICKY    Relationship: [facility]    Best call back number: [phone]      Additional notes: BLOOD SUGAR  ; AND RECEIVED LABS, AND SHOULD BE FAXED OVER BEFORE PATIENT'S NEXT APPOINTMENT.

## 2022-01-25 ENCOUNTER — TELEPHONE (OUTPATIENT)
Dept: INTERNAL MEDICINE | Facility: CLINIC | Age: 87
End: 2022-01-25

## 2022-01-25 NOTE — TELEPHONE ENCOUNTER
.    Pharmacy Name:  [facility]     Pharmacy representative name: EVITA     Pharmacy representative phone number: [phone]    What medication are you calling in regards to: BRACE      What question does the pharmacy have: PHARMACY STATES THAT AN ORDER HAS BEEN FAXED -[phone] MULTIPLE TIMES AND NO ONE HAS SINGED IT AND SENT IT BACK     Who is the provider that prescribed the medication: PATIENT ORDERED IT     Additional notes: PHARMACY DID NOT WANT THE BRACE TO SAY KNEE OR BACK. THEREFORE, HUB JUST LISTED BRACE AS PHARMACY SUGGESTED

## 2022-01-26 NOTE — TELEPHONE ENCOUNTER
Called pharmacy and no answer. I looked through the faxes and basket and did not see an order for brace. I will try to call the pharmacy again.

## 2022-01-27 ENCOUNTER — OFFICE VISIT (OUTPATIENT)
Dept: INTERNAL MEDICINE | Facility: CLINIC | Age: 87
End: 2022-01-27

## 2022-01-27 VITALS
BODY MASS INDEX: 27.93 KG/M2 | TEMPERATURE: 96.4 F | DIASTOLIC BLOOD PRESSURE: 82 MMHG | HEIGHT: 64 IN | RESPIRATION RATE: 16 BRPM | SYSTOLIC BLOOD PRESSURE: 130 MMHG | WEIGHT: 163.6 LBS | HEART RATE: 76 BPM | OXYGEN SATURATION: 96 %

## 2022-01-27 DIAGNOSIS — E78.00 HIGH CHOLESTEROL: ICD-10-CM

## 2022-01-27 DIAGNOSIS — E61.1 IRON DEFICIENCY: ICD-10-CM

## 2022-01-27 DIAGNOSIS — E83.42 HYPOMAGNESEMIA: ICD-10-CM

## 2022-01-27 DIAGNOSIS — E11.65 TYPE 2 DIABETES MELLITUS WITH HYPERGLYCEMIA, WITHOUT LONG-TERM CURRENT USE OF INSULIN: ICD-10-CM

## 2022-01-27 DIAGNOSIS — E55.9 VITAMIN D DEFICIENCY: ICD-10-CM

## 2022-01-27 DIAGNOSIS — R10.30 LOWER ABDOMINAL PAIN: ICD-10-CM

## 2022-01-27 DIAGNOSIS — Z79.899 ENCOUNTER FOR LONG-TERM (CURRENT) USE OF OTHER MEDICATIONS: ICD-10-CM

## 2022-01-27 DIAGNOSIS — E03.9 HYPOTHYROIDISM, UNSPECIFIED TYPE: ICD-10-CM

## 2022-01-27 DIAGNOSIS — R41.89 COGNITIVE IMPAIRMENT: ICD-10-CM

## 2022-01-27 DIAGNOSIS — M35.3 PMR (POLYMYALGIA RHEUMATICA): ICD-10-CM

## 2022-01-27 DIAGNOSIS — E53.8 B12 DEFICIENCY: Primary | ICD-10-CM

## 2022-01-27 DIAGNOSIS — E78.5 HYPERLIPIDEMIA, UNSPECIFIED HYPERLIPIDEMIA TYPE: ICD-10-CM

## 2022-01-27 DIAGNOSIS — I10 PRIMARY HYPERTENSION: ICD-10-CM

## 2022-01-27 PROCEDURE — 99214 OFFICE O/P EST MOD 30 MIN: CPT | Performed by: INTERNAL MEDICINE

## 2022-01-27 RX ORDER — MEMANTINE HYDROCHLORIDE 5 MG/1
5 TABLET ORAL 2 TIMES DAILY
Qty: 60 TABLET | Refills: 2 | Status: SHIPPED | OUTPATIENT
Start: 2022-01-27

## 2022-01-27 RX ORDER — GLIPIZIDE 10 MG/1
TABLET, FILM COATED, EXTENDED RELEASE ORAL
Qty: 60 TABLET | Refills: 2 | Status: SHIPPED | OUTPATIENT
Start: 2022-01-27 | End: 2022-03-07 | Stop reason: SDUPTHER

## 2022-01-27 NOTE — TELEPHONE ENCOUNTER
Called the pharmacy and the pharmacy is resending over order for brace. I will print and put in basket to sign.

## 2022-01-28 PROBLEM — I10 HTN (HYPERTENSION): Status: RESOLVED | Noted: 2019-10-18 | Resolved: 2022-01-28

## 2022-01-28 PROBLEM — R10.30 LOWER ABDOMINAL PAIN: Status: ACTIVE | Noted: 2022-01-28

## 2022-01-28 NOTE — ASSESSMENT & PLAN NOTE
Patient has less pain.  She developed diarrhea with antibiotics and stopped them after for 5 days.  Diarrhea has ceased.  Eating adequately.  CT of the abdomen and pelvis showed umbilical and periumbilical hernia but no strangulated bowel.  Had diastases recti.  Small bowel was noted and that but not strangulated.  Assessment: Probable mild acute diverticulitis that is resolved.  Cannot rule out this mall bowel obstruction transiently but no evidence of that now.  Plan: She will be high risk for surgery and especially worsening of confusion with delirium in the hospital.  Says she is doing better we will leave her off antibiotics and observe.

## 2022-01-28 NOTE — PROGRESS NOTES
"Chief Complaint  Follow-up (Pt states she had a day where she had diarrhea and she feels it was due to the antibiotics she was on so pt stopped taking them tuesday. Pt states she is feeling \"okay\" right now. ), Labs Only (Following up on blood work ), Imaging Only (Following up on CT scan ), and Memory issues (Pt had an appt on tuesday at 10 and pt got up at 2:30 and 4 AM that morning to go. She has also been wetting herself. )    Subjective  Her abdominal pain has resolved.  Sister notes more confusion.  This may be in part related to Covid infection recently superimposed upon previous cognitive impairment.  CT showed atrophy.  Will recommend MRI of the brain to look for other.  Had declined Aricept and Namenda in the past.  Her sister is agreeable to have her start on Namenda.  The sister sets up her medicine planner.  She is her POA.    Tita Jiménez presents to [facility]  History of Present Illness  No chest pain or shortness of breath.  No falls.  Appetite is okay.  Bowel movements are better now.  No abdominal pain.  Objective   Vital Signs:   /82 (BP Location: Left arm, Patient Position: Sitting, Cuff Size: Adult)   Pulse 76   Temp 96.4 °F (35.8 °C) (Temporal)   Resp 16   Ht 162.6 cm (64\")   Wt 74.2 kg (163 lb 9.6 oz)   SpO2 96%   BMI 28.08 kg/m²     Physical Exam  Vitals and nursing note reviewed.   Constitutional:       Appearance: Normal appearance.   HENT:      Head: Normocephalic.   Eyes:      Extraocular Movements: Extraocular movements intact.      Conjunctiva/sclera: Conjunctivae normal.   Cardiovascular:      Rate and Rhythm: Normal rate and regular rhythm.      Heart sounds: Normal heart sounds. No murmur heard.      Pulmonary:      Effort: Pulmonary effort is normal.      Breath sounds: Normal breath sounds. No wheezing or rales.   Abdominal:      General: Bowel sounds are normal.      Palpations: Abdomen is soft.      Tenderness: There is no " abdominal tenderness. There is no guarding.   Musculoskeletal:         General: No swelling. Normal range of motion.   Skin:     General: Skin is warm and dry.   Neurological:      General: No focal deficit present.      Mental Status: She is alert. Mental status is at baseline. She is disoriented and confused.      Comments: Patient is pleasantly confused.        Result Review :  The following data was reviewed by: Angela Doherty MD on 01/27/2022:  CMP    CMP 11/11/21 1/19/22 1/24/22   Glucose 122 (A) 204 (A) 233 (A)   BUN 16 15 14   Creatinine 0.82 0.83 0.95   eGFR Non  Am 66 65 56 (A)   Sodium 136 137 135 (A)   Potassium 4.3 4.1 4.3   Chloride 102 101 101   Calcium 10.0 10.1 10.7 (A)   Albumin 3.20 (A) 4.00    Total Bilirubin 0.4 0.4    Alkaline Phosphatase 91 125 (A)    AST (SGOT) 17 10    ALT (SGPT) 22 15    (A) Abnormal value            CBC w/diff    CBC w/Diff 11/8/21 11/11/21 1/19/22   WBC 5.35 4.05 5.50   RBC 4.00 4.07 4.81   Hemoglobin 11.5 (A) 11.7 (A) 13.8   Hematocrit 34.7 35.2 41.4   MCV 86.8 86.5 86.1   MCH 28.8 28.7 28.7   MCHC 33.1 33.2 33.3   RDW 13.4 13.8 13.2   Platelets 182 136 (A) 287   Neutrophil Rel % 54.9 43.8 59.3   Immature Granulocyte Rel % 0.2 0.2 0.4   Lymphocyte Rel % 29.5 37.3 23.8   Monocyte Rel % 12.0 14.8 (A) 14.7 (A)   Eosinophil Rel % 2.8 3.2 1.3   Basophil Rel % 0.6 0.7 0.5   (A) Abnormal value            Lipid Panel    Lipid Panel 3/11/21 9/17/21 1/19/22   Total Cholesterol  192 183   Total Cholesterol 171     Triglycerides 190 (A) 176 (A) 148   HDL Cholesterol 41 41 43   VLDL Cholesterol 38 (A) 31 26   LDL Cholesterol  92 120 (A) 114 (A)   LDL/HDL Ratio  2.82 2.57   (A) Abnormal value       Comments are available for some flowsheets but are not being displayed.           TSH    TSH 3/11/21 9/17/21 1/19/22   TSH 4.780 (A) 3.350 3.000   (A) Abnormal value            Most Recent A1C    HGBA1C Most Recent 1/24/22   Hemoglobin A1C 9.71 (A)   (A) Abnormal value                     UA    Urinalysis 12/23/21 12/23/21 1/15/22 1/24/22 1/24/22    1830 1830  1215 1215   Squamous Epithelial Cells, UA  3-6 (A)   3-6 (A)   Specific Gravity, UA >=1.030  1.018 1.025    Ketones, UA Trace (A)  Negative Negative    Blood, UA Negative  Negative Negative    Leukocytes, UA Small (1+) (A)  Negative Small (1+) (A)    Nitrite, UA Negative  Negative Negative    RBC, UA  None Seen   0-2   WBC, UA  6-12 (A)   3-5 (A)   Bacteria, UA  1+ (A)   Trace (A)   (A) Abnormal value                          Assessment and Plan   Diagnoses and all orders for this visit:    1. B12 deficiency (Primary)  -     Vitamin B12; Future  -     Folate; Future    2. Encounter for long-term (current) use of other medications  -     CBC w AUTO Differential; Future    3. Primary hypertension  -     Comprehensive metabolic panel; Future    4. Hyperlipidemia, unspecified hyperlipidemia type  -     Lipid panel; Future    5. Hypomagnesemia  -     Magnesium; Future    6. Hypothyroidism, unspecified type  -     TSH+Free T4; Future    7. Iron deficiency  -     Iron and TIBC; Future  -     Ferritin; Future    8. Vitamin D deficiency  -     Vitamin D 25 hydroxy; Future    9. PMR (polymyalgia rheumatica) (HCC)  -     Sedimentation Rate; Future  -     C-reactive Protein; Future    10. Cognitive impairment  Assessment & Plan:  Patient has had a history of mild cognitive impairment.  I recommended Aricept/Namenda in the past but she had declined.  Her sister is agreeable with her being prescribed Namenda.  I discussed, to take  5 mg daily for the first week and then increase to 5 mg twice daily and she sets her medicines and medicine planner.  Assessment: Cognitive impairment may be worsened by recent Covid infection.  However it does have underlying cognitive impairment.  Plan: Check MRI of the brain since recent CT scan was unrevealing except for atrophy.  Rule out strokes.  Add Namenda 5 mg twice daily.  Will increase dose if tolerated on  next visit    Orders:  -     MRI Brain Without Contrast; Future    11. Type 2 diabetes mellitus with hyperglycemia, without long-term current use of insulin ([facility])  Assessment & Plan:  Blood sugar is doing adequate.  Tried to eliminate sweets.  Tends to eat chocolate ice cream all the time.  Assessment: Diabetes mellitus type 2.  Plan: Continue and increase glipizide ER to 20 mg every morning, Januvia 100 mg daily, Metformin  mg twice daily,  She is already has urinary incontinence and reluctant and Farxiga or Jardiance.  Hemoglobin A1c on next visit.    Orders:  -     Hemoglobin A1c; Future    12. Lower abdominal pain  Assessment & Plan:  Patient has less pain.  She developed diarrhea with antibiotics and stopped them after for 5 days.  Diarrhea has ceased.  Eating adequately.  CT of the abdomen and pelvis showed umbilical and periumbilical hernia but no strangulated bowel.  Had diastases recti.  Small bowel was noted and that but not strangulated.  Assessment: Probable mild acute diverticulitis that is resolved.  Cannot rule out this mall bowel obstruction transiently but no evidence of that now.  Plan: She will be high risk for surgery and especially worsening of confusion with delirium in the hospital.  Says she is doing better we will leave her off antibiotics and observe.      13. High cholesterol  Assessment & Plan:  Hyperlipidemia adequately controlled.  Plan: Continue atorvastatin 20 mg at at bedtime.      Other orders  -     glipizide (glipiZIDE XL) 10 MG 24 hr tablet; Take 2 tablets of glipizide ER 10 mg every morning  Dispense: 60 tablet; Refill: 2  -     memantine (Namenda) 5 MG tablet; Take 1 tablet by mouth 2 (Two) Times a Day.  Dispense: 60 tablet; Refill: 2        Follow Up Return in about 4 weeks (around 2/24/2022).  Patient was given instructions and counseling regarding her condition or for health maintenance advice. Please see specific information pulled into the AVS if appropriate.

## 2022-01-28 NOTE — ASSESSMENT & PLAN NOTE
Hypertension is controlled.  Plan: Continue Cardizem  mg daily, and lisinopril 10 mg daily Toprol-XL 25 mg daily

## 2022-01-28 NOTE — ASSESSMENT & PLAN NOTE
Blood sugar is doing adequate.  Tried to eliminate sweets.  Tends to eat chocolate ice cream all the time.  Assessment: Diabetes mellitus type 2.  Plan: Continue and increase glipizide ER to 20 mg every morning, Januvia 100 mg daily, Metformin  mg twice daily,  She is already has urinary incontinence and reluctant and Farxiga or Jardiance.  Hemoglobin A1c on next visit.

## 2022-01-28 NOTE — ASSESSMENT & PLAN NOTE
Patient has had a history of mild cognitive impairment.  I recommended Aricept/Namenda in the past but she had declined.  Her sister is agreeable with her being prescribed Namenda.  I discussed, to take  5 mg daily for the first week and then increase to 5 mg twice daily and she sets her medicines and medicine planner.  Assessment: Cognitive impairment may be worsened by recent Covid infection.  However it does have underlying cognitive impairment.  Plan: Check MRI of the brain since recent CT scan was unrevealing except for atrophy.  Rule out strokes.  Add Namenda 5 mg twice daily.  Will increase dose if tolerated on next visit

## 2022-02-02 ENCOUNTER — TELEPHONE (OUTPATIENT)
Dept: INTERNAL MEDICINE | Facility: CLINIC | Age: 87
End: 2022-02-02

## 2022-02-02 NOTE — TELEPHONE ENCOUNTER
Caller: JORGITO    Relationship: [facility]    Best call back number: [phone]    What is the best time to reach you: ANY    Who are you requesting to speak with (clinical staff, provider,  specific staff member): CLINICAL    What was the call regarding: [facility] CALLED TO PROVIDE UPDATES ON PATIENT. HER SUGAR . SHE HAD A FALL AND INJURED RIGHT KNEE BUT AS PER [facility], SHE IS OK. SHE SAW A PODIATRIST ON Friday. [facility] IS REQUESTING UPDATED ORDERS TO SEE PATIENT EVERY WEEK.     Do you require a callback: YES

## 2022-02-09 RX ORDER — DULOXETIN HYDROCHLORIDE 30 MG/1
CAPSULE, DELAYED RELEASE ORAL
Qty: 90 CAPSULE | Refills: 3 | Status: SHIPPED | OUTPATIENT
Start: 2022-02-09 | End: 2022-03-07 | Stop reason: SDUPTHER

## 2022-02-15 ENCOUNTER — TELEPHONE (OUTPATIENT)
Dept: INTERNAL MEDICINE | Facility: CLINIC | Age: 87
End: 2022-02-15

## 2022-02-15 DIAGNOSIS — R26.2 DIFFICULTY WALKING: ICD-10-CM

## 2022-02-15 DIAGNOSIS — R53.1 WEAKNESS: ICD-10-CM

## 2022-02-15 DIAGNOSIS — R29.6 FALLING: Primary | ICD-10-CM

## 2022-02-15 NOTE — TELEPHONE ENCOUNTER
Tatum with [facility] called and states that pt keeps rolling out of bed and needs RX for hospital bed. Faxed over RX and demo sheet and last  AVS.

## 2022-02-21 RX ORDER — PANTOPRAZOLE SODIUM 40 MG/1
TABLET, DELAYED RELEASE ORAL
Qty: 90 TABLET | Refills: 3 | Status: SHIPPED | OUTPATIENT
Start: 2022-02-21 | End: 2022-03-07 | Stop reason: SDUPTHER

## 2022-02-22 ENCOUNTER — TELEPHONE (OUTPATIENT)
Dept: INTERNAL MEDICINE | Facility: CLINIC | Age: 87
End: 2022-02-22

## 2022-02-22 ENCOUNTER — LAB REQUISITION (OUTPATIENT)
Dept: LAB | Facility: HOSPITAL | Age: 87
End: 2022-02-22

## 2022-02-22 DIAGNOSIS — N39.0 URINARY TRACT INFECTION, SITE NOT SPECIFIED: ICD-10-CM

## 2022-02-22 LAB
BACTERIA UR QL AUTO: ABNORMAL /HPF
BILIRUB UR QL STRIP: NEGATIVE
CLARITY UR: CLEAR
COLOR UR: YELLOW
GLUCOSE UR STRIP-MCNC: ABNORMAL MG/DL
HGB UR QL STRIP.AUTO: NEGATIVE
HYALINE CASTS UR QL AUTO: ABNORMAL /LPF
KETONES UR QL STRIP: NEGATIVE
LEUKOCYTE ESTERASE UR QL STRIP.AUTO: ABNORMAL
NITRITE UR QL STRIP: NEGATIVE
PH UR STRIP.AUTO: 7 [PH] (ref 5–8)
PROT UR QL STRIP: NEGATIVE
RBC # UR STRIP: ABNORMAL /HPF
REF LAB TEST METHOD: ABNORMAL
SP GR UR STRIP: 1.01 (ref 1–1.03)
SQUAMOUS #/AREA URNS HPF: ABNORMAL /HPF
UROBILINOGEN UR QL STRIP: ABNORMAL
WBC # UR STRIP: ABNORMAL /HPF

## 2022-02-22 PROCEDURE — 87077 CULTURE AEROBIC IDENTIFY: CPT | Performed by: INTERNAL MEDICINE

## 2022-02-22 PROCEDURE — 87186 SC STD MICRODIL/AGAR DIL: CPT | Performed by: INTERNAL MEDICINE

## 2022-02-22 PROCEDURE — 87086 URINE CULTURE/COLONY COUNT: CPT | Performed by: INTERNAL MEDICINE

## 2022-02-22 PROCEDURE — 81001 URINALYSIS AUTO W/SCOPE: CPT | Performed by: INTERNAL MEDICINE

## 2022-02-22 NOTE — TELEPHONE ENCOUNTER
Caller: KAYLEY    Relationship: Home Health    Best call back number: [phone]    What orders are you requesting (i.e. lab or imaging): URINE SPECIMEN    In what timeframe would the patient need to come in: ASAP    Where will you receive your lab/imaging services: AT HOME    Additional notes: KAYLEY REQUESTING ORDERS FOR AT HOME URINE SPECIMEN TEST.

## 2022-02-22 NOTE — TELEPHONE ENCOUNTER
Madeline with [facility] health called and wanted to know if they need to collect any blood work from pt before her appt on 3/3/2022. Pt just had routine labs drawn 1/19/2022 and there are labs. Is there any labs to be drawn for pt before appt?    Madeline [phone]

## 2022-02-22 NOTE — TELEPHONE ENCOUNTER
Called [facility] and spoke with maykel. I gave verbal for U/A and culture for home health to collect.Maykel  [phone]

## 2022-02-22 NOTE — TELEPHONE ENCOUNTER
Caller: JiménezTita    Relationship: Self    Best call back number: [phone]    Requested Prescriptions:   Requested Prescriptions     Pending Prescriptions Disp Refills   • SITagliptin (Januvia) 100 MG tablet 90 tablet 3     Sig: Take 1 tablet by mouth Daily.        Pharmacy where request should be sent: [id number] - [place], [place] - [address] AT [facility] [phone] [facility] [phone]      Additional details provided by patient: PATIENT ONLY REQUESTING A 30 DAY SUPPLY AT THIS TIME AS SHE WILL BE CHANGING PHARMACIES LATER THIS MONTH    Does the patient have less than a 3 day supply:  [] Yes  [x] No    Zoey Laboy Rep   02/22/22 11:16 EST

## 2022-02-23 ENCOUNTER — TELEPHONE (OUTPATIENT)
Dept: INTERNAL MEDICINE | Facility: CLINIC | Age: 87
End: 2022-02-23

## 2022-02-23 NOTE — TELEPHONE ENCOUNTER
Yes have home health nurse draw blood before her next appointment.  The lab orders are in the computer.

## 2022-02-23 NOTE — TELEPHONE ENCOUNTER
Spoke with Tessa. Advised her of medication change to Januvia 50 mgs qd. She didn't know it was reduced. Patient will need a refill on the 50 mg tablet. Pended Rx.

## 2022-02-24 ENCOUNTER — HOSPITAL ENCOUNTER (OUTPATIENT)
Dept: MRI IMAGING | Facility: HOSPITAL | Age: 87
Discharge: HOME | End: 2022-02-24
Admitting: INTERNAL MEDICINE

## 2022-02-24 DIAGNOSIS — R41.89 COGNITIVE IMPAIRMENT: ICD-10-CM

## 2022-02-24 LAB — BACTERIA SPEC AEROBE CULT: ABNORMAL

## 2022-02-24 PROCEDURE — 70551 MRI BRAIN STEM W/O DYE: CPT

## 2022-02-25 ENCOUNTER — DOCUMENTATION (OUTPATIENT)
Dept: INTERNAL MEDICINE | Facility: CLINIC | Age: 87
End: 2022-02-25

## 2022-02-25 NOTE — PROGRESS NOTES
I called and spoke to her Sister Tessa Garrett who is her POA.  The patient had a subacute right frontal lobe brain infarct.  Ventricles were normal.  Mild atrophy.  No hemorrhage.  No mass or midline shift.  Fairly extensive chronic appearing changes in the periventricular white matter.  There is a 3.5 cm x 1 cm area in the right frontal lobe that suggests a subacute brain infarct.  The patient is on aspirin.  She has an appointment in my office next week.  We will consider doing carotid Doppler scan, echocardiogram, EKG depending on her willingness and ability to get those studies done.

## 2022-03-03 ENCOUNTER — OFFICE VISIT (OUTPATIENT)
Dept: INTERNAL MEDICINE | Facility: CLINIC | Age: 87
End: 2022-03-03

## 2022-03-03 ENCOUNTER — LAB (OUTPATIENT)
Dept: LAB | Facility: HOSPITAL | Age: 87
End: 2022-03-03

## 2022-03-03 VITALS
WEIGHT: 157 LBS | SYSTOLIC BLOOD PRESSURE: 142 MMHG | RESPIRATION RATE: 16 BRPM | BODY MASS INDEX: 27.82 KG/M2 | OXYGEN SATURATION: 93 % | DIASTOLIC BLOOD PRESSURE: 90 MMHG | HEIGHT: 63 IN | TEMPERATURE: 98.1 F | HEART RATE: 84 BPM

## 2022-03-03 DIAGNOSIS — E11.65 TYPE 2 DIABETES MELLITUS WITH HYPERGLYCEMIA, WITHOUT LONG-TERM CURRENT USE OF INSULIN: Primary | ICD-10-CM

## 2022-03-03 DIAGNOSIS — R10.30 LOWER ABDOMINAL PAIN: ICD-10-CM

## 2022-03-03 DIAGNOSIS — I10 PRIMARY HYPERTENSION: ICD-10-CM

## 2022-03-03 DIAGNOSIS — E11.43 TYPE 2 DIABETES MELLITUS WITH DIABETIC AUTONOMIC NEUROPATHY, WITHOUT LONG-TERM CURRENT USE OF INSULIN: ICD-10-CM

## 2022-03-03 DIAGNOSIS — E11.65 TYPE 2 DIABETES MELLITUS WITH HYPERGLYCEMIA, WITHOUT LONG-TERM CURRENT USE OF INSULIN: ICD-10-CM

## 2022-03-03 DIAGNOSIS — I69.314 FRONTAL LOBE AND EXECUTIVE FUNCTION DEFICIT FOLLOWING CEREBRAL INFARCTION: ICD-10-CM

## 2022-03-03 DIAGNOSIS — E78.00 HIGH CHOLESTEROL: ICD-10-CM

## 2022-03-03 LAB
ANION GAP SERPL CALCULATED.3IONS-SCNC: 11 MMOL/L (ref 5–15)
BASOPHILS # BLD AUTO: 0.04 10*3/MM3 (ref 0–0.2)
BASOPHILS NFR BLD AUTO: 0.5 % (ref 0–1.5)
BUN SERPL-MCNC: 15 MG/DL (ref 8–23)
BUN/CREAT SERPL: 18.1 (ref 7–25)
CALCIUM SPEC-SCNC: 10.4 MG/DL (ref 8.6–10.5)
CHLORIDE SERPL-SCNC: 102 MMOL/L (ref 98–107)
CO2 SERPL-SCNC: 26 MMOL/L (ref 22–29)
CREAT SERPL-MCNC: 0.83 MG/DL (ref 0.57–1)
DEPRECATED RDW RBC AUTO: 43.6 FL (ref 37–54)
EGFRCR SERPLBLD CKD-EPI 2021: 68.8 ML/MIN/1.73
EOSINOPHIL # BLD AUTO: 0.11 10*3/MM3 (ref 0–0.4)
EOSINOPHIL NFR BLD AUTO: 1.5 % (ref 0.3–6.2)
ERYTHROCYTE [DISTWIDTH] IN BLOOD BY AUTOMATED COUNT: 13.5 % (ref 12.3–15.4)
GLUCOSE SERPL-MCNC: 152 MG/DL (ref 65–99)
HBA1C MFR BLD: 9 % (ref 4.8–5.6)
HCT VFR BLD AUTO: 42.3 % (ref 34–46.6)
HGB BLD-MCNC: 13.7 G/DL (ref 12–15.9)
IMM GRANULOCYTES # BLD AUTO: 0.02 10*3/MM3 (ref 0–0.05)
IMM GRANULOCYTES NFR BLD AUTO: 0.3 % (ref 0–0.5)
LYMPHOCYTES # BLD AUTO: 1.87 10*3/MM3 (ref 0.7–3.1)
LYMPHOCYTES NFR BLD AUTO: 25 % (ref 19.6–45.3)
MCH RBC QN AUTO: 28.5 PG (ref 26.6–33)
MCHC RBC AUTO-ENTMCNC: 32.4 G/DL (ref 31.5–35.7)
MCV RBC AUTO: 87.9 FL (ref 79–97)
MONOCYTES # BLD AUTO: 1.03 10*3/MM3 (ref 0.1–0.9)
MONOCYTES NFR BLD AUTO: 13.8 % (ref 5–12)
NEUTROPHILS NFR BLD AUTO: 4.4 10*3/MM3 (ref 1.7–7)
NEUTROPHILS NFR BLD AUTO: 58.9 % (ref 42.7–76)
NRBC BLD AUTO-RTO: 0 /100 WBC (ref 0–0.2)
PLATELET # BLD AUTO: 280 10*3/MM3 (ref 140–450)
PMV BLD AUTO: 9.5 FL (ref 6–12)
POTASSIUM SERPL-SCNC: 4.2 MMOL/L (ref 3.5–5.2)
RBC # BLD AUTO: 4.81 10*6/MM3 (ref 3.77–5.28)
SODIUM SERPL-SCNC: 139 MMOL/L (ref 136–145)
WBC NRBC COR # BLD: 7.47 10*3/MM3 (ref 3.4–10.8)

## 2022-03-03 PROCEDURE — 80048 BASIC METABOLIC PNL TOTAL CA: CPT

## 2022-03-03 PROCEDURE — 36415 COLL VENOUS BLD VENIPUNCTURE: CPT

## 2022-03-03 PROCEDURE — 85025 COMPLETE CBC W/AUTO DIFF WBC: CPT

## 2022-03-03 PROCEDURE — 99214 OFFICE O/P EST MOD 30 MIN: CPT | Performed by: INTERNAL MEDICINE

## 2022-03-03 PROCEDURE — 83036 HEMOGLOBIN GLYCOSYLATED A1C: CPT

## 2022-03-03 RX ORDER — MEMANTINE HYDROCHLORIDE 10 MG/1
10 TABLET ORAL 2 TIMES DAILY
Qty: 180 TABLET | Refills: 3 | Status: SHIPPED | OUTPATIENT
Start: 2022-03-03

## 2022-03-03 RX ORDER — MULTIPLE VITAMINS W/ MINERALS TAB 9MG-400MCG
1 TAB ORAL DAILY
COMMUNITY
End: 2022-03-07 | Stop reason: SDUPTHER

## 2022-03-03 RX ORDER — FAMOTIDINE 10 MG
10 TABLET ORAL
COMMUNITY
End: 2022-03-07 | Stop reason: SDUPTHER

## 2022-03-03 NOTE — ASSESSMENT & PLAN NOTE
Urine glucose was 250 on the urinalysis most recently.  It had been over thousand.  Her sister and I reviewed her medicines.  She is on Metformin ER 1000 mg daily, glipizide 20 mg daily and Januvia 100 mg daily now.  She has had a UTI.  She decreased sweets.  Assessment: Diabetes mellitus type 2 with at least better control by urinalysis.  Plan BMP and hemoglobin A1c today.

## 2022-03-03 NOTE — PROGRESS NOTES
"Chief Complaint  Urinary Tract Infection (Incontinent (Wearing a brief now))    Subjective  Still having incontinence of urine.  Had a urine culture grew 25,000 colonies E. coli that was pansensitive.  That was collected by home health nurse.  She is moving to a different new assisted living.  All of her meds will be sent to [facility] pharmacy and they will set her medications up in a medicine planner and give them to her.    Tita Jiménez presents to [facility]  History of Present Illness  87-year-old lady with right frontal lobe subacute infarct on presentation a month ago.  Hypertension.  Diabetes mellitus type 2 is poorly controlled.  Clinically is improving.  Medicine was adjusted on her last visit.  Hyperlipidemia.  Cognitive impairment.  Will increase Namenda.  She just agreed to start it last time.  GERD.  Objective   Vital Signs:   /90 (BP Location: Right arm, Patient Position: Sitting, Cuff Size: Adult)   Pulse 84   Temp 98.1 °F (36.7 °C) (Temporal)   Resp 16   Ht 160 cm (63\")   Wt 71.2 kg (157 lb)   SpO2 93%   BMI 27.81 kg/m²     Physical Exam  Constitutional:       Appearance: Normal appearance. She is obese.   Eyes:      Extraocular Movements: Extraocular movements intact.   Cardiovascular:      Rate and Rhythm: Normal rate and regular rhythm.      Heart sounds: No murmur heard.      Pulmonary:      Effort: Pulmonary effort is normal.      Breath sounds: Wheezing present. No rhonchi or rales.   Abdominal:      Tenderness: There is no abdominal tenderness. There is no guarding or rebound.   Neurological:      Mental Status: She is alert. She is disoriented.   Psychiatric:      Comments: Patient has cognitive impairment.  Memory is poor.  Remains very pleasant.  Sister is very helpful.        Result Review :               No labs were done for this visit     Assessment and Plan   Diagnoses and all orders for this visit:    1. Type 2 diabetes mellitus " with hyperglycemia, without long-term current use of insulin ([facility]) (Primary)  Assessment & Plan:  Urine glucose was 250 on the urinalysis most recently.  It had been over thousand.  Her sister and I reviewed her medicines.  She is on Metformin ER 1000 mg daily, glipizide 20 mg daily and Januvia 100 mg daily now.  She has had a UTI.  She decreased sweets.  Assessment: Diabetes mellitus type 2 with at least better control by urinalysis.  Plan BMP and hemoglobin A1c today.    Orders:  -     Basic Metabolic Panel; Future  -     Hemoglobin A1c; Future    2. 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      MRI Brain Without Contrast [[id number]] (Order [id number])  Order  Status: Final result     Appointment Information      Display Notes    v-lc             PACS Images     Radiology Images    Study Result    Narrative & Impression   PROCEDURE:  MRI BRAIN WO CONTRAST     COMPARISON: [facility], MR, BRAIN W/O CONTRAST, 12/14/2018, 11:22.     INDICATIONS:  CONFUSION, DIZZINESS, IMBALANCE, BLURRY VISION AND GENERALIZED WEAKNESS.     TECHNIQUE:    Multiplanar multisequence images of the brain without intravenous gadolinium.     FINDINGS:  Motion artifact is present.  The ventricles have a normal size and configuration.  There is mild   generalized atrophy.  No evidence of acute intracranial hemorrhage, mass or midline shift.  There   are fairly extensive chronic appearing changes in the periventricular white matter.  There is a 3.5   cm by 1 cm area of increased signal intensity in the anterior superior aspect of the right frontal   lobe on diffusion-weighted images.  There is minimal restriction in this region.  There are   bilateral mastoid effusions.  The paranasal sinuses are clear.     IMPRESSION:      1. Subacute right frontal lobe infarct.     2. Bilateral mastoid effusions.        NUBIA ANDREWS MD          Electronically Signed and Approved By: STIVEN HORTA MD on 2/24/2022 at 12:34                        Imaging    MRI Brain Without Contrast (Order: [id number]) - 2/24/2022    Result History    MRI Brain Without Contrast (Order #[id number]) on 2/24/2022 - Order Result History Report    Signed    Electronically signed by Stiven Horta MD on 2/24/22 at 1234 EST     Reprint Order Requisition    MRI Brain Without Contrast (Order #[id number]) on 2/24/22       Provider Comment To Patient     Release   Not seen         MRI Brain Without Contrast: Patient Communication     Release   Not seen       Signed by    Signed Date/Time  Phone Pager   STIVEN HORTA 2/24/2022 12:34 PM [id number] 12:34 [phone]      Exam Information    Status Exam Begun  Exam Ended    Final [99] 2/24/2022 11:15 2/24/2022 12:00 PM [id number] 12:00 PM     Questions    Release to patient Immediate     Reviewed by    Angela Doherty MD 2/25/2022 17:19     External Results Report    Open External Results Report      Encounter    View Encounter               Intra Procedure Documentation    Intra Procedure Documentation     Order-Level Documents:    There are no order-level documents.    Encounter-Level Documents on 02/24/2022:    Electronic signature on 2/24/2022 1056 - E-signed  Scan on 2/24/2022 by [employer], [facility]: MRI SCREENING/HX FORM, [facility], 02/24/2022           Reason for Exam  Priority: Routine  cognitive impairment   Dx: Cognitive impairment [R41.89 (ICD-10-CM)]     Results Routing Tracking    View Results Routing Information     Order Report     Order Details    She is getting somewhat better.  I called and spoke to her sister.  She is on aspirin 81 mg daily.  Assessment: Subacute right frontal lobe infarct presenting several days at least after the insult.  She seems to be improving somewhat.  Plan: Continue aspirin 81 mg daily and risk factor control.      3. Primary hypertension  Assessment & Plan:  Hypertension is  well controlled.  Plan: Continue lisinopril 10 mg daily and Toprol-XL 25 mg daily  And continue Cardizem  mg daily      4. High cholesterol  Assessment & Plan:  Assessment: Hyperlipidemia.  Plan: Continue atorvastatin 20 mg at bedtime.  Lipid panel return the office in 2 months.          Follow Up Return in about 2 months (around 5/3/2022).  Patient was given instructions and counseling regarding her condition or for health maintenance advice. Please see specific information pulled into the AVS if appropriate.

## 2022-03-03 NOTE — ASSESSMENT & PLAN NOTE
Patient had MRI of the brain after she was last here.      MRI Brain Without Contrast [[id number]] (Order [id number])  Order  Status: Final result     Appointment Information      Display Notes    v-lc             PACS Images     Radiology Images    Study Result    Narrative & Impression   PROCEDURE:  MRI BRAIN WO CONTRAST     COMPARISON: [facility], MR, BRAIN W/O CONTRAST, 12/14/2018, 11:22.     INDICATIONS:  CONFUSION, DIZZINESS, IMBALANCE, BLURRY VISION AND GENERALIZED WEAKNESS.     TECHNIQUE:    Multiplanar multisequence images of the brain without intravenous gadolinium.     FINDINGS:  Motion artifact is present.  The ventricles have a normal size and configuration.  There is mild   generalized atrophy.  No evidence of acute intracranial hemorrhage, mass or midline shift.  There   are fairly extensive chronic appearing changes in the periventricular white matter.  There is a 3.5   cm by 1 cm area of increased signal intensity in the anterior superior aspect of the right frontal   lobe on diffusion-weighted images.  There is minimal restriction in this region.  There are   bilateral mastoid effusions.  The paranasal sinuses are clear.     IMPRESSION:      1. Subacute right frontal lobe infarct.     2. Bilateral mastoid effusions.        STIVEN HORTA MD         Electronically Signed and Approved By: STIVEN HORTA MD on 2/24/2022 at 12:34                        Imaging    MRI Brain Without Contrast (Order: [id number]) - 2/24/2022    Result History    MRI Brain Without Contrast (Order #[id number]) on 2/24/2022 - Order Result History Report    Signed    Electronically signed by Stiven Horta MD on 2/24/22 at 1234 EST     Reprint Order Requisition    MRI Brain Without Contrast (Order #[id number]) on 2/24/22       Provider Comment To Patient     Release   Not seen         MRI Brain Without Contrast: Patient Communication     Release   Not seen       Signed by    Signed Date/Time  Phone Pager    NUBIA ANDREWS 2/24/2022 12:34 PM [id number] 12:34 [phone]      Exam Information    Status Exam Begun  Exam Ended    Final [99] 2/24/2022 11:15 2/24/2022 12:00 PM [id number] 12:00 PM     Questions    Release to patient Immediate     Reviewed by    Angela Doherty MD 2/25/2022 17:19     External Results Report    Open External Results Report      Encounter    View Encounter               Intra Procedure Documentation    Intra Procedure Documentation     Order-Level Documents:    There are no order-level documents.    Encounter-Level Documents on 02/24/2022:    Electronic signature on 2/24/2022 1056 - E-signed  Scan on 2/24/2022 by [employer], [facility]: MRI SCREENING/HX FORM, [facility], 02/24/2022           Reason for Exam  Priority: Routine  cognitive impairment   Dx: Cognitive impairment [R41.89 (ICD-10-CM)]     Results Routing Tracking    View Results Routing Information     Order Report     Order Details    She is getting somewhat better.  I called and spoke to her sister.  She is on aspirin 81 mg daily.  Assessment: Subacute right frontal lobe infarct presenting several days at least after the insult.  She seems to be improving somewhat.  Plan: Continue aspirin 81 mg daily and risk factor control.

## 2022-03-03 NOTE — ASSESSMENT & PLAN NOTE
Hypertension is well controlled.  Plan: Continue lisinopril 10 mg daily and Toprol-XL 25 mg daily  And continue Cardizem  mg daily

## 2022-03-07 ENCOUNTER — TELEPHONE (OUTPATIENT)
Dept: INTERNAL MEDICINE | Facility: CLINIC | Age: 87
End: 2022-03-07

## 2022-03-07 DIAGNOSIS — J45.21 MILD INTERMITTENT ASTHMA WITH ACUTE EXACERBATION: ICD-10-CM

## 2022-03-07 DIAGNOSIS — I10 HYPERTENSION, UNSPECIFIED TYPE: ICD-10-CM

## 2022-03-07 RX ORDER — MULTIPLE VITAMINS W/ MINERALS TAB 9MG-400MCG
1 TAB ORAL DAILY
Qty: 90 TABLET | Refills: 3 | Status: SHIPPED | OUTPATIENT
Start: 2022-03-07

## 2022-03-07 RX ORDER — DILTIAZEM HYDROCHLORIDE 240 MG/1
240 CAPSULE, COATED, EXTENDED RELEASE ORAL
Qty: 90 CAPSULE | Refills: 3 | Status: SHIPPED | OUTPATIENT
Start: 2022-03-07

## 2022-03-07 RX ORDER — MONTELUKAST SODIUM 10 MG/1
10 TABLET ORAL NIGHTLY
Qty: 90 TABLET | Refills: 3 | Status: SHIPPED | OUTPATIENT
Start: 2022-03-07

## 2022-03-07 RX ORDER — PANTOPRAZOLE SODIUM 40 MG/1
40 TABLET, DELAYED RELEASE ORAL DAILY
Qty: 90 TABLET | Refills: 3 | Status: SHIPPED | OUTPATIENT
Start: 2022-03-07

## 2022-03-07 RX ORDER — GLIPIZIDE 10 MG/1
20 TABLET, FILM COATED, EXTENDED RELEASE ORAL DAILY
Qty: 180 TABLET | Refills: 3 | Status: SHIPPED | OUTPATIENT
Start: 2022-03-07

## 2022-03-07 RX ORDER — ATORVASTATIN CALCIUM 20 MG/1
20 TABLET, FILM COATED ORAL
Qty: 90 TABLET | Refills: 3 | Status: SHIPPED | OUTPATIENT
Start: 2022-03-07

## 2022-03-07 RX ORDER — POLYETHYLENE GLYCOL 3350 17 G/17G
17 POWDER, FOR SOLUTION ORAL DAILY
Qty: 255 G | Refills: 3 | Status: SHIPPED | OUTPATIENT
Start: 2022-03-07

## 2022-03-07 RX ORDER — MULTIVITAMIN WITH IRON
250 TABLET ORAL NIGHTLY PRN
Qty: 90 TABLET | Refills: 3 | Status: SHIPPED | OUTPATIENT
Start: 2022-03-07

## 2022-03-07 RX ORDER — ALBUTEROL SULFATE 90 UG/1
2 AEROSOL, METERED RESPIRATORY (INHALATION) EVERY 4 HOURS PRN
Qty: 18 G | Refills: 3 | Status: SHIPPED | OUTPATIENT
Start: 2022-03-07

## 2022-03-07 RX ORDER — LISINOPRIL 10 MG/1
10 TABLET ORAL DAILY
Qty: 90 TABLET | Refills: 3 | Status: SHIPPED | OUTPATIENT
Start: 2022-03-07

## 2022-03-07 RX ORDER — DULOXETIN HYDROCHLORIDE 30 MG/1
30 CAPSULE, DELAYED RELEASE ORAL DAILY
Qty: 90 CAPSULE | Refills: 3 | Status: SHIPPED | OUTPATIENT
Start: 2022-03-07

## 2022-03-07 RX ORDER — METOPROLOL SUCCINATE 25 MG/1
25 TABLET, EXTENDED RELEASE ORAL DAILY
Qty: 90 TABLET | Refills: 3 | Status: SHIPPED | OUTPATIENT
Start: 2022-03-07

## 2022-03-07 RX ORDER — ASPIRIN 81 MG/1
81 TABLET ORAL DAILY
Qty: 90 TABLET | Refills: 3 | Status: SHIPPED | OUTPATIENT
Start: 2022-03-07

## 2022-03-07 RX ORDER — MOMETASONE FUROATE AND FORMOTEROL FUMARATE DIHYDRATE 200; 5 UG/1; UG/1
2 AEROSOL RESPIRATORY (INHALATION) 2 TIMES DAILY
Qty: 13 G | Refills: 3 | Status: SHIPPED | OUTPATIENT
Start: 2022-03-07

## 2022-03-07 RX ORDER — FAMOTIDINE 10 MG
10 TABLET ORAL
Qty: 90 TABLET | Refills: 3 | Status: SHIPPED | OUTPATIENT
Start: 2022-03-07

## 2022-03-07 NOTE — TELEPHONE ENCOUNTER
Daughter Tessa Garrett is requesting that all of Ms. Jiménez medicines be switched to [facility].    Patient is going into [facility] facility and [facility] will handle all their meds.    Also stating [facility] facility is requesting any chart notes, H&P, or pertinent med recs to be faxed to them.       Fax # [phone]  Phone # [phone]       Tessa # [phone]      12:40: Emperatriz from [facility] called. They are needing all the patient's meds including OTC meds which will need scripts per assisted living requirements. Annemarie states they only received one medicine - but they need everything and all OTC things.  Asking for a return phone call - needing to speak to someone here.      Emperatriz #   [phone]

## 2022-03-07 NOTE — TELEPHONE ENCOUNTER
All of this has been taken care of. I have sent prescriptions over and faxed info to assisted living.

## 2022-03-10 RX ORDER — FLASH GLUCOSE SENSOR
1 KIT MISCELLANEOUS
Qty: 2 EACH | Refills: 5 | Status: SHIPPED | OUTPATIENT
Start: 2022-03-10

## 2022-03-10 RX ORDER — FLASH GLUCOSE SCANNING READER
1 EACH MISCELLANEOUS DAILY
Qty: 1 EACH | Refills: 0 | Status: SHIPPED | OUTPATIENT
Start: 2022-03-10

## 2022-03-16 ENCOUNTER — TELEPHONE (OUTPATIENT)
Dept: INTERNAL MEDICINE | Facility: CLINIC | Age: 87
End: 2022-03-16

## 2022-03-16 NOTE — TELEPHONE ENCOUNTER
Caller: MARINO    Relationship to patient: Provider/ [facility]    Best call back number: [phone]    Patient is needing: PROVIDER CALLED IN AND SAID THE SHE WAS REPORTING ON FREQUENCY AND DURATION FOR PT. SHE SAID SHE DID A RE-EVALUATION ON 3/15/22 AND BEGINNING 3/21/2022 SHE WILL BE DOING PT ONE TIME A WEEK FOR 4 WEEKS

## 2022-03-18 ENCOUNTER — TELEPHONE (OUTPATIENT)
Dept: INTERNAL MEDICINE | Facility: CLINIC | Age: 87
End: 2022-03-18

## 2022-03-18 ENCOUNTER — DOCUMENTATION (OUTPATIENT)
Dept: INTERNAL MEDICINE | Facility: CLINIC | Age: 87
End: 2022-03-18

## 2022-03-18 NOTE — PROGRESS NOTES
I spoke to Nadia.  We will try starting insulin, Lantus 18 units every morning.  Check blood sugars before meals and at bedtime.  Personal-care home can do that and give her insulin.  Have [facility] home health nurse check her next Monday or Tuesday.  Stop Metformin for few days and see if that helps diarrhea.  Awilda get back with me next week.

## 2022-03-18 NOTE — TELEPHONE ENCOUNTER
Nadia RN called. She saw the patient yesterday at [facility] where she is now a resident. Her FBS yesterday morning was 258. Her sugars have been all over the place. Sometimes it is over 400 and mostly runs in the 300's. She couldn't administer her own insulin before and [facility] couldn't give it to her but now where is living can give her the shots if needed. Asking if they can stop the Metformin because of bowel incontinence. She doesn't participate in activities because she can't tell when she has to go. Wonders if this could be related to the Metformin. Patient ate a brownie the other date and urinated all night. Next morning her sugars were over 400. Knows she is not supposed to eat it. Her memory is getting worse. Nadia will see her again next week. Do you want any labs and do you want to change any meds?    [phone]

## 2022-03-23 ENCOUNTER — LAB REQUISITION (OUTPATIENT)
Dept: LAB | Facility: HOSPITAL | Age: 87
End: 2022-03-23

## 2022-03-23 DIAGNOSIS — R30.0 DYSURIA: ICD-10-CM

## 2022-03-23 LAB
BILIRUB UR QL STRIP: NEGATIVE
CLARITY UR: CLEAR
COLOR UR: YELLOW
GLUCOSE UR STRIP-MCNC: ABNORMAL MG/DL
HGB UR QL STRIP.AUTO: NEGATIVE
KETONES UR QL STRIP: NEGATIVE
LEUKOCYTE ESTERASE UR QL STRIP.AUTO: NEGATIVE
NITRITE UR QL STRIP: NEGATIVE
PH UR STRIP.AUTO: 7 [PH] (ref 5–8)
PROT UR QL STRIP: NEGATIVE
SP GR UR STRIP: 1.01 (ref 1–1.03)
UROBILINOGEN UR QL STRIP: ABNORMAL

## 2022-03-23 PROCEDURE — 81003 URINALYSIS AUTO W/O SCOPE: CPT | Performed by: INTERNAL MEDICINE

## 2022-03-28 ENCOUNTER — TELEPHONE (OUTPATIENT)
Dept: INTERNAL MEDICINE | Facility: CLINIC | Age: 87
End: 2022-03-28

## 2022-03-28 NOTE — TELEPHONE ENCOUNTER
MARY Andrea from [facility] called. Patient's blood sugar was 426 at the highest. Last week it was over 400's three different times. Multiple 300's range. 222 was the lowest this morning. Patient is on 18 units of Lantus once a day. Please advise.     [phone]

## 2022-04-08 ENCOUNTER — TELEPHONE (OUTPATIENT)
Dept: INTERNAL MEDICINE | Facility: CLINIC | Age: 87
End: 2022-04-08

## 2022-04-08 DIAGNOSIS — E11.9 DIABETES MELLITUS, TYPE II, INSULIN DEPENDENT: Primary | ICD-10-CM

## 2022-04-08 DIAGNOSIS — Z79.4 DIABETES MELLITUS, TYPE II, INSULIN DEPENDENT: Primary | ICD-10-CM

## 2022-04-08 NOTE — TELEPHONE ENCOUNTER
[facility] called and stated they need an update Rx on patient's insulin with the new directions. Pended.

## 2022-04-18 ENCOUNTER — LAB REQUISITION (OUTPATIENT)
Dept: LAB | Facility: HOSPITAL | Age: 87
End: 2022-04-18

## 2022-04-18 DIAGNOSIS — I10 ESSENTIAL (PRIMARY) HYPERTENSION: ICD-10-CM

## 2022-04-18 DIAGNOSIS — E11.65 TYPE 2 DIABETES MELLITUS WITH HYPERGLYCEMIA: ICD-10-CM

## 2022-04-18 DIAGNOSIS — S32.009D UNSPECIFIED FRACTURE OF UNSPECIFIED LUMBAR VERTEBRA, SUBSEQUENT ENCOUNTER FOR FRACTURE WITH ROUTINE HEALING: ICD-10-CM

## 2022-04-18 LAB — D DIMER PPP FEU-MCNC: 0.72 MG/L (FEU) (ref 0–0.59)

## 2022-04-18 PROCEDURE — 80053 COMPREHEN METABOLIC PANEL: CPT | Performed by: NURSE PRACTITIONER

## 2022-04-18 PROCEDURE — 85025 COMPLETE CBC W/AUTO DIFF WBC: CPT | Performed by: NURSE PRACTITIONER

## 2022-04-18 PROCEDURE — 85379 FIBRIN DEGRADATION QUANT: CPT | Performed by: NURSE PRACTITIONER

## 2022-04-19 LAB
ALBUMIN SERPL-MCNC: 3.5 G/DL (ref 3.5–5.2)
ALBUMIN/GLOB SERPL: 1.2 G/DL
ALP SERPL-CCNC: 139 U/L (ref 39–117)
ALT SERPL W P-5'-P-CCNC: 18 U/L (ref 1–33)
ANION GAP SERPL CALCULATED.3IONS-SCNC: 13.3 MMOL/L (ref 5–15)
ANISOCYTOSIS BLD QL: ABNORMAL
AST SERPL-CCNC: 22 U/L (ref 1–32)
BILIRUB SERPL-MCNC: 0.3 MG/DL (ref 0–1.2)
BUN SERPL-MCNC: 11 MG/DL (ref 8–23)
BUN/CREAT SERPL: 14.7 (ref 7–25)
BURR CELLS BLD QL SMEAR: ABNORMAL
CALCIUM SPEC-SCNC: 9.8 MG/DL (ref 8.6–10.5)
CHLORIDE SERPL-SCNC: 100 MMOL/L (ref 98–107)
CO2 SERPL-SCNC: 21.7 MMOL/L (ref 22–29)
CREAT SERPL-MCNC: 0.75 MG/DL (ref 0.57–1)
DEPRECATED RDW RBC AUTO: 39.8 FL (ref 37–54)
EGFRCR SERPLBLD CKD-EPI 2021: 77.6 ML/MIN/1.73
EOSINOPHIL # BLD MANUAL: 0.05 10*3/MM3 (ref 0–0.4)
EOSINOPHIL NFR BLD MANUAL: 1 % (ref 0.3–6.2)
ERYTHROCYTE [DISTWIDTH] IN BLOOD BY AUTOMATED COUNT: 12.8 % (ref 12.3–15.4)
GLOBULIN UR ELPH-MCNC: 2.9 GM/DL
GLUCOSE SERPL-MCNC: 303 MG/DL (ref 65–99)
HCT VFR BLD AUTO: 40.7 % (ref 34–46.6)
HGB BLD-MCNC: 13.6 G/DL (ref 12–15.9)
LYMPHOCYTES # BLD MANUAL: 0.91 10*3/MM3 (ref 0.7–3.1)
LYMPHOCYTES NFR BLD MANUAL: 14 % (ref 5–12)
MCH RBC QN AUTO: 28.8 PG (ref 26.6–33)
MCHC RBC AUTO-ENTMCNC: 33.4 G/DL (ref 31.5–35.7)
MCV RBC AUTO: 86.2 FL (ref 79–97)
MICROCYTES BLD QL: ABNORMAL
MONOCYTES # BLD: 0.71 10*3/MM3 (ref 0.1–0.9)
NEUTROPHILS # BLD AUTO: 3.38 10*3/MM3 (ref 1.7–7)
NEUTROPHILS NFR BLD MANUAL: 67 % (ref 42.7–76)
PLAT MORPH BLD: NORMAL
PLATELET # BLD AUTO: 222 10*3/MM3 (ref 140–450)
PMV BLD AUTO: 10.2 FL (ref 6–12)
POIKILOCYTOSIS BLD QL SMEAR: ABNORMAL
POTASSIUM SERPL-SCNC: 4 MMOL/L (ref 3.5–5.2)
PROT SERPL-MCNC: 6.4 G/DL (ref 6–8.5)
RBC # BLD AUTO: 4.72 10*6/MM3 (ref 3.77–5.28)
SODIUM SERPL-SCNC: 135 MMOL/L (ref 136–145)
VARIANT LYMPHS NFR BLD MANUAL: 18 % (ref 19.6–45.3)
WBC MORPH BLD: NORMAL
WBC NRBC COR # BLD: 5.05 10*3/MM3 (ref 3.4–10.8)

## 2022-06-01 NOTE — ASSESSMENT & PLAN NOTE
Diabetes mellitus type 2 with improving control.  Plan: Continue Januvia 100 mg daily metformin ER 1000 mg with breakfast.  As a 500 mg ER tablet.  Is all she can tolerate.  Glipizide 10 mg daily.  Patient is not on insulin.  She does not have Levemir or Lantus at home.

## 2022-06-01 NOTE — PROGRESS NOTES
Chief Complaint  Follow-up (4 month follow up), Medical Clearance (patient couldnt get glipizine refilled. patient is taking glimepiride 4mg instead. Wants to be sure this is what she needs instead of glipizide.), and Nail Problem (right big toe has a nail that looks as if it is coming off. tender to touch. patient keeps it bandaged because it did bleed excessively.)    Subjective  It is okay to continue taking the glimepiride 4 mg presently.  May need to switch to glipizide at a later date.  We discussed her right great toe nail.  At this point she might need to see podiatry and we discussed.  She can just keep it clean and covered with a dressing.  Patient with diabetes mellitus type 2.  She is all sweets that she wants.  They bring her desserts for every meal in assisted living.  Very difficult to control her diabetes without some diet control on her part.  Not a good candidate for insulin.  Currently in assisted living although she is looking at personal care when 1 becomes available.    Tita Jiménez presents to [facility]  History of Present Illness  87-year-old lady with multiple comorbidities.  Hypertension.  Hyperlipidemia.  Diabetes mellitus type 2.  GERD.  Urinary incontinence.  Cognitive impairment.  Depression.  Has always been unwilling to take some medicines especially antidepressants.  History of diverticulitis.  GERD.  Chronic constipation.  Seasonal perennial allergies.  Mild asthma.  Compression fracture of T1-T2 in the past.  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  Pneumonia due to COVID-19 virus.  Gait dysfunction.  At risk of falls.  In assisted living.  Moving to personal care home.  Her sister is her power of .  She comes in with her and takes care of her medicines and all of her financial affairs and legal affairs.  Objective    Vital Signs:   /68 (BP Location: Right arm, Patient Position: Sitting,  "Cuff Size: Adult)   Pulse 67   Temp 98.2 °F (36.8 °C) (Temporal)   Resp 16   Ht 162.6 cm (64\")   Wt 72.9 kg (160 lb 12.8 oz)   SpO2 97%   BMI 27.60 kg/m²     Physical Exam  Vitals and nursing note reviewed.   Constitutional:       Appearance: Normal appearance.   HENT:      Head: Normocephalic.   Eyes:      Extraocular Movements: Extraocular movements intact.      Conjunctiva/sclera: Conjunctivae normal.   Cardiovascular:      Rate and Rhythm: Normal rate and regular rhythm.      Heart sounds: Normal heart sounds. No murmur heard.  Pulmonary:      Effort: Pulmonary effort is normal.      Breath sounds: Normal breath sounds. No wheezing or rales.   Abdominal:      General: Bowel sounds are normal.      Palpations: Abdomen is soft.      Tenderness: There is no abdominal tenderness. There is no guarding.   Musculoskeletal:         General: No swelling. Normal range of motion.   Skin:     General: Skin is warm and dry.   Neurological:      General: No focal deficit present.      Mental Status: She is alert.   Psychiatric:         Mood and Affect: Mood normal.         Behavior: Behavior is cooperative.      Comments: Patient is pleasantly confused.  Her sister is the one that gives history.  She is one that takes care of her medications.        Result Review :  The following data was reviewed by: Angela Doherty MD on 01/19/2022:  CMP    CMP 1/24/22 3/3/22 4/18/22   Glucose 233 (A) 152 (A) 303 (A)   BUN 14 15 11   Creatinine 0.95 0.83 0.75   eGFR Non African Am 56 (A)     Sodium 135 (A) 139 135 (A)   Potassium 4.3 4.2 4.0   Chloride 101 102 100   Calcium 10.7 (A) 10.4 9.8   Albumin   3.50   Total Bilirubin   0.3   Alkaline Phosphatase   139 (A)   AST (SGOT)   22   ALT (SGPT)   18   (A) Abnormal value       Comments are available for some flowsheets but are not being displayed.           CBC w/diff    CBC w/Diff 1/19/22 3/3/22 4/18/22   WBC 5.50 7.47 5.05   RBC 4.81 4.81 4.72   Hemoglobin 13.8 13.7 13.6 "   Hematocrit 41.4 42.3 40.7   MCV 86.1 87.9 86.2   MCH 28.7 28.5 28.8   MCHC 33.3 32.4 33.4   RDW 13.2 13.5 12.8   Platelets 287 280 222   Neutrophil Rel % 59.3 58.9    Immature Granulocyte Rel % 0.4 0.3    Lymphocyte Rel % 23.8 25.0    Monocyte Rel % 14.7 (A) 13.8 (A)    Eosinophil Rel % 1.3 1.5    Basophil Rel % 0.5 0.5    (A) Abnormal value            Lipid Panel    Lipid Panel 9/17/21 1/19/22   Total Cholesterol 192 183   Triglycerides 176 (A) 148   HDL Cholesterol 41 43   VLDL Cholesterol 31 26   LDL Cholesterol  120 (A) 114 (A)   LDL/HDL Ratio 2.82 2.57   (A) Abnormal value            TSH    TSH 9/17/21 1/19/22   TSH 3.350 3.000           Most Recent A1C    HGBA1C Most Recent 3/3/22   Hemoglobin A1C 9.00 (A)   (A) Abnormal value                A1C Last 3 Results    HGBA1C Last 3 Results 1/19/22 1/24/22 3/3/22   Hemoglobin A1C 9.20 (A) 9.71 (A) 9.00 (A)   (A) Abnormal value                UA    Urinalysis 1/24/22 1/24/22 2/22/22 2/22/22 3/23/22    1215 1215 1600 1600    Squamous Epithelial Cells, UA  3-6 (A)  0-2    Specific Gravity, UA 1.025  1.015  1.012   Ketones, UA Negative  Negative  Negative   Blood, UA Negative  Negative  Negative   Leukocytes, UA Small (1+) (A)  Small (1+) (A)  Negative   Nitrite, UA Negative  Negative  Negative   RBC, UA  0-2  3-5 (A)    WBC, UA  3-5 (A)  0-2    Bacteria, UA  Trace (A)  None Seen    (A) Abnormal value                          Assessment and Plan   Diagnoses and all orders for this visit:    1. Screening due (Primary)    2. Mild intermittent asthma with acute exacerbation  Assessment & Plan:  Patient has history of asthma.  She is on albuterol as needed.  Singulair 10 mg daily.  Dulera 200-5 2 puffs twice daily.      Orders:  -     Discontinue: montelukast (SINGULAIR) 10 MG tablet; Take 1 tablet by mouth Every Night.  Dispense: 30 tablet; Refill: 1    3. Lower abdominal pain  -     CBC & Differential; Future  -     CT Abdomen Pelvis Without Contrast; Future    4. Type  2 diabetes mellitus with diabetic autonomic neuropathy, without long-term current use of insulin ([facility])  Assessment & Plan:  Diabetes mellitus type 2 with improving control.  Plan: Continue Januvia 100 mg daily metformin ER 1000 mg with breakfast.  As a 500 mg ER tablet.  Is all she can tolerate.  Glipizide 10 mg daily.  Patient is not on insulin.  She does not have Levemir or Lantus at home.    Orders:  -     Basic Metabolic Panel; Future  -     Hemoglobin A1c; Future    5. Encounter for screening mammogram for malignant neoplasm of breast  -     Mammo Screening Bilateral With CAD; Future    6. Osteopenia of both hips  -     DEXA Bone Density Axial; Future    7. 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  Plan: Continue aspirin 81 mg daily.  Controlled hypertension hyperlipidemia.  Controlled diabetes.      8. High cholesterol  Assessment & Plan:  Lipid panel is adequate.  Plan: Continue atorvastatin 20 mg at bedtime.  Has myalgias with anything higher.  Has had myalgias with Lipitor Livalo and Pravachol and higher doses.      9. Primary hypertension  Assessment & Plan:  Assessment: Hypertension adequately controlled.  Plan: Continue diltiazem CD2 140 mg daily.  Lisinopril 10 mg daily.  Toprol-XL 25 mg daily.      Other orders  -     cephalexin (Keflex) 500 MG capsule; Take 1 capsule by mouth 3 (Three) Times a Day.  Dispense: 21 capsule; Refill: 0  -     metroNIDAZOLE (Flagyl) 250 MG tablet; Take 1 tablet by mouth 3 (Three) Times a Day.  Dispense: 21 tablet; Refill: 0        Follow Up Return in about 3 months (around 4/19/2022).  Patient was given instructions and counseling regarding her condition or for health maintenance advice. Please see specific information pulled into the AVS if appropriate.

## 2022-06-01 NOTE — ASSESSMENT & PLAN NOTE
Patient has history of asthma.  She is on albuterol as needed.  Singulair 10 mg daily.  Dulera 200-5 2 puffs twice daily.

## 2022-06-01 NOTE — ASSESSMENT & PLAN NOTE
Assessment: Hypertension adequately controlled.  Plan: Continue diltiazem CD2 140 mg daily.  Lisinopril 10 mg daily.  Toprol-XL 25 mg daily.

## 2022-06-01 NOTE — ASSESSMENT & PLAN NOTE
She had subacute frontal lobe infarct with subsequent executive function deficit.  Plan: Continue aspirin 81 mg daily.  Controlled hypertension hyperlipidemia.  Controlled diabetes.

## 2022-08-19 ENCOUNTER — LAB REQUISITION (OUTPATIENT)
Dept: LAB | Facility: HOSPITAL | Age: 87
End: 2022-08-19

## 2022-08-19 DIAGNOSIS — E11.9 TYPE 2 DIABETES MELLITUS WITHOUT COMPLICATIONS: ICD-10-CM

## 2022-08-19 DIAGNOSIS — I10 ESSENTIAL (PRIMARY) HYPERTENSION: ICD-10-CM

## 2022-08-19 DIAGNOSIS — I50.9 HEART FAILURE, UNSPECIFIED: ICD-10-CM

## 2022-08-19 LAB
ALBUMIN SERPL-MCNC: 3.4 G/DL (ref 3.5–5.2)
ALBUMIN/GLOB SERPL: 1.3 G/DL
ALP SERPL-CCNC: 139 U/L (ref 39–117)
ALT SERPL W P-5'-P-CCNC: 15 U/L (ref 1–33)
ANION GAP SERPL CALCULATED.3IONS-SCNC: 10.3 MMOL/L (ref 5–15)
AST SERPL-CCNC: 15 U/L (ref 1–32)
BASOPHILS # BLD AUTO: 0.02 10*3/MM3 (ref 0–0.2)
BASOPHILS NFR BLD AUTO: 0.3 % (ref 0–1.5)
BILIRUB SERPL-MCNC: 0.3 MG/DL (ref 0–1.2)
BILIRUB UR QL STRIP: NEGATIVE
BUN SERPL-MCNC: 13 MG/DL (ref 8–23)
BUN/CREAT SERPL: 15.1 (ref 7–25)
CALCIUM SPEC-SCNC: 9.9 MG/DL (ref 8.6–10.5)
CHLORIDE SERPL-SCNC: 105 MMOL/L (ref 98–107)
CLARITY UR: CLEAR
CO2 SERPL-SCNC: 22.7 MMOL/L (ref 22–29)
COLOR UR: YELLOW
CREAT SERPL-MCNC: 0.86 MG/DL (ref 0.57–1)
DEPRECATED RDW RBC AUTO: 42.6 FL (ref 37–54)
EGFRCR SERPLBLD CKD-EPI 2021: 65.9 ML/MIN/1.73
EOSINOPHIL # BLD AUTO: 0.11 10*3/MM3 (ref 0–0.4)
EOSINOPHIL NFR BLD AUTO: 1.7 % (ref 0.3–6.2)
ERYTHROCYTE [DISTWIDTH] IN BLOOD BY AUTOMATED COUNT: 13.7 % (ref 12.3–15.4)
GLOBULIN UR ELPH-MCNC: 2.7 GM/DL
GLUCOSE SERPL-MCNC: 203 MG/DL (ref 65–99)
GLUCOSE UR STRIP-MCNC: NEGATIVE MG/DL
HCT VFR BLD AUTO: 38.2 % (ref 34–46.6)
HGB BLD-MCNC: 12.3 G/DL (ref 12–15.9)
HGB UR QL STRIP.AUTO: NEGATIVE
IMM GRANULOCYTES # BLD AUTO: 0.02 10*3/MM3 (ref 0–0.05)
IMM GRANULOCYTES NFR BLD AUTO: 0.3 % (ref 0–0.5)
KETONES UR QL STRIP: NEGATIVE
LEUKOCYTE ESTERASE UR QL STRIP.AUTO: NEGATIVE
LYMPHOCYTES # BLD AUTO: 1.48 10*3/MM3 (ref 0.7–3.1)
LYMPHOCYTES NFR BLD AUTO: 22.5 % (ref 19.6–45.3)
MCH RBC QN AUTO: 27.8 PG (ref 26.6–33)
MCHC RBC AUTO-ENTMCNC: 32.2 G/DL (ref 31.5–35.7)
MCV RBC AUTO: 86.4 FL (ref 79–97)
MONOCYTES # BLD AUTO: 0.86 10*3/MM3 (ref 0.1–0.9)
MONOCYTES NFR BLD AUTO: 13.1 % (ref 5–12)
NEUTROPHILS NFR BLD AUTO: 4.1 10*3/MM3 (ref 1.7–7)
NEUTROPHILS NFR BLD AUTO: 62.1 % (ref 42.7–76)
NITRITE UR QL STRIP: NEGATIVE
NRBC BLD AUTO-RTO: 0 /100 WBC (ref 0–0.2)
NT-PROBNP SERPL-MCNC: 75.2 PG/ML (ref 0–1800)
PH UR STRIP.AUTO: 6 [PH] (ref 5–8)
PLATELET # BLD AUTO: 259 10*3/MM3 (ref 140–450)
PMV BLD AUTO: 9.7 FL (ref 6–12)
POTASSIUM SERPL-SCNC: 3.9 MMOL/L (ref 3.5–5.2)
PROT SERPL-MCNC: 6.1 G/DL (ref 6–8.5)
PROT UR QL STRIP: NEGATIVE
RBC # BLD AUTO: 4.42 10*6/MM3 (ref 3.77–5.28)
SODIUM SERPL-SCNC: 138 MMOL/L (ref 136–145)
SP GR UR STRIP: 1.01 (ref 1–1.03)
UROBILINOGEN UR QL STRIP: NORMAL
WBC NRBC COR # BLD: 6.59 10*3/MM3 (ref 3.4–10.8)

## 2022-08-19 PROCEDURE — 81003 URINALYSIS AUTO W/O SCOPE: CPT | Performed by: NURSE PRACTITIONER

## 2022-08-19 PROCEDURE — 83880 ASSAY OF NATRIURETIC PEPTIDE: CPT | Performed by: NURSE PRACTITIONER

## 2022-08-19 PROCEDURE — 87086 URINE CULTURE/COLONY COUNT: CPT | Performed by: NURSE PRACTITIONER

## 2022-08-19 PROCEDURE — 85025 COMPLETE CBC W/AUTO DIFF WBC: CPT | Performed by: NURSE PRACTITIONER

## 2022-08-19 PROCEDURE — 80053 COMPREHEN METABOLIC PANEL: CPT | Performed by: NURSE PRACTITIONER

## 2022-08-20 LAB — BACTERIA SPEC AEROBE CULT: NORMAL

## 2022-08-31 PROCEDURE — 81003 URINALYSIS AUTO W/O SCOPE: CPT | Performed by: NURSE PRACTITIONER

## 2022-08-31 PROCEDURE — 87086 URINE CULTURE/COLONY COUNT: CPT | Performed by: NURSE PRACTITIONER

## 2022-09-01 ENCOUNTER — LAB REQUISITION (OUTPATIENT)
Dept: LAB | Facility: HOSPITAL | Age: 87
End: 2022-09-01

## 2022-09-01 DIAGNOSIS — R30.0 DYSURIA: ICD-10-CM

## 2022-09-01 LAB
BILIRUB UR QL STRIP: NEGATIVE
CLARITY UR: CLEAR
COLOR UR: YELLOW
GLUCOSE UR STRIP-MCNC: NEGATIVE MG/DL
HGB UR QL STRIP.AUTO: NEGATIVE
KETONES UR QL STRIP: NEGATIVE
LEUKOCYTE ESTERASE UR QL STRIP.AUTO: NEGATIVE
NITRITE UR QL STRIP: NEGATIVE
PH UR STRIP.AUTO: 5.5 [PH] (ref 5–8)
PROT UR QL STRIP: NEGATIVE
SP GR UR STRIP: 1.02 (ref 1–1.03)
UROBILINOGEN UR QL STRIP: NORMAL

## 2022-09-02 LAB — BACTERIA SPEC AEROBE CULT: NO GROWTH

## 2022-10-06 ENCOUNTER — LAB REQUISITION (OUTPATIENT)
Dept: LAB | Facility: HOSPITAL | Age: 87
End: 2022-10-06

## 2022-10-06 DIAGNOSIS — R30.0 DYSURIA: ICD-10-CM

## 2022-10-06 LAB
BILIRUB UR QL STRIP: NEGATIVE
CLARITY UR: CLEAR
COLOR UR: YELLOW
GLUCOSE UR STRIP-MCNC: ABNORMAL MG/DL
HGB UR QL STRIP.AUTO: NEGATIVE
KETONES UR QL STRIP: NEGATIVE
LEUKOCYTE ESTERASE UR QL STRIP.AUTO: NEGATIVE
NITRITE UR QL STRIP: NEGATIVE
PH UR STRIP.AUTO: 5.5 [PH] (ref 5–8)
PROT UR QL STRIP: NEGATIVE
SP GR UR STRIP: 1.02 (ref 1–1.03)
UROBILINOGEN UR QL STRIP: ABNORMAL

## 2022-10-06 PROCEDURE — 81003 URINALYSIS AUTO W/O SCOPE: CPT | Performed by: NURSE PRACTITIONER

## 2023-02-07 ENCOUNTER — APPOINTMENT (OUTPATIENT)
Dept: GENERAL RADIOLOGY | Facility: HOSPITAL | Age: 88
End: 2023-02-07
Payer: MEDICARE

## 2023-02-07 ENCOUNTER — HOSPITAL ENCOUNTER (EMERGENCY)
Facility: HOSPITAL | Age: 88
Discharge: HOME | End: 2023-02-07
Attending: EMERGENCY MEDICINE | Admitting: EMERGENCY MEDICINE
Payer: MEDICARE

## 2023-02-07 ENCOUNTER — APPOINTMENT (OUTPATIENT)
Dept: CT IMAGING | Facility: HOSPITAL | Age: 88
End: 2023-02-07
Payer: MEDICARE

## 2023-02-07 VITALS
SYSTOLIC BLOOD PRESSURE: 206 MMHG | OXYGEN SATURATION: 93 % | DIASTOLIC BLOOD PRESSURE: 113 MMHG | HEART RATE: 109 BPM | TEMPERATURE: 98.4 F | RESPIRATION RATE: 15 BRPM

## 2023-02-07 DIAGNOSIS — R55 SYNCOPE, UNSPECIFIED SYNCOPE TYPE: Primary | ICD-10-CM

## 2023-02-07 LAB
ALBUMIN SERPL-MCNC: 4 G/DL (ref 3.5–5.2)
ALBUMIN/GLOB SERPL: 1.3 G/DL
ALP SERPL-CCNC: 154 U/L (ref 39–117)
ALT SERPL W P-5'-P-CCNC: 20 U/L (ref 1–33)
ANION GAP SERPL CALCULATED.3IONS-SCNC: 15.3 MMOL/L (ref 5–15)
AST SERPL-CCNC: 23 U/L (ref 1–32)
BACTERIA UR QL AUTO: ABNORMAL /HPF
BASOPHILS # BLD AUTO: 0.04 10*3/MM3 (ref 0–0.2)
BASOPHILS NFR BLD AUTO: 0.4 % (ref 0–1.5)
BILIRUB SERPL-MCNC: 0.4 MG/DL (ref 0–1.2)
BILIRUB UR QL STRIP: NEGATIVE
BUN SERPL-MCNC: 20 MG/DL (ref 8–23)
BUN/CREAT SERPL: 20.2 (ref 7–25)
CALCIUM SPEC-SCNC: 10.3 MG/DL (ref 8.6–10.5)
CHLORIDE SERPL-SCNC: 104 MMOL/L (ref 98–107)
CLARITY UR: CLEAR
CO2 SERPL-SCNC: 19.7 MMOL/L (ref 22–29)
COLOR UR: YELLOW
CREAT SERPL-MCNC: 0.99 MG/DL (ref 0.57–1)
DEPRECATED RDW RBC AUTO: 44.6 FL (ref 37–54)
EGFRCR SERPLBLD CKD-EPI 2021: 55.6 ML/MIN/1.73
EOSINOPHIL # BLD AUTO: 0.2 10*3/MM3 (ref 0–0.4)
EOSINOPHIL NFR BLD AUTO: 2 % (ref 0.3–6.2)
ERYTHROCYTE [DISTWIDTH] IN BLOOD BY AUTOMATED COUNT: 14.9 % (ref 12.3–15.4)
GEN 5 2HR TROPONIN T REFLEX: 19 NG/L
GLOBULIN UR ELPH-MCNC: 3.2 GM/DL
GLUCOSE BLDC GLUCOMTR-MCNC: 152 MG/DL (ref 70–99)
GLUCOSE SERPL-MCNC: 163 MG/DL (ref 65–99)
GLUCOSE UR STRIP-MCNC: ABNORMAL MG/DL
HCT VFR BLD AUTO: 45.6 % (ref 34–46.6)
HGB BLD-MCNC: 14.6 G/DL (ref 12–15.9)
HGB UR QL STRIP.AUTO: NEGATIVE
HOLD SPECIMEN: NORMAL
HOLD SPECIMEN: NORMAL
HYALINE CASTS UR QL AUTO: ABNORMAL /LPF
IMM GRANULOCYTES # BLD AUTO: 0.07 10*3/MM3 (ref 0–0.05)
IMM GRANULOCYTES NFR BLD AUTO: 0.7 % (ref 0–0.5)
KETONES UR QL STRIP: NEGATIVE
LEUKOCYTE ESTERASE UR QL STRIP.AUTO: NEGATIVE
LYMPHOCYTES # BLD AUTO: 2.95 10*3/MM3 (ref 0.7–3.1)
LYMPHOCYTES NFR BLD AUTO: 29.3 % (ref 19.6–45.3)
MAGNESIUM SERPL-MCNC: 2 MG/DL (ref 1.6–2.4)
MCH RBC QN AUTO: 26.5 PG (ref 26.6–33)
MCHC RBC AUTO-ENTMCNC: 32 G/DL (ref 31.5–35.7)
MCV RBC AUTO: 82.9 FL (ref 79–97)
MONOCYTES # BLD AUTO: 1.07 10*3/MM3 (ref 0.1–0.9)
MONOCYTES NFR BLD AUTO: 10.6 % (ref 5–12)
NEUTROPHILS NFR BLD AUTO: 5.73 10*3/MM3 (ref 1.7–7)
NEUTROPHILS NFR BLD AUTO: 57 % (ref 42.7–76)
NITRITE UR QL STRIP: NEGATIVE
NRBC BLD AUTO-RTO: 0 /100 WBC (ref 0–0.2)
PH UR STRIP.AUTO: 5.5 [PH] (ref 5–8)
PLATELET # BLD AUTO: 257 10*3/MM3 (ref 140–450)
PMV BLD AUTO: 9.8 FL (ref 6–12)
POTASSIUM SERPL-SCNC: 4.3 MMOL/L (ref 3.5–5.2)
PROT SERPL-MCNC: 7.2 G/DL (ref 6–8.5)
PROT UR QL STRIP: ABNORMAL
RBC # BLD AUTO: 5.5 10*6/MM3 (ref 3.77–5.28)
RBC # UR STRIP: ABNORMAL /HPF
REF LAB TEST METHOD: ABNORMAL
SODIUM SERPL-SCNC: 139 MMOL/L (ref 136–145)
SP GR UR STRIP: >1.03 (ref 1–1.03)
SQUAMOUS #/AREA URNS HPF: ABNORMAL /HPF
TROPONIN T DELTA: 4 NG/L
TROPONIN T SERPL HS-MCNC: 15 NG/L
UROBILINOGEN UR QL STRIP: ABNORMAL
WBC # UR STRIP: ABNORMAL /HPF
WBC NRBC COR # BLD: 10.06 10*3/MM3 (ref 3.4–10.8)
WHOLE BLOOD HOLD COAG: NORMAL
WHOLE BLOOD HOLD SPECIMEN: NORMAL

## 2023-02-07 PROCEDURE — 93005 ELECTROCARDIOGRAM TRACING: CPT | Performed by: EMERGENCY MEDICINE

## 2023-02-07 PROCEDURE — 83735 ASSAY OF MAGNESIUM: CPT | Performed by: EMERGENCY MEDICINE

## 2023-02-07 PROCEDURE — 71045 X-RAY EXAM CHEST 1 VIEW: CPT

## 2023-02-07 PROCEDURE — 81001 URINALYSIS AUTO W/SCOPE: CPT | Performed by: EMERGENCY MEDICINE

## 2023-02-07 PROCEDURE — 82962 GLUCOSE BLOOD TEST: CPT

## 2023-02-07 PROCEDURE — 93005 ELECTROCARDIOGRAM TRACING: CPT

## 2023-02-07 PROCEDURE — 84484 ASSAY OF TROPONIN QUANT: CPT | Performed by: EMERGENCY MEDICINE

## 2023-02-07 PROCEDURE — 36415 COLL VENOUS BLD VENIPUNCTURE: CPT

## 2023-02-07 PROCEDURE — 70450 CT HEAD/BRAIN W/O DYE: CPT

## 2023-02-07 PROCEDURE — 85025 COMPLETE CBC W/AUTO DIFF WBC: CPT | Performed by: EMERGENCY MEDICINE

## 2023-02-07 PROCEDURE — 99284 EMERGENCY DEPT VISIT MOD MDM: CPT

## 2023-02-07 PROCEDURE — 80053 COMPREHEN METABOLIC PANEL: CPT | Performed by: EMERGENCY MEDICINE

## 2023-02-07 RX ORDER — INSULIN LISPRO 100 [IU]/ML
INJECTION, SOLUTION INTRAVENOUS; SUBCUTANEOUS
COMMUNITY

## 2023-02-07 RX ORDER — SODIUM CHLORIDE 0.9 % (FLUSH) 0.9 %
10 SYRINGE (ML) INJECTION AS NEEDED
Status: DISCONTINUED | OUTPATIENT
Start: 2023-02-07 | End: 2023-02-08 | Stop reason: HOSPADM

## 2023-02-07 RX ORDER — AZITHROMYCIN 250 MG/1
TABLET, FILM COATED ORAL
Qty: 6 TABLET | Refills: 0 | Status: SHIPPED | OUTPATIENT
Start: 2023-02-07 | End: 2023-02-12

## 2023-02-07 RX ORDER — DAPAGLIFLOZIN 10 MG/1
TABLET, FILM COATED ORAL
COMMUNITY

## 2023-02-07 RX ORDER — CALCIUM POLYCARBOPHIL 625 MG
TABLET ORAL DAILY
COMMUNITY

## 2023-02-07 NOTE — ED PROVIDER NOTES
Time: 3:58 PM EST  Date of encounter:  2/7/2023  Independent Historian/Clinical History and Information was obtained by:   Family  Chief Complaint: Hypertension    History is limited by: N/A    History of Present Illness:  Patient is a 87 y.o. year old female who presents to the emergency department for evaluation of hypertension and seizures. Pt comes from full nursing care. EMS reports several episodes of seizure that went on for about 40 seconds. Pt denies chest pain, abdominal pain, headache. Pt is amnestic to the events today. Pt denies recent illness. Pt denies feeling like having a stroke today. Pt states she is mildly lightheaded. Pt denies any changes in vision today. Pt denies headache. Sister at bedside states patient is acting on her baseline.     HPI    Patient Care Team  Primary Care Provider: Angela Doherty MD    Past Medical History:     Allergies   Allergen Reactions   • Paxil [Paroxetine Hcl] Palpitations   • Aricept [Donepezil Hcl] Other (See Comments)     Nightmares   • Levaquin [Levofloxacin] Diarrhea   • Reglan [Metoclopramide] Other (See Comments)     Jitters   • Statins Myalgia     Pravachol, Lipitor, Livalo     Past Medical History:   Diagnosis Date   • Asthma    • Benign essential tremor 9/23/2021   • Carpal tunnel syndrome    • Contusion     small on scalp. skin intact. D/T fall on 10/18/2019   • Depression    • Diabetes mellitus (HCC)    • GERD (gastroesophageal reflux disease) 9/23/2021   • HTN (hypertension) 10/18/2019   • Hyperlipidemia    • Hypertension    • Mitral valve problem    • Polymyalgia rheumatica (HCC)    • Stroke (HCC)     short term memory loss   • Ulcerative colitis (HCC) 9/23/2021   • Urinary incontinence      Past Surgical History:   Procedure Laterality Date   • ADENOIDECTOMY     • APPENDECTOMY     • BREAST SURGERY      ( L4-L5)   • COLON SURGERY      colon resection   • HYSTERECTOMY     • OOPHORECTOMY     • TONSILLECTOMY       History reviewed. No pertinent family  history.    Home Medications:  Prior to Admission medications    Medication Sig Start Date End Date Taking? Authorizing Provider   albuterol sulfate  (90 Base) MCG/ACT inhaler Inhale 2 puffs Every 4 (Four) Hours As Needed for Wheezing. PRN 3/7/22   Angela Doherty MD   aspirin (aspirin) 81 MG EC tablet Take 1 tablet by mouth Daily. 3/7/22   Angela Doherty MD   atorvastatin (LIPITOR) 20 MG tablet Take 1 tablet by mouth every night at bedtime. 3/7/22   Angela Doherty MD   calcium carb-cholecalciferol (Calcium + D3) 600-800 MG-UNIT tablet Take 1 tablet by mouth Daily. 3/7/22   Angela Doherty MD   cephalexin (Keflex) 500 MG capsule Take 1 capsule by mouth 3 (Three) Times a Day. 1/19/22   Angela Doherty MD   Continuous Blood Gluc  (FreeStyle [address] [address]) device 1 each Daily. 3/10/22   Angela Doherty MD   Continuous Blood Gluc Sensor (FreeStyle [place] [address] Sensor) misc 1 each Every 14 (Fourteen) Days. 3/10/22   Angela Doherty MD   dapagliflozin Propanediol (Farxiga) 10 MG tablet Take  by mouth.    Provider, MD Awais   dilTIAZem CD (CARDIZEM CD) 240 MG 24 hr capsule Take 1 capsule by mouth every night at bedtime. 3/7/22   Angela Doherty MD   DULoxetine (CYMBALTA) 30 MG capsule Take 1 capsule by mouth Daily. 3/7/22   Angela Doherty MD   famotidine (PEPCID) 10 MG tablet Take 1 tablet by mouth Every Morning Before Breakfast. 3/7/22   Angela Doherty MD   famotidine (PEPCID) 20 MG tablet Take 20 mg by mouth Daily.    ProviderAwais MD   glipizide (glipiZIDE XL) 10 MG 24 hr tablet Take 2 tablets by mouth Daily. Take 2 tablets of glipizide ER 10 mg every morning 3/7/22   Angela Doherty MD   glipizide (GLUCOTROL) 10 MG tablet Take 1 tablet by mouth Every Morning Before Breakfast. 11/2/21   Sonia Monroe MD   insulin detemir (LEVEMIR) 100 UNIT/ML injection Inject 12 Units under the skin into the appropriate area as directed Every Night. 11/1/21   Sonia Monroe MD    Insulin Glargine (LANTUS SOLOSTAR) 100 UNIT/ML injection pen Inject 28 Units under the skin into the appropriate area as directed Every Morning. 4/8/22   Angela Doherty MD   Insulin Lispro (humaLOG) 100 UNIT/ML injection Inject  under the skin into the appropriate area as directed 3 (Three) Times a Day Before Meals.    Awais Stephens MD   lisinopril (PRINIVIL,ZESTRIL) 10 MG tablet Take 1 tablet by mouth Daily. 3/7/22   Angela Doherty MD   Magnesium 250 MG tablet Take 1 tablet by mouth At Night As Needed (leg cramps). PRN 3/7/22   Angela Doherty MD   memantine (Namenda) 10 MG tablet Take 1 tablet by mouth 2 (Two) Times a Day. 3/3/22   Angela Doherty MD   memantine (Namenda) 5 MG tablet Take 1 tablet by mouth 2 (Two) Times a Day. 1/27/22   Angela Doherty MD   metFORMIN (GLUCOPHAGE) 500 MG tablet Take 2 tablets by mouth Daily With Breakfast. Patient has 500 mg tabs and takes 2 tabs with breakfast 3/7/22   Angela Doherty MD   metoprolol succinate XL (TOPROL-XL) 25 MG 24 hr tablet Take 1 tablet by mouth Daily. 3/7/22   Angela Doherty MD   metroNIDAZOLE (Flagyl) 250 MG tablet Take 1 tablet by mouth 3 (Three) Times a Day. 1/19/22   Angela Doherty MD   mometasone-formoterol (Dulera) 200-5 MCG/ACT inhaler Inhale 2 puffs 2 (Two) Times a Day. 3/7/22   Angela Doherty MD   montelukast (SINGULAIR) 10 MG tablet Take 1 tablet by mouth Every Night. 3/7/22   Angela Doherty MD   Multiple Vitamins-Minerals (MULTIVITAMIN ADULT PO) Take  by mouth Daily.    Awais Stephens MD   multivitamin with minerals (CENTRUM ADULTS PO) Take 1 tablet by mouth Daily. 3/7/22   Angela Doherty MD   nystatin (MYCOSTATIN) 100,000 unit/mL suspension Swish and swallow 500,000 Units 2 (Two) Times a Day.    Awais Stephens MD   pantoprazole (PROTONIX) 40 MG EC tablet Take 1 tablet by mouth Daily. 3/7/22   Angela Doherty MD   polycarbophil (calcium polycarbophil) 625 MG tablet tablet Take  by mouth Daily.    Provider,  MD Awais   polyethylene glycol (MiraLax) 17 GM/SCOOP powder Take 17 g by mouth Daily. 3/7/22   Angela Doherty MD   SITagliptin (Januvia) 100 MG tablet Take 1 tablet by mouth Daily. 3/7/22   Angela Doherty MD        Social History:   Social History     Tobacco Use   • Smoking status: Former     Types: Cigarettes   • Smokeless tobacco: Never   Vaping Use   • Vaping Use: Never used   Substance Use Topics   • Alcohol use: No   • Drug use: No         Review of Systems:  Review of Systems   Constitutional: Negative for chills and fever.   HENT: Negative for congestion, rhinorrhea and sore throat.    Eyes: Negative for pain and visual disturbance.   Respiratory: Negative for apnea, cough, chest tightness and shortness of breath.    Cardiovascular: Negative for chest pain and palpitations.   Gastrointestinal: Negative for abdominal pain, diarrhea, nausea and vomiting.   Genitourinary: Negative for difficulty urinating and dysuria.   Musculoskeletal: Negative for joint swelling and myalgias.   Skin: Negative for color change.   Neurological: Positive for seizures and light-headedness. Negative for headaches.   Psychiatric/Behavioral: Negative.    All other systems reviewed and are negative.       Physical Exam:  BP (!) 206/113 (BP Location: Right arm, Patient Position: Sitting)   Pulse 109   Temp 98.4 °F (36.9 °C) (Oral)   Resp 15   SpO2 93%     Physical Exam  Vitals and nursing note reviewed.   Constitutional:       Appearance: Normal appearance.   HENT:      Head: Normocephalic and atraumatic.      Nose: Nose normal.      Mouth/Throat:      Mouth: Mucous membranes are dry.   Eyes:      Extraocular Movements: Extraocular movements intact.      Pupils: Pupils are equal, round, and reactive to light.   Cardiovascular:      Rate and Rhythm: Normal rate and regular rhythm.      Heart sounds: Normal heart sounds.   Pulmonary:      Effort: Pulmonary effort is normal.      Breath sounds: Normal breath sounds.    Abdominal:      General: Bowel sounds are normal.      Palpations: Abdomen is soft.      Tenderness: There is no abdominal tenderness.   Musculoskeletal:         General: No swelling. Normal range of motion.      Cervical back: Normal range of motion and neck supple.   Skin:     General: Skin is warm and dry.      Coloration: Skin is not jaundiced.   Neurological:      General: No focal deficit present.      Mental Status: She is alert and oriented to person, place, and time. Mental status is at baseline.      Sensory: Sensation is intact.      Motor: Motor function is intact.   Psychiatric:         Mood and Affect: Mood normal.         Behavior: Behavior normal.         Judgment: Judgment normal.                  Procedures:  Procedures      Medical Decision Making:      Comorbidities that affect care:    Stroke, GERD, Diabetes, Hypertension    External Notes reviewed:    Previous Admission Note, Previous Clinic Note, Previous Radiological Studies and Previous Labs      The following orders were placed and all results were independently analyzed by me:  Orders Placed This Encounter   Procedures   • XR Chest 1 View   • CT Head Without Contrast   • [place] Draw   • Comprehensive Metabolic Panel   • Troponin   • Magnesium   • CBC Auto Differential   • Urinalysis With Culture If Indicated - Straight Cath   • High Sensitivity Troponin T 2Hr   • Urinalysis, Microscopic Only - Urine, Clean Catch   • Undress & Gown   • Continuous Pulse Oximetry   • Vital Signs   • Ceribell  [facility] Nursing Order (Specify)   • POC Glucose Once   • POC Glucose Once   • ECG 12 Lead ED Triage Standing Order; Weak / Dizzy / AMS   • ECG 12 Lead Rhythm Change   • CBC & Differential   • Green Top (Gel)   • Lavender Top   • Gold Top - SST   • Light Blue Top       Medications Given in the Emergency Department:  Medications - No data to display     ED Course:    ED Course as of 02/08/23 0243   Tue Feb 07, 2023   1602 EKG interpretation: Normal sinus  tachycardia, heart rate 107, borderline first-degree AV block with NJ interval 203, left axis deviation, right bundle branch block, nonspecific ST segment changes no acute ischemia. [RP]   1607 Current BP: 157/95 [MZ]   2114 Discharge reevaluation: Patient more alert and voices no active complaints.  Sister at bedside feels that she looks very well-appearing and is happy with her plan to discharge her. [RP]      ED Course User Index  [MZ] Yoandy Jeffries  [RP] Frank Llanes MD       Labs:    Lab Results (last 24 hours)     Procedure Component Value Units Date/Time    CBC & Differential [[id number]]  (Abnormal) Collected: 02/07/23 1547    Specimen: Blood Updated: 02/07/23 1603    Narrative:      The following orders were created for panel order CBC & Differential.  Procedure                               Abnormality         Status                     ---------                               -----------         ------                     CBC Auto Differential[[id number]]        Abnormal            Final result                 Please view results for these tests on the individual orders.    Comprehensive Metabolic Panel [[id number]]  (Abnormal) Collected: 02/07/23 1547    Specimen: Blood Updated: 02/07/23 1621     Glucose 163 mg/dL      BUN 20 mg/dL      Creatinine 0.99 mg/dL      Sodium 139 mmol/L      Potassium 4.3 mmol/L      Comment: Slight hemolysis detected by analyzer. Results may be affected.        Chloride 104 mmol/L      CO2 19.7 mmol/L      Calcium 10.3 mg/dL      Total Protein 7.2 g/dL      Albumin 4.0 g/dL      ALT (SGPT) 20 U/L      AST (SGOT) 23 U/L      Comment: Slight hemolysis detected by analyzer. Results may be affected.        Alkaline Phosphatase 154 U/L      Total Bilirubin 0.4 mg/dL      Globulin 3.2 gm/dL      A/G Ratio 1.3 g/dL      BUN/Creatinine Ratio 20.2     Anion Gap 15.3 mmol/L      eGFR 55.6 mL/min/1.73     Narrative:      GFR Normal >60  Chronic Kidney Disease <60  Kidney Failure  <15    The GFR formula is only valid for adults with stable renal function between ages 18 and 70.    Troponin [[id number]]  (Abnormal) Collected: 02/07/23 1547    Specimen: Blood Updated: 02/07/23 1628     HS Troponin T 15 ng/L     Narrative:      High Sensitive Troponin T Reference Range:  <10.0 ng/L- Negative Female for AMI  <15.0 ng/L- Negative Male for AMI  >=10 - Abnormal Female indicating possible myocardial injury.  >=15 - Abnormal Male indicating possible myocardial injury.   Clinicians would have to utilize clinical acumen, EKG, Troponin, and serial changes to determine if it is an Acute Myocardial Infarction or myocardial injury due to an underlying chronic condition.         Magnesium [[id number]]  (Normal) Collected: 02/07/23 1547    Specimen: Blood Updated: 02/07/23 1620     Magnesium 2.0 mg/dL     CBC Auto Differential [[id number]]  (Abnormal) Collected: 02/07/23 1547    Specimen: Blood Updated: 02/07/23 1603     WBC 10.06 10*3/mm3      RBC 5.50 10*6/mm3      Hemoglobin 14.6 g/dL      Hematocrit 45.6 %      MCV 82.9 fL      MCH 26.5 pg      MCHC 32.0 g/dL      RDW 14.9 %      RDW-SD 44.6 fl      MPV 9.8 fL      Platelets 257 10*3/mm3      Neutrophil % 57.0 %      Lymphocyte % 29.3 %      Monocyte % 10.6 %      Eosinophil % 2.0 %      Basophil % 0.4 %      Immature Grans % 0.7 %      Neutrophils, Absolute 5.73 10*3/mm3      Lymphocytes, Absolute 2.95 10*3/mm3      Monocytes, Absolute 1.07 10*3/mm3      Eosinophils, Absolute 0.20 10*3/mm3      Basophils, Absolute 0.04 10*3/mm3      Immature Grans, Absolute 0.07 10*3/mm3      nRBC 0.0 /100 WBC     POC Glucose Once [[id number]]  (Abnormal) Collected: 02/07/23 1556    Specimen: Blood Updated: 02/07/23 1559     Glucose 152 mg/dL      Comment: Serial Number: [id number]:  [id number]       Urinalysis With Culture If Indicated - Straight Cath [[id number]]  (Abnormal) Collected: 02/07/23 1655    Specimen: Urine from Straight Cath Updated: 02/07/23 9815      Color, UA Yellow     Appearance, UA Clear     pH, UA 5.5     Specific Gravity, UA >1.030     Glucose, UA >=1000 mg/dL (3+)     Ketones, UA Negative     Bilirubin, UA Negative     Blood, UA Negative     Protein, UA >=300 mg/dL (3+)     Leuk Esterase, UA Negative     Nitrite, UA Negative     Urobilinogen, UA 1.0 E.U./dL    Narrative:      In absence of clinical symptoms, the presence of pyuria, bacteria, and/or nitrites on the urinalysis result does not correlate with infection.    Urinalysis, Microscopic Only - Straight Cath [[id number]]  (Abnormal) Collected: 02/07/23 1655    Specimen: Urine from Straight Cath Updated: 02/07/23 1725     RBC, UA 0-2 /HPF      WBC, UA 0-2 /HPF      Comment: Urine culture not indicated.        Bacteria, UA None Seen /HPF      Squamous Epithelial Cells, UA 0-2 /HPF      Hyaline Casts, UA 0-2 /LPF      Methodology Automated Microscopy    High Sensitivity Troponin T 2Hr [[id number]]  (Abnormal) Collected: 02/07/23 1753    Specimen: Blood Updated: 02/07/23 1828     HS Troponin T 19 ng/L      Troponin T Delta 4 ng/L     Narrative:      High Sensitive Troponin T Reference Range:  <10.0 ng/L- Negative Female for AMI  <15.0 ng/L- Negative Male for AMI  >=10 - Abnormal Female indicating possible myocardial injury.  >=15 - Abnormal Male indicating possible myocardial injury.   Clinicians would have to utilize clinical acumen, EKG, Troponin, and serial changes to determine if it is an Acute Myocardial Infarction or myocardial injury due to an underlying chronic condition.                Imaging:    CT Head Without Contrast    Result Date: 2/7/2023  PROCEDURE: CT HEAD WO CONTRAST  COMPARISON:  [facility], CT, CT HEAD WO CONTRAST, 10/29/2021, 18:46. INDICATIONS: headache, dizziness  PROTOCOL:   Standard imaging protocol performed    RADIATION:   DLP: 1017.2 mGy*cm   MA and/or KV was adjusted to minimize radiation dose.     TECHNIQUE: After obtaining the patient's consent, CT images were  obtained without non-ionic intravenous contrast material.  FINDINGS:  There is a chronic infarct in the right basal ganglia.  Adjacent to this in the right frontal white matter is a chronic lacunar infarct.  Small chronic cortical infarct is noted in the anterosuperior right frontal lobe.  Moderate low density in the cerebral white matter is consistent with small vessel ischemic disease.  No intracranial hemorrhage is seen.  The ventricles are normal in size and configuration given the diffuse age-appropriate cerebral and cerebellar atrophy.  No focal calvarial abnormality is evident.        1. Chronic infarcts, as above 2. Moderate small vessel ischemic changes in the white matter 3. No intracranial hemorrhage or calvarial fracture     Alberto Christianson M.D.       Electronically Signed and Approved By: Alberto Christianson M.D. on 2/07/2023 at 18:40             XR Chest 1 View    Result Date: 2/7/2023  PROCEDURE: XR CHEST 1 VW  COMPARISON: [facility], CR, XR CHEST PA AND LATERAL, 12/06/2021, 12:08.  INDICATIONS: Weak/Dizzy/AMS triage protocol  FINDINGS:  Mild airspace opacity in the left lung base is noted.  The right lung is clear.  The cardiac and mediastinal silhouettes appear normal.        1. Mild left basilar airspace opacity consistent with atelectasis versus pneumonia.       Alberto Christianson M.D.       Electronically Signed and Approved By: Alberto Christianson M.D. on 2/07/2023 at 16:21                 Differential Diagnosis and Discussion:    Syncope: Differential diagnosis includes but is not limited to TIA, hyperventilation, aortic stenosis, pulmonary emboli, myocardial disease, bradycardia arrhythmia, heart block, tachyarrhythmia, vasovagal, orthostatic hypotension, ruptured AAA, aortic dissection, subarachnoid hemorrhage, seizure, hypoglycemia.    All labs were reviewed and analyzed by me.  All X-rays were independently reviewed by me.  EKG was interpreted by me.    MDM  Number of Diagnoses or  Management Options     Amount and/or Complexity of Data Reviewed  Clinical lab tests: reviewed  Tests in the radiology section of CPT®: reviewed  Tests in the medicine section of CPT®: reviewed  Review and summarize past medical records: yes  Independent visualization of images, tracings, or specimens: yes    Risk of Complications, Morbidity, and/or Mortality  Presenting problems: moderate  Management options: low    Patient Progress  Patient progress: stable           Patient Care Considerations:          Consultants/Shared Management Plan:    None    Social Determinants of Health:    Patient has presented with family members who are responsible, reliable and will ensure follow up care.      Disposition and Care Coordination:    Discharged: The patient is suitable and stable for discharge with no need for consideration of observation or admission.   She is being discharged to the nursing facility so she will have close observation.    I have explained the patient´s condition, diagnoses and treatment plan based on the information available to me at this time. I have answered questions and addressed any concerns. The patient has a good  understanding of the patient´s diagnosis, condition, and treatment plan as can be expected at this point. The vital signs have been stable. The patient´s condition is stable and appropriate for discharge from the emergency department.      The patient will pursue further outpatient evaluation with the primary care physician or other designated or consulting physician as outlined in the discharge instructions. They are agreeable to this plan of care and follow-up instructions have been explained in detail. The patient has received these instructions in written format and have expressed an understanding of the discharge instructions. The patient is aware that any significant change in condition or worsening of symptoms should prompt an immediate return to this or the closest emergency  department or call to 911.    Final diagnoses:   Syncope, unspecified syncope type        ED Disposition     ED Disposition   Discharge    Condition   Stable    Comment   --             This medical record created using voice recognition software.        Documentation assistance provided by Yoandy Jeffries acting as scribe for No att. providers found. Information recorded by the scribe was done at my direction and has been verified and validated by me.            Yoandy Jeffries  02/07/23 9179       Frank Llanes MD  02/08/23 4746

## 2023-02-08 NOTE — DISCHARGE INSTRUCTIONS
Return to the emergency department immediately for worsening of symptoms.  Your work-up today revealed no concern for emergent condition, however things can change quickly with time.  Stay well-hydrated.  Follow-up with your primary care physician as soon as possible to let them know you were in the emergency department today.  You may need further outpatient testing or specialist referral to determine the cause of your symptoms.

## 2023-02-15 LAB
QT INTERVAL: 365 MS
QT INTERVAL: 368 MS

## 2023-03-13 ENCOUNTER — LAB REQUISITION (OUTPATIENT)
Dept: LAB | Facility: HOSPITAL | Age: 88
End: 2023-03-13
Payer: MEDICARE

## 2023-03-13 DIAGNOSIS — E78.5 HYPERLIPIDEMIA, UNSPECIFIED: ICD-10-CM

## 2023-03-13 DIAGNOSIS — E55.9 VITAMIN D DEFICIENCY, UNSPECIFIED: ICD-10-CM

## 2023-03-13 DIAGNOSIS — E03.9 HYPOTHYROIDISM, UNSPECIFIED: ICD-10-CM

## 2023-03-13 DIAGNOSIS — E11.9 TYPE 2 DIABETES MELLITUS WITHOUT COMPLICATIONS: ICD-10-CM

## 2023-03-13 DIAGNOSIS — G40.909 EPILEPSY, UNSPECIFIED, NOT INTRACTABLE, WITHOUT STATUS EPILEPTICUS: ICD-10-CM

## 2023-03-13 LAB
CRP SERPL-MCNC: 1.18 MG/DL (ref 0–0.5)
D DIMER PPP FEU-MCNC: 0.37 MCGFEU/ML (ref 0–0.87)
T4 FREE SERPL-MCNC: 1.02 NG/DL (ref 0.93–1.7)
TSH SERPL DL<=0.05 MIU/L-ACNC: 2.79 UIU/ML (ref 0.27–4.2)

## 2023-03-13 PROCEDURE — 84439 ASSAY OF FREE THYROXINE: CPT | Performed by: PSYCHIATRY & NEUROLOGY

## 2023-03-13 PROCEDURE — 82306 VITAMIN D 25 HYDROXY: CPT | Performed by: PSYCHIATRY & NEUROLOGY

## 2023-03-13 PROCEDURE — 82378 CARCINOEMBRYONIC ANTIGEN: CPT | Performed by: PSYCHIATRY & NEUROLOGY

## 2023-03-13 PROCEDURE — 84443 ASSAY THYROID STIM HORMONE: CPT | Performed by: PSYCHIATRY & NEUROLOGY

## 2023-03-13 PROCEDURE — 86140 C-REACTIVE PROTEIN: CPT | Performed by: PSYCHIATRY & NEUROLOGY

## 2023-03-13 PROCEDURE — 85379 FIBRIN DEGRADATION QUANT: CPT | Performed by: PSYCHIATRY & NEUROLOGY

## 2023-03-14 LAB
25(OH)D3 SERPL-MCNC: 44.5 NG/ML (ref 30–100)
CEA SERPL-MCNC: 2.23 NG/ML

## 2023-03-15 ENCOUNTER — TRANSCRIBE ORDERS (OUTPATIENT)
Dept: ADMINISTRATIVE | Facility: HOSPITAL | Age: 88
End: 2023-03-15
Payer: MEDICARE

## 2023-03-15 DIAGNOSIS — G40.309 GENERALIZED EPILEPSY: Primary | ICD-10-CM

## 2023-03-21 ENCOUNTER — LAB REQUISITION (OUTPATIENT)
Dept: LAB | Facility: HOSPITAL | Age: 88
End: 2023-03-21
Payer: MEDICARE

## 2023-03-21 DIAGNOSIS — E11.9 TYPE 2 DIABETES MELLITUS WITHOUT COMPLICATIONS: ICD-10-CM

## 2023-03-21 DIAGNOSIS — E03.9 HYPOTHYROIDISM, UNSPECIFIED: ICD-10-CM

## 2023-03-21 DIAGNOSIS — E78.5 HYPERLIPIDEMIA, UNSPECIFIED: ICD-10-CM

## 2023-03-21 DIAGNOSIS — R06.00 DYSPNEA, UNSPECIFIED: ICD-10-CM

## 2023-03-21 PROCEDURE — 83655 ASSAY OF LEAD: CPT | Performed by: PSYCHIATRY & NEUROLOGY

## 2023-03-21 PROCEDURE — 86038 ANTINUCLEAR ANTIBODIES: CPT | Performed by: PSYCHIATRY & NEUROLOGY

## 2023-03-21 PROCEDURE — 82175 ASSAY OF ARSENIC: CPT | Performed by: PSYCHIATRY & NEUROLOGY

## 2023-03-21 PROCEDURE — 86769 SARS-COV-2 COVID-19 ANTIBODY: CPT | Performed by: PSYCHIATRY & NEUROLOGY

## 2023-03-21 PROCEDURE — 83825 ASSAY OF MERCURY: CPT | Performed by: PSYCHIATRY & NEUROLOGY

## 2023-03-22 LAB
ANA HOMOGEN TITR SER: NORMAL {TITER}
ANA SER QL IF: POSITIVE
Lab: NORMAL
SARS-COV-2 AB SERPL IA-ACNC: 5267 U/ML
SARS-COV-2 AB SERPL IA-ACNC: NORMAL U/ML
SARS-COV-2 AB SERPL-IMP: POSITIVE

## 2023-03-24 LAB
ARSENIC BLD-MCNC: 4 UG/L (ref 0–9)
LABORATORY COMMENT REPORT: NORMAL
LEAD BLDV-MCNC: 1.7 UG/DL (ref 0–3.4)
Lab: NORMAL
MERCURY BLD-MCNC: <1 UG/L (ref 0–14.9)
PATHOLOGIST NAME: NORMAL
SARS-COV-2 IGM SERPL IA-ACNC: 0.01 COI
SARS-COV-2 IGM SERPL QL IA: NEGATIVE

## 2023-05-08 ENCOUNTER — LAB REQUISITION (OUTPATIENT)
Dept: LAB | Facility: HOSPITAL | Age: 88
End: 2023-05-08
Payer: MEDICARE

## 2023-05-08 DIAGNOSIS — R53.83 OTHER FATIGUE: ICD-10-CM

## 2023-05-08 DIAGNOSIS — Z79.899 OTHER LONG TERM (CURRENT) DRUG THERAPY: ICD-10-CM

## 2023-05-08 DIAGNOSIS — R30.0 DYSURIA: ICD-10-CM

## 2023-05-08 DIAGNOSIS — E11.9 TYPE 2 DIABETES MELLITUS WITHOUT COMPLICATIONS: ICD-10-CM

## 2023-05-08 LAB
BASOPHILS # BLD AUTO: 0.03 10*3/MM3 (ref 0–0.2)
BASOPHILS NFR BLD AUTO: 0.4 % (ref 0–1.5)
BILIRUB UR QL STRIP: NEGATIVE
CLARITY UR: CLEAR
COLOR UR: YELLOW
DEPRECATED RDW RBC AUTO: 45.4 FL (ref 37–54)
EOSINOPHIL # BLD AUTO: 0.06 10*3/MM3 (ref 0–0.4)
EOSINOPHIL NFR BLD AUTO: 0.8 % (ref 0.3–6.2)
ERYTHROCYTE [DISTWIDTH] IN BLOOD BY AUTOMATED COUNT: 15.1 % (ref 12.3–15.4)
GLUCOSE UR STRIP-MCNC: ABNORMAL MG/DL
HCT VFR BLD AUTO: 39.1 % (ref 34–46.6)
HGB BLD-MCNC: 12.8 G/DL (ref 12–15.9)
HGB UR QL STRIP.AUTO: NEGATIVE
HOLD SPECIMEN: NORMAL
IMM GRANULOCYTES # BLD AUTO: 0.04 10*3/MM3 (ref 0–0.05)
IMM GRANULOCYTES NFR BLD AUTO: 0.6 % (ref 0–0.5)
KETONES UR QL STRIP: NEGATIVE
LEUKOCYTE ESTERASE UR QL STRIP.AUTO: NEGATIVE
LYMPHOCYTES # BLD AUTO: 0.59 10*3/MM3 (ref 0.7–3.1)
LYMPHOCYTES NFR BLD AUTO: 8.2 % (ref 19.6–45.3)
MCH RBC QN AUTO: 27.1 PG (ref 26.6–33)
MCHC RBC AUTO-ENTMCNC: 32.7 G/DL (ref 31.5–35.7)
MCV RBC AUTO: 82.7 FL (ref 79–97)
MONOCYTES # BLD AUTO: 1.15 10*3/MM3 (ref 0.1–0.9)
MONOCYTES NFR BLD AUTO: 16 % (ref 5–12)
NEUTROPHILS NFR BLD AUTO: 5.31 10*3/MM3 (ref 1.7–7)
NEUTROPHILS NFR BLD AUTO: 74 % (ref 42.7–76)
NITRITE UR QL STRIP: NEGATIVE
NRBC BLD AUTO-RTO: 0 /100 WBC (ref 0–0.2)
PH UR STRIP.AUTO: 6.5 [PH] (ref 5–8)
PLATELET # BLD AUTO: 184 10*3/MM3 (ref 140–450)
PMV BLD AUTO: 10.3 FL (ref 6–12)
PROT UR QL STRIP: NEGATIVE
RBC # BLD AUTO: 4.73 10*6/MM3 (ref 3.77–5.28)
SP GR UR STRIP: >=1.03 (ref 1–1.03)
UROBILINOGEN UR QL STRIP: ABNORMAL
WBC NRBC COR # BLD: 7.18 10*3/MM3 (ref 3.4–10.8)

## 2023-05-08 PROCEDURE — 81003 URINALYSIS AUTO W/O SCOPE: CPT | Performed by: NURSE PRACTITIONER

## 2023-05-08 PROCEDURE — 85025 COMPLETE CBC W/AUTO DIFF WBC: CPT | Performed by: NURSE PRACTITIONER

## 2023-05-08 PROCEDURE — 87086 URINE CULTURE/COLONY COUNT: CPT | Performed by: NURSE PRACTITIONER

## 2023-05-09 LAB — BACTERIA SPEC AEROBE CULT: NO GROWTH

## 2023-05-12 ENCOUNTER — APPOINTMENT (OUTPATIENT)
Dept: GENERAL RADIOLOGY | Facility: HOSPITAL | Age: 88
End: 2023-05-12
Payer: MEDICARE

## 2023-05-12 ENCOUNTER — HOSPITAL ENCOUNTER (INPATIENT)
Facility: HOSPITAL | Age: 88
LOS: 8 days | Discharge: NURSING, REHAB OR LONG-TERM CARE | End: 2023-05-23
Attending: EMERGENCY MEDICINE
Payer: MEDICARE

## 2023-05-12 DIAGNOSIS — I69.314 FRONTAL LOBE AND EXECUTIVE FUNCTION DEFICIT FOLLOWING CEREBRAL INFARCTION: ICD-10-CM

## 2023-05-12 DIAGNOSIS — R53.1 GENERALIZED WEAKNESS: ICD-10-CM

## 2023-05-12 DIAGNOSIS — R13.12 OROPHARYNGEAL DYSPHAGIA: ICD-10-CM

## 2023-05-12 DIAGNOSIS — Z78.9 DECREASED ACTIVITIES OF DAILY LIVING (ADL): ICD-10-CM

## 2023-05-12 DIAGNOSIS — E86.0 DEHYDRATION: Primary | ICD-10-CM

## 2023-05-12 DIAGNOSIS — R26.2 DIFFICULTY IN WALKING: ICD-10-CM

## 2023-05-12 LAB
ALBUMIN SERPL-MCNC: 3.4 G/DL (ref 3.5–5.2)
ALBUMIN/GLOB SERPL: 1.1 G/DL
ALP SERPL-CCNC: 116 U/L (ref 39–117)
ALT SERPL W P-5'-P-CCNC: 20 U/L (ref 1–33)
ANION GAP SERPL CALCULATED.3IONS-SCNC: 14.7 MMOL/L (ref 5–15)
AST SERPL-CCNC: 16 U/L (ref 1–32)
BASOPHILS # BLD AUTO: 0.03 10*3/MM3 (ref 0–0.2)
BASOPHILS NFR BLD AUTO: 0.4 % (ref 0–1.5)
BILIRUB SERPL-MCNC: 0.2 MG/DL (ref 0–1.2)
BUN SERPL-MCNC: 25 MG/DL (ref 8–23)
BUN/CREAT SERPL: 26.3 (ref 7–25)
CALCIUM SPEC-SCNC: 10.3 MG/DL (ref 8.6–10.5)
CHLORIDE SERPL-SCNC: 102 MMOL/L (ref 98–107)
CO2 SERPL-SCNC: 18.3 MMOL/L (ref 22–29)
CREAT SERPL-MCNC: 0.95 MG/DL (ref 0.57–1)
DEPRECATED RDW RBC AUTO: 45 FL (ref 37–54)
EGFRCR SERPLBLD CKD-EPI 2021: 58.1 ML/MIN/1.73
EOSINOPHIL # BLD AUTO: 0.03 10*3/MM3 (ref 0–0.4)
EOSINOPHIL NFR BLD AUTO: 0.4 % (ref 0.3–6.2)
ERYTHROCYTE [DISTWIDTH] IN BLOOD BY AUTOMATED COUNT: 14.9 % (ref 12.3–15.4)
GLOBULIN UR ELPH-MCNC: 3 GM/DL
GLUCOSE SERPL-MCNC: 303 MG/DL (ref 65–99)
HCT VFR BLD AUTO: 40.8 % (ref 34–46.6)
HGB BLD-MCNC: 13.2 G/DL (ref 12–15.9)
HOLD SPECIMEN: NORMAL
HOLD SPECIMEN: NORMAL
IMM GRANULOCYTES # BLD AUTO: 0.04 10*3/MM3 (ref 0–0.05)
IMM GRANULOCYTES NFR BLD AUTO: 0.6 % (ref 0–0.5)
LYMPHOCYTES # BLD AUTO: 0.58 10*3/MM3 (ref 0.7–3.1)
LYMPHOCYTES NFR BLD AUTO: 8.1 % (ref 19.6–45.3)
MCH RBC QN AUTO: 26.8 PG (ref 26.6–33)
MCHC RBC AUTO-ENTMCNC: 32.4 G/DL (ref 31.5–35.7)
MCV RBC AUTO: 82.9 FL (ref 79–97)
MONOCYTES # BLD AUTO: 0.46 10*3/MM3 (ref 0.1–0.9)
MONOCYTES NFR BLD AUTO: 6.4 % (ref 5–12)
NEUTROPHILS NFR BLD AUTO: 6.05 10*3/MM3 (ref 1.7–7)
NEUTROPHILS NFR BLD AUTO: 84.1 % (ref 42.7–76)
NRBC BLD AUTO-RTO: 0 /100 WBC (ref 0–0.2)
NT-PROBNP SERPL-MCNC: 160.4 PG/ML (ref 0–1800)
PLATELET # BLD AUTO: 217 10*3/MM3 (ref 140–450)
PMV BLD AUTO: 9.6 FL (ref 6–12)
POTASSIUM SERPL-SCNC: 3.9 MMOL/L (ref 3.5–5.2)
PROT SERPL-MCNC: 6.4 G/DL (ref 6–8.5)
RBC # BLD AUTO: 4.92 10*6/MM3 (ref 3.77–5.28)
SODIUM SERPL-SCNC: 135 MMOL/L (ref 136–145)
TROPONIN T SERPL HS-MCNC: 17 NG/L
WBC NRBC COR # BLD: 7.19 10*3/MM3 (ref 3.4–10.8)
WHOLE BLOOD HOLD COAG: NORMAL
WHOLE BLOOD HOLD SPECIMEN: NORMAL

## 2023-05-12 PROCEDURE — 84484 ASSAY OF TROPONIN QUANT: CPT

## 2023-05-12 PROCEDURE — 99285 EMERGENCY DEPT VISIT HI MDM: CPT

## 2023-05-12 PROCEDURE — 93005 ELECTROCARDIOGRAM TRACING: CPT | Performed by: EMERGENCY MEDICINE

## 2023-05-12 PROCEDURE — 93005 ELECTROCARDIOGRAM TRACING: CPT

## 2023-05-12 PROCEDURE — 80053 COMPREHEN METABOLIC PANEL: CPT

## 2023-05-12 PROCEDURE — 71045 X-RAY EXAM CHEST 1 VIEW: CPT

## 2023-05-12 PROCEDURE — 73610 X-RAY EXAM OF ANKLE: CPT

## 2023-05-12 PROCEDURE — 63710000001 ONDANSETRON ODT 4 MG TABLET DISPERSIBLE: Performed by: NURSE PRACTITIONER

## 2023-05-12 PROCEDURE — 83880 ASSAY OF NATRIURETIC PEPTIDE: CPT

## 2023-05-12 PROCEDURE — 93010 ELECTROCARDIOGRAM REPORT: CPT | Performed by: INTERNAL MEDICINE

## 2023-05-12 PROCEDURE — 85025 COMPLETE CBC W/AUTO DIFF WBC: CPT

## 2023-05-12 RX ORDER — HYDROCODONE BITARTRATE AND ACETAMINOPHEN 5; 325 MG/1; MG/1
1 TABLET ORAL EVERY 6 HOURS PRN
Status: DISPENSED | OUTPATIENT
Start: 2023-05-12 | End: 2023-05-19

## 2023-05-12 RX ORDER — SODIUM CHLORIDE 0.9 % (FLUSH) 0.9 %
10 SYRINGE (ML) INJECTION AS NEEDED
Status: DISCONTINUED | OUTPATIENT
Start: 2023-05-12 | End: 2023-05-23 | Stop reason: HOSPADM

## 2023-05-12 RX ORDER — ONDANSETRON 4 MG/1
4 TABLET, ORALLY DISINTEGRATING ORAL ONCE
Status: COMPLETED | OUTPATIENT
Start: 2023-05-12 | End: 2023-05-12

## 2023-05-12 RX ADMIN — HYDROCODONE BITARTRATE AND ACETAMINOPHEN 1 TABLET: 5; 325 TABLET ORAL at 20:54

## 2023-05-12 RX ADMIN — ONDANSETRON 4 MG: 4 TABLET, ORALLY DISINTEGRATING ORAL at 20:54

## 2023-05-13 PROBLEM — E86.0 DEHYDRATION: Status: ACTIVE | Noted: 2023-05-13

## 2023-05-13 LAB
ALBUMIN SERPL-MCNC: 3.1 G/DL (ref 3.5–5.2)
ALBUMIN/GLOB SERPL: 1.1 G/DL
ALP SERPL-CCNC: 101 U/L (ref 39–117)
ALT SERPL W P-5'-P-CCNC: 18 U/L (ref 1–33)
ANION GAP SERPL CALCULATED.3IONS-SCNC: 8.1 MMOL/L (ref 5–15)
AST SERPL-CCNC: 13 U/L (ref 1–32)
BASOPHILS # BLD AUTO: 0.02 10*3/MM3 (ref 0–0.2)
BASOPHILS NFR BLD AUTO: 0.4 % (ref 0–1.5)
BILIRUB SERPL-MCNC: 0.2 MG/DL (ref 0–1.2)
BUN SERPL-MCNC: 23 MG/DL (ref 8–23)
BUN/CREAT SERPL: 27.7 (ref 7–25)
CALCIUM SPEC-SCNC: 9.8 MG/DL (ref 8.6–10.5)
CHLORIDE SERPL-SCNC: 107 MMOL/L (ref 98–107)
CO2 SERPL-SCNC: 23.9 MMOL/L (ref 22–29)
CREAT SERPL-MCNC: 0.83 MG/DL (ref 0.57–1)
DEPRECATED RDW RBC AUTO: 45.2 FL (ref 37–54)
EGFRCR SERPLBLD CKD-EPI 2021: 68.3 ML/MIN/1.73
EOSINOPHIL # BLD AUTO: 0.01 10*3/MM3 (ref 0–0.4)
EOSINOPHIL NFR BLD AUTO: 0.2 % (ref 0.3–6.2)
ERYTHROCYTE [DISTWIDTH] IN BLOOD BY AUTOMATED COUNT: 14.8 % (ref 12.3–15.4)
GLOBULIN UR ELPH-MCNC: 2.7 GM/DL
GLUCOSE BLDC GLUCOMTR-MCNC: 131 MG/DL (ref 70–99)
GLUCOSE BLDC GLUCOMTR-MCNC: 172 MG/DL (ref 70–99)
GLUCOSE BLDC GLUCOMTR-MCNC: 179 MG/DL (ref 70–99)
GLUCOSE BLDC GLUCOMTR-MCNC: 190 MG/DL (ref 70–99)
GLUCOSE BLDC GLUCOMTR-MCNC: 234 MG/DL (ref 70–99)
GLUCOSE SERPL-MCNC: 160 MG/DL (ref 65–99)
HCT VFR BLD AUTO: 38.2 % (ref 34–46.6)
HGB BLD-MCNC: 12.1 G/DL (ref 12–15.9)
IMM GRANULOCYTES # BLD AUTO: 0.07 10*3/MM3 (ref 0–0.05)
IMM GRANULOCYTES NFR BLD AUTO: 1.2 % (ref 0–0.5)
LYMPHOCYTES # BLD AUTO: 0.84 10*3/MM3 (ref 0.7–3.1)
LYMPHOCYTES NFR BLD AUTO: 14.8 % (ref 19.6–45.3)
MAGNESIUM SERPL-MCNC: 2 MG/DL (ref 1.6–2.4)
MCH RBC QN AUTO: 26.8 PG (ref 26.6–33)
MCHC RBC AUTO-ENTMCNC: 31.7 G/DL (ref 31.5–35.7)
MCV RBC AUTO: 84.5 FL (ref 79–97)
MONOCYTES # BLD AUTO: 0.52 10*3/MM3 (ref 0.1–0.9)
MONOCYTES NFR BLD AUTO: 9.2 % (ref 5–12)
NEUTROPHILS NFR BLD AUTO: 4.22 10*3/MM3 (ref 1.7–7)
NEUTROPHILS NFR BLD AUTO: 74.2 % (ref 42.7–76)
NRBC BLD AUTO-RTO: 0 /100 WBC (ref 0–0.2)
PHOSPHATE SERPL-MCNC: 3.4 MG/DL (ref 2.5–4.5)
PLATELET # BLD AUTO: 214 10*3/MM3 (ref 140–450)
PMV BLD AUTO: 10 FL (ref 6–12)
POTASSIUM SERPL-SCNC: 4.4 MMOL/L (ref 3.5–5.2)
PROT SERPL-MCNC: 5.8 G/DL (ref 6–8.5)
QT INTERVAL: 389 MS
RBC # BLD AUTO: 4.52 10*6/MM3 (ref 3.77–5.28)
SODIUM SERPL-SCNC: 139 MMOL/L (ref 136–145)
WBC NRBC COR # BLD: 5.68 10*3/MM3 (ref 3.4–10.8)

## 2023-05-13 PROCEDURE — 94640 AIRWAY INHALATION TREATMENT: CPT

## 2023-05-13 PROCEDURE — 82948 REAGENT STRIP/BLOOD GLUCOSE: CPT

## 2023-05-13 PROCEDURE — 63710000001 DEXAMETHASONE PER 0.25 MG: Performed by: STUDENT IN AN ORGANIZED HEALTH CARE EDUCATION/TRAINING PROGRAM

## 2023-05-13 PROCEDURE — 99223 1ST HOSP IP/OBS HIGH 75: CPT | Performed by: STUDENT IN AN ORGANIZED HEALTH CARE EDUCATION/TRAINING PROGRAM

## 2023-05-13 PROCEDURE — 84100 ASSAY OF PHOSPHORUS: CPT | Performed by: STUDENT IN AN ORGANIZED HEALTH CARE EDUCATION/TRAINING PROGRAM

## 2023-05-13 PROCEDURE — 97165 OT EVAL LOW COMPLEX 30 MIN: CPT

## 2023-05-13 PROCEDURE — G0378 HOSPITAL OBSERVATION PER HR: HCPCS

## 2023-05-13 PROCEDURE — 94799 UNLISTED PULMONARY SVC/PX: CPT

## 2023-05-13 PROCEDURE — 94664 DEMO&/EVAL PT USE INHALER: CPT

## 2023-05-13 PROCEDURE — 63710000001 INSULIN LISPRO (HUMAN) PER 5 UNITS: Performed by: INTERNAL MEDICINE

## 2023-05-13 PROCEDURE — 63710000001 INSULIN DETEMIR PER 5 UNITS: Performed by: INTERNAL MEDICINE

## 2023-05-13 PROCEDURE — 80053 COMPREHEN METABOLIC PANEL: CPT | Performed by: STUDENT IN AN ORGANIZED HEALTH CARE EDUCATION/TRAINING PROGRAM

## 2023-05-13 PROCEDURE — 85025 COMPLETE CBC W/AUTO DIFF WBC: CPT | Performed by: STUDENT IN AN ORGANIZED HEALTH CARE EDUCATION/TRAINING PROGRAM

## 2023-05-13 PROCEDURE — 97161 PT EVAL LOW COMPLEX 20 MIN: CPT

## 2023-05-13 PROCEDURE — 83735 ASSAY OF MAGNESIUM: CPT | Performed by: STUDENT IN AN ORGANIZED HEALTH CARE EDUCATION/TRAINING PROGRAM

## 2023-05-13 PROCEDURE — 25010000002 HEPARIN (PORCINE) PER 1000 UNITS: Performed by: STUDENT IN AN ORGANIZED HEALTH CARE EDUCATION/TRAINING PROGRAM

## 2023-05-13 PROCEDURE — 25010000002 ENOXAPARIN PER 10 MG: Performed by: INTERNAL MEDICINE

## 2023-05-13 RX ORDER — MONTELUKAST SODIUM 10 MG/1
10 TABLET ORAL NIGHTLY
Status: DISCONTINUED | OUTPATIENT
Start: 2023-05-13 | End: 2023-05-23 | Stop reason: HOSPADM

## 2023-05-13 RX ORDER — INSULIN LISPRO 100 [IU]/ML
3-14 INJECTION, SOLUTION INTRAVENOUS; SUBCUTANEOUS
Status: DISCONTINUED | OUTPATIENT
Start: 2023-05-13 | End: 2023-05-23 | Stop reason: HOSPADM

## 2023-05-13 RX ORDER — METOPROLOL SUCCINATE 25 MG/1
25 TABLET, EXTENDED RELEASE ORAL DAILY
Status: DISCONTINUED | OUTPATIENT
Start: 2023-05-13 | End: 2023-05-23 | Stop reason: HOSPADM

## 2023-05-13 RX ORDER — INSULIN LISPRO 100 [IU]/ML
1-200 INJECTION, SOLUTION INTRAVENOUS; SUBCUTANEOUS AS NEEDED
Status: DISCONTINUED | OUTPATIENT
Start: 2023-05-13 | End: 2023-05-13

## 2023-05-13 RX ORDER — GUAIFENESIN 200 MG/10ML
200 LIQUID ORAL EVERY 4 HOURS PRN
Status: DISCONTINUED | OUTPATIENT
Start: 2023-05-13 | End: 2023-05-23 | Stop reason: HOSPADM

## 2023-05-13 RX ORDER — FAMOTIDINE 10 MG
10 TABLET ORAL DAILY
Status: DISCONTINUED | OUTPATIENT
Start: 2023-05-14 | End: 2023-05-23 | Stop reason: HOSPADM

## 2023-05-13 RX ORDER — LEVALBUTEROL INHALATION SOLUTION 1.25 MG/3ML
1.25 SOLUTION RESPIRATORY (INHALATION)
Status: DISCONTINUED | OUTPATIENT
Start: 2023-05-13 | End: 2023-05-15

## 2023-05-13 RX ORDER — INSULIN LISPRO 100 [IU]/ML
1-200 INJECTION, SOLUTION INTRAVENOUS; SUBCUTANEOUS
Status: DISCONTINUED | OUTPATIENT
Start: 2023-05-13 | End: 2023-05-13

## 2023-05-13 RX ORDER — LISINOPRIL 20 MG/1
20 TABLET ORAL
Status: DISCONTINUED | OUTPATIENT
Start: 2023-05-13 | End: 2023-05-15

## 2023-05-13 RX ORDER — HYDROCODONE POLISTIREX AND CHLORPHENIRAMINE POLISTIREX 10; 8 MG/5ML; MG/5ML
5 SUSPENSION, EXTENDED RELEASE ORAL 2 TIMES DAILY
Status: COMPLETED | OUTPATIENT
Start: 2023-05-13 | End: 2023-05-19

## 2023-05-13 RX ORDER — INSULIN GLARGINE 100 [IU]/ML
28-30 INJECTION, SOLUTION SUBCUTANEOUS 2 TIMES DAILY
Status: ON HOLD | COMMUNITY
End: 2023-05-29 | Stop reason: SDUPTHER

## 2023-05-13 RX ORDER — NICOTINE POLACRILEX 4 MG
15 LOZENGE BUCCAL
Status: DISCONTINUED | OUTPATIENT
Start: 2023-05-13 | End: 2023-05-23 | Stop reason: HOSPADM

## 2023-05-13 RX ORDER — ENOXAPARIN SODIUM 100 MG/ML
40 INJECTION SUBCUTANEOUS EVERY 24 HOURS
Status: DISCONTINUED | OUTPATIENT
Start: 2023-05-13 | End: 2023-05-23 | Stop reason: HOSPADM

## 2023-05-13 RX ORDER — SODIUM CHLORIDE 9 MG/ML
40 INJECTION, SOLUTION INTRAVENOUS AS NEEDED
Status: DISCONTINUED | OUTPATIENT
Start: 2023-05-13 | End: 2023-05-23 | Stop reason: HOSPADM

## 2023-05-13 RX ORDER — DILTIAZEM HYDROCHLORIDE 240 MG/1
240 CAPSULE, COATED, EXTENDED RELEASE ORAL
Status: DISCONTINUED | OUTPATIENT
Start: 2023-05-13 | End: 2023-05-23 | Stop reason: HOSPADM

## 2023-05-13 RX ORDER — DEXTROSE MONOHYDRATE 25 G/50ML
25 INJECTION, SOLUTION INTRAVENOUS
Status: DISCONTINUED | OUTPATIENT
Start: 2023-05-13 | End: 2023-05-23 | Stop reason: HOSPADM

## 2023-05-13 RX ORDER — DULOXETIN HYDROCHLORIDE 30 MG/1
30 CAPSULE, DELAYED RELEASE ORAL DAILY
Status: DISCONTINUED | OUTPATIENT
Start: 2023-05-13 | End: 2023-05-13

## 2023-05-13 RX ORDER — NICOTINE POLACRILEX 4 MG
15 LOZENGE BUCCAL
Status: DISCONTINUED | OUTPATIENT
Start: 2023-05-13 | End: 2023-05-13 | Stop reason: SDUPTHER

## 2023-05-13 RX ORDER — SODIUM CHLORIDE, SODIUM LACTATE, POTASSIUM CHLORIDE, CALCIUM CHLORIDE 600; 310; 30; 20 MG/100ML; MG/100ML; MG/100ML; MG/100ML
75 INJECTION, SOLUTION INTRAVENOUS CONTINUOUS
Status: ACTIVE | OUTPATIENT
Start: 2023-05-13 | End: 2023-05-13

## 2023-05-13 RX ORDER — MULTIPLE VITAMINS W/ MINERALS TAB 9MG-400MCG
1 TAB ORAL DAILY
COMMUNITY

## 2023-05-13 RX ORDER — FAMOTIDINE 20 MG/1
20 TABLET, FILM COATED ORAL DAILY
Status: DISCONTINUED | OUTPATIENT
Start: 2023-05-13 | End: 2023-05-13

## 2023-05-13 RX ORDER — SITAGLIPTIN 50 MG/1
50 TABLET, FILM COATED ORAL
COMMUNITY
Start: 2023-04-24

## 2023-05-13 RX ORDER — HEPARIN SODIUM 5000 [USP'U]/ML
5000 INJECTION, SOLUTION INTRAVENOUS; SUBCUTANEOUS EVERY 8 HOURS SCHEDULED
Status: DISCONTINUED | OUTPATIENT
Start: 2023-05-13 | End: 2023-05-13

## 2023-05-13 RX ORDER — ATORVASTATIN CALCIUM 20 MG/1
20 TABLET, FILM COATED ORAL NIGHTLY
Status: DISCONTINUED | OUTPATIENT
Start: 2023-05-13 | End: 2023-05-23 | Stop reason: HOSPADM

## 2023-05-13 RX ORDER — DEXTROSE MONOHYDRATE 25 G/50ML
10-50 INJECTION, SOLUTION INTRAVENOUS
Status: DISCONTINUED | OUTPATIENT
Start: 2023-05-13 | End: 2023-05-13

## 2023-05-13 RX ORDER — NYSTATIN 100000 [USP'U]/G
POWDER TOPICAL EVERY 12 HOURS SCHEDULED
Status: DISCONTINUED | OUTPATIENT
Start: 2023-05-13 | End: 2023-05-23 | Stop reason: HOSPADM

## 2023-05-13 RX ORDER — BENZONATATE 100 MG/1
100 CAPSULE ORAL 3 TIMES DAILY PRN
Status: DISCONTINUED | OUTPATIENT
Start: 2023-05-13 | End: 2023-05-23 | Stop reason: HOSPADM

## 2023-05-13 RX ORDER — SODIUM CHLORIDE 0.9 % (FLUSH) 0.9 %
10 SYRINGE (ML) INJECTION AS NEEDED
Status: DISCONTINUED | OUTPATIENT
Start: 2023-05-13 | End: 2023-05-23 | Stop reason: HOSPADM

## 2023-05-13 RX ORDER — LEVALBUTEROL INHALATION SOLUTION 1.25 MG/3ML
1.25 SOLUTION RESPIRATORY (INHALATION) EVERY 6 HOURS PRN
Status: DISCONTINUED | OUTPATIENT
Start: 2023-05-13 | End: 2023-05-15

## 2023-05-13 RX ORDER — LISINOPRIL 10 MG/1
10 TABLET ORAL DAILY
Status: DISCONTINUED | OUTPATIENT
Start: 2023-05-13 | End: 2023-05-13

## 2023-05-13 RX ORDER — LISINOPRIL 20 MG/1
1 TABLET ORAL DAILY
COMMUNITY
Start: 2023-04-24 | End: 2023-05-23 | Stop reason: HOSPADM

## 2023-05-13 RX ORDER — ARFORMOTEROL TARTRATE 15 UG/2ML
15 SOLUTION RESPIRATORY (INHALATION)
Status: DISCONTINUED | OUTPATIENT
Start: 2023-05-13 | End: 2023-05-23 | Stop reason: HOSPADM

## 2023-05-13 RX ORDER — SODIUM CHLORIDE 0.9 % (FLUSH) 0.9 %
10 SYRINGE (ML) INJECTION EVERY 12 HOURS SCHEDULED
Status: DISCONTINUED | OUTPATIENT
Start: 2023-05-13 | End: 2023-05-23 | Stop reason: HOSPADM

## 2023-05-13 RX ADMIN — LEVALBUTEROL HYDROCHLORIDE 1.25 MG: 1.25 SOLUTION RESPIRATORY (INHALATION) at 19:40

## 2023-05-13 RX ADMIN — INSULIN DETEMIR 15 UNITS: 100 INJECTION, SOLUTION SUBCUTANEOUS at 20:48

## 2023-05-13 RX ADMIN — HYDROCODONE POLISTIREX AND CHLORPHENIRAMINE POLISTIREX 5 ML: 10; 8 SUSPENSION, EXTENDED RELEASE ORAL at 10:04

## 2023-05-13 RX ADMIN — Medication 10 ML: at 20:49

## 2023-05-13 RX ADMIN — INSULIN LISPRO 3 UNITS: 100 INJECTION, SOLUTION INTRAVENOUS; SUBCUTANEOUS at 13:07

## 2023-05-13 RX ADMIN — ENOXAPARIN SODIUM 40 MG: 100 INJECTION SUBCUTANEOUS at 08:31

## 2023-05-13 RX ADMIN — LEVALBUTEROL HYDROCHLORIDE 1.25 MG: 1.25 SOLUTION RESPIRATORY (INHALATION) at 14:24

## 2023-05-13 RX ADMIN — DEXAMETHASONE 6 MG: 0.5 TABLET ORAL at 08:32

## 2023-05-13 RX ADMIN — INSULIN LISPRO 3 UNITS: 100 INJECTION, SOLUTION INTRAVENOUS; SUBCUTANEOUS at 17:45

## 2023-05-13 RX ADMIN — ASPIRIN 81 MG: 81 TABLET, COATED ORAL at 08:32

## 2023-05-13 RX ADMIN — NYSTATIN: 100000 POWDER TOPICAL at 08:33

## 2023-05-13 RX ADMIN — HEPARIN SODIUM 5000 UNITS: 5000 INJECTION INTRAVENOUS; SUBCUTANEOUS at 05:36

## 2023-05-13 RX ADMIN — INSULIN DETEMIR 15 UNITS: 100 INJECTION, SOLUTION SUBCUTANEOUS at 10:08

## 2023-05-13 RX ADMIN — HYDROCODONE BITARTRATE AND ACETAMINOPHEN 1 TABLET: 5; 325 TABLET ORAL at 20:48

## 2023-05-13 RX ADMIN — ARFORMOTEROL TARTRATE 15 MCG: 15 SOLUTION RESPIRATORY (INHALATION) at 19:40

## 2023-05-13 RX ADMIN — DILTIAZEM HYDROCHLORIDE 240 MG: 240 CAPSULE, COATED, EXTENDED RELEASE ORAL at 10:04

## 2023-05-13 RX ADMIN — METOPROLOL SUCCINATE 25 MG: 25 TABLET, EXTENDED RELEASE ORAL at 08:32

## 2023-05-13 RX ADMIN — SODIUM CHLORIDE, POTASSIUM CHLORIDE, SODIUM LACTATE AND CALCIUM CHLORIDE 75 ML/HR: 600; 310; 30; 20 INJECTION, SOLUTION INTRAVENOUS at 04:06

## 2023-05-13 RX ADMIN — MONTELUKAST 10 MG: 10 TABLET, FILM COATED ORAL at 20:48

## 2023-05-13 RX ADMIN — HYDROCODONE POLISTIREX AND CHLORPHENIRAMINE POLISTIREX 5 ML: 10; 8 SUSPENSION, EXTENDED RELEASE ORAL at 20:48

## 2023-05-13 RX ADMIN — NYSTATIN: 100000 POWDER TOPICAL at 20:48

## 2023-05-13 RX ADMIN — LISINOPRIL 20 MG: 20 TABLET ORAL at 10:04

## 2023-05-13 RX ADMIN — ARFORMOTEROL TARTRATE 15 MCG: 15 SOLUTION RESPIRATORY (INHALATION) at 09:43

## 2023-05-13 RX ADMIN — LEVALBUTEROL HYDROCHLORIDE 1.25 MG: 1.25 SOLUTION RESPIRATORY (INHALATION) at 09:47

## 2023-05-13 RX ADMIN — SODIUM CHLORIDE 1000 ML: 9 INJECTION, SOLUTION INTRAVENOUS at 01:00

## 2023-05-13 RX ADMIN — ATORVASTATIN CALCIUM 20 MG: 20 TABLET, FILM COATED ORAL at 20:50

## 2023-05-13 RX ADMIN — FAMOTIDINE 20 MG: 20 TABLET, FILM COATED ORAL at 08:32

## 2023-05-13 NOTE — ED PROVIDER NOTES
Time: 8:03 PM EDT  Date of encounter:  5/12/2023  Independent Historian/Clinical History and Information was obtained by:   Patient and Family  Chief Complaint   Patient presents with   • Shortness of Breath       History is limited by: N/A    History of Present Illness:  Patient is a 88 y.o. year old female who presents to the emergency department for evaluation of shortness of breath, cough. Recently dx with covid. Family reports patient has been struggling getting around. Patient states she is here because she cannot walk on her right foot/ankle. Recently sprained her ankle. Had xray at a mobile clinic on Wednesday and told it was just sprained. Family states patients mobility was worse today than it has been all week.     HPI    Patient Care Team  Primary Care Provider: Madison Ortiz APRN    Past Medical History:     Allergies   Allergen Reactions   • Paxil [Paroxetine Hcl] Palpitations   • Aricept [Donepezil Hcl] Other (See Comments)     Nightmares   • Levaquin [Levofloxacin] Diarrhea   • Reglan [Metoclopramide] Other (See Comments)     Jitters   • Statins Myalgia     Pravachol, Lipitor, Livalo     Past Medical History:   Diagnosis Date   • Asthma    • Benign essential tremor 9/23/2021   • Carpal tunnel syndrome    • Contusion     small on scalp. skin intact. D/T fall on 10/18/2019   • Depression    • Diabetes mellitus    • GERD (gastroesophageal reflux disease) 9/23/2021   • HTN (hypertension) 10/18/2019   • Hyperlipidemia    • Hypertension    • Mitral valve problem    • Polymyalgia rheumatica    • Stroke     short term memory loss   • Ulcerative colitis 9/23/2021   • Urinary incontinence      Past Surgical History:   Procedure Laterality Date   • ADENOIDECTOMY     • APPENDECTOMY     • BREAST SURGERY      ( L4-L5)   • COLON SURGERY      colon resection   • HYSTERECTOMY     • OOPHORECTOMY     • TONSILLECTOMY       No family history on file.    Home Medications:  Prior to Admission medications     Medication Sig Start Date End Date Taking? Authorizing Provider   albuterol sulfate  (90 Base) MCG/ACT inhaler Inhale 2 puffs Every 4 (Four) Hours As Needed for Wheezing. PRN 3/7/22   Angela Doherty MD   aspirin (aspirin) 81 MG EC tablet Take 1 tablet by mouth Daily. 3/7/22   Angela Doherty MD   atorvastatin (LIPITOR) 20 MG tablet Take 1 tablet by mouth every night at bedtime. 3/7/22   Angela Doherty MD   calcium carb-cholecalciferol (Calcium + D3) 600-800 MG-UNIT tablet Take 1 tablet by mouth Daily. 3/7/22   Angela Doherty MD   cephalexin (Keflex) 500 MG capsule Take 1 capsule by mouth 3 (Three) Times a Day. 1/19/22   Angela Doherty MD   Continuous Blood Gluc  (FreeStyle [place] 14 Day [place]) device 1 each Daily. 3/10/22   Angela Doherty MD   Continuous Blood Gluc Sensor (FreeStyle [place] 14 Day Sensor) misc 1 each Every 14 (Fourteen) Days. 3/10/22   Agnela Doherty MD   dapagliflozin Propanediol (Farxiga) 10 MG tablet Take  by mouth.    Provider, MD Awais   dilTIAZem CD (CARDIZEM CD) 240 MG 24 hr capsule Take 1 capsule by mouth every night at bedtime. 3/7/22   Angela Doherty MD   DULoxetine (CYMBALTA) 30 MG capsule Take 1 capsule by mouth Daily. 3/7/22   Angela Doherty MD   famotidine (PEPCID) 10 MG tablet Take 1 tablet by mouth Every Morning Before Breakfast. 3/7/22   Angela Doherty MD   famotidine (PEPCID) 20 MG tablet Take 20 mg by mouth Daily.    ProviderAwais MD   glipizide (glipiZIDE XL) 10 MG 24 hr tablet Take 2 tablets by mouth Daily. Take 2 tablets of glipizide ER 10 mg every morning 3/7/22   Angela Doherty MD   glipizide (GLUCOTROL) 10 MG tablet Take 1 tablet by mouth Every Morning Before Breakfast. 11/2/21   Sonia Monroe MD   insulin detemir (LEVEMIR) 100 UNIT/ML injection Inject 12 Units under the skin into the appropriate area as directed Every Night. 11/1/21   Sonia Monroe MD   Insulin Glargine (LANTUS SOLOSTAR) 100 UNIT/ML injection pen  Inject 28 Units under the skin into the appropriate area as directed Every Morning. 4/8/22   Angela Doherty MD   Insulin Lispro (humaLOG) 100 UNIT/ML injection Inject  under the skin into the appropriate area as directed 3 (Three) Times a Day Before Meals.    Awais Stephens MD   lisinopril (PRINIVIL,ZESTRIL) 10 MG tablet Take 1 tablet by mouth Daily. 3/7/22   Angela Doherty MD   Magnesium 250 MG tablet Take 1 tablet by mouth At Night As Needed (leg cramps). PRN 3/7/22   Angela Doherty MD   memantine (Namenda) 10 MG tablet Take 1 tablet by mouth 2 (Two) Times a Day. 3/3/22   Angela Doherty MD   memantine (Namenda) 5 MG tablet Take 1 tablet by mouth 2 (Two) Times a Day. 1/27/22   Angela Doherty MD   metFORMIN (GLUCOPHAGE) 500 MG tablet Take 2 tablets by mouth Daily With Breakfast. Patient has 500 mg tabs and takes 2 tabs with breakfast 3/7/22   Angela Doherty MD   metoprolol succinate XL (TOPROL-XL) 25 MG 24 hr tablet Take 1 tablet by mouth Daily. 3/7/22   Angela Doherty MD   metroNIDAZOLE (Flagyl) 250 MG tablet Take 1 tablet by mouth 3 (Three) Times a Day. 1/19/22   Angela Doherty MD   mometasone-formoterol (Dulera) 200-5 MCG/ACT inhaler Inhale 2 puffs 2 (Two) Times a Day. 3/7/22   Angela Doherty MD   montelukast (SINGULAIR) 10 MG tablet Take 1 tablet by mouth Every Night. 3/7/22   Angela Doherty MD   Multiple Vitamins-Minerals (MULTIVITAMIN ADULT PO) Take  by mouth Daily.    Awais Stephens MD   multivitamin with minerals (CENTRUM ADULTS PO) Take 1 tablet by mouth Daily. 3/7/22   Angela Doherty MD   nystatin (MYCOSTATIN) 100,000 unit/mL suspension Swish and swallow 500,000 Units 2 (Two) Times a Day.    Awais Stephens MD   pantoprazole (PROTONIX) 40 MG EC tablet Take 1 tablet by mouth Daily. 3/7/22   Angela Doherty MD   polycarbophil (calcium polycarbophil) 625 MG tablet tablet Take  by mouth Daily.    Provider, MD Awais   polyethylene glycol (MiraLax) 17 GM/SCOOP  "powder Take 17 g by mouth Daily. 3/7/22   Angela Doherty MD   SITagliptin (Januvia) 100 MG tablet Take 1 tablet by mouth Daily. 3/7/22   Angela Doherty MD        Social History:   Social History     Tobacco Use   • Smoking status: Former     Types: Cigarettes   • Smokeless tobacco: Never   Vaping Use   • Vaping Use: Never used   Substance Use Topics   • Alcohol use: No   • Drug use: No         Review of Systems:  Review of Systems   Constitutional: Positive for activity change. Negative for chills and fever.   HENT: Negative for congestion, rhinorrhea and sore throat.    Eyes: Negative for pain and visual disturbance.   Respiratory: Positive for cough and shortness of breath. Negative for apnea and chest tightness.    Cardiovascular: Negative for chest pain and palpitations.   Gastrointestinal: Positive for nausea. Negative for abdominal pain, diarrhea and vomiting.   Genitourinary: Negative for difficulty urinating and dysuria.   Musculoskeletal: Positive for arthralgias (right ankle pain). Negative for joint swelling and myalgias.   Skin: Negative for color change.   Neurological: Negative for seizures and headaches.   Psychiatric/Behavioral: Negative.    All other systems reviewed and are negative.       Physical Exam:  /82   Pulse 78   Temp 97.8 °F (36.6 °C) (Oral)   Resp 18   Ht 160 cm (63\")   Wt 71.2 kg (156 lb 15.5 oz)   SpO2 96%   BMI 27.81 kg/m²     Physical Exam  Vitals and nursing note reviewed.   Constitutional:       General: She is not in acute distress.     Appearance: Normal appearance. She is not toxic-appearing.   HENT:      Head: Normocephalic and atraumatic.      Jaw: There is normal jaw occlusion.      Mouth/Throat:      Mouth: Mucous membranes are moist.   Eyes:      General: Lids are normal.      Extraocular Movements: Extraocular movements intact.      Conjunctiva/sclera: Conjunctivae normal.      Pupils: Pupils are equal, round, and reactive to light.   Cardiovascular:      " Rate and Rhythm: Normal rate and regular rhythm.      Pulses: Normal pulses.      Heart sounds: Normal heart sounds.   Pulmonary:      Effort: Pulmonary effort is normal. No respiratory distress.      Breath sounds: Normal breath sounds. No wheezing or rhonchi.   Abdominal:      General: Abdomen is flat. There is no distension.      Palpations: Abdomen is soft.      Tenderness: There is no abdominal tenderness. There is no guarding or rebound.   Musculoskeletal:         General: Normal range of motion.      Cervical back: Normal range of motion and neck supple.      Right lower leg: No edema.      Left lower leg: No edema.   Skin:     General: Skin is warm and dry.   Neurological:      General: No focal deficit present.      Mental Status: She is alert and oriented to person, place, and time. Mental status is at baseline.   Psychiatric:         Mood and Affect: Mood normal.         Behavior: Behavior normal.                  Procedures:  Procedures      Medical Decision Making:      Comorbidities that affect care:    Diabetes    External Notes reviewed:    Telephone Encounter: Internal medicine for general medical management      The following orders were placed and all results were independently analyzed by me:  Orders Placed This Encounter   Procedures   • XR Chest 1 View   • XR Ankle 3+ View Right   • [place] Draw   • Comprehensive Metabolic Panel   • BNP   • Single High Sensitivity Troponin T   • CBC Auto Differential   • NPO Diet NPO Type: Strict NPO   • Undress & Gown   • Cardiac Monitoring   • Continuous Pulse Oximetry   • Vital Signs   • Inpatient Hospitalist Consult   • Oxygen Therapy- Nasal Cannula; 2 LPM; Titrate for SPO2: equal to or greater than, 92%   • ECG 12 Lead ED Triage Standing Order; SOA   • Insert Peripheral IV   • CBC & Differential   • Green Top (Gel)   • Lavender Top   • Gold Top - SST   • Light Blue Top       Medications Given in the Emergency Department:  Medications   sodium chloride 0.9  % flush 10 mL (has no administration in time range)   HYDROcodone-acetaminophen (NORCO) 5-325 MG per tablet 1 tablet (1 tablet Oral Given 5/12/23 2054)   sodium chloride 0.9 % bolus 1,000 mL (1,000 mL Intravenous New Bag 5/13/23 0100)   ondansetron ODT (ZOFRAN-ODT) disintegrating tablet 4 mg (4 mg Oral Given 5/12/23 2054)        ED Course:    The patient was initially evaluated in the triage area where orders were placed. The patient was later dispositioned by Jason Humphrey MD.      The patient was advised to stay for completion of workup which includes but is not limited to communication of labs and radiological results, reassessment and plan. The patient was advised that leaving prior to disposition by a provider could result in critical findings that are not communicated to the patient.     ED Course as of 05/13/23 0124   Fri May 12, 2023   2007 The patient was seen and examined by Brooke rodriguez APRN, while in triage. Orders placed. Patient is awaiting disposition.   [AR]      ED Course User Index  [AR] Brooke Tellez APRN       Labs:    Lab Results (last 24 hours)     Procedure Component Value Units Date/Time    CBC & Differential [[id number]]  (Abnormal) Collected: 05/12/23 1911    Specimen: Blood Updated: 05/12/23 1919    Narrative:      The following orders were created for panel order CBC & Differential.  Procedure                               Abnormality         Status                     ---------                               -----------         ------                     CBC Auto Differential[[id number]]        Abnormal            Final result                 Please view results for these tests on the individual orders.    Comprehensive Metabolic Panel [[id number]]  (Abnormal) Collected: 05/12/23 1911    Specimen: Blood Updated: 05/12/23 1949     Glucose 303 mg/dL      BUN 25 mg/dL      Creatinine 0.95 mg/dL      Sodium 135 mmol/L      Potassium 3.9 mmol/L      Chloride 102 mmol/L      CO2  18.3 mmol/L      Calcium 10.3 mg/dL      Total Protein 6.4 g/dL      Albumin 3.4 g/dL      ALT (SGPT) 20 U/L      AST (SGOT) 16 U/L      Alkaline Phosphatase 116 U/L      Total Bilirubin 0.2 mg/dL      Globulin 3.0 gm/dL      A/G Ratio 1.1 g/dL      BUN/Creatinine Ratio 26.3     Anion Gap 14.7 mmol/L      eGFR 58.1 mL/min/1.73     Narrative:      GFR Normal >60  Chronic Kidney Disease <60  Kidney Failure <15    The GFR formula is only valid for adults with stable renal function between ages 18 and 70.    BNP [[id number]]  (Normal) Collected: 05/12/23 1911    Specimen: Blood Updated: 05/12/23 1946     proBNP 160.4 pg/mL     Narrative:      Among patients with dyspnea, NT-proBNP is highly sensitive for the detection of acute congestive heart failure. In addition NT-proBNP of <300 pg/ml effectively rules out acute congestive heart failure with 99% negative predictive value.      Single High Sensitivity Troponin T [[id number]]  (Abnormal) Collected: 05/12/23 1911    Specimen: Blood Updated: 05/12/23 1949     HS Troponin T 17 ng/L     Narrative:      High Sensitive Troponin T Reference Range:  <10.0 ng/L- Negative Female for AMI  <15.0 ng/L- Negative Male for AMI  >=10 - Abnormal Female indicating possible myocardial injury.  >=15 - Abnormal Male indicating possible myocardial injury.   Clinicians would have to utilize clinical acumen, EKG, Troponin, and serial changes to determine if it is an Acute Myocardial Infarction or myocardial injury due to an underlying chronic condition.         CBC Auto Differential [[id number]]  (Abnormal) Collected: 05/12/23 1911    Specimen: Blood Updated: 05/12/23 1919     WBC 7.19 10*3/mm3      RBC 4.92 10*6/mm3      Hemoglobin 13.2 g/dL      Hematocrit 40.8 %      MCV 82.9 fL      MCH 26.8 pg      MCHC 32.4 g/dL      RDW 14.9 %      RDW-SD 45.0 fl      MPV 9.6 fL      Platelets 217 10*3/mm3      Neutrophil % 84.1 %      Lymphocyte % 8.1 %      Monocyte % 6.4 %      Eosinophil % 0.4 %       Basophil % 0.4 %      Immature Grans % 0.6 %      Neutrophils, Absolute 6.05 10*3/mm3      Lymphocytes, Absolute 0.58 10*3/mm3      Monocytes, Absolute 0.46 10*3/mm3      Eosinophils, Absolute 0.03 10*3/mm3      Basophils, Absolute 0.03 10*3/mm3      Immature Grans, Absolute 0.04 10*3/mm3      nRBC 0.0 /100 WBC            Imaging:    XR Ankle 3+ View Right    Result Date: 5/12/2023  PROCEDURE: XR ANKLE 3+ VW RIGHT  COMPARISON: None  INDICATIONS: FALL. RIGHT ANKLE PAIN  FINDINGS:  BONES: No acute fracture is identified.  No focal osseous abnormality is identified.  There is a small enthesophyte along the inferior calcaneus. SOFT TISSUES: Negative.  No visible soft tissue swelling.  EFFUSION: None visible.  OTHER: Negative.        No acute fracture or traumatic malalignment identified.      SARAH ARTHUR MD       Electronically Signed and Approved By: SARAH ARTHUR MD on 5/12/2023 at 20:46             XR Chest 1 View    Result Date: 5/12/2023  PROCEDURE: XR CHEST 1 VW  COMPARISON: [facility], , XR CHEST 1 VW, 2/07/2023, 15:39.  INDICATIONS: Asthma attack.  FINDINGS:  LUNGS: Normal.  No significant pulmonary parenchymal abnormalities.  VASCULATURE: Normal.  Unremarkable pulmonary vasculature.  CARDIAC: Normal.  No cardiac silhouette abnormality or cardiomegaly.  MEDIASTINUM: Normal.  No visible mass or adenopathy.  PLEURA: Normal.  No effusion or pleural thickening.  BONES: Normal.  No fracture or visible bony lesion.  OTHER: Negative.        No acute cardiopulmonary process identified.       SARAH ARTHUR MD       Electronically Signed and Approved By: SARAH ARTHUR MD on 5/12/2023 at 19:34                 Differential Diagnosis and Discussion:      Weakness: Based on the patient's history, signs, and symptoms, the diffential diagnosis includes but is not limited to meningitis, stroke, sepsis, subarachnoid hemorrhage, intracranial bleeding, encephalitis, acute uti, dehydration, MS,  myasthenia gravis, Guillan [place], migraine variant, neuromuscular disorders vertigo, electrolyte imbalance, and metabolic disorders.    All labs were reviewed and interpreted by me.  All X-rays impressions were independently interpreted by me.    MDM  Number of Diagnoses or Management Options  Dehydration  Generalized weakness  Diagnosis management comments: In summary this is an 88-year-old female who was recently tested positive for COVID-19 and presents to the emergency department from a local assisted living facility for evaluation.  They report she is so weak that she is unable to get up on her own and therefore needs a higher level of care currently.  CBC independently reviewed by me and shows no critical abnormalities.  CMP remarkable for hyperglycemia without DKA.  CMP does also show significant signs of dehydration.  Chest x-ray unremarkable patient is not hypoxic, requiring no additional oxygen.  She will be admitted to the hospital for further hydration, physical therapy and evaluation for short-term rehab placement.  Patient case has been discussed with the hospitalist team who will admit to the hospital for further evaluation and continuation of treatment.           Patient Care Considerations:    CT CHEST: I considered ordering a CT scan of the chest, however No acute respiratory distress      Consultants/Shared Management Plan:    Hospitalist: I have discussed the case with Dr. Fontanez who agrees to accept the patient for admission.    Social Determinants of Health:    Patient is a nursing home/assisted living resident and has reliable access to care.      Disposition and Care Coordination:    Admit:   Through independent evaluation of the patient's history, physical, and imperical data, the patient meets criteria for observation/admission to the hospital.        Final diagnoses:   Dehydration   Generalized weakness        ED Disposition     ED Disposition   Decision to Admit    Condition   --     Comment   --             This medical record created using voice recognition software.           Jason Humphrey MD  05/13/23 0124

## 2023-05-13 NOTE — THERAPY EVALUATION
Acute Care - Physical Therapy Initial Evaluation  RUPESH Villalobos     Patient Name: Tita Jiménez  : 1935  MRN: [id number]  Today's Date: 2023     Admit date: 2023     Referring Physician: Oleg Blanc MD     Surgery Date:* No surgery found *                Visit Dx:     ICD-10-CM ICD-9-CM   1. Dehydration  E86.0 276.51   2. Generalized weakness  R53.1 780.79   3. Decreased activities of daily living (ADL)  Z78.9 V49.89   4. Difficulty in walking  R26.2 719.7     Patient Active Problem List   Diagnosis   • Compression fracture of T1 -T2 vertebra (CMS/HCC)   • Fall   • Contusion of scalp   • Closed head injury   • Asthma   • Depression   • Reflux esophagitis   • Bladder incontinence   • Bowel incontinence   • High cholesterol   • Diabetes mellitus, type II (HCC)   • Hypertension   • Ulcerative colitis (HCC)   • GERD (gastroesophageal reflux disease)   • Benign essential tremor   • Carpal tunnel syndrome   • Ataxia   • Breast cancer screening   • Hypoxia   • Cytokine release syndrome, grade 1   • Pneumonia due to COVID-19 virus   • Weakness   • Cognitive impairment   • Difficulty walking   • At risk for venous thromboembolism (VTE)   • Lower abdominal pain   • 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 skin intact. D/T fall on 10/18/2019   • Depression    • Diabetes mellitus    • GERD (gastroesophageal reflux disease) 2021   • HTN (hypertension) 10/18/2019   • Hyperlipidemia    • Hypertension    • Mitral valve problem    • Polymyalgia rheumatica    • Stroke     short term memory loss   • Ulcerative colitis 2021   • Urinary incontinence      Past Surgical History:   Procedure Laterality Date   • ADENOIDECTOMY     • APPENDECTOMY     • BREAST SURGERY      ( L4-L5)   • COLON SURGERY       colon resection   • HYSTERECTOMY     • OOPHORECTOMY     • TONSILLECTOMY       PT Assessment (last 12 hours)     PT Evaluation and Treatment     Row Name 05/13/23 1300          Physical Therapy Time and Intention    Subjective Information no complaints  -MYESHA     Document Type evaluation  -MYESHA     Mode of Treatment individual therapy;physical therapy  -MYESHA     Patient Effort good  -MYESHA     Row Name 05/13/23 1300          General Information    Patient Observations alert;cooperative;agree to therapy  -MYESHA     Equipment Currently Used at Home wheelchair;walker, rolling  -MYESHA     Existing Precautions/Restrictions fall  -MYESHA     Barriers to Rehab none identified  -MYESHA     Row Name 05/13/23 1300          Living Environment    Current Living Arrangements home  -MYESHA     Row Name 05/13/23 1300          Range of Motion (ROM)    Range of Motion ROM is WFL  -MYESHA     [facility] Name 05/13/23 1300          Strength (Manual Muscle Testing)    Strength (Manual Muscle Testing) bilateral lower extremities  3+/5  -MYESHA     [facility] Name 05/13/23 1300          Bed Mobility    Bed Mobility bed mobility (all) activities;supine-sit  -MYESHA     All Activities, [place] (Bed Mobility) minimum assist (75% patient effort)  -MYESHA     Supine-Sit [place] (Bed Mobility) minimum assist (75% patient effort)  -MYESHA     Row Name 05/13/23 1300          Transfers    Transfers sit-stand transfer  -MYESHA     Row Name 05/13/23 1300          Sit-Stand Transfer    Sit-Stand [place] (Transfers) moderate assist (50% patient effort)  -MYESHA     Assistive Device (Sit-Stand Transfers) walker, front-wheeled  -MYESHA     Comment, (Sit-Stand Transfer) limited by c/o right foot pain from previous fall at home  -MYESHA     Row Name 05/13/23 1300          Safety Issues, Functional Mobility    Impairments Affecting Function (Mobility) balance;endurance/activity tolerance  -MYESHA     Row Name 05/13/23 1300          Balance    Balance Assessment standing static balance  -MYESHA     Static Standing Balance  moderate assist  -MYESHA     Row Name 05/13/23 1300          Plan of Care Review    Plan of Care Reviewed With patient  -MYESHA     Outcome Evaluation Patient presents with decreased strength, transfers and ambulation.  Skilled physical therapy services will be required to address these mobility deficits.  Recommend skilled nursing facility placement upon discharge from the hospital  -MYESHA     Row Name 05/13/23 1300          Therapy Assessment/Plan (PT)    Rehab Potential (PT) good, to achieve stated therapy goals  -MYESHA     Criteria for Skilled Interventions Met (PT) skilled treatment is necessary  -MYESHA     Therapy Frequency (PT) daily  -MYESHA     Predicted Duration of Therapy Intervention (PT) 10 days  -MYESHA     Problem List (PT) problems related to;balance;mobility;range of motion (ROM);strength  -MYESHA     Activity Limitations Related to Problem List (PT) unable to ambulate safely;unable to transfer safely  -MYESHA     Row Name 05/13/23 1300          PT Evaluation Complexity    History, PT Evaluation Complexity no personal factors and/or comorbidities  -MYESHA     Examination of Body Systems (PT Eval Complexity) total of 4 or more elements  -MYESHA     Clinical Presentation (PT Evaluation Complexity) stable  -MYESHA     Clinical Decision Making (PT Evaluation Complexity) low complexity  -MYESHA     Overall Complexity (PT Evaluation Complexity) low complexity  -MYESHA     Row Name 05/13/23 1300          Therapy Plan Review/Discharge Plan (PT)    Therapy Plan Review (PT) evaluation/treatment results reviewed;participants voiced agreement with care plan;participants included;patient  -MYESHA     Row Name 05/13/23 1300          Physical Therapy Goals    Transfer Goal Selection (PT) transfer, PT goal 1  -MYESHA     Gait Training Goal Selection (PT) gait training, PT goal 1  -MYESHA     Row Name 05/13/23 1300          Transfer Goal 1 (PT)    Activity/Assistive Device (Transfer Goal 1, PT) transfers, all  -MYESHA     [place] Level/Cues Needed (Transfer Goal 1, PT)  independent  -MYESHA     Time Frame (Transfer Goal 1, PT) long term goal (LTG);10 days  -MYESHA     Row Name 05/13/23 1300          Gait Training Goal 1 (PT)    Activity/Assistive Device (Gait Training Goal 1, PT) gait (walking locomotion);assistive device use;walker, rolling  -MYESHA     [place] Level (Gait Training Goal 1, PT) independent  -MYESHA     Distance (Gait Training Goal 1, PT) 300  -MYESHA     Time Frame (Gait Training Goal 1, PT) long term goal (LTG);10 days  -MYESHA           User Key  (r) = Recorded By, (t) = Taken By, (c) = Cosigned By    Initials Name Provider Type    Jeff Fernandez, PT Physical Therapist                Physical Therapy Education     Title: PT OT SLP Therapies (Done)     Topic: Physical Therapy (Done)     Point: Mobility training (Done)     Learning Progress Summary           Patient Acceptance, E, VU by  at 5/13/2023 1004                   Point: Home exercise program (Done)     Learning Progress Summary           Patient Acceptance, E, VU by  at 5/13/2023 1004                   Point: Body mechanics (Done)     Learning Progress Summary           Patient Acceptance, E, VU by  at 5/13/2023 1004                   Point: Precautions (Done)     Learning Progress Summary           Patient Acceptance, E, VU by  at 5/13/2023 1004                               User Key     Initials Effective Dates Name Provider Type Discipline     06/16/21 -  Celeste Anthony OT Occupational Therapist OT              PT Recommendation and Plan  Anticipated Discharge Disposition (PT): sub acute care setting  Planned Therapy Interventions (PT): balance training, bed mobility training, gait training, home exercise program, stair training, strengthening, transfer training  Therapy Frequency (PT): daily  Plan of Care Reviewed With: patient  Outcome Evaluation: Patient presents with decreased strength, transfers and ambulation.  Skilled physical therapy services will be required to address these mobility deficits.   Recommend skilled nursing facility placement upon discharge from the hospital   Outcome Measures     Row Name 05/13/23 1300             How much help from another person do you currently need...    Turning from your back to your side while in flat bed without using bedrails? 3  -MYESHA      Moving from lying on back to sitting on the side of a flat bed without bedrails? 3  -MYESHA      Moving to and from a bed to a chair (including a wheelchair)? 2  -MYESHA      Standing up from a chair using your arms (e.g., wheelchair, bedside chair)? 2  -MYESHA      Climbing 3-5 steps with a railing? 1  -MYESHA      To walk in hospital room? 1  -MYESHA      AM-PAC 6 Clicks Score (PT) 12  -MYESHA         Functional Assessment    Outcome Measure Options AM-PAC 6 Clicks Basic Mobility (PT)  -MYESHA            User Key  (r) = Recorded By, (t) = Taken By, (c) = Cosigned By    Initials Name Provider Type    Jeff Fernandez, ALLEN Physical Therapist                 Time Calculation:    PT Charges     Row Name 05/13/23 1309             Time Calculation    PT Received On 05/13/23  -MYESHA      PT Goal Re-Cert Due Date 05/22/23  -MYESHA         Untimed Charges    PT Eval/Re-eval Minutes 20  -MYESHA         Total Minutes    Untimed Charges Total Minutes 20  -MYESHA       Total Minutes 20  -MYESHA            User Key  (r) = Recorded By, (t) = Taken By, (c) = Cosigned By    Initials Name Provider Type    Jfef Fernandez, PT Physical Therapist              Therapy Charges for Today     Code Description Service Date Service Provider Modifiers Qty    [id number] HC PT EVAL LOW COMPLEXITY 2 5/13/2023 Jeff Shell PT GP 1          PT G-Codes  Outcome Measure Options: AM-PAC 6 Clicks Basic Mobility (PT)  AM-PAC 6 Clicks Score (PT): 12  AM-PAC 6 Clicks Score (OT): 17    Jeff Shell PT  5/13/2023

## 2023-05-13 NOTE — PLAN OF CARE
Goal Outcome Evaluation:  Plan of Care Reviewed With: patient, sibling        Progress: improving  Outcome Evaluation: PT has been AAOx4 this shift, VSS. No c/o of pain. Tolerated 1L of LR. PO intake improving, UOP adequate. Remains weak, requiring transfer assistance. Incontinent of urine. No BM noted this shift. Remains on SSI with insulin given as ordered. Potential DC to rehab or back to [facility]. Updated sister at bedside. No acute events this shift. Continue to monitor with current POC.

## 2023-05-13 NOTE — PLAN OF CARE
Goal Outcome Evaluation:  Plan of Care Reviewed With: patient           Outcome Evaluation: Patient presents with decreased strength, transfers and ambulation.  Skilled physical therapy services will be required to address these mobility deficits.  Recommend skilled nursing facility placement upon discharge from the hospital

## 2023-05-13 NOTE — THERAPY EVALUATION
Patient Name: Tita Jiménez  : 1935    MRN: [id number]                              Today's Date: 2023       Admit Date: 2023    Visit Dx:     ICD-10-CM ICD-9-CM   1. Dehydration  E86.0 276.51   2. Generalized weakness  R53.1 780.79   3. Decreased activities of daily living (ADL)  Z78.9 V49.89     Patient Active Problem List   Diagnosis   • Compression fracture of T1 -T2 vertebra (CMS/HCC)   • Fall   • Contusion of scalp   • Closed head injury   • Asthma   • Depression   • Reflux esophagitis   • Bladder incontinence   • Bowel incontinence   • High cholesterol   • Diabetes mellitus, type II (HCC)   • Hypertension   • Ulcerative colitis (HCC)   • GERD (gastroesophageal reflux disease)   • Benign essential tremor   • Carpal tunnel syndrome   • Ataxia   • Breast cancer screening   • Hypoxia   • Cytokine release syndrome, grade 1   • Pneumonia due to COVID-19 virus   • Weakness   • Cognitive impairment   • Difficulty walking   • At risk for venous thromboembolism (VTE)   • Lower abdominal pain   • 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 skin intact. D/T fall on 10/18/2019   • Depression    • Diabetes mellitus    • GERD (gastroesophageal reflux disease) 2021   • HTN (hypertension) 10/18/2019   • Hyperlipidemia    • Hypertension    • Mitral valve problem    • Polymyalgia rheumatica    • Stroke     short term memory loss   • Ulcerative colitis 2021   • Urinary incontinence      Past Surgical History:   Procedure Laterality Date   • ADENOIDECTOMY     • APPENDECTOMY     • BREAST SURGERY      ( L4-L5)   • COLON SURGERY      colon resection   • HYSTERECTOMY     • OOPHORECTOMY     • TONSILLECTOMY        General Information     Row Name 23 0948          OT Time and Intention    Document  Type evaluation  -     Mode of Treatment individual therapy;occupational therapy  -     Row Name 05/13/23 0948          General Information    Patient Profile Reviewed yes  -     Prior Level of Function --  luke reports she lives at [facility] used a rollator and (I) with adls except requiring assist past couple of weeks.  -     Existing Precautions/Restrictions fall  -     Barriers to Rehab none identified  -     Row Name 05/13/23 0948          Occupational Profile    Reason for Services/Referral (Occupational Profile) Pt. is a 88year old female admitted for the above diagnosis. Pt. referred to OT services to assess independence with ADLs and adl transfers/fx'l mobility. No previous OT services for current condition.  -     Row Name 05/13/23 0948          Living Environment    People in Home facility resident  -     Row Name 05/13/23 0948          Cognition    Orientation Status (Cognition) oriented x 3  -     Row Name 05/13/23 0948          Safety Issues, Functional Mobility    Impairments Affecting Function (Mobility) balance;endurance/activity tolerance  -           User Key  (r) = Recorded By, (t) = Taken By, (c) = Cosigned By    Initials Name Provider Type    AC Celeste Anthony, OT Occupational Therapist                 Mobility/ADL's     Row Name 05/13/23 0952          Bed Mobility    Bed Mobility bed mobility (all) activities  -     All Activities, [place] (Bed Mobility) moderate assist (50% patient effort);minimum assist (75% patient effort);1 person assist  -     Assistive Device (Bed Mobility) head of bed elevated;bed rails  -     Row Name 05/13/23 0952          Transfers    Transfers sit-stand transfer  -     Row Name 05/13/23 0952          Sit-Stand Transfer    Sit-Stand [place] (Transfers) moderate assist (50% patient effort);1 person assist  -     Row Name 05/13/23 0952          Activities of Daily Living    BADL Assessment/Intervention --  patient is setup for  self-feeding, min A for grooming, min A for upper body bathing/dressing, max A for lower body bathing/dressing, max A for toileting.  -           User Key  (r) = Recorded By, (t) = Taken By, (c) = Cosigned By    Initials Name Provider Type     Celeste Anthony OT Occupational Therapist               Obj/Interventions     Row Name 05/13/23 0959          Sensory Assessment (Somatosensory)    Sensory Assessment (Somatosensory) sensation intact  -[facility] Name 05/13/23 0959          Vision Assessment/Intervention    Visual Impairment/Limitations WFL;corrective lenses full-time  -     Row Name 05/13/23 0959          Range of Motion Comprehensive    General Range of Motion bilateral upper extremity ROM WFL  -[facility] Name 05/13/23 0959          Strength Comprehensive (MMT)    Comment, General Manual Muscle Testing (MMT) Assessment 4/5 BUE  -[facility] Name 05/13/23 0959          Motor Skills    Motor Skills coordination;functional endurance  -AC     Coordination WFL  -     Functional Endurance fair -  -     Row Name 05/13/23 0959          Balance    Balance Assessment sitting static balance  -     Static Sitting Balance minimal assist;contact guard;1-person assist;verbal cues  -     Position, Sitting Balance supported  -     Comment, Balance patient lost balance to right when sitting EOB. required min/cga to maintain sitting.  -           User Key  (r) = Recorded By, (t) = Taken By, (c) = Cosigned By    Initials Name Provider Type     Celeste Anthony OT Occupational Therapist               Goals/Plan     Row Name 05/13/23 1001          Bed Mobility Goal 1 (OT)    Activity/Assistive Device (Bed Mobility Goal 1, OT) bed mobility activities, all  -AC     [place] Level/Cues Needed (Bed Mobility Goal 1, OT) modified independence  -     Time Frame (Bed Mobility Goal 1, OT) long term goal (LTG);10 days  -     Row Name 05/13/23 1001          Transfer Goal 1 (OT)    Activity/Assistive Device (Transfer  Goal 1, OT) transfers, all  -AC     [place] Level/Cues Needed (Transfer Goal 1, OT) modified independence  -AC     Time Frame (Transfer Goal 1, OT) long term goal (LTG);10 days  -AC     Row Name 05/13/23 1001          Bathing Goal 1 (OT)    Activity/Device (Bathing Goal 1, OT) bathing skills, all  -AC     [place] Level/Cues Needed (Bathing Goal 1, OT) modified independence  -AC     Time Frame (Bathing Goal 1, OT) long term goal (LTG);10 days  -AC     Row Name 05/13/23 1001          Dressing Goal 1 (OT)    Activity/Device (Dressing Goal 1, OT) dressing skills, all  -AC     [place]/Cues Needed (Dressing Goal 1, OT) modified independence  -AC     Time Frame (Dressing Goal 1, OT) long term goal (LTG);10 days  -AC     Row Name 05/13/23 1001          Toileting Goal 1 (OT)    Activity/Device (Toileting Goal 1, OT) toileting skills, all  -AC     [place] Level/Cues Needed (Toileting Goal 1, OT) modified independence  -AC     Time Frame (Toileting Goal 1, OT) long term goal (LTG);10 days  -     Row Name 05/13/23 1001          Grooming Goal 1 (OT)    Activity/Device (Grooming Goal 1, OT) grooming skills, all  -AC     [place] (Grooming Goal 1, OT) modified independence  -AC     Time Frame (Grooming Goal 1, OT) long term goal (LTG);10 days  -     Row Name 05/13/23 1001          Strength Goal 1 (OT)    Strength Goal 1 (OT) patient to increase BUE strength to 5/5 for adls.  -AC     Time Frame (Strength Goal 1, OT) long term goal (LTG);10 days  -AC     Row Name 05/13/23 1001          Problem Specific Goal 1 (OT)    Problem Specific Goal 1 (OT) Patient to improve endurance to good for adls.  -AC     Time Frame (Problem Specific Goal 1, OT) long term goal (LTG);10 days  -AC     Row Name 05/13/23 1001          Therapy Assessment/Plan (OT)    Planned Therapy Interventions (OT) activity tolerance training;BADL retraining;functional balance retraining;occupation/activity based interventions;patient/caregiver  education/training;transfer/mobility retraining;ROM/therapeutic exercise  -           User Key  (r) = Recorded By, (t) = Taken By, (c) = Cosigned By    Initials Name Provider Type    Celeste Lewis OT Occupational Therapist               Clinical Impression     Row Name 05/13/23 1000          Pain Assessment    Pretreatment Pain Rating 0/10 - no pain  -     Posttreatment Pain Rating 0/10 - no pain  -AC     Row Name 05/13/23 1000          Plan of Care Review    Plan of Care Reviewed With patient  -     Progress no change  -     Outcome Evaluation Patient presents with limitations that impede his/her ability to perform ADLS. The skills of a therapist are necessary to maximize independence with ADLs.  Recommend inpatient rehab following hospital discharge.  -     Row Name 05/13/23 1000          Therapy Assessment/Plan (OT)    Patient/Family Therapy Goal Statement (OT) Patient would like to maximize independence with adls.  -     Rehab Potential (OT) good, to achieve stated therapy goals  -     Criteria for Skilled Therapeutic Interventions Met (OT) yes;meets criteria;skilled treatment is necessary  -     Therapy Frequency (OT) 5 times/wk  -AC     Row Name 05/13/23 1000          Therapy Plan Review/Discharge Plan (OT)    Equipment Needs Upon Discharge (OT) walker, rolling;commode chair  -AC     Anticipated Discharge Disposition (OT) inpatient rehabilitation facility  -[facility] 05/13/23 1000          Positioning and Restraints    Pre-Treatment Position in bed  -     Post Treatment Position bed  -     In Bed fowlers;call light within reach;encouraged to call for assist;exit alarm on  -           User Key  (r) = Recorded By, (t) = Taken By, (c) = Cosigned By    Initials Name Provider Type    Celeste Lewis OT Occupational Therapist               Outcome Measures     Row Name 05/13/23 1003          How much help from another is currently needed...    Putting on and taking off regular lower  body clothing? 2  -AC     Bathing (including washing, rinsing, and drying) 2  -AC     Toileting (which includes using toilet bed pan or urinal) 2  -AC     Putting on and taking off regular upper body clothing 3  -AC     Taking care of personal grooming (such as brushing teeth) 4  -AC     Eating meals 4  -AC     AM-PAC 6 Clicks Score (OT) 17  -AC     Row Name 05/13/23 0334          How much help from another person do you currently need...    Turning from your back to your side while in flat bed without using bedrails? 4  -CS     Moving from lying on back to sitting on the side of a flat bed without bedrails? 4  -CS     Moving to and from a bed to a chair (including a wheelchair)? 1  -CS     Standing up from a chair using your arms (e.g., wheelchair, bedside chair)? 1  -CS     Climbing 3-5 steps with a railing? 1  -CS     To walk in hospital room? 1  -CS     AM-PAC 6 Clicks Score (PT) 12  -CS     Row Name 05/13/23 1003          Functional Assessment    Outcome Measure Options AM-PAC 6 Clicks Daily Activity (OT);Optimal Instrument  -AC     Row Name 05/13/23 1003          Optimal Instrument    Optimal Instrument Optimal - 3  -AC     Bending/Stooping 4  -AC     Standing 3  -AC     Reaching 1  -AC     From the list, choose the 3 activities you would most like to be able to do without any difficulty Bending/stooping;Standing;Reaching  -AC     Total Score Optimal - 3 8  -AC           User Key  (r) = Recorded By, (t) = Taken By, (c) = Cosigned By    Initials Name Provider Type    CS Manda Ash, RN Registered Nurse    Celeste Lewis OT Occupational Therapist                Occupational Therapy Education     Title: PT OT SLP Therapies (Done)     Topic: Occupational Therapy (Done)     Point: ADL training (Done)     Description:   Instruct learner(s) on proper safety adaptation and remediation techniques during self care or transfers.   Instruct in proper use of assistive devices.              Learning Progress  Summary           Patient Acceptance, E, VU by  at 5/13/2023 1004                   Point: Home exercise program (Done)     Description:   Instruct learner(s) on appropriate technique for monitoring, assisting and/or progressing therapeutic exercises/activities.              Learning Progress Summary           Patient Acceptance, E, VU by  at 5/13/2023 1004                   Point: Precautions (Done)     Description:   Instruct learner(s) on prescribed precautions during self-care and functional transfers.              Learning Progress Summary           Patient Acceptance, E, VU by  at 5/13/2023 1004                   Point: Body mechanics (Done)     Description:   Instruct learner(s) on proper positioning and spine alignment during self-care, functional mobility activities and/or exercises.              Learning Progress Summary           Patient Acceptance, E, VU by  at 5/13/2023 1004                               User Key     Initials Effective Dates Name Provider Type Discipline     06/16/21 -  Celeste Anthony OT Occupational Therapist OT              OT Recommendation and Plan  Planned Therapy Interventions (OT): activity tolerance training, BADL retraining, functional balance retraining, occupation/activity based interventions, patient/caregiver education/training, transfer/mobility retraining, ROM/therapeutic exercise  Therapy Frequency (OT): 5 times/wk  Plan of Care Review  Plan of Care Reviewed With: patient  Progress: no change  Outcome Evaluation: Patient presents with limitations that impede his/her ability to perform ADLS. The skills of a therapist are necessary to maximize independence with ADLs.  Recommend inpatient rehab following hospital discharge.     Time Calculation:    Time Calculation- OT     Row Name 05/13/23 1007             Time Calculation- OT    OT Received On 05/13/23  [facility]      OT Goal Re-Cert Due Date 05/22/23  -         Untimed Charges    OT Eval/Re-eval Minutes 32  -          Total Minutes    Untimed Charges Total Minutes 32  -AC       Total Minutes 32  -AC            User Key  (r) = Recorded By, (t) = Taken By, (c) = Cosigned By    Initials Name Provider Type    AC Celeste Anthony OT Occupational Therapist              Therapy Charges for Today     Code Description Service Date Service Provider Modifiers Qty    [id number] HC OT EVAL LOW COMPLEXITY 3 5/13/2023 Celeste Anthony OT GO 1               Celeste Anthony OT  5/13/2023

## 2023-05-13 NOTE — H&P
[facility]  [facility]: [facility]   Patient Name: Tita Jiménez  : 1935  MRN: [id number]  Primary Care Physician:  Madison Ortiz APRN  Date of admission: 2023    Subjective   Subjective     Chief Complaint: Shortness of breath, generalized weakness    HPI:    Tita Jiméenz is a 88 y.o. female  with a past medical history of type 2 diabetes mellitus, hypertension, hyperlipidemia, ulcerative colitis, presented to the ED for evaluation of shortness of breath, cough.  Patient has been recently diagnosed with COVID infection on Monday.  Since the COVID infection, patient has been progressively becoming weak and now she is not able to get up out of bed in chair by herself.  Patient is currently staying at assisted living facility and as per the patient's sister she can be allowed to stay at a assisted living facility if she is so weak to get up and walk.  Associated with mild shortness of breath, intermittent dry cough, sore throat.  Denies fevers, chills, nausea, vomiting, abdominal pain.  Patient has recently sprained her right ankle, imaging at the urgent care 2 days ago did not show fracture.    Upon arrival to the ED, vital signs temperature 97.8, pulse 96, respirate rate 18, blood pressure 124/65 on 2 L of nasal cannula saturating around 93%.  Labs showed troponin 17, proBNP 160.4, sodium 135, bicarb 18.3, normal creatinine, albumin 3.4, rest of the CMP is nonsignificant, normal CBC.  Chest x-ray showed no acute cardiopulmonary process.  X-ray ankle of the right showed no acute fracture or traumatic malalignment.  EKG showed no significant ST-T wave changes concerning for ischemia.  Patient has been admitted for further evaluation and management of generalized weakness, right ankle sprain, COVID-19 infection.    Personal History     Past Medical History:   Diagnosis Date   • Asthma    • Benign essential tremor 2021   • Carpal tunnel syndrome     • Contusion     small on scalp. skin intact. D/T fall on 10/18/2019   • Depression    • Diabetes mellitus    • GERD (gastroesophageal reflux disease) 9/23/2021   • HTN (hypertension) 10/18/2019   • Hyperlipidemia    • Hypertension    • Mitral valve problem    • Polymyalgia rheumatica    • Stroke     short term memory loss   • Ulcerative colitis 9/23/2021   • Urinary incontinence          Past Surgical History:   Procedure Laterality Date   • ADENOIDECTOMY     • APPENDECTOMY     • BREAST SURGERY      ( L4-L5)   • COLON SURGERY      colon resection   • HYSTERECTOMY     • OOPHORECTOMY     • TONSILLECTOMY           No family history on file.      Social History     Socioeconomic History   • Marital status:    Tobacco Use   • Smoking status: Former     Types: Cigarettes   • Smokeless tobacco: Never   Vaping Use   • Vaping Use: Never used   Substance and Sexual Activity   • Alcohol use: No   • Drug use: No   • Sexual activity: Defer         Home Medications:  DULoxetine, FreeStyle [place] 14 Day [place], FreeStyle [place] 14 Day Sensor, Insulin Glargine, Insulin Lispro, Magnesium, SITagliptin, albuterol sulfate HFA, aspirin, atorvastatin, calcium carb-cholecalciferol, cephalexin, dapagliflozin Propanediol, dilTIAZem CD, famotidine, glipizide, insulin detemir, lisinopril, memantine, metFORMIN, metoprolol succinate XL, metroNIDAZOLE, mometasone-formoterol, montelukast, multivitamin with minerals, nystatin, pantoprazole, polycarbophil, and polyethylene glycol    Allergies:  Allergies   Allergen Reactions   • Paxil [Paroxetine Hcl] Palpitations   • Aricept [Donepezil Hcl] Other (See Comments)     Nightmares   • Levaquin [Levofloxacin] Diarrhea   • Reglan [Metoclopramide] Other (See Comments)     Jitters   • Statins Myalgia     Pravachol, Lipitor, Livalo       Review of Systems   All other systems reviewed and negative except as mentioned above in the HPI    Objective   Objective     Vitals:   Temp:  [97.8 °F (36.6 °C)]  97.8 °F (36.6 °C)  Heart Rate:  [78-96] 78  Resp:  [18] 18  BP: (115-151)/(65-89) 151/82  Flow (L/min):  [2] 2    Physical Exam    Constitutional: Awake, alert, no acute distress   Eyes: Pupils equal, sclerae anicteric, no conjunctival injection   HENT: NCAT, mucous membranes moist   Respiratory: Clear to auscultation bilaterally, nonlabored respirations    Cardiovascular: RRR, no murmurs, rubs, or gallops   Gastrointestinal: Positive bowel sounds, soft, nontender, nondistended   Musculoskeletal: No bilateral ankle edema, no clubbing or cyanosis to extremities, mild swelling in the right ankle    Psychiatric: Appropriate affect, cooperative   Neurologic: Oriented x 3, speech clear       Result Review    Result Review:  I have personally reviewed the results from the time of this admission to 5/13/2023 02:38 EDT and agree with these findings:  [x]  Laboratory  []  Microbiology  [x]  Radiology  [x]  EKG/Telemetry   []  Cardiology/Vascular   []  Pathology  []  Old records  []  Other:         Imaging Results (Last 24 Hours)     Procedure Component Value Units Date/Time    XR Ankle 3+ View Right [[id number]] Collected: 05/12/23 2046     Updated: 05/12/23 2049    Narrative:      PROCEDURE: XR ANKLE 3+ VW RIGHT     COMPARISON: None     INDICATIONS: FALL. RIGHT ANKLE PAIN     FINDINGS:   BONES: No acute fracture is identified.  No focal osseous abnormality is identified.  There is a   small enthesophyte along the inferior calcaneus.  SOFT TISSUES: Negative.  No visible soft tissue swelling.    EFFUSION: None visible.    OTHER: Negative.         Impression:       No acute fracture or traumatic malalignment identified.               SARAH ARTHUR MD         Electronically Signed and Approved By: SARAH ARTHUR MD on 5/12/2023 at 20:46                     XR Chest 1 View [[id number]] Collected: 05/12/23 1934     Updated: 05/12/23 1937    Narrative:      PROCEDURE: XR CHEST 1 VW     COMPAR[facility]: [facility], ,  XR CHEST 1 VW, 2/07/2023, 15:39.     INDICATIONS: Asthma attack.     FINDINGS:   LUNGS: Normal.  No significant pulmonary parenchymal abnormalities.    VASCULATURE: Normal.  Unremarkable pulmonary vasculature.    CARDIAC: Normal.  No cardiac silhouette abnormality or cardiomegaly.    MEDIASTINUM: Normal.  No visible mass or adenopathy.    PLEURA: Normal.  No effusion or pleural thickening.    BONES: Normal.  No fracture or visible bony lesion.    OTHER: Negative.         Impression:       No acute cardiopulmonary process identified.                  SARAH ARTHUR MD         Electronically Signed and Approved By: SARAH ARTHUR MD on 5/12/2023 at 19:34                                  Assessment & Plan   Assessment / Plan     Assessment/Plan:   Generalized weakness  Recent COVID-19 infection  Right ankle sprain  Dehydration  Type 2 diabetes mellitus  Hypertension  Hyperlipidemia  Dementia    Plan  Admit to observation, MedSurg  Fall precautions  Receiving 1 L IV fluids in the ED, LR at 75 cc/h for next 12 hours  Encourage p.o. intake  PT OT consult  Fall precautions  Regular diet  Insulin as per weight-based Glucomander protocol  Dario scheduled  Xopenex every 6 hours as needed  Decadron 6mg PO daily for 10 days as the patient is requiring supplemental oxygen currently   Bronchodilator protocol  Resume other appropriate home medications once reconciled    During my evaluation, when questioned about whether she would like to have chest compressions and intubation if required, patient stated Yes and said that she would like to live longer.  I have been notified by the patient's RN later that paperwork that came from the assisted living facility stated that she is DNR/DNI.  We will continue full code for now.  Will place a palliative consult for goals of care discussion with patient and her family tomorrow and change the code status accordingly     DVT prophylaxis:  No DVT prophylaxis order currently  exists.    CODE STATUS:    Level Of Support Discussed With: Patient  Code Status (Patient has no pulse and is not breathing): CPR (Attempt to Resuscitate)  Medical Interventions (Patient has pulse or is breathing): Full Support      Admission Status:  I believe this patient meets inpatient status.    Part of this note may be an electronic transcription/translation of spoken language to printed text using the Dragon Dictation System    Charlee Fontanez MD

## 2023-05-13 NOTE — PLAN OF CARE
Goal Outcome Evaluation:  Plan of Care Reviewed With: patient        Progress: no change  Outcome Evaluation: Patient presents with limitations that impede his/her ability to perform ADLS. The skills of a therapist are necessary to maximize independence with ADLs.  Recommend inpatient rehab following hospital discharge.

## 2023-05-13 NOTE — PROGRESS NOTES
Respiratory Therapist Broncho-Pulmonary Hygiene Progress Note      Patient Name:  Tita Jiménez  YOB: 1935    Tita Jiménez meets the qualification for Level 1 of the Bronco-Pulmonary Hygiene Protocol. This was based on my daily patient assessment and includes review of chest x-ray results, cough ability and quality, oxygenation, secretions or risk for secretion development and patient mobility.     Broncho-Pulmonary Hygiene Assessment:    Level of Movement: Actively changing positions without assistance  Alert/ oriented/ cooperative    Breath Sounds: Clear to slightly diminished    Cough: Strong, effective    Chest X-Ray: No CXR available    Sputum Productions: None or small amount of thin or watery secretions with effective cough    History and Physical: Chronic condition    SpO2 to Oxygen Need: greater than 92% on room air or  less than 3L nasal canula    Current SpO2 is:92 on 2    Based on this information, I have completed the following interventions: Aerobika with bronchodialtor medication or TID      Electronically signed by Clif Quiroz RRT, 05/13/23, 5:09 AM EDT.    Respiratory Therapist Broncho-Pulmonary Hygiene Progress Note      Patient Name:  Tita Jiménez  YOB: 1935    Tita Jiménez meets the qualification for Level 1 of the Bronco-Pulmonary Hygiene Protocol. This was based on my daily patient assessment and includes review of chest x-ray results, cough ability and quality, oxygenation, secretions or risk for secretion development and patient mobility.     Broncho-Pulmonary Hygiene Assessment:    Level of Movement: Low level of mobility  Lethargic uncoorperative    Breath Sounds: Clear to slightly diminished    Cough: Strong, effective    Chest X-Ray: No CXR available    Sputum Productions: None or small amount of thin or watery secretions with effective cough    History and Physical: Chronic condition    SpO2 to Oxygen Need: greater than 92% on  room air or  less than 3L nasal canula    Current SpO2 is:92 on 2L    Based on this information, I have completed the following interventions: Aerobika with bronchodialtor medication or TID      Electronically signed by Clif Quiroz RRT, 05/13/23, 5:09 AM EDT.

## 2023-05-14 LAB
ANION GAP SERPL CALCULATED.3IONS-SCNC: 8 MMOL/L (ref 5–15)
BASOPHILS # BLD AUTO: 0.01 10*3/MM3 (ref 0–0.2)
BASOPHILS NFR BLD AUTO: 0.2 % (ref 0–1.5)
BUN SERPL-MCNC: 24 MG/DL (ref 8–23)
BUN/CREAT SERPL: 32.4 (ref 7–25)
CALCIUM SPEC-SCNC: 9.9 MG/DL (ref 8.6–10.5)
CHLORIDE SERPL-SCNC: 104 MMOL/L (ref 98–107)
CO2 SERPL-SCNC: 26 MMOL/L (ref 22–29)
CREAT SERPL-MCNC: 0.74 MG/DL (ref 0.57–1)
DEPRECATED RDW RBC AUTO: 43.4 FL (ref 37–54)
EGFRCR SERPLBLD CKD-EPI 2021: 78.4 ML/MIN/1.73
EOSINOPHIL # BLD AUTO: 0 10*3/MM3 (ref 0–0.4)
EOSINOPHIL NFR BLD AUTO: 0 % (ref 0.3–6.2)
ERYTHROCYTE [DISTWIDTH] IN BLOOD BY AUTOMATED COUNT: 14.4 % (ref 12.3–15.4)
GLUCOSE BLDC GLUCOMTR-MCNC: 135 MG/DL (ref 70–99)
GLUCOSE BLDC GLUCOMTR-MCNC: 241 MG/DL (ref 70–99)
GLUCOSE BLDC GLUCOMTR-MCNC: 244 MG/DL (ref 70–99)
GLUCOSE BLDC GLUCOMTR-MCNC: 346 MG/DL (ref 70–99)
GLUCOSE SERPL-MCNC: 153 MG/DL (ref 65–99)
HCT VFR BLD AUTO: 38.4 % (ref 34–46.6)
HGB BLD-MCNC: 12.2 G/DL (ref 12–15.9)
IMM GRANULOCYTES # BLD AUTO: 0.04 10*3/MM3 (ref 0–0.05)
IMM GRANULOCYTES NFR BLD AUTO: 0.6 % (ref 0–0.5)
LYMPHOCYTES # BLD AUTO: 0.7 10*3/MM3 (ref 0.7–3.1)
LYMPHOCYTES NFR BLD AUTO: 11 % (ref 19.6–45.3)
MAGNESIUM SERPL-MCNC: 2 MG/DL (ref 1.6–2.4)
MCH RBC QN AUTO: 26.4 PG (ref 26.6–33)
MCHC RBC AUTO-ENTMCNC: 31.8 G/DL (ref 31.5–35.7)
MCV RBC AUTO: 83.1 FL (ref 79–97)
MONOCYTES # BLD AUTO: 0.58 10*3/MM3 (ref 0.1–0.9)
MONOCYTES NFR BLD AUTO: 9.1 % (ref 5–12)
NEUTROPHILS NFR BLD AUTO: 5.05 10*3/MM3 (ref 1.7–7)
NEUTROPHILS NFR BLD AUTO: 79.1 % (ref 42.7–76)
NRBC BLD AUTO-RTO: 0 /100 WBC (ref 0–0.2)
PHOSPHATE SERPL-MCNC: 2.7 MG/DL (ref 2.5–4.5)
PLATELET # BLD AUTO: 206 10*3/MM3 (ref 140–450)
PMV BLD AUTO: 9.3 FL (ref 6–12)
POTASSIUM SERPL-SCNC: 4.4 MMOL/L (ref 3.5–5.2)
RBC # BLD AUTO: 4.62 10*6/MM3 (ref 3.77–5.28)
SODIUM SERPL-SCNC: 138 MMOL/L (ref 136–145)
WBC NRBC COR # BLD: 6.38 10*3/MM3 (ref 3.4–10.8)

## 2023-05-14 PROCEDURE — 94799 UNLISTED PULMONARY SVC/PX: CPT

## 2023-05-14 PROCEDURE — 84100 ASSAY OF PHOSPHORUS: CPT | Performed by: INTERNAL MEDICINE

## 2023-05-14 PROCEDURE — 82948 REAGENT STRIP/BLOOD GLUCOSE: CPT

## 2023-05-14 PROCEDURE — 80048 BASIC METABOLIC PNL TOTAL CA: CPT | Performed by: INTERNAL MEDICINE

## 2023-05-14 PROCEDURE — 97110 THERAPEUTIC EXERCISES: CPT

## 2023-05-14 PROCEDURE — 63710000001 INSULIN LISPRO (HUMAN) PER 5 UNITS: Performed by: INTERNAL MEDICINE

## 2023-05-14 PROCEDURE — G0378 HOSPITAL OBSERVATION PER HR: HCPCS

## 2023-05-14 PROCEDURE — 25010000002 ENOXAPARIN PER 10 MG: Performed by: INTERNAL MEDICINE

## 2023-05-14 PROCEDURE — 85025 COMPLETE CBC W/AUTO DIFF WBC: CPT | Performed by: INTERNAL MEDICINE

## 2023-05-14 PROCEDURE — 83735 ASSAY OF MAGNESIUM: CPT | Performed by: INTERNAL MEDICINE

## 2023-05-14 PROCEDURE — 94664 DEMO&/EVAL PT USE INHALER: CPT

## 2023-05-14 PROCEDURE — 99233 SBSQ HOSP IP/OBS HIGH 50: CPT | Performed by: INTERNAL MEDICINE

## 2023-05-14 PROCEDURE — 97530 THERAPEUTIC ACTIVITIES: CPT

## 2023-05-14 PROCEDURE — 63710000001 DEXAMETHASONE PER 0.25 MG: Performed by: STUDENT IN AN ORGANIZED HEALTH CARE EDUCATION/TRAINING PROGRAM

## 2023-05-14 PROCEDURE — 63710000001 INSULIN DETEMIR PER 5 UNITS: Performed by: INTERNAL MEDICINE

## 2023-05-14 RX ORDER — FLUTICASONE PROPIONATE 110 UG/1
2 AEROSOL, METERED RESPIRATORY (INHALATION)
Status: DISCONTINUED | OUTPATIENT
Start: 2023-05-14 | End: 2023-05-23 | Stop reason: HOSPADM

## 2023-05-14 RX ORDER — AMOXICILLIN 250 MG
1 CAPSULE ORAL 2 TIMES DAILY
Status: DISCONTINUED | OUTPATIENT
Start: 2023-05-14 | End: 2023-05-23 | Stop reason: HOSPADM

## 2023-05-14 RX ORDER — POLYETHYLENE GLYCOL 3350 17 G/17G
17 POWDER, FOR SOLUTION ORAL DAILY
Status: DISCONTINUED | OUTPATIENT
Start: 2023-05-14 | End: 2023-05-23 | Stop reason: HOSPADM

## 2023-05-14 RX ADMIN — LEVALBUTEROL HYDROCHLORIDE 1.25 MG: 1.25 SOLUTION RESPIRATORY (INHALATION) at 05:14

## 2023-05-14 RX ADMIN — FAMOTIDINE 10 MG: 10 TABLET ORAL at 08:21

## 2023-05-14 RX ADMIN — Medication 10 ML: at 20:29

## 2023-05-14 RX ADMIN — LEVALBUTEROL HYDROCHLORIDE 1.25 MG: 1.25 SOLUTION RESPIRATORY (INHALATION) at 20:37

## 2023-05-14 RX ADMIN — ATORVASTATIN CALCIUM 20 MG: 20 TABLET, FILM COATED ORAL at 20:29

## 2023-05-14 RX ADMIN — DEXAMETHASONE 6 MG: 0.5 TABLET ORAL at 08:21

## 2023-05-14 RX ADMIN — MONTELUKAST 10 MG: 10 TABLET, FILM COATED ORAL at 20:29

## 2023-05-14 RX ADMIN — HYDROCODONE POLISTIREX AND CHLORPHENIRAMINE POLISTIREX 5 ML: 10; 8 SUSPENSION, EXTENDED RELEASE ORAL at 20:29

## 2023-05-14 RX ADMIN — Medication 10 ML: at 08:21

## 2023-05-14 RX ADMIN — LEVALBUTEROL HYDROCHLORIDE 1.25 MG: 1.25 SOLUTION RESPIRATORY (INHALATION) at 13:48

## 2023-05-14 RX ADMIN — DOCUSATE SODIUM 50MG AND SENNOSIDES 8.6MG 1 TABLET: 8.6; 5 TABLET, FILM COATED ORAL at 10:13

## 2023-05-14 RX ADMIN — NYSTATIN: 100000 POWDER TOPICAL at 08:22

## 2023-05-14 RX ADMIN — INSULIN LISPRO 5 UNITS: 100 INJECTION, SOLUTION INTRAVENOUS; SUBCUTANEOUS at 12:33

## 2023-05-14 RX ADMIN — LISINOPRIL 20 MG: 20 TABLET ORAL at 08:21

## 2023-05-14 RX ADMIN — ARFORMOTEROL TARTRATE 15 MCG: 15 SOLUTION RESPIRATORY (INHALATION) at 20:37

## 2023-05-14 RX ADMIN — ASPIRIN 81 MG: 81 TABLET, COATED ORAL at 08:21

## 2023-05-14 RX ADMIN — ARFORMOTEROL TARTRATE 15 MCG: 15 SOLUTION RESPIRATORY (INHALATION) at 07:51

## 2023-05-14 RX ADMIN — FLUTICASONE PROPIONATE 2 PUFF: 110 AEROSOL, METERED RESPIRATORY (INHALATION) at 20:37

## 2023-05-14 RX ADMIN — INSULIN DETEMIR 15 UNITS: 100 INJECTION, SOLUTION SUBCUTANEOUS at 20:29

## 2023-05-14 RX ADMIN — POLYETHYLENE GLYCOL 3350 17 G: 17 POWDER, FOR SOLUTION ORAL at 10:13

## 2023-05-14 RX ADMIN — METOPROLOL SUCCINATE 25 MG: 25 TABLET, EXTENDED RELEASE ORAL at 08:21

## 2023-05-14 RX ADMIN — NYSTATIN: 100000 POWDER TOPICAL at 20:29

## 2023-05-14 RX ADMIN — ENOXAPARIN SODIUM 40 MG: 100 INJECTION SUBCUTANEOUS at 08:20

## 2023-05-14 RX ADMIN — DILTIAZEM HYDROCHLORIDE 240 MG: 240 CAPSULE, COATED, EXTENDED RELEASE ORAL at 08:21

## 2023-05-14 RX ADMIN — HYDROCODONE POLISTIREX AND CHLORPHENIRAMINE POLISTIREX 5 ML: 10; 8 SUSPENSION, EXTENDED RELEASE ORAL at 08:20

## 2023-05-14 RX ADMIN — LEVALBUTEROL HYDROCHLORIDE 1.25 MG: 1.25 SOLUTION RESPIRATORY (INHALATION) at 07:50

## 2023-05-14 RX ADMIN — DOCUSATE SODIUM 50MG AND SENNOSIDES 8.6MG 1 TABLET: 8.6; 5 TABLET, FILM COATED ORAL at 20:29

## 2023-05-14 RX ADMIN — INSULIN DETEMIR 15 UNITS: 100 INJECTION, SOLUTION SUBCUTANEOUS at 08:20

## 2023-05-14 RX ADMIN — INSULIN LISPRO 5 UNITS: 100 INJECTION, SOLUTION INTRAVENOUS; SUBCUTANEOUS at 17:27

## 2023-05-14 NOTE — PLAN OF CARE
Goal Outcome Evaluation:  Plan of Care Reviewed With: patient        Progress: no change  Outcome Evaluation: patient is alert and oriented this shift with some intermittent forgetfullness. up to chair x2 this shift. blood glucose monitored. started on bowel regimen this shift. no other changes at this time.

## 2023-05-14 NOTE — PROGRESS NOTES
[facility]   Hospitalist Progress Note  Date: 2023  Patient Name: Tita Jiménez  : 1935  MRN: [id number]  Date of admission: 2023      Subjective   Subjective     Chief Complaint: Shortness of air, weakness    Summary: 88 y.o. female  with a past medical history of type 2 diabetes mellitus, hypertension, hyperlipidemia, ulcerative colitis, presented to the ED for evaluation of shortness of breath, cough.  Patient has been recently diagnosed with COVID infection on Monday.  Since the COVID infection, patient has been progressively becoming weak and now she is not able to get up out of bed in chair by herself.  Patient is currently staying at assisted living facility and as per the patient's sister she can be allowed to stay at a assisted living facility if she is so weak to get up and walk.  Associated with mild shortness of breath, intermittent dry cough, sore throat.  Denies fevers, chills, nausea, vomiting, abdominal pain.  Patient has recently sprained her right ankle, imaging at the urgent care 2 days ago did not show fracture.     Interval Followup: Starting to feel a bit better.  Was weaned off of oxygen and is doing well on room air thus far.  Still feels very weak, she is two-person assist currently and required a lot of assistance to get to the bedside chair per nursing.  Her appetite is starting to improve, she ate all of her breakfast which she states is the first time she been to do this in a week.    Objective   Objective     Vitals:   Temp:  [97.7 °F (36.5 °C)-97.9 °F (36.6 °C)] 97.7 °F (36.5 °C)  Heart Rate:  [60-76] 60  Resp:  [18] 18  BP: (125-176)/(50-95) 176/95  Flow (L/min):  [2] 2  Physical Exam    Constitutional: Elderly female, sitting up in bedside chair, awake, alert, no acute distress    Respiratory: Clear to auscultation bilaterally, scattered rhonchi, nonlabored respirations    Cardiovascular: RRR, no MRG   Gastrointestinal: Positive bowel sounds, soft,  nontender, nondistended   Neurologic: Oriented x 3, globally weak, strength symmetric in all extremities, Cranial Nerves grossly intact to confrontation, speech clear    Result Review    Result Review:  I have personally reviewed the results below:  [x]  Laboratory personally reviewed blood sugars, CBC, BMP, magnesium, phosphorus  []  Microbiology  []  Radiology  []  EKG/Telemetry   []  Cardiology/Vascular   []  Pathology  []  Old records  []  Other:  CBC        5/12/2023    19:11 5/13/2023    05:31 5/14/2023    06:50   CBC   WBC 7.19   5.68   6.38     RBC 4.92   4.52   4.62     Hemoglobin 13.2   12.1   12.2     Hematocrit 40.8   38.2   38.4     MCV 82.9   84.5   83.1     MCH 26.8   26.8   26.4     MCHC 32.4   31.7   31.8     RDW 14.9   14.8   14.4     Platelets 217   214   206       CMP        5/12/2023    19:11 5/13/2023    05:31 5/14/2023    06:50   CMP   Glucose 303   160   153     BUN 25   23   24     Creatinine 0.95   0.83   0.74     EGFR 58.1   68.3   78.4     Sodium 135   139   138     Potassium 3.9   4.4   4.4     Chloride 102   107   104     Calcium 10.3   9.8   9.9     Total Protein 6.4   5.8      Albumin 3.4   3.1      Globulin 3.0   2.7      Total Bilirubin 0.2   0.2      Alkaline Phosphatase 116   101      AST (SGOT) 16   13      ALT (SGPT) 20   18      Albumin/Globulin Ratio 1.1   1.1      BUN/Creatinine Ratio 26.3   27.7   32.4     Anion Gap 14.7   8.1   8.0         Assessment & Plan   Assessment / Plan     Assessment/Plan:  COVID-19 pneumonia  Acute hypoxemia  Right ankle sprain  Clinical dehydration  Significant weakness secondary to above  Anorexia due to viral infection  Type 2 diabetes mellitus  Hypertension  Hyperlipidemia  Dementia    Continue to monitor in the hospital for management of the above  Discontinue IV fluids, encourage oral intake  Antiemetics as needed  Continue with Decadron 6 mg p.o. daily x10 days  Continue with scheduled Brovana, add Flovent, continue scheduled Xopenex 3 times  daily  Bronchopulmonary hygiene  Encourage out of bed, up to chair  Continue with scheduled Tussionex, this is helped her cough significantly  Expected hyperglycemia with Decadron, increase detemir to 15 units twice daily, moderate dose sliding scale insulin  Continue home atorvastatin, aspirin, diltiazem  PT/OT consulted and recommend higher level of care that her previous assisted living facility.  Social work aware and working on placement  Due to advanced age and COVID, patient remains high risk for further decompensation requiring inpatient and close monitoring  Trend renal function and electrolytes with a.m. BMP, magnesium   Trend Hgb and WBC with a.m. CBC     Discussed plan with RN.    DVT prophylaxis:  Medical DVT prophylaxis orders are present.    CODE STATUS:   Level Of Support Discussed With: Patient  Code Status (Patient has no pulse and is not breathing): CPR (Attempt to Resuscitate)  Medical Interventions (Patient has pulse or is breathing): Full Support        Electronically signed by Oleg Hammonds MD, 05/14/23, 2:31 PM EDT.

## 2023-05-14 NOTE — THERAPY TREATMENT NOTE
Acute Care - Physical Therapy Treatment Note  RUPESH Villalobos     Patient Name: Tita Jiménez  : 1935  MRN: [id number]  Today's Date: 2023      Visit Dx:     ICD-10-CM ICD-9-CM   1. Dehydration  E86.0 276.51   2. Generalized weakness  R53.1 780.79   3. Decreased activities of daily living (ADL)  Z78.9 V49.89   4. Difficulty in walking  R26.2 719.7     Patient Active Problem List   Diagnosis   • Compression fracture of T1 -T2 vertebra (CMS/HCC)   • Fall   • Contusion of scalp   • Closed head injury   • Asthma   • Depression   • Reflux esophagitis   • Bladder incontinence   • Bowel incontinence   • High cholesterol   • Diabetes mellitus, type II (HCC)   • Hypertension   • Ulcerative colitis (HCC)   • GERD (gastroesophageal reflux disease)   • Benign essential tremor   • Carpal tunnel syndrome   • Ataxia   • Breast cancer screening   • Hypoxia   • Cytokine release syndrome, grade 1   • Pneumonia due to COVID-19 virus   • Weakness   • Cognitive impairment   • Difficulty walking   • At risk for venous thromboembolism (VTE)   • Lower abdominal pain   • 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 skin intact. D/T fall on 10/18/2019   • Depression    • Diabetes mellitus    • GERD (gastroesophageal reflux disease) 2021   • HTN (hypertension) 10/18/2019   • Hyperlipidemia    • Hypertension    • Mitral valve problem    • Polymyalgia rheumatica    • Stroke     short term memory loss   • Ulcerative colitis 2021   • Urinary incontinence      Past Surgical History:   Procedure Laterality Date   • ADENOIDECTOMY     • APPENDECTOMY     • BREAST SURGERY      ( L4-L5)   • COLON SURGERY      colon resection   • HYSTERECTOMY     • OOPHORECTOMY     • TONSILLECTOMY       PT Assessment (last 12 hours)     PT Evaluation  and Treatment     Row Name 05/14/23 1034          Physical Therapy Time and Intention    Subjective Information complains of;weakness;fatigue;pain  -DK     Document Type therapy note (daily note)  -DK     Mode of Treatment individual therapy;physical therapy  -DK     Patient Effort adequate  -DK     Symptoms Noted During/After Treatment fatigue;increased pain  -DK     Comment Pt appears rather lethargic, some confusion noted.  She required constant cues to keep performing exercises, appeared to have some difficulty moving the left leg.  -     Row Name 05/14/23 1034          Pain    Pretreatment Pain Rating 3/10  -DK     Posttreatment Pain Rating 3/10  -DK     Pain Location - Side/Orientation Right  -DK     Pain Location generalized  -DK     Pain Location - foot  -DK     Pain Intervention(s) Distraction;Therapeutic presence  -     Row Name 05/14/23 1034          Cognition    Affect/Mental Status (Cognition) confused;low arousal/lethargic  -DK     Orientation Status (Cognition) oriented to;person  -DK     Follows Commands (Cognition) physical/tactile prompts required;repetition of directions required;verbal cues/prompting required  -DK     Cognitive Function attention deficit  -DK     Attention Deficit (Cognition) minimal deficit;arousal/alertness;concentration;focused/sustained attention  -DK     Personal Safety Interventions gait belt;nonskid shoes/slippers when out of bed;supervised activity  -     Row Name 05/14/23 1034          Motor Skills    Motor Skills --  therapeutic exercises  -DK     Coordination WFL  -DK     Therapeutic Exercise hip;knee;ankle  -DK     Additional Documentation --  Pt appears to have some difficulty following cues, focusing on tasks, moving the left leg. Transfers deferred this session. She required frequent / constant cues to perform exercises.  -     Row Name 05/14/23 1034          Hip (Therapeutic Exercise)    Hip (Therapeutic Exercise) AROM (active range of motion)  -DK     Hip  AROM (Therapeutic Exercise) bilateral;flexion;extension;aBduction;aDduction;sitting;15 repititions  -     Row Name 05/14/23 1034          Knee (Therapeutic Exercise)    Knee (Therapeutic Exercise) AROM (active range of motion)  -DK     Knee AROM (Therapeutic Exercise) bilateral;flexion;extension;LAQ (long arc quad);sitting;15 repititions  -     Row Name 05/14/23 1034          Ankle (Therapeutic Exercise)    Ankle (Therapeutic Exercise) AROM (active range of motion)  -DK     Ankle AROM (Therapeutic Exercise) bilateral;dorsiflexion;plantarflexion;sitting;20 repititions  -     Row Name 05/14/23 1034          Plan of Care Review    Plan of Care Reviewed With patient  -     Progress no change  -     Row Name 05/14/23 1034          Positioning and Restraints    Pre-Treatment Position sitting in chair/recliner  -DK     Post Treatment Position chair  -DK     In Chair reclined;call light within reach;encouraged to call for assist;exit alarm on;legs elevated;heels elevated  -     Row Name 05/14/23 1034          Therapy Assessment/Plan (PT)    Rehab Potential (PT) fair, will monitor progress closely  -     Criteria for Skilled Interventions Met (PT) skilled treatment is necessary  -     Therapy Frequency (PT) daily  -     Problem List (PT) problems related to;balance;cognition;mobility;strength;pain;hearing  -DK     Activity Limitations Related to Problem List (PT) unable to ambulate safely;unable to transfer safely  -     Row Name 05/14/23 1034          Progress Summary (PT)    Progress Toward Functional Goals (PT) progress toward functional goals is fair  -           User Key  (r) = Recorded By, (t) = Taken By, (c) = Cosigned By    Initials Name Provider Type    Joellen Fleming PTA Physical Therapist Assistant                Physical Therapy Education     Title: PT OT SLP Therapies (Done)     Topic: Physical Therapy (Done)     Point: Mobility training (Done)     Learning Progress Summary            Patient Acceptance, E, VU by  at 5/13/2023 1004                   Point: Home exercise program (Done)     Learning Progress Summary           Patient Acceptance, E, VU by  at 5/13/2023 1004                   Point: Body mechanics (Done)     Learning Progress Summary           Patient Acceptance, E, VU by  at 5/13/2023 1004                   Point: Precautions (Done)     Learning Progress Summary           Patient Acceptance, E, VU by  at 5/13/2023 1004                               User Key     Initials Effective Dates Name Provider Type Discipline     06/16/21 -  Celeste Anthony OT Occupational Therapist OT              PT Recommendation and Plan  Planned Therapy Interventions (PT): balance training, bed mobility training, gait training, strengthening, transfer training  Therapy Frequency (PT): daily  Progress Summary (PT)  Progress Toward Functional Goals (PT): progress toward functional goals is fair  Plan of Care Reviewed With: patient  Progress: no change   Outcome Measures     Row Name 05/14/23 1034 05/13/23 1300          How much help from another person do you currently need...    Turning from your back to your side while in flat bed without using bedrails? 3  -DK 3  -MYESHA     Moving from lying on back to sitting on the side of a flat bed without bedrails? 3  -DK 3  -MYESHA     Moving to and from a bed to a chair (including a wheelchair)? 2  -DK 2  -MYESHA     Standing up from a chair using your arms (e.g., wheelchair, bedside chair)? 2  -DK 2  -MYESHA     Climbing 3-5 steps with a railing? 1  -DK 1  -MYESHA     To walk in hospital room? 1  -DK 1  -MYESHA     AM-PAC 6 Clicks Score (PT) 12  -DK 12  -MYESHA        Functional Assessment    Outcome Measure Options AM-PAC 6 Clicks Basic Mobility (PT)  -DK AM-PAC 6 Clicks Basic Mobility (PT)  -MYESHA           User Key  (r) = Recorded By, (t) = Taken By, (c) = Cosigned By    Initials Name Provider Type    Joellen Fleming, PTA Physical Therapist Assistant    Jeff Fernandez, PT  Physical Therapist                 Time Calculation:    PT Charges     Row Name 05/14/23 1039             Time Calculation    PT Received On 05/14/23  -DK      PT Goal Re-Cert Due Date 05/22/23  -DK         Timed Charges    80307 - PT Therapeutic Exercise Minutes 17  -DK      02358 - PT Therapeutic Activity Minutes 6  -DK         Total Minutes    Timed Charges Total Minutes 23  -DK       Total Minutes 23  -DK            User Key  (r) = Recorded By, (t) = Taken By, (c) = Cosigned By    Initials Name Provider Type    Joellen Fleming PTA Physical Therapist Assistant              Therapy Charges for Today     Code Description Service Date Service Provider Modifiers Qty    07380959547 HC PT THER PROC EA 15 MIN 5/14/2023 Joellen Cali, MANAV GP 1    47816831961 HC PT THERAPEUTIC ACT EA 15 MIN 5/14/2023 Joellen Cali PTA GP 1          PT G-Codes  Outcome Measure Options: AM-PAC 6 Clicks Basic Mobility (PT)  AM-PAC 6 Clicks Score (PT): 12  AM-PAC 6 Clicks Score (OT): 17    Joellen Cali PTA  5/14/2023

## 2023-05-15 LAB
ANION GAP SERPL CALCULATED.3IONS-SCNC: 7.3 MMOL/L (ref 5–15)
BASOPHILS # BLD AUTO: 0.01 10*3/MM3 (ref 0–0.2)
BASOPHILS NFR BLD AUTO: 0.1 % (ref 0–1.5)
BUN SERPL-MCNC: 25 MG/DL (ref 8–23)
BUN/CREAT SERPL: 31.3 (ref 7–25)
CALCIUM SPEC-SCNC: 10.1 MG/DL (ref 8.6–10.5)
CHLORIDE SERPL-SCNC: 102 MMOL/L (ref 98–107)
CO2 SERPL-SCNC: 25.7 MMOL/L (ref 22–29)
CREAT SERPL-MCNC: 0.8 MG/DL (ref 0.57–1)
DEPRECATED RDW RBC AUTO: 41.4 FL (ref 37–54)
EGFRCR SERPLBLD CKD-EPI 2021: 71.4 ML/MIN/1.73
EOSINOPHIL # BLD AUTO: 0 10*3/MM3 (ref 0–0.4)
EOSINOPHIL NFR BLD AUTO: 0 % (ref 0.3–6.2)
ERYTHROCYTE [DISTWIDTH] IN BLOOD BY AUTOMATED COUNT: 14 % (ref 12.3–15.4)
GLUCOSE BLDC GLUCOMTR-MCNC: 156 MG/DL (ref 70–99)
GLUCOSE BLDC GLUCOMTR-MCNC: 177 MG/DL (ref 70–99)
GLUCOSE BLDC GLUCOMTR-MCNC: 251 MG/DL (ref 70–99)
GLUCOSE BLDC GLUCOMTR-MCNC: 401 MG/DL (ref 70–99)
GLUCOSE SERPL-MCNC: 202 MG/DL (ref 65–99)
HCT VFR BLD AUTO: 38.8 % (ref 34–46.6)
HGB BLD-MCNC: 12.7 G/DL (ref 12–15.9)
IMM GRANULOCYTES # BLD AUTO: 0.13 10*3/MM3 (ref 0–0.05)
IMM GRANULOCYTES NFR BLD AUTO: 1.7 % (ref 0–0.5)
LYMPHOCYTES # BLD AUTO: 0.8 10*3/MM3 (ref 0.7–3.1)
LYMPHOCYTES NFR BLD AUTO: 10.5 % (ref 19.6–45.3)
MAGNESIUM SERPL-MCNC: 2.3 MG/DL (ref 1.6–2.4)
MCH RBC QN AUTO: 26.7 PG (ref 26.6–33)
MCHC RBC AUTO-ENTMCNC: 32.7 G/DL (ref 31.5–35.7)
MCV RBC AUTO: 81.7 FL (ref 79–97)
MONOCYTES # BLD AUTO: 0.61 10*3/MM3 (ref 0.1–0.9)
MONOCYTES NFR BLD AUTO: 8 % (ref 5–12)
NEUTROPHILS NFR BLD AUTO: 6.05 10*3/MM3 (ref 1.7–7)
NEUTROPHILS NFR BLD AUTO: 79.7 % (ref 42.7–76)
NRBC BLD AUTO-RTO: 0 /100 WBC (ref 0–0.2)
PHOSPHATE SERPL-MCNC: 3 MG/DL (ref 2.5–4.5)
PLATELET # BLD AUTO: 226 10*3/MM3 (ref 140–450)
PMV BLD AUTO: 9.7 FL (ref 6–12)
POTASSIUM SERPL-SCNC: 4.6 MMOL/L (ref 3.5–5.2)
RBC # BLD AUTO: 4.75 10*6/MM3 (ref 3.77–5.28)
SODIUM SERPL-SCNC: 135 MMOL/L (ref 136–145)
WBC NRBC COR # BLD: 7.6 10*3/MM3 (ref 3.4–10.8)

## 2023-05-15 PROCEDURE — 97530 THERAPEUTIC ACTIVITIES: CPT

## 2023-05-15 PROCEDURE — 63710000001 INSULIN LISPRO (HUMAN) PER 5 UNITS

## 2023-05-15 PROCEDURE — 84100 ASSAY OF PHOSPHORUS: CPT | Performed by: INTERNAL MEDICINE

## 2023-05-15 PROCEDURE — 94799 UNLISTED PULMONARY SVC/PX: CPT

## 2023-05-15 PROCEDURE — 83735 ASSAY OF MAGNESIUM: CPT | Performed by: INTERNAL MEDICINE

## 2023-05-15 PROCEDURE — 63710000001 INSULIN DETEMIR PER 5 UNITS: Performed by: INTERNAL MEDICINE

## 2023-05-15 PROCEDURE — 25010000002 ENOXAPARIN PER 10 MG: Performed by: INTERNAL MEDICINE

## 2023-05-15 PROCEDURE — 80048 BASIC METABOLIC PNL TOTAL CA: CPT | Performed by: INTERNAL MEDICINE

## 2023-05-15 PROCEDURE — 63710000001 INSULIN LISPRO (HUMAN) PER 5 UNITS: Performed by: INTERNAL MEDICINE

## 2023-05-15 PROCEDURE — 94664 DEMO&/EVAL PT USE INHALER: CPT

## 2023-05-15 PROCEDURE — 63710000001 DEXAMETHASONE PER 0.25 MG: Performed by: STUDENT IN AN ORGANIZED HEALTH CARE EDUCATION/TRAINING PROGRAM

## 2023-05-15 PROCEDURE — 97110 THERAPEUTIC EXERCISES: CPT

## 2023-05-15 PROCEDURE — 85025 COMPLETE CBC W/AUTO DIFF WBC: CPT | Performed by: INTERNAL MEDICINE

## 2023-05-15 PROCEDURE — 99232 SBSQ HOSP IP/OBS MODERATE 35: CPT | Performed by: INTERNAL MEDICINE

## 2023-05-15 PROCEDURE — 82948 REAGENT STRIP/BLOOD GLUCOSE: CPT

## 2023-05-15 RX ORDER — LISINOPRIL 20 MG/1
40 TABLET ORAL DAILY
Status: DISCONTINUED | OUTPATIENT
Start: 2023-05-15 | End: 2023-05-18

## 2023-05-15 RX ORDER — INSULIN LISPRO 100 [IU]/ML
5 INJECTION, SOLUTION INTRAVENOUS; SUBCUTANEOUS ONCE
Status: COMPLETED | OUTPATIENT
Start: 2023-05-15 | End: 2023-05-15

## 2023-05-15 RX ORDER — LEVALBUTEROL INHALATION SOLUTION 1.25 MG/3ML
1.25 SOLUTION RESPIRATORY (INHALATION) EVERY 4 HOURS PRN
Status: DISCONTINUED | OUTPATIENT
Start: 2023-05-15 | End: 2023-05-15

## 2023-05-15 RX ORDER — LISINOPRIL 20 MG/1
20 TABLET ORAL DAILY
Status: DISCONTINUED | OUTPATIENT
Start: 2023-05-15 | End: 2023-05-15

## 2023-05-15 RX ORDER — LEVALBUTEROL INHALATION SOLUTION 1.25 MG/3ML
1.25 SOLUTION RESPIRATORY (INHALATION) EVERY 4 HOURS PRN
Status: DISCONTINUED | OUTPATIENT
Start: 2023-05-15 | End: 2023-05-23 | Stop reason: HOSPADM

## 2023-05-15 RX ADMIN — ENOXAPARIN SODIUM 40 MG: 100 INJECTION SUBCUTANEOUS at 08:33

## 2023-05-15 RX ADMIN — Medication 10 ML: at 08:33

## 2023-05-15 RX ADMIN — ARFORMOTEROL TARTRATE 15 MCG: 15 SOLUTION RESPIRATORY (INHALATION) at 20:23

## 2023-05-15 RX ADMIN — ATORVASTATIN CALCIUM 20 MG: 20 TABLET, FILM COATED ORAL at 20:56

## 2023-05-15 RX ADMIN — LEVALBUTEROL HYDROCHLORIDE 1.25 MG: 1.25 SOLUTION RESPIRATORY (INHALATION) at 12:44

## 2023-05-15 RX ADMIN — ASPIRIN 81 MG: 81 TABLET, COATED ORAL at 08:34

## 2023-05-15 RX ADMIN — HYDROCODONE POLISTIREX AND CHLORPHENIRAMINE POLISTIREX 5 ML: 10; 8 SUSPENSION, EXTENDED RELEASE ORAL at 20:57

## 2023-05-15 RX ADMIN — LEVALBUTEROL HYDROCHLORIDE 1.25 MG: 1.25 SOLUTION RESPIRATORY (INHALATION) at 07:09

## 2023-05-15 RX ADMIN — INSULIN LISPRO 3 UNITS: 100 INJECTION, SOLUTION INTRAVENOUS; SUBCUTANEOUS at 11:52

## 2023-05-15 RX ADMIN — MONTELUKAST 10 MG: 10 TABLET, FILM COATED ORAL at 20:56

## 2023-05-15 RX ADMIN — INSULIN DETEMIR 20 UNITS: 100 INJECTION, SOLUTION SUBCUTANEOUS at 20:57

## 2023-05-15 RX ADMIN — POLYETHYLENE GLYCOL 3350 17 G: 17 POWDER, FOR SOLUTION ORAL at 08:33

## 2023-05-15 RX ADMIN — FAMOTIDINE 10 MG: 10 TABLET ORAL at 08:33

## 2023-05-15 RX ADMIN — FLUTICASONE PROPIONATE 2 PUFF: 110 AEROSOL, METERED RESPIRATORY (INHALATION) at 20:23

## 2023-05-15 RX ADMIN — SERTRALINE HYDROCHLORIDE 50 MG: 50 TABLET ORAL at 08:37

## 2023-05-15 RX ADMIN — INSULIN DETEMIR 20 UNITS: 100 INJECTION, SOLUTION SUBCUTANEOUS at 08:37

## 2023-05-15 RX ADMIN — INSULIN LISPRO 3 UNITS: 100 INJECTION, SOLUTION INTRAVENOUS; SUBCUTANEOUS at 08:33

## 2023-05-15 RX ADMIN — DEXAMETHASONE 6 MG: 0.5 TABLET ORAL at 08:34

## 2023-05-15 RX ADMIN — DOCUSATE SODIUM 50MG AND SENNOSIDES 8.6MG 1 TABLET: 8.6; 5 TABLET, FILM COATED ORAL at 08:34

## 2023-05-15 RX ADMIN — DILTIAZEM HYDROCHLORIDE 240 MG: 240 CAPSULE, COATED, EXTENDED RELEASE ORAL at 08:33

## 2023-05-15 RX ADMIN — ARFORMOTEROL TARTRATE 15 MCG: 15 SOLUTION RESPIRATORY (INHALATION) at 07:09

## 2023-05-15 RX ADMIN — NYSTATIN: 100000 POWDER TOPICAL at 20:57

## 2023-05-15 RX ADMIN — LISINOPRIL 40 MG: 20 TABLET ORAL at 08:37

## 2023-05-15 RX ADMIN — INSULIN LISPRO 5 UNITS: 100 INJECTION, SOLUTION INTRAVENOUS; SUBCUTANEOUS at 22:14

## 2023-05-15 RX ADMIN — Medication 10 ML: at 20:57

## 2023-05-15 RX ADMIN — METOPROLOL SUCCINATE 25 MG: 25 TABLET, EXTENDED RELEASE ORAL at 08:34

## 2023-05-15 RX ADMIN — HYDROCODONE POLISTIREX AND CHLORPHENIRAMINE POLISTIREX 5 ML: 10; 8 SUSPENSION, EXTENDED RELEASE ORAL at 08:33

## 2023-05-15 RX ADMIN — FLUTICASONE PROPIONATE 2 PUFF: 110 AEROSOL, METERED RESPIRATORY (INHALATION) at 07:10

## 2023-05-15 RX ADMIN — NYSTATIN: 100000 POWDER TOPICAL at 08:35

## 2023-05-15 RX ADMIN — INSULIN LISPRO 8 UNITS: 100 INJECTION, SOLUTION INTRAVENOUS; SUBCUTANEOUS at 18:24

## 2023-05-15 RX ADMIN — DOCUSATE SODIUM 50MG AND SENNOSIDES 8.6MG 1 TABLET: 8.6; 5 TABLET, FILM COATED ORAL at 20:56

## 2023-05-15 NOTE — THERAPY TREATMENT NOTE
Acute Care - Physical Therapy Treatment Note  RUPESH Villalobos     Patient Name: Tita Jiménez  : 1935  MRN: [id number]  Today's Date: 5/15/2023      Visit Dx:     ICD-10-CM ICD-9-CM   1. Dehydration  E86.0 276.51   2. Generalized weakness  R53.1 780.79   3. Decreased activities of daily living (ADL)  Z78.9 V49.89   4. Difficulty in walking  R26.2 719.7     Patient Active Problem List   Diagnosis   • Compression fracture of T1 -T2 vertebra (CMS/HCC)   • Fall   • Contusion of scalp   • Closed head injury   • Asthma   • Depression   • Reflux esophagitis   • Bladder incontinence   • Bowel incontinence   • High cholesterol   • Diabetes mellitus, type II (HCC)   • Hypertension   • Ulcerative colitis (HCC)   • GERD (gastroesophageal reflux disease)   • Benign essential tremor   • Carpal tunnel syndrome   • Ataxia   • Breast cancer screening   • Hypoxia   • Cytokine release syndrome, grade 1   • Pneumonia due to COVID-19 virus   • Weakness   • Cognitive impairment   • Difficulty walking   • At risk for venous thromboembolism (VTE)   • Lower abdominal pain   • 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 skin intact. D/T fall on 10/18/2019   • Depression    • Diabetes mellitus    • GERD (gastroesophageal reflux disease) 2021   • HTN (hypertension) 10/18/2019   • Hyperlipidemia    • Hypertension    • Mitral valve problem    • Polymyalgia rheumatica    • Stroke     short term memory loss   • Ulcerative colitis 2021   • Urinary incontinence      Past Surgical History:   Procedure Laterality Date   • ADENOIDECTOMY     • APPENDECTOMY     • BREAST SURGERY      ( L4-L5)   • COLON SURGERY      colon resection   • HYSTERECTOMY     • OOPHORECTOMY     • TONSILLECTOMY       PT Assessment (last 12 hours)     PT Evaluation  Spoke with pt's wife. Appt scheduled with [facility] for 8/29/22 at 3:15, with Dr. Gee. Pt's wife verbalized understanding and will call back with any further questions or concerns.    ----- Message from Carroll Antunez MD sent at 8/11/2022  5:28 PM CDT -----  Contact: wife/Imelda [phone]  Yes that is an outpatient procedure, I will consult sleep medicine  ----- Message -----  From: Ingris Huggins LPN  Sent: 8/11/2022   9:27 AM CDT  To: Carroll Antunez MD    Spoke with pt's wife. States that the  at [facility] told them that they cannot set up sleep medicine/sleep study for them. Pt's wife asked that we make a referral for pt to have sleep study done. States that it is urgent that he has this done. Please advise.  ----- Message -----  From: Carroll Antunez MD  Sent: 8/10/2022   5:10 PM CDT  To: Ingris Huggins LPN    Yes appt with sleep medicine/pulmonary  ----- Message -----  From: Ingris Huggins LPN  Sent: 8/10/2022   1:35 PM CDT  To: Carroll Antunez MD    Please advise.  ----- Message -----  From: Aliyah López  Sent: 8/10/2022   1:20 PM CDT  To: Tulio RAYMOND Staff    Pt's wife calling to let you know pt was admitted to the hospital for tachycardia and severe sleep apnea. Wife would like to know if you could set him up with a sleep study b/c he has not had one in years or would he need to see Pulmonology.                  and Treatment     Row Name 05/15/23 0850          Physical Therapy Time and Intention    Subjective Information complains of;weakness;fatigue;pain  -DK     Document Type therapy note (daily note)  -DK     Mode of Treatment individual therapy;physical therapy  -DK     Patient Effort good  -DK     Symptoms Noted During/After Treatment fatigue;increased pain  -DK     Comment Pt is rather slow to respond to cues and is easily distracted.  She was able to participate in exercises, transfers and a few steps of ambulation.  -     Row Name 05/15/23 0850          Pain    Pretreatment Pain Rating 2/10  -DK     Posttreatment Pain Rating 2/10  -DK     Pain Location - Side/Orientation Right  -DK     Pain Location generalized  -DK     Pain Location - foot  -DK     Pain Intervention(s) Repositioned;Ambulation/increased activity;Distraction;Therapeutic presence  -     Row Name 05/15/23 0850          Cognition    Affect/Mental Status (Cognition) confused;low arousal/lethargic  -DK     Orientation Status (Cognition) oriented to;person  -DK     Follows Commands (Cognition) physical/tactile prompts required;repetition of directions required;verbal cues/prompting required  -DK     Cognitive Function attention deficit  -DK     Attention Deficit (Cognition) minimal deficit;arousal/alertness;concentration;focused/sustained attention  -DK     Personal Safety Interventions gait belt;nonskid shoes/slippers when out of bed;supervised activity  -     Row Name 05/15/23 0850          Transfers    Transfers sit-stand transfer;stand-sit transfer  -     Row Name 05/15/23 0850          Sit-Stand Transfer    Sit-Stand Grady (Transfers) contact guard;minimum assist (75% patient effort);1 person assist  -DK     Assistive Device (Sit-Stand Transfers) walker, front-wheeled  -     Row Name 05/15/23 0850          Stand-Sit Transfer    Stand-Sit Grady (Transfers) contact guard;minimum assist (75% patient effort);1 person assist  -DK      Assistive Device (Stand-Sit Transfers) walker, front-wheeled  -DK     Row Name 05/15/23 0850          Gait/Stairs (Locomotion)    Gait/Stairs Locomotion gait/ambulation independence;gait/ambulation assistive device;distance ambulated;gait pattern  -DK     Blaine Level (Gait) minimum assist (75% patient effort);1 person assist;moderate assist (50% patient effort)  -DK     Assistive Device (Gait) walker, front-wheeled  -DK     Distance in Feet (Gait) 6  3' forward / reverse  -DK     Pattern (Gait) step-to  -DK     Deviations/Abnormal Patterns (Gait) festinating/shuffling;stride length decreased;gait speed decreased;tra decreased  -DK     Bilateral Gait Deviations forward flexed posture  -DK     Comment, (Gait/Stairs) Pt stood and ambulated 3' forward / reverse with a rolling walker, on room air.  She does exhibit a posterior trunk lean, especially with reverse walking.  Pt was left in the recliner on alert post treatment.  -DK     Row Name 05/15/23 0850          Safety Issues, Functional Mobility    Safety Issues Affecting Function (Mobility) ability to follow commands;awareness of need for assistance;impulsivity;judgment;safety precaution awareness  -DK     Impairments Affecting Function (Mobility) balance;cognition;endurance/activity tolerance;pain;strength  -DK     Cognitive Impairments, Mobility Safety/Performance attention;awareness, need for assistance;impulsivity;judgment;safety precaution awareness  -DK     Row Name 05/15/23 0850          Balance    Balance Assessment sitting static balance;sitting dynamic balance;standing static balance;standing dynamic balance  -DK     Static Sitting Balance standby assist  -DK     Dynamic Sitting Balance standby assist  -DK     Position, Sitting Balance sitting in chair;unsupported  -DK     Static Standing Balance contact guard;minimal assist;1-person assist  -DK     Dynamic Standing Balance minimal assist;moderate assist;1-person assist  -DK      Position/Device Used, Standing Balance walker, front-wheeled  -DK     Balance Interventions standing;dynamic;tandem gait  -     Row Name 05/15/23 0850          Motor Skills    Motor Skills --  therapeutic exercises  -DK     Coordination WFL  -DK     Therapeutic Exercise hip;knee;ankle  -DK     Additional Documentation --  Pt indicated no discomfort with movement of the right ankle.  -     Row Name 05/15/23 0850          Hip (Therapeutic Exercise)    Hip (Therapeutic Exercise) AAROM (active assistive range of motion)  -     Hip AAROM (Therapeutic Exercise) bilateral;flexion;extension;aBduction;aDduction;supine;10 repetitions;2 sets  -DK     Row Name 05/15/23 0850          Knee (Therapeutic Exercise)    Knee (Therapeutic Exercise) AAROM (active assistive range of motion)  -DK     Knee AAROM (Therapeutic Exercise) bilateral;extension;flexion;supine;10 repetitions;2 sets  -     Row Name 05/15/23 0850          Ankle (Therapeutic Exercise)    Ankle (Therapeutic Exercise) AAROM (active assistive range of motion)  -DK     Ankle AAROM (Therapeutic Exercise) bilateral;dorsiflexion;plantarflexion;supine;10 repetitions;2 sets  -     Row Name 05/15/23 0850          Plan of Care Review    Plan of Care Reviewed With patient  -DK     Progress improving  -     Row Name 05/15/23 0850          Positioning and Restraints    Pre-Treatment Position sitting in chair/recliner  -DK     Post Treatment Position chair  -DK     In Chair reclined;call light within reach;encouraged to call for assist;exit alarm on;heels elevated;legs elevated  -     Row Name 05/15/23 0850          Therapy Assessment/Plan (PT)    Rehab Potential (PT) fair, will monitor progress closely  -DK     Criteria for Skilled Interventions Met (PT) skilled treatment is necessary  -DK     Therapy Frequency (PT) daily  -DK     Problem List (PT) problems related to;balance;cognition;mobility;strength;pain;hearing  -DK     Activity Limitations Related to Problem  List (PT) unable to ambulate safely;unable to transfer safely  -DK     Row Name 05/15/23 0850          Progress Summary (PT)    Progress Toward Functional Goals (PT) progress toward functional goals is fair  -DK           User Key  (r) = Recorded By, (t) = Taken By, (c) = Cosigned By    Initials Name Provider Type    Joellen Fleming PTA Physical Therapist Assistant                Physical Therapy Education     Title: PT OT SLP Therapies (Done)     Topic: Physical Therapy (Done)     Point: Mobility training (Done)     Learning Progress Summary           Patient Acceptance, E, VU by  at 5/13/2023 1004                   Point: Home exercise program (Done)     Learning Progress Summary           Patient Acceptance, E, VU by  at 5/13/2023 1004                   Point: Body mechanics (Done)     Learning Progress Summary           Patient Acceptance, E, VU by  at 5/13/2023 1004                   Point: Precautions (Done)     Learning Progress Summary           Patient Acceptance, E, VU by  at 5/13/2023 1004                               User Key     Initials Effective Dates Name Provider Type Discipline     06/16/21 -  Celeste Anthony, LORNA Occupational Therapist OT              PT Recommendation and Plan  Planned Therapy Interventions (PT): balance training, bed mobility training, gait training, strengthening, transfer training  Therapy Frequency (PT): daily  Progress Summary (PT)  Progress Toward Functional Goals (PT): progress toward functional goals is fair  Plan of Care Reviewed With: patient  Progress: improving   Outcome Measures     Row Name 05/15/23 0849 05/14/23 1034 05/13/23 1300       How much help from another person do you currently need...    Turning from your back to your side while in flat bed without using bedrails? 3  -DK 3  -DK 3  -MYESHA    Moving from lying on back to sitting on the side of a flat bed without bedrails? 3  -DK 3  -DK 3  -MYESHA    Moving to and from a bed to a chair (including a  wheelchair)? 2  -DK 2  -DK 2  -MYESHA    Standing up from a chair using your arms (e.g., wheelchair, bedside chair)? 3  -DK 2  -DK 2  -MYESHA    Climbing 3-5 steps with a railing? 1  -DK 1  -DK 1  -MYESHA    To walk in hospital room? 2  -DK 1  -DK 1  -MYESHA    AM-PAC 6 Clicks Score (PT) 14  -DK 12  -DK 12  -MYESHA       Functional Assessment    Outcome Measure Options AM-PAC 6 Clicks Basic Mobility (PT)  -DK AM-PAC 6 Clicks Basic Mobility (PT)  -DK AM-PAC 6 Clicks Basic Mobility (PT)  -MYESHA          User Key  (r) = Recorded By, (t) = Taken By, (c) = Cosigned By    Initials Name Provider Type    Joellen Fleming PTA Physical Therapist Assistant    Jeff Fernandez, PT Physical Therapist                 Time Calculation:    PT Charges     Row Name 05/15/23 0855             Time Calculation    PT Received On 05/15/23  -DK      PT Goal Re-Cert Due Date 05/22/23  -DK         Timed Charges    11050 - PT Therapeutic Exercise Minutes 14  -DK      11765 - Gait Training Minutes  3  -DK      30930 - PT Therapeutic Activity Minutes 8  -DK         Total Minutes    Timed Charges Total Minutes 25  -DK       Total Minutes 25  -DK            User Key  (r) = Recorded By, (t) = Taken By, (c) = Cosigned By    Initials Name Provider Type    Joellen Fleming PTA Physical Therapist Assistant              Therapy Charges for Today     Code Description Service Date Service Provider Modifiers Qty    06565652489 HC PT THER PROC EA 15 MIN 5/14/2023 Joellen Cali, PTA GP 1    61330548013 HC PT THERAPEUTIC ACT EA 15 MIN 5/14/2023 Joellen Cali, PTA GP 1    73150294266 HC PT THER PROC EA 15 MIN 5/15/2023 Joellen Cali, PTA GP 1    49612444189 HC PT THERAPEUTIC ACT EA 15 MIN 5/15/2023 Joellen Cali, MANAV GP 1          PT G-Codes  Outcome Measure Options: AM-PAC 6 Clicks Basic Mobility (PT)  AM-PAC 6 Clicks Score (PT): 14  AM-PAC 6 Clicks Score (OT): 17    Joellen Cali PTA  5/15/2023

## 2023-05-15 NOTE — CONSULTS
Purpose of the visit was to evaluate for: other code status clarification. Spoke with MD, RN and patient and discussed palliative care and resuscitation status and advanced care planning.      Assessment: Patient is currently on 4nt. Updated by primary rn regarding patients current condition. Patient sitting up in bed at time of visit. Patient is alert and oriented to person, place, time and situation. Introduced self and explained role. Discussed and educated on code status. Patient requests to be No CPR, No Intubation, updated provider for order. Patient reports she wishes to return to [facility], agreeable to rehab if needed to get stronger before returning. Patient requests not to go to [facility]. Patient has ACP documents on file- reviewed with patient, patient does not wish to complete new documents at this time.      Tasks Completed: Code Status clarification.    Palliative care clarified code status with patient per order, no further palliative care needs identified at this time. Palliative care will sign off. If new issues arise, please place new palliative care team consult order.    Lupe NGUYEN RN, PN  Palliative Care

## 2023-05-15 NOTE — PLAN OF CARE
Goal Outcome Evaluation:           Progress: no change  Outcome Evaluation: alert and oriented x 4 with intermittent confusion/forgetfulness. up with x 2 assistance/walker this shift. blood glucose monitored. no complaints at this time.

## 2023-05-15 NOTE — PLAN OF CARE
Goal Outcome Evaluation:           Progress: no change  Outcome Evaluation: Patient alert to person, place, and time. Forgetful at times. Slept well throughout night. Turned and repositioned every 2 hours. Vital signs stable, will continue to monitor.

## 2023-05-15 NOTE — SIGNIFICANT NOTE
05/15/23 1130   Coping/Psychosocial   Observed Emotional State calm;cooperative;happy   Verbalized Emotional State hopefulness;relief   Trust Relationship/Rapport empathic listening provided   Involvement in Care interacting with patient   Additional Documentation Spiritual Care (Group)   Spiritual Care   Use of Spiritual Resources non-Yazidi use of spiritual care   Spiritual Care Source  initiative   Spiritual Care Follow-Up follow-up, none required as presently assessed   Response to Spiritual Care engaged in conversation;receptive of support;thanks expressed   Spiritual Care Interventions supportive conversation provided   Spiritual Care Visit Type initial   Receptivity to Spiritual Care visit welcomed

## 2023-05-15 NOTE — PROGRESS NOTES
[facility]   Hospitalist Progress Note  Date: 5/15/2023  Patient Name: Tita Jiménez  : 1935  MRN: [id number]  Date of admission: 2023      Subjective   Subjective     Chief Complaint: Shortness of air, weakness    Summary: 88 y.o. female  with a past medical history of type 2 diabetes mellitus, hypertension, hyperlipidemia, ulcerative colitis, admitted for COVID infection and sprained ankle.  Initially required oxygen, now weaned off.  Lives at assisted living facility but will require higher level of care.  Social work working on rehab placement.    Interval Followup: Continues to feel a bit better daily.  Has remained off of oxygen for 2 days.  Began coughing up some yellow sputum this morning after using her flutter valve.    Objective   Objective     Vitals:   Temp:  [97.3 °F (36.3 °C)-97.9 °F (36.6 °C)] 97.3 °F (36.3 °C)  Heart Rate:  [62-70] 64  Resp:  [18] 18  BP: (155-173)/(73-80) 155/78  Physical Exam    Constitutional: Elderly female, sitting up in bedside chair, awake, alert, no acute distress, appears weak   Respiratory: Clear to auscultation bilaterally, no w/r/r, nonlabored respirations    Cardiovascular: RRR, no MRG   Gastrointestinal: Positive bowel sounds, soft, nontender, nondistended   Neurologic: Oriented x 3, globally weak, strength symmetric in all extremities, Cranial Nerves grossly intact to confrontation, speech clear    Result Review    Result Review:  I have personally reviewed the results below:  [x]  Laboratory personally reviewed blood sugars, CBC, BMP, magnesium, phosphorus  []  Microbiology  []  Radiology  []  EKG/Telemetry   []  Cardiology/Vascular   []  Pathology  []  Old records  []  Other:  CBC        2023    05:31 2023    06:50 5/15/2023    04:37   CBC   WBC 5.68   6.38   7.60     RBC 4.52   4.62   4.75     Hemoglobin 12.1   12.2   12.7     Hematocrit 38.2   38.4   38.8     MCV 84.5   83.1   81.7     MCH 26.8   26.4   26.7     MCHC 31.7   31.8    32.7     RDW 14.8   14.4   14.0     Platelets 214   206   226       CMP        5/13/2023    05:31 5/14/2023    06:50 5/15/2023    04:37   CMP   Glucose 160   153   202     BUN 23   24   25     Creatinine 0.83   0.74   0.80     EGFR 68.3   78.4   71.4     Sodium 139   138   135     Potassium 4.4   4.4   4.6     Chloride 107   104   102     Calcium 9.8   9.9   10.1     Total Protein 5.8       Albumin 3.1       Globulin 2.7       Total Bilirubin 0.2       Alkaline Phosphatase 101       AST (SGOT) 13       ALT (SGPT) 18       Albumin/Globulin Ratio 1.1       BUN/Creatinine Ratio 27.7   32.4   31.3     Anion Gap 8.1   8.0   7.3         Assessment & Plan   Assessment / Plan     Assessment/Plan:  COVID-19 pneumonia  Acute hypoxemia  Right ankle sprain  Clinical dehydration  Significant weakness secondary to above  Anorexia due to viral infection  Type 2 diabetes mellitus  Hypertension  Hyperlipidemia  Dementia    Continue to monitor in the hospital for management of the above  As she has now been on room air for 48 hours, will discontinue Decadron  Continue with scheduled Brovana, Flovent, change scheduled Xopenex as needed  Bronchopulmonary hygiene  Continue with scheduled Tussionex, this is helped her cough significantly  Obtain sputum culture if able to produce sputum to rule out superimposed bacterial pneumonia  Continue detemir 15 units twice daily, moderate dose sliding scale insulin, another we will discontinue Decadron she may require less insulin going forward.  We will continue to monitor closely  Continue home atorvastatin, aspirin, diltiazem  PT/OT consulted and recommend higher level of care that her previous assisted living facility.  Social work aware and working on placement  Encourage out of bed, up to chair daily  Trend renal function and electrolytes with a.m. BMP, magnesium   Trend Hgb and WBC with a.m. CBC     Discussed plan with RN.    DVT prophylaxis:  Medical DVT prophylaxis orders are  present.    CODE STATUS:   Level Of Support Discussed With: Patient  Code Status (Patient has no pulse and is not breathing): CPR (Attempt to Resuscitate)  Medical Interventions (Patient has pulse or is breathing): Full Support

## 2023-05-16 ENCOUNTER — APPOINTMENT (OUTPATIENT)
Dept: CT IMAGING | Facility: HOSPITAL | Age: 88
End: 2023-05-16
Payer: MEDICARE

## 2023-05-16 LAB
ANION GAP SERPL CALCULATED.3IONS-SCNC: 8.2 MMOL/L (ref 5–15)
BASOPHILS # BLD AUTO: 0.03 10*3/MM3 (ref 0–0.2)
BASOPHILS NFR BLD AUTO: 0.3 % (ref 0–1.5)
BUN SERPL-MCNC: 27 MG/DL (ref 8–23)
BUN/CREAT SERPL: 36 (ref 7–25)
CALCIUM SPEC-SCNC: 9.8 MG/DL (ref 8.6–10.5)
CHLORIDE SERPL-SCNC: 102 MMOL/L (ref 98–107)
CO2 SERPL-SCNC: 27.8 MMOL/L (ref 22–29)
CREAT SERPL-MCNC: 0.75 MG/DL (ref 0.57–1)
DEPRECATED RDW RBC AUTO: 41.8 FL (ref 37–54)
EGFRCR SERPLBLD CKD-EPI 2021: 77.2 ML/MIN/1.73
EOSINOPHIL # BLD AUTO: 0.01 10*3/MM3 (ref 0–0.4)
EOSINOPHIL NFR BLD AUTO: 0.1 % (ref 0.3–6.2)
ERYTHROCYTE [DISTWIDTH] IN BLOOD BY AUTOMATED COUNT: 14.3 % (ref 12.3–15.4)
GEN 5 2HR TROPONIN T REFLEX: 15 NG/L
GLUCOSE BLDC GLUCOMTR-MCNC: 112 MG/DL (ref 70–99)
GLUCOSE BLDC GLUCOMTR-MCNC: 156 MG/DL (ref 70–99)
GLUCOSE BLDC GLUCOMTR-MCNC: 200 MG/DL (ref 70–99)
GLUCOSE BLDC GLUCOMTR-MCNC: 217 MG/DL (ref 70–99)
GLUCOSE BLDC GLUCOMTR-MCNC: 325 MG/DL (ref 70–99)
GLUCOSE SERPL-MCNC: 177 MG/DL (ref 65–99)
HCT VFR BLD AUTO: 41.2 % (ref 34–46.6)
HGB BLD-MCNC: 13.3 G/DL (ref 12–15.9)
IMM GRANULOCYTES # BLD AUTO: 0.32 10*3/MM3 (ref 0–0.05)
IMM GRANULOCYTES NFR BLD AUTO: 3.4 % (ref 0–0.5)
LYMPHOCYTES # BLD AUTO: 1.15 10*3/MM3 (ref 0.7–3.1)
LYMPHOCYTES NFR BLD AUTO: 12.4 % (ref 19.6–45.3)
MAGNESIUM SERPL-MCNC: 2.1 MG/DL (ref 1.6–2.4)
MCH RBC QN AUTO: 26.4 PG (ref 26.6–33)
MCHC RBC AUTO-ENTMCNC: 32.3 G/DL (ref 31.5–35.7)
MCV RBC AUTO: 81.7 FL (ref 79–97)
MONOCYTES # BLD AUTO: 0.91 10*3/MM3 (ref 0.1–0.9)
MONOCYTES NFR BLD AUTO: 9.8 % (ref 5–12)
NEUTROPHILS NFR BLD AUTO: 6.86 10*3/MM3 (ref 1.7–7)
NEUTROPHILS NFR BLD AUTO: 74 % (ref 42.7–76)
NRBC BLD AUTO-RTO: 0 /100 WBC (ref 0–0.2)
PHOSPHATE SERPL-MCNC: 2.8 MG/DL (ref 2.5–4.5)
PLATELET # BLD AUTO: 233 10*3/MM3 (ref 140–450)
PMV BLD AUTO: 9.3 FL (ref 6–12)
POTASSIUM SERPL-SCNC: 4.5 MMOL/L (ref 3.5–5.2)
RBC # BLD AUTO: 5.04 10*6/MM3 (ref 3.77–5.28)
SODIUM SERPL-SCNC: 138 MMOL/L (ref 136–145)
TROPONIN T DELTA: 2 NG/L
TROPONIN T SERPL HS-MCNC: 13 NG/L
WBC NRBC COR # BLD: 9.28 10*3/MM3 (ref 3.4–10.8)

## 2023-05-16 PROCEDURE — 70498 CT ANGIOGRAPHY NECK: CPT

## 2023-05-16 PROCEDURE — 82948 REAGENT STRIP/BLOOD GLUCOSE: CPT

## 2023-05-16 PROCEDURE — 83735 ASSAY OF MAGNESIUM: CPT | Performed by: INTERNAL MEDICINE

## 2023-05-16 PROCEDURE — 84100 ASSAY OF PHOSPHORUS: CPT | Performed by: INTERNAL MEDICINE

## 2023-05-16 PROCEDURE — 94664 DEMO&/EVAL PT USE INHALER: CPT

## 2023-05-16 PROCEDURE — 63710000001 INSULIN LISPRO (HUMAN) PER 5 UNITS: Performed by: INTERNAL MEDICINE

## 2023-05-16 PROCEDURE — 63710000001 INSULIN DETEMIR PER 5 UNITS: Performed by: INTERNAL MEDICINE

## 2023-05-16 PROCEDURE — 93010 ELECTROCARDIOGRAM REPORT: CPT | Performed by: INTERNAL MEDICINE

## 2023-05-16 PROCEDURE — 84484 ASSAY OF TROPONIN QUANT: CPT | Performed by: INTERNAL MEDICINE

## 2023-05-16 PROCEDURE — 94799 UNLISTED PULMONARY SVC/PX: CPT

## 2023-05-16 PROCEDURE — 99233 SBSQ HOSP IP/OBS HIGH 50: CPT | Performed by: INTERNAL MEDICINE

## 2023-05-16 PROCEDURE — 70450 CT HEAD/BRAIN W/O DYE: CPT

## 2023-05-16 PROCEDURE — 70496 CT ANGIOGRAPHY HEAD: CPT

## 2023-05-16 PROCEDURE — 93005 ELECTROCARDIOGRAM TRACING: CPT | Performed by: INTERNAL MEDICINE

## 2023-05-16 PROCEDURE — 80048 BASIC METABOLIC PNL TOTAL CA: CPT | Performed by: INTERNAL MEDICINE

## 2023-05-16 PROCEDURE — 25510000001 IOPAMIDOL PER 1 ML: Performed by: INTERNAL MEDICINE

## 2023-05-16 PROCEDURE — 85025 COMPLETE CBC W/AUTO DIFF WBC: CPT | Performed by: INTERNAL MEDICINE

## 2023-05-16 PROCEDURE — 25010000002 ENOXAPARIN PER 10 MG: Performed by: INTERNAL MEDICINE

## 2023-05-16 RX ORDER — SODIUM CHLORIDE 0.9 % (FLUSH) 0.9 %
3 SYRINGE (ML) INJECTION EVERY 12 HOURS SCHEDULED
Status: CANCELLED | OUTPATIENT
Start: 2023-05-16

## 2023-05-16 RX ORDER — NITROGLYCERIN 0.4 MG/1
0.4 TABLET SUBLINGUAL
Status: DISCONTINUED | OUTPATIENT
Start: 2023-05-16 | End: 2023-05-23 | Stop reason: HOSPADM

## 2023-05-16 RX ORDER — LIDOCAINE HYDROCHLORIDE 10 MG/ML
5 INJECTION, SOLUTION EPIDURAL; INFILTRATION; INTRACAUDAL; PERINEURAL AS NEEDED
Status: CANCELLED | OUTPATIENT
Start: 2023-05-16

## 2023-05-16 RX ORDER — SODIUM CHLORIDE 0.9 % (FLUSH) 0.9 %
10 SYRINGE (ML) INJECTION AS NEEDED
Status: CANCELLED | OUTPATIENT
Start: 2023-05-16

## 2023-05-16 RX ADMIN — FLUTICASONE PROPIONATE 2 PUFF: 110 AEROSOL, METERED RESPIRATORY (INHALATION) at 08:10

## 2023-05-16 RX ADMIN — ENOXAPARIN SODIUM 40 MG: 100 INJECTION SUBCUTANEOUS at 08:41

## 2023-05-16 RX ADMIN — NYSTATIN: 100000 POWDER TOPICAL at 22:13

## 2023-05-16 RX ADMIN — INSULIN LISPRO 5 UNITS: 100 INJECTION, SOLUTION INTRAVENOUS; SUBCUTANEOUS at 13:24

## 2023-05-16 RX ADMIN — ARFORMOTEROL TARTRATE 15 MCG: 15 SOLUTION RESPIRATORY (INHALATION) at 19:31

## 2023-05-16 RX ADMIN — INSULIN LISPRO 3 UNITS: 100 INJECTION, SOLUTION INTRAVENOUS; SUBCUTANEOUS at 08:40

## 2023-05-16 RX ADMIN — FAMOTIDINE 10 MG: 10 TABLET ORAL at 08:39

## 2023-05-16 RX ADMIN — FLUTICASONE PROPIONATE 2 PUFF: 110 AEROSOL, METERED RESPIRATORY (INHALATION) at 19:32

## 2023-05-16 RX ADMIN — SERTRALINE HYDROCHLORIDE 50 MG: 50 TABLET ORAL at 08:39

## 2023-05-16 RX ADMIN — ASPIRIN 81 MG: 81 TABLET, COATED ORAL at 08:39

## 2023-05-16 RX ADMIN — MONTELUKAST 10 MG: 10 TABLET, FILM COATED ORAL at 20:23

## 2023-05-16 RX ADMIN — INSULIN DETEMIR 20 UNITS: 100 INJECTION, SOLUTION SUBCUTANEOUS at 08:40

## 2023-05-16 RX ADMIN — DILTIAZEM HYDROCHLORIDE 240 MG: 240 CAPSULE, COATED, EXTENDED RELEASE ORAL at 08:39

## 2023-05-16 RX ADMIN — METOPROLOL SUCCINATE 25 MG: 25 TABLET, EXTENDED RELEASE ORAL at 08:39

## 2023-05-16 RX ADMIN — HYDROCODONE POLISTIREX AND CHLORPHENIRAMINE POLISTIREX 5 ML: 10; 8 SUSPENSION, EXTENDED RELEASE ORAL at 20:23

## 2023-05-16 RX ADMIN — IOPAMIDOL 100 ML: 755 INJECTION, SOLUTION INTRAVENOUS at 12:38

## 2023-05-16 RX ADMIN — Medication 10 ML: at 08:41

## 2023-05-16 RX ADMIN — POLYETHYLENE GLYCOL 3350 17 G: 17 POWDER, FOR SOLUTION ORAL at 08:41

## 2023-05-16 RX ADMIN — LISINOPRIL 40 MG: 20 TABLET ORAL at 08:39

## 2023-05-16 RX ADMIN — INSULIN DETEMIR 20 UNITS: 100 INJECTION, SOLUTION SUBCUTANEOUS at 20:22

## 2023-05-16 RX ADMIN — HYDROCODONE POLISTIREX AND CHLORPHENIRAMINE POLISTIREX 5 ML: 10; 8 SUSPENSION, EXTENDED RELEASE ORAL at 08:39

## 2023-05-16 RX ADMIN — ARFORMOTEROL TARTRATE 15 MCG: 15 SOLUTION RESPIRATORY (INHALATION) at 08:10

## 2023-05-16 RX ADMIN — DOCUSATE SODIUM 50MG AND SENNOSIDES 8.6MG 1 TABLET: 8.6; 5 TABLET, FILM COATED ORAL at 08:39

## 2023-05-16 RX ADMIN — NYSTATIN: 100000 POWDER TOPICAL at 08:41

## 2023-05-16 RX ADMIN — DOCUSATE SODIUM 50MG AND SENNOSIDES 8.6MG 1 TABLET: 8.6; 5 TABLET, FILM COATED ORAL at 20:23

## 2023-05-16 NOTE — PLAN OF CARE
Goal Outcome Evaluation:  Plan of Care Reviewed With: patient           Outcome Evaluation: received pt as transfer this shift due to rrt. no complaints of pain or soa. neuro checks as ordered. blood glucose monitored. will continue to monitor.

## 2023-05-16 NOTE — PROGRESS NOTES
[facility]   Hospitalist Progress Note  Date: 2023  Patient Name: Tita Jiménez  : 1935  MRN: [id number]  Date of admission: 2023      Subjective   Subjective     Chief Complaint: Shortness of air, weakness    Summary: 88 y.o. female  with a past medical history of type 2 diabetes mellitus, hypertension, hyperlipidemia, ulcerative colitis, admitted for COVID infection and sprained ankle.  Initially required oxygen, now weaned off.  Lives at assisted living facility but will require higher level of care.  Social work working on rehab placement.    Interval Followup: Stroke RRT called today patient with new asymmetry in the face, left upper extremity weakness    Objective   Objective     Vitals:   Temp:  [97.4 °F (36.3 °C)-98.2 °F (36.8 °C)] 98.2 °F (36.8 °C)  Heart Rate:  [61-86] 62  Resp:  [16-18] 16  BP: (133-184)/(62-86) 133/72  Flow (L/min):  [2] 2  Physical Exam   GEN: Drowsy  HEENT: Moist mucous membrane  LUNGS: Equal chest rise bilaterally  CARDIAC: Regular rate and rhythm  NEURO: Left upper extremity weakness, asymmetric face  SKIN: No obvious breakdown    Result Review    Result Review:  I have personally reviewed the results below:  [x]  Laboratory personally reviewed blood sugars, CBC, BMP, magnesium, phosphorus  []  Microbiology  []  Radiology  []  EKG/Telemetry   []  Cardiology/Vascular   []  Pathology  []  Old records  []  Other:  CBC        2023    06:50 5/15/2023    04:37 2023    06:26   CBC   WBC 6.38   7.60   9.28     RBC 4.62   4.75   5.04     Hemoglobin 12.2   12.7   13.3     Hematocrit 38.4   38.8   41.2     MCV 83.1   81.7   81.7     MCH 26.4   26.7   26.4     MCHC 31.8   32.7   32.3     RDW 14.4   14.0   14.3     Platelets 206   226   233       CMP        2023    06:50 5/15/2023    04:37 2023    06:26   CMP   Glucose 153   202   177     BUN 24   25   27     Creatinine 0.74   0.80   0.75     EGFR 78.4   71.4   77.2     Sodium 138   135   138      Potassium 4.4   4.6   4.5     Chloride 104   102   102     Calcium 9.9   10.1   9.8     BUN/Creatinine Ratio 32.4   31.3   36.0     Anion Gap 8.0   7.3   8.2         Assessment & Plan   Assessment / Plan     Assessment/Plan:  Concern for CVA  COVID-19 pneumonia  Acute hypoxemia  Right ankle sprain  Clinical dehydration  Significant weakness secondary to above  Anorexia due to viral infection  Type 2 diabetes mellitus  Hypertension  Hyperlipidemia  Dementia  Chest discomfort    Stroke RRT called 5/16/2023, patient with some left upper extremity weakness, word finding difficulties  Check stat CT scan of the head, CTA head and neck  EKG reviewed, stable from prior checks, troponin flat x2  Transfer to stroke floor for neurochecks  Continue to monitor in the hospital for management of the above  Room air currently  Continue with scheduled Brovana, Flovent, change scheduled Xopenex as needed  Bronchopulmonary hygiene  Continue with scheduled Tussionex, this is helped her cough significantly  Obtain sputum culture if able to produce sputum to rule out superimposed bacterial pneumonia  Continue detemir 15 units twice daily, moderate dose sliding scale insulin, another we will discontinue Decadron she may require less insulin going forward.  We will continue to monitor closely  Continue home atorvastatin, aspirin, diltiazem  PT/OT consulted and recommend higher level of care that her previous assisted living facility.  Social work aware and working on placement  Encourage out of bed, up to chair daily  Trend renal function and electrolytes with a.m. BMP, magnesium   Trend Hgb and WBC with a.m. CBC     Reviewed patients labs and imaging, and discussed with patient and nurse at bedside.      DVT prophylaxis:  Medical DVT prophylaxis orders are present.    CODE STATUS:   Medical Intervention Limits: NO intubation (DNI)  Level Of Support Discussed With: Patient  Code Status (Patient has no pulse and is not breathing): No CPR  (Do Not Attempt to Resuscitate)  Medical Interventions (Patient has pulse or is breathing): Limited Support      Electronically signed by Michael Barba MD, 05/16/23, 12:50 PM EDT.

## 2023-05-16 NOTE — PLAN OF CARE
Goal Outcome Evaluation:  Plan of Care Reviewed With: patient      Pt is A&O x4 with intermittent confusion. Last blood sugar was 200. No new complaints. Continue care plan.

## 2023-05-17 ENCOUNTER — APPOINTMENT (OUTPATIENT)
Dept: MRI IMAGING | Facility: HOSPITAL | Age: 88
End: 2023-05-17
Payer: MEDICARE

## 2023-05-17 LAB
ALBUMIN SERPL-MCNC: 3.2 G/DL (ref 3.5–5.2)
ALBUMIN/GLOB SERPL: 1.3 G/DL
ALP SERPL-CCNC: 88 U/L (ref 39–117)
ALT SERPL W P-5'-P-CCNC: 24 U/L (ref 1–33)
ANION GAP SERPL CALCULATED.3IONS-SCNC: 11.9 MMOL/L (ref 5–15)
AST SERPL-CCNC: 19 U/L (ref 1–32)
BASOPHILS # BLD AUTO: 0.06 10*3/MM3 (ref 0–0.2)
BASOPHILS NFR BLD AUTO: 0.7 % (ref 0–1.5)
BILIRUB SERPL-MCNC: 0.3 MG/DL (ref 0–1.2)
BUN SERPL-MCNC: 27 MG/DL (ref 8–23)
BUN/CREAT SERPL: 35.5 (ref 7–25)
CALCIUM SPEC-SCNC: 9.4 MG/DL (ref 8.6–10.5)
CHLORIDE SERPL-SCNC: 101 MMOL/L (ref 98–107)
CO2 SERPL-SCNC: 21.1 MMOL/L (ref 22–29)
CREAT SERPL-MCNC: 0.76 MG/DL (ref 0.57–1)
DEPRECATED RDW RBC AUTO: 42.2 FL (ref 37–54)
EGFRCR SERPLBLD CKD-EPI 2021: 75.9 ML/MIN/1.73
EOSINOPHIL # BLD AUTO: 0.07 10*3/MM3 (ref 0–0.4)
EOSINOPHIL NFR BLD AUTO: 0.8 % (ref 0.3–6.2)
ERYTHROCYTE [DISTWIDTH] IN BLOOD BY AUTOMATED COUNT: 14.6 % (ref 12.3–15.4)
GLOBULIN UR ELPH-MCNC: 2.5 GM/DL
GLUCOSE BLDC GLUCOMTR-MCNC: 138 MG/DL (ref 70–99)
GLUCOSE BLDC GLUCOMTR-MCNC: 205 MG/DL (ref 70–99)
GLUCOSE BLDC GLUCOMTR-MCNC: 216 MG/DL (ref 70–99)
GLUCOSE BLDC GLUCOMTR-MCNC: 95 MG/DL (ref 70–99)
GLUCOSE SERPL-MCNC: 116 MG/DL (ref 65–99)
HCT VFR BLD AUTO: 41 % (ref 34–46.6)
HGB BLD-MCNC: 13.4 G/DL (ref 12–15.9)
IMM GRANULOCYTES # BLD AUTO: 0.33 10*3/MM3 (ref 0–0.05)
IMM GRANULOCYTES NFR BLD AUTO: 4 % (ref 0–0.5)
LYMPHOCYTES # BLD AUTO: 2.22 10*3/MM3 (ref 0.7–3.1)
LYMPHOCYTES NFR BLD AUTO: 26.8 % (ref 19.6–45.3)
MAGNESIUM SERPL-MCNC: 2.1 MG/DL (ref 1.6–2.4)
MCH RBC QN AUTO: 26.6 PG (ref 26.6–33)
MCHC RBC AUTO-ENTMCNC: 32.7 G/DL (ref 31.5–35.7)
MCV RBC AUTO: 81.5 FL (ref 79–97)
MONOCYTES # BLD AUTO: 0.96 10*3/MM3 (ref 0.1–0.9)
MONOCYTES NFR BLD AUTO: 11.6 % (ref 5–12)
NEUTROPHILS NFR BLD AUTO: 4.64 10*3/MM3 (ref 1.7–7)
NEUTROPHILS NFR BLD AUTO: 56.1 % (ref 42.7–76)
NRBC BLD AUTO-RTO: 0 /100 WBC (ref 0–0.2)
PHOSPHATE SERPL-MCNC: 3.1 MG/DL (ref 2.5–4.5)
PLATELET # BLD AUTO: 212 10*3/MM3 (ref 140–450)
PMV BLD AUTO: 9.1 FL (ref 6–12)
POTASSIUM SERPL-SCNC: 4.1 MMOL/L (ref 3.5–5.2)
PROT SERPL-MCNC: 5.7 G/DL (ref 6–8.5)
RBC # BLD AUTO: 5.03 10*6/MM3 (ref 3.77–5.28)
SARS-COV-2 RNA RESP QL NAA+PROBE: DETECTED
SODIUM SERPL-SCNC: 134 MMOL/L (ref 136–145)
WBC NRBC COR # BLD: 8.28 10*3/MM3 (ref 3.4–10.8)

## 2023-05-17 PROCEDURE — 94664 DEMO&/EVAL PT USE INHALER: CPT

## 2023-05-17 PROCEDURE — 97168 OT RE-EVAL EST PLAN CARE: CPT

## 2023-05-17 PROCEDURE — 85025 COMPLETE CBC W/AUTO DIFF WBC: CPT | Performed by: INTERNAL MEDICINE

## 2023-05-17 PROCEDURE — 94799 UNLISTED PULMONARY SVC/PX: CPT

## 2023-05-17 PROCEDURE — 92610 EVALUATE SWALLOWING FUNCTION: CPT

## 2023-05-17 PROCEDURE — 99233 SBSQ HOSP IP/OBS HIGH 50: CPT | Performed by: INTERNAL MEDICINE

## 2023-05-17 PROCEDURE — 87635 SARS-COV-2 COVID-19 AMP PRB: CPT

## 2023-05-17 PROCEDURE — 63710000001 INSULIN DETEMIR PER 5 UNITS: Performed by: INTERNAL MEDICINE

## 2023-05-17 PROCEDURE — 84100 ASSAY OF PHOSPHORUS: CPT | Performed by: INTERNAL MEDICINE

## 2023-05-17 PROCEDURE — 25010000002 ONDANSETRON PER 1 MG: Performed by: PHYSICIAN ASSISTANT

## 2023-05-17 PROCEDURE — 83735 ASSAY OF MAGNESIUM: CPT | Performed by: INTERNAL MEDICINE

## 2023-05-17 PROCEDURE — 25010000002 ENOXAPARIN PER 10 MG: Performed by: INTERNAL MEDICINE

## 2023-05-17 PROCEDURE — 70551 MRI BRAIN STEM W/O DYE: CPT

## 2023-05-17 PROCEDURE — 80053 COMPREHEN METABOLIC PANEL: CPT | Performed by: INTERNAL MEDICINE

## 2023-05-17 PROCEDURE — 63710000001 INSULIN LISPRO (HUMAN) PER 5 UNITS: Performed by: INTERNAL MEDICINE

## 2023-05-17 PROCEDURE — 97164 PT RE-EVAL EST PLAN CARE: CPT

## 2023-05-17 PROCEDURE — 82948 REAGENT STRIP/BLOOD GLUCOSE: CPT

## 2023-05-17 RX ORDER — ONDANSETRON 2 MG/ML
4 INJECTION INTRAMUSCULAR; INTRAVENOUS EVERY 4 HOURS PRN
Status: DISCONTINUED | OUTPATIENT
Start: 2023-05-17 | End: 2023-05-23 | Stop reason: HOSPADM

## 2023-05-17 RX ORDER — HYDROXYZINE PAMOATE 25 MG/1
25 CAPSULE ORAL ONCE
Status: COMPLETED | OUTPATIENT
Start: 2023-05-17 | End: 2023-05-17

## 2023-05-17 RX ORDER — CLOPIDOGREL BISULFATE 75 MG/1
75 TABLET ORAL DAILY
Status: DISCONTINUED | OUTPATIENT
Start: 2023-05-17 | End: 2023-05-23 | Stop reason: HOSPADM

## 2023-05-17 RX ADMIN — CLOPIDOGREL BISULFATE 75 MG: 75 TABLET ORAL at 17:26

## 2023-05-17 RX ADMIN — FLUTICASONE PROPIONATE 2 PUFF: 110 AEROSOL, METERED RESPIRATORY (INHALATION) at 20:19

## 2023-05-17 RX ADMIN — DOCUSATE SODIUM 50MG AND SENNOSIDES 8.6MG 1 TABLET: 8.6; 5 TABLET, FILM COATED ORAL at 08:44

## 2023-05-17 RX ADMIN — Medication 10 ML: at 00:01

## 2023-05-17 RX ADMIN — ATORVASTATIN CALCIUM 20 MG: 20 TABLET, FILM COATED ORAL at 21:50

## 2023-05-17 RX ADMIN — INSULIN DETEMIR 20 UNITS: 100 INJECTION, SOLUTION SUBCUTANEOUS at 21:46

## 2023-05-17 RX ADMIN — MONTELUKAST 10 MG: 10 TABLET, FILM COATED ORAL at 21:50

## 2023-05-17 RX ADMIN — HYDROCODONE POLISTIREX AND CHLORPHENIRAMINE POLISTIREX 5 ML: 10; 8 SUSPENSION, EXTENDED RELEASE ORAL at 21:50

## 2023-05-17 RX ADMIN — ARFORMOTEROL TARTRATE 15 MCG: 15 SOLUTION RESPIRATORY (INHALATION) at 20:17

## 2023-05-17 RX ADMIN — HYDROCODONE POLISTIREX AND CHLORPHENIRAMINE POLISTIREX 5 ML: 10; 8 SUSPENSION, EXTENDED RELEASE ORAL at 08:47

## 2023-05-17 RX ADMIN — Medication 10 ML: at 21:51

## 2023-05-17 RX ADMIN — DOCUSATE SODIUM 50MG AND SENNOSIDES 8.6MG 1 TABLET: 8.6; 5 TABLET, FILM COATED ORAL at 21:50

## 2023-05-17 RX ADMIN — SERTRALINE HYDROCHLORIDE 50 MG: 50 TABLET ORAL at 09:23

## 2023-05-17 RX ADMIN — METOPROLOL SUCCINATE 25 MG: 25 TABLET, EXTENDED RELEASE ORAL at 08:44

## 2023-05-17 RX ADMIN — ASPIRIN 81 MG: 81 TABLET, COATED ORAL at 08:45

## 2023-05-17 RX ADMIN — FLUTICASONE PROPIONATE 2 PUFF: 110 AEROSOL, METERED RESPIRATORY (INHALATION) at 07:20

## 2023-05-17 RX ADMIN — ENOXAPARIN SODIUM 40 MG: 100 INJECTION SUBCUTANEOUS at 08:46

## 2023-05-17 RX ADMIN — DILTIAZEM HYDROCHLORIDE 240 MG: 240 CAPSULE, COATED, EXTENDED RELEASE ORAL at 09:23

## 2023-05-17 RX ADMIN — INSULIN DETEMIR 20 UNITS: 100 INJECTION, SOLUTION SUBCUTANEOUS at 08:47

## 2023-05-17 RX ADMIN — Medication 10 ML: at 08:46

## 2023-05-17 RX ADMIN — NYSTATIN: 100000 POWDER TOPICAL at 21:51

## 2023-05-17 RX ADMIN — HYDROXYZINE PAMOATE 25 MG: 25 CAPSULE ORAL at 22:40

## 2023-05-17 RX ADMIN — INSULIN LISPRO 5 UNITS: 100 INJECTION, SOLUTION INTRAVENOUS; SUBCUTANEOUS at 12:30

## 2023-05-17 RX ADMIN — POLYETHYLENE GLYCOL 3350 17 G: 17 POWDER, FOR SOLUTION ORAL at 08:48

## 2023-05-17 RX ADMIN — FAMOTIDINE 10 MG: 10 TABLET ORAL at 09:23

## 2023-05-17 RX ADMIN — LISINOPRIL 40 MG: 20 TABLET ORAL at 08:45

## 2023-05-17 RX ADMIN — NYSTATIN: 100000 POWDER TOPICAL at 09:23

## 2023-05-17 RX ADMIN — ONDANSETRON 4 MG: 2 INJECTION INTRAMUSCULAR; INTRAVENOUS at 21:46

## 2023-05-17 RX ADMIN — ARFORMOTEROL TARTRATE 15 MCG: 15 SOLUTION RESPIRATORY (INHALATION) at 07:20

## 2023-05-17 NOTE — PROGRESS NOTES
[facility]   Hospitalist Progress Note       Patient Name: Tita Jiménez  : 1935  MRN: [id number]  Primary Care Physician: Madison Ortiz APRN  Date of admission: 2023  Today's Date: 2023  Room / Bed:   219/2  Subjective   Chief Complaint: SOA.  Weakness.    Summary:  Tita Jiménez is a 88 y.o. female  with a past medical history of type 2 diabetes mellitus, hypertension, hyperlipidemia, ulcerative colitis, presented to the ED for evaluation of shortness of breath, cough.  Patient has been recently diagnosed with COVID infection on Monday.  Since the COVID infection, patient has been progressively becoming weak and now she is not able to get up out of bed in chair by herself.    Upon arrival to the ED, vital signs temperature 97.8, pulse 96, respirate rate 18, blood pressure 124/65 on 2 L of nasal cannula saturating around 93%.  Labs showed troponin 17, proBNP 160.4, sodium 135, bicarb 18.3, normal creatinine, albumin 3.4, rest of the CMP is nonsignificant, normal CBC.  Chest x-ray showed no acute cardiopulmonary process.  X-ray ankle of the right showed no acute fracture or traumatic malalignment.  EKG showed no significant ST-T wave changes concerning for ischemia.      Patient admitted for further evaluation and management of generalized weakness, right ankle sprain, COVID-19 infection.  On 23 she was moved to PCU after stroke RRT was called.  Patient had left facial droop and left sided weakness.    Interval Followup: 2023    • Alert and oriented  • No events on telemetry  • Vital signs stable  • CT head with old CVA noted.  • MRI brain pending  • Still has left facial droop and left upper/lower extremity weakness.      REVIEW OF SYSTEMS: All other systems reviewed and are negative.   • General weakness.  Shortness of air.  Left upper and lower extremity weakness.  Objective   Temp:  [97.2 °F (36.2 °C)-98.2 °F (36.8 °C)] 97.2 °F (36.2 °C)  Heart Rate:   [] 62  Resp:  [16-18] 16  BP: (100-164)/(45-82) 100/45  PHYSICAL EXAM   • CON: WN. WD. NAD.  Oriented.  Knows place year month president.  • EYES:  Sclera anicteric. EOMI. Normal conjunctiva.   • ENT:  Oropharyngeal mucosa without ulcers or thrush.    • NECK:  No thyromegaly. No stridor. Trachea midline.  • RESP:  CTA. No wheezes. No crackles.  No work of breathing or tachypnea.   • CV:  Rhythm regular. Rate WNL. No murmur noted.  No edema.  • GI:  Soft and nontender. Nondistended.  Bowel sounds present.   • EXT: Peripheral pulses intact.  No joint deformities or cyanosis.  • LYMPH:  No lymphedema noted.  No cervical lymphadenopathy.  • PSYCH:  Alert. Oriented. Normal affect and mood.  • NEURO:  Left facial droop.  Left upper and lower extremity weakness.  No  paresthesia.  • SKIN: No chronic venous stasis changes or varicosities.  No cellulitis    Results from last 7 days   Lab Units 05/17/23  0434 05/16/23  0626 05/15/23  0437 05/14/23  0650 05/13/23  0531 05/12/23  1911   WBC 10*3/mm3 8.28 9.28 7.60 6.38 5.68 7.19   HEMOGLOBIN g/dL 13.4 13.3 12.7 12.2 12.1 13.2   HEMATOCRIT % 41.0 41.2 38.8 38.4 38.2 40.8   PLATELETS 10*3/mm3 212 233 226 206 214 217     Results from last 7 days   Lab Units 05/17/23  0434 05/16/23  0626 05/15/23  0437 05/14/23  0650 05/13/23  0531 05/12/23  1911   SODIUM mmol/L 134* 138 135* 138 139 135*   POTASSIUM mmol/L 4.1 4.5 4.6 4.4 4.4 3.9   CO2 mmol/L 21.1* 27.8 25.7 26.0 23.9 18.3*   CHLORIDE mmol/L 101 102 102 104 107 102   ANION GAP mmol/L 11.9 8.2 7.3 8.0 8.1 14.7   BUN mg/dL 27* 27* 25* 24* 23 25*   CREATININE mg/dL 0.76 0.75 0.80 0.74 0.83 0.95   GLUCOSE mg/dL 116* 177* 202* 153* 160* 303*           RESULTS REVIEWED:  I have personally reviewed the results from the time of this admission to 5/17/2023 11:45 EDT and agree with these findings:  []  Laboratory  []  Microbiology  []  Radiology  []  EKG/Telemetry   []  Cardiology/Vascular   []  Pathology  []  Old records  []   Other:  Assessment / Plan   Assessment:  • Left-sided weakness and left facial droop, suspect stroke  • History of prior CVA (right frontal, right basal ganglia)  • COVID infection  • DM  • HTN  • Dyslipidemia  • Ulcerative colitis  • CODE STATUS: DNR/DNI       Plan:  • Moved to PCU via stroke pathway  • MRI brain today .... Pending  • CT head showed old right frontal and old right basal ganglia stroke  • Nebulizers as needed and scheduled  • Levemir and sliding scale insulin for glucose control  • Aspirin/statin  • Lovenox for DVT prophylaxis  • PT/OT .... Rehab placement (lives in assisted living facility at baseline)  Discussed plan with RN.  DVT prophylaxis:  Medical and mechanical DVT prophylaxis orders are present.  CODE STATUS:      Medical Intervention Limits: NO intubation (DNI)  Level Of Support Discussed With: Patient  Code Status (Patient has no pulse and is not breathing): No CPR (Do Not Attempt to Resuscitate)  Medical Interventions (Patient has pulse or is breathing): Limited Support       Electronically signed by ROSARIO Zhu, 05/17/23, 11:45 AM EDT.    Patient independently seen and evaluated, agree with assessment and plan, above documentation reflects plan put forth during bedside rounds.  More than 51% of the time of this patient encounter was performed by me.    Interval history: Patient still remains altered this a.m., still with left upper extremity weakness, left lower extremity weakness    Exam:  GEN: Drowsy  HEENT: Moist mucous membrane  LUNGS: Equal chest rise bilaterally  CARDIAC: Regular rate and rhythm  NEURO: Left upper extremity weakness, asymmetric face, left lower extremity weakness  SKIN: No obvious breakdown    Plan:  Agree with assessment and plan as above  Head CT, CTA head and neck personally reviewed, no obvious stroke but areas of decreased blood flow in the posterior circulation  Check MRI of the brain today  Continue bronchodilators  Continue aspirin and  statin  Continue basal bolus insulin for hyperglycemia  Continue Lovenox for DVT prophylaxis  Continue PT/OT  CBC, CMP reviewed  Repeat CBC, CMP, mag and Phos in a.m.  Clinical course will dictate further management    Reviewed patients labs and imaging, and discussed with patient and nurse at bedside.      Electronically signed by Michael Barba MD, 05/17/23, 12:17 PM EDT.

## 2023-05-17 NOTE — THERAPY EVALUATION
Acute Care - Speech Language Pathology   Swallow Initial Evaluation RUPESH Villalobos     Patient Name: Tita Jiménez  : 1935  MRN: [id number]  Today's Date: 2023               Admit Date: 2023    Visit Dx:     ICD-10-CM ICD-9-CM   1. Dehydration  E86.0 276.51   2. Generalized weakness  R53.1 780.79   3. Decreased activities of daily living (ADL)  Z78.9 V49.89   4. Difficulty in walking  R26.2 719.7   5. Oropharyngeal dysphagia  R13.12 787.22     Patient Active Problem List   Diagnosis   • Compression fracture of T1 -T2 vertebra (CMS/HCC)   • Fall   • Contusion of scalp   • Closed head injury   • Asthma   • Depression   • Reflux esophagitis   • Bladder incontinence   • Bowel incontinence   • High cholesterol   • Diabetes mellitus, type II (HCC)   • Hypertension   • Ulcerative colitis (HCC)   • GERD (gastroesophageal reflux disease)   • Benign essential tremor   • Carpal tunnel syndrome   • Ataxia   • Breast cancer screening   • Hypoxia   • Cytokine release syndrome, grade 1   • Pneumonia due to COVID-19 virus   • Weakness   • Cognitive impairment   • Difficulty walking   • At risk for venous thromboembolism (VTE)   • Lower abdominal pain   • 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 skin intact. D/T fall on 10/18/2019   • Depression    • Diabetes mellitus    • GERD (gastroesophageal reflux disease) 2021   • HTN (hypertension) 10/18/2019   • Hyperlipidemia    • Hypertension    • Mitral valve problem    • Polymyalgia rheumatica    • Stroke     short term memory loss   • Ulcerative colitis 2021   • Urinary incontinence      Past Surgical History:   Procedure Laterality Date   • ADENOIDECTOMY     • APPENDECTOMY     • BREAST SURGERY      ( L4-L5)   • COLON SURGERY      colon resection   •  HYSTERECTOMY     • OOPHORECTOMY     • TONSILLECTOMY         Inpatient Speech Pathology Dysphagia Evaluation        PAIN SCALE: None indicated    PRECAUTIONS/CONTRAINDICATIONS: Standard, enhanced airborne    SUSPECTED ABUSE/NEGLECT/EXPLOITATION: None identified    SOCIAL/PSYCHOLOGICAL NEEDS/BARRIERS: None identified    PAST SOCIAL HISTORY: 88-year-old female, lives in an assisted living facility    PRIOR FUNCTION: Patient reports no prior difficulty swallowing    PATIENT GOALS/EXPECTATIONS: She wants to begin eating and drinking    HISTORY: Patient is an 88-year-old female initially admitted to [facility] on 5/12/2023 secondary to dehydration.  She was recently diagnosed with COVID-19.  On 5/16/2023, patient required stroke RRT due to new onset facial droop.  She was referred for speech pathology dysphagia evaluation for assessment of swallow function due to change in status.  Head CT revealed Chronic right frontal lobe infarct and chronic infarct in the right basal ganglia.  MRI to be completed today.    CURRENT DIET LEVEL: N.p.o.    OBJECTIVE:    TEST ADMINISTERED: Clinical dysphagia evaluation    COGNITION/SAFETY AWARENESS: Appears appropriate for environment however not thoroughly evaluated    BEHAVIORAL OBSERVATIONS: Alert and cooperative    ORAL MOTOR EXAM: Left-sided facial droop/asymmetry.  Lingual protrusion is at midline.  Lingual strength range of motion left 4/5.  Lingual strength range of motion right 4+/5.  Labial retraction left 4/5.    VOICE QUALITY: Clear    REFLEX EXAM: Nonproductive    POSTURE: Sitting fully upright    FEEDING/SWALLOWING FUNCTION: Assessed with thin liquids, nectar thick liquids, purée solids, regular solids    CLINICAL OBSERVATIONS: Nectar thick liquids by spoon and by cup with swallows completed with mild delay.  Vocal quality clear and laryngeal sounds clear with cervical auscultation.  Thin liquids by spoon and by cup with swallows completed with mild delay.  Vocal  quality clear and laryngeal sounds clear.  Purée solids by spoon with lingual pumping.  Swallow completed.  Regular solids with extended chewing.  Patient biting inside of cheek when attempting to chew on the left.  Swallow was completed with left-sided buccal residue.  Several attempts at lingual sweep to completely clear.  Thin liquids by straw with single sip.  Swallow completed with mild delay.  Thin liquid by straw with free drinking.  Swallows completed with mild delay with coughing observed.  Laryngeal elevation was noted to palpation.  Patient exhibiting mild swallow delay with all consistencies.  Decreased oral motor strength range of motion to the left resulting in difficulty clearing solids from oral cavity.  Patient exhibiting signs of aspiration with free drinking thin liquids via straw.  Silent aspiration cannot be ruled out.    DYSPHAGIA CRITERIA: Risk of aspiration    FUNCTIONAL ASSESSMENT INSTRUMENT: Patient currently scored a level 5 of 7 on Functional Communication Measures for swallowing indicating a 20-39% limitation in function.    ASSESSMENT/ PLAN OF CARE:  Pt presents with limitations, noted below, that impede patient's ability to tolerate least restrictive diet safely and independently. The skills of a therapist will be required to safely and effectively implement the following treatment plan to restore maximal level of function.    PROBLEMS:  1.  Risk of aspiration, swallow delay, decreased oral motor strength/range of motion   LTG 1: 30 days.  Patient will increase functional communication measures for swallowing to level 6 of 7, indicating 1-19% limitation in function.   STG 1a: 14 days.  Patient will tolerate least restrictive diet of mechanical soft solids, thin liquids with minimal to no signs or symptoms of aspiration.   STG 1b: 14 days.  Patient will tolerate least restrictive diet mechanical soft solids, thin liquids utilizing compensatory strategies independently.   STG 1c: 14  days.  Patient will tolerate least restrictive diet of mechanical soft solids, thin liquids utilizing compensatory strategies with min/mod cues.   TREATMENT: Speech therapy for dysphagia, education of strategies and tolerance of least restrictive diet.      FREQUENCY/DURATION: Daily, 5 days a week    REHAB POTENTIAL:  Pt has good rehab potential.  The following limitations may influence improvement/ length of tx medical status.    RECOMMENDATIONS:   1.   DIET: Mechanical soft solids, thin liquids    2.  POSITION: Fully upright for all p.o. intake, 30 minutes following    3.  COMPENSATORY STRATEGIES: Small bites and sips, single sips of thin liquids, meds 1 at a time with single sip of liquid or whole in applesauce.    Pt/responsible party agrees with plan of care and has been informed of all alternatives, risks and benefits.    SLP Recommendation and Plan                                                                                       EDUCATION  The patient has been educated in the following areas:   Dysphagia (Swallowing Impairment).              Time Calculation:    Time Calculation- SLP     Row Name 05/17/23 0818             Time Calculation- SLP    SLP Start Time 0645  -SN      SLP Stop Time 0745  -SN      SLP Time Calculation (min) 60 min  -SN      SLP Received On 05/17/23  -SN         Untimed Charges    54068-[place] Eval Oral Pharyng Swallow Minutes 60  -SN         Total Minutes    Untimed Charges Total Minutes 60  -SN       Total Minutes 60  -SN            User Key  (r) = Recorded By, (t) = Taken By, (c) = Cosigned By    Initials Name Provider Type    Deirdre Herndon MS-CCC/SLP, [facility] Speech and Language Pathologist                Therapy Charges for Today     Code Description Service Date Service Provider Modifiers Qty    [id number] HC ST EVAL ORAL PHARYNG SWALLOW 4 5/17/2023 Deirdre Pedroza MS-CCC/SLP, [facility] GN 1               NISHA Sterling/[facility]O LOPEZ  5/17/2023

## 2023-05-17 NOTE — PLAN OF CARE
Goal Outcome Evaluation:         ASSESSMENT/ PLAN OF CARE:  Pt presents with limitations, noted below, that impede patient's ability to tolerate least restrictive diet safely and independently. The skills of a therapist will be required to safely and effectively implement the following treatment plan to restore maximal level of function.    PROBLEMS:  1.  Risk of aspiration, swallow delay, decreased oral motor strength/range of motion  TREATMENT: Speech therapy for dysphagia, education of strategies and tolerance of least restrictive diet.      FREQUENCY/DURATION: Daily, 5 days a week    REHAB POTENTIAL:  Pt has good rehab potential.  The following limitations may influence improvement/ length of tx medical status.    RECOMMENDATIONS:   1.   DIET: Mechanical soft solids, thin liquids    2.  POSITION: Fully upright for all p.o. intake, 30 minutes following    3.  COMPENSATORY STRATEGIES: Small bites and sips, single sips of thin liquids, meds 1 at a time with single sip of liquid or whole in applesauce.

## 2023-05-17 NOTE — PLAN OF CARE
Goal Outcome Evaluation:  Plan of Care Reviewed With: patient   Covering hospitalist notified for stroke orders and increase in NIH. NIH 3. Patient has left sided facial droop and coughs during med administration. Made NPO, speech consult placed. Patient is Aox4, but very forgetful. Purwik in place due to weakness. BP elevated overnight, within parameters. Other VSS. Covid test ordered and swab sent to lab.

## 2023-05-17 NOTE — PLAN OF CARE
Goal Outcome Evaluation:  Plan of Care Reviewed With: patient           Outcome Evaluation: pt alert this shift. weakness progressing to left side. md aware. mri resulted, md aware. blood glucose monitored. positive covid test.

## 2023-05-17 NOTE — THERAPY EVALUATION
Acute Care - Physical Therapy Initial Evaluation  RUPESH Villalobos     Patient Name: Tita Jiménez  : 1935  MRN: [id number]  Today's Date: 2023      Admit date: 2023     Referring Physician: Michael Barba MD     Surgery Date:* No surgery found *              Visit Dx:     ICD-10-CM ICD-9-CM   1. Dehydration  E86.0 276.51   2. Generalized weakness  R53.1 780.79   3. Decreased activities of daily living (ADL)  Z78.9 V49.89   4. Difficulty in walking  R26.2 719.7   5. Oropharyngeal dysphagia  R13.12 787.22     Patient Active Problem List   Diagnosis   • Compression fracture of T1 -T2 vertebra (CMS/HCC)   • Fall   • Contusion of scalp   • Closed head injury   • Asthma   • Depression   • Reflux esophagitis   • Bladder incontinence   • Bowel incontinence   • High cholesterol   • Diabetes mellitus, type II (HCC)   • Hypertension   • Ulcerative colitis (HCC)   • GERD (gastroesophageal reflux disease)   • Benign essential tremor   • Carpal tunnel syndrome   • Ataxia   • Breast cancer screening   • Hypoxia   • Cytokine release syndrome, grade 1   • Pneumonia due to COVID-19 virus   • Weakness   • Cognitive impairment   • Difficulty walking   • At risk for venous thromboembolism (VTE)   • Lower abdominal pain   • 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 skin intact. D/T fall on 10/18/2019   • Depression    • Diabetes mellitus    • GERD (gastroesophageal reflux disease) 2021   • HTN (hypertension) 10/18/2019   • Hyperlipidemia    • Hypertension    • Mitral valve problem    • Polymyalgia rheumatica    • Stroke     short term memory loss   • Ulcerative colitis 2021   • Urinary incontinence      Past Surgical History:   Procedure Laterality Date   • ADENOIDECTOMY     • APPENDECTOMY     • BREAST  SURGERY      ( L4-L5)   • COLON SURGERY      colon resection   • HYSTERECTOMY     • OOPHORECTOMY     • TONSILLECTOMY       PT Assessment (last 12 hours)     PT Evaluation and Treatment     Row Name 05/17/23 1437          Physical Therapy Time and Intention    Subjective Information no complaints  -MYESHA     Document Type re-evaluation  -MYESHA     Mode of Treatment individual therapy;physical therapy  -MYESHA     Patient Effort good  -MYESHA     Row Name 05/17/23 1437          General Information    Patient Observations alert;cooperative;agree to therapy  -MYESHA     Row Name 05/17/23 1437          Range of Motion (ROM)    Range of Motion ROM is WFL  -MYESHA     Row Name 05/17/23 1437          Strength (Manual Muscle Testing)    Strength (Manual Muscle Testing) left lower extremity  0/5 throughout  -MYESHA     Row Name 05/17/23 1437          Bed Mobility    Bed Mobility bed mobility (all) activities;supine-sit  -MYESHA     All Activities, [place] (Bed Mobility) moderate assist (50% patient effort)  -MYESHA     Supine-Sit [place] (Bed Mobility) maximum assist (25% patient effort)  -MYESHA     Row Name 05/17/23 1437          Transfers    Comment, (Transfers) pt not safe for OOB transfer.  Poor sitting balance at the edge of the bed  -MYESHA     Row Name 05/17/23 1437          Safety Issues, Functional Mobility    Impairments Affecting Function (Mobility) balance;cognition;endurance/activity tolerance;pain;strength  -MYESHA     Row Name 05/17/23 1437          Balance    Balance Assessment sitting static balance  -MYESHA     Static Sitting Balance moderate assist  posterior and Leftward lean noted in static sitting  -MYESHA     Row Name 05/17/23 1437          Progress Summary (PT)    Daily Progress Summary (PT) Patient has had a new diagnosis of CVA with a significant decline in function.  Skilled physical therapy services will be required to address her new mobility deficits.  Plan of care will be updated below.  -MYESHA     Row Name 05/17/23 1437          Physical  Therapy Goals    Transfer Goal Selection (PT) transfer, PT goal 1  -MYESHA     Gait Training Goal Selection (PT) gait training, PT goal 1  -MYESHA     Strength Goal Selection (PT) strength, PT goal 1  -MYESHA     Row Name 05/17/23 1437          Transfer Goal 1 (PT)    Activity/Assistive Device (Transfer Goal 1, PT) transfers, all  -MYESHA     [place] Level/Cues Needed (Transfer Goal 1, PT) standby assist  -MYESHA     Time Frame (Transfer Goal 1, PT) long term goal (LTG);10 days  -MYESHA     Progress/Outcome (Transfer Goal 1, PT) new goal  -MYESHA     Row Name 05/17/23 1437          Gait Training Goal 1 (PT)    Activity/Assistive Device (Gait Training Goal 1, PT) gait (walking locomotion);assistive device use;walker, rolling  -MYESHA     [place] Level (Gait Training Goal 1, PT) contact guard required  -MYESHA     Distance (Gait Training Goal 1, PT) 50  -MYESHA     Time Frame (Gait Training Goal 1, PT) long term goal (LTG);10 days  -MYESHA     Progress/Outcome (Gait Training Goal 1, PT) new goal  -MYESHA     Row Name 05/17/23 1437          Strength Goal 1 (PT)    Strength Goal 1 (PT) Pt will demonstrate 3/5 strength in her LLE  -MYESHA           User Key  (r) = Recorded By, (t) = Taken By, (c) = Cosigned By    Initials Name Provider Type    Jeff Fernandez PT Physical Therapist                Physical Therapy Education     Title: PT OT SLP Therapies (Done)     Topic: Physical Therapy (Done)     Point: Mobility training (Done)     Learning Progress Summary           Patient Acceptance, E, VU by  at 5/13/2023 1004                   Point: Home exercise program (Done)     Learning Progress Summary           Patient Acceptance, E, VU by AC at 5/13/2023 1004                   Point: Body mechanics (Done)     Learning Progress Summary           Patient Acceptance, E, VU by  at 5/13/2023 1004                   Point: Precautions (Done)     Learning Progress Summary           Patient Acceptance, E, VU by  at 5/13/2023 1004                                User Key     Initials Effective Dates Name Provider Type Discipline    AC 06/16/21 -  Celeste Anthony OT Occupational Therapist OT              PT Recommendation and Plan  Anticipated Discharge Disposition (PT): sub acute care setting  Planned Therapy Interventions (PT): balance training, bed mobility training, gait training, home exercise program, stair training, strengthening, transfer training  Therapy Frequency (PT): daily  Progress Summary (PT)  Daily Progress Summary (PT): Patient has had a new diagnosis of CVA with a significant decline in function.  Skilled physical therapy services will be required to address her new mobility deficits.  Plan of care will be updated below.  Plan of Care Reviewed With: patient  Outcome Evaluation: Patient presents with decreased strength, transfers and ambulation.  Skilled physical therapy services will be required to address these mobility deficits.  Recommend skilled nursing facility placement upon discharge from the hospital   Outcome Measures     Row Name 05/17/23 1448 05/15/23 0849          How much help from another person do you currently need...    Turning from your back to your side while in flat bed without using bedrails? 2  -MYESHA 3  -DK     Moving from lying on back to sitting on the side of a flat bed without bedrails? 2  -MYESHA 3  -DK     Moving to and from a bed to a chair (including a wheelchair)? 1  -MYESHA 2  -DK     Standing up from a chair using your arms (e.g., wheelchair, bedside chair)? 2  -MYESHA 3  -DK     Climbing 3-5 steps with a railing? 1  -MYESHA 1  -DK     To walk in hospital room? 1  -MYESHA 2  -DK     AM-PAC 6 Clicks Score (PT) 9  -MYESHA 14  -DK        Functional Assessment    Outcome Measure Options AM-PAC 6 Clicks Basic Mobility (PT)  -MYESHA AM-PAC 6 Clicks Basic Mobility (PT)  -DK           User Key  (r) = Recorded By, (t) = Taken By, (c) = Cosigned By    Initials Name Provider Type    Joellen Fleming, PTA Physical Therapist Assistant    Jeff Fernandez, PT  Physical Therapist                 Time Calculation:    PT Charges     Row Name 05/17/23 1443             Time Calculation    PT Received On 05/17/23  -MYESHA      PT Goal Re-Cert Due Date 05/26/23  -MYESHA         Untimed Charges    PT Eval/Re-eval Minutes 15  -MYESHA         Total Minutes    Untimed Charges Total Minutes 15  -MYESHA       Total Minutes 15  -MYESHA            User Key  (r) = Recorded By, (t) = Taken By, (c) = Cosigned By    Initials Name Provider Type    Jeff Fernandez, PT Physical Therapist              Therapy Charges for Today     Code Description Service Date Service Provider Modifiers Qty    [id number] HC PT RE-EVAL ESTABLISHED PLAN 2 5/17/2023 Jeff Shell PT GP 1          PT G-Codes  Outcome Measure Options: AM-PAC 6 Clicks Basic Mobility (PT)  AM-PAC 6 Clicks Score (PT): 9  AM-PAC 6 Clicks Score (OT): 14    Jeff Shell PT  5/17/2023

## 2023-05-17 NOTE — THERAPY RE-EVALUATION
Patient Name: Tita Jiménez  : 1935    MRN: [id number]                              Today's Date: 2023       Admit Date: 2023    Visit Dx:     ICD-10-CM ICD-9-CM   1. Dehydration  E86.0 276.51   2. Generalized weakness  R53.1 780.79   3. Decreased activities of daily living (ADL)  Z78.9 V49.89   4. Difficulty in walking  R26.2 719.7   5. Oropharyngeal dysphagia  R13.12 787.22     Patient Active Problem List   Diagnosis   • Compression fracture of T1 -T2 vertebra (CMS/HCC)   • Fall   • Contusion of scalp   • Closed head injury   • Asthma   • Depression   • Reflux esophagitis   • Bladder incontinence   • Bowel incontinence   • High cholesterol   • Diabetes mellitus, type II (HCC)   • Hypertension   • Ulcerative colitis (HCC)   • GERD (gastroesophageal reflux disease)   • Benign essential tremor   • Carpal tunnel syndrome   • Ataxia   • Breast cancer screening   • Hypoxia   • Cytokine release syndrome, grade 1   • Pneumonia due to COVID-19 virus   • Weakness   • Cognitive impairment   • Difficulty walking   • At risk for venous thromboembolism (VTE)   • Lower abdominal pain   • 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 skin intact. D/T fall on 10/18/2019   • Depression    • Diabetes mellitus    • GERD (gastroesophageal reflux disease) 2021   • HTN (hypertension) 10/18/2019   • Hyperlipidemia    • Hypertension    • Mitral valve problem    • Polymyalgia rheumatica    • Stroke     short term memory loss   • Ulcerative colitis 2021   • Urinary incontinence      Past Surgical History:   Procedure Laterality Date   • ADENOIDECTOMY     • APPENDECTOMY     • BREAST SURGERY      ( L4-L5)   • COLON SURGERY      colon resection   • HYSTERECTOMY     • OOPHORECTOMY     • TONSILLECTOMY        General  Information     [facility] Name 05/17/23 [facility]          OT Time and Intention    Document Type re-evaluation  -     Mode of Treatment individual therapy;occupational therapy  -     Row Name 05/17/23 [facility]          General Information    Existing Precautions/Restrictions fall  -[facility] Name 05/17/23 [facility]          Cognition    Orientation Status (Cognition) oriented to;person  patient is oriented to self only, patient is very sleepy during evaluation. Patient requires verbal and tactile cueing for arousal.  -ES           User Key  (r) = Recorded By, (t) = Taken By, (c) = Cosigned By    Initials Name Provider Type    ES Lita West, OTR/L, CSRS Occupational Therapist                 Mobility/ADL's     [facility] Name 05/17/23 [facility]          Bed Mobility    Bed Mobility supine-sit;sit-supine  -ES     Supine-Sit [place] (Bed Mobility) maximum assist (25% patient effort);1 person assist  -ES     Sit-Supine [place] (Bed Mobility) maximum assist (25% patient effort);1 person assist  -ES     [facility] Name 05/17/23 [facility]          Transfers    Comment, (Transfers) not tested secondary to LLE flacidity at time of re evaluation  -[facility] Name 05/17/23 [facility]          Activities of Daily Living    BADL Assessment/Intervention bathing;upper body dressing;lower body dressing;grooming;feeding;toileting  -[facility] Name 05/17/23 [facility]          Bathing Assessment/Intervention    [place] Level (Bathing) bathing skills;maximum assist (25% patient effort)  -[facility] Name 05/17/23 [facility]          Upper Body Dressing Assessment/Training    [place] Level (Upper Body Dressing) upper body dressing skills;minimum assist (75% patient effort)  -ES     Row Name 05/17/23 [facility]          Lower Body Dressing Assessment/Training    [place] Level (Lower Body Dressing) lower body dressing skills;maximum assist (25% patient effort)  -ES     Row Name 05/17/23 [facility]          Grooming Assessment/Training    [place] Level (Grooming) grooming  skills;minimum assist (75% patient effort)  -ES     Row Name 05/17/23 [facility]          Self-Feeding Assessment/Training    [place] Level (Feeding) feeding skills;set up  -ES     [facility] Name 05/17/23 [facility]          Toileting Assessment/Training    [place] Level (Toileting) toileting skills;dependent (less than 25% patient effort)  -ES           User Key  (r) = Recorded By, (t) = Taken By, (c) = Cosigned By    Initials Name Provider Type    ES Lita West, OTR/L, LAURAS Occupational Therapist               Obj/Interventions     Row Name 05/17/23 [facility]          Vision Assessment/Intervention    Vision Assessment Comment patient demonstrates difficulty with visual tracking during evaluation, eyes not in focus. Patient reports blurry vision  -ES     Row Name 05/17/23 [facility]          Range of Motion Comprehensive    General Range of Motion bilateral upper extremity ROM WNL  -ES     [facility] Name 05/17/23 [facility]          Strength Comprehensive (MMT)    General Manual Muscle Testing (MMT) Assessment upper extremity strength deficits identified;lower extremity strength deficits identified  -ES     Comment, General Manual Muscle Testing (MMT) Assessment RUE 4/5. LUE 3+/5, grasp 2+/5 sith significant new onset of L weakness  -ES     Row Name 05/17/23 [facility]          Lower Extremity (Manual Muscle Testing)    Comment, MMT: Lower Extremity LLE assessed 0/5 at time of re assessment  -ES     [facility] Name 05/17/23 [facility]          Motor Skills    Motor Skills functional endurance  -ES     Functional Endurance fair minus  -ES     Row Name 05/17/23 [facility]          Balance    Balance Assessment sitting static balance  -ES     Static Sitting Balance minimal assist;1-person assist  -ES     Comment, Balance patient with posterior lean seated edge of bed. Min A with static sitting balance  -ES           User Key  (r) = Recorded By, (t) = Taken By, (c) = Cosigned By    Initials Name Provider Type    ES Lita West, LORNAR/L, CSRS Occupational Therapist                Goals/Plan     Row Name 05/17/23 1438          Transfer Goal 1 (OT)    Activity/Assistive Device (Transfer Goal 1, OT) transfers, all  -ES     [place] Level/Cues Needed (Transfer Goal 1, OT) modified independence  -ES     Time Frame (Transfer Goal 1, OT) long term goal (LTG);10 days  -ES     Progress/Outcome (Transfer Goal 1, OT) continuing progress toward goal  -ES     Row Name 05/17/23 1438          Bathing Goal 1 (OT)    Activity/Device (Bathing Goal 1, OT) bathing skills, all  -ES     [place] Level/Cues Needed (Bathing Goal 1, OT) modified independence  -ES     Time Frame (Bathing Goal 1, OT) long term goal (LTG);10 days  -ES     Progress/Outcomes (Bathing Goal 1, OT) continuing progress toward goal  -ES     Row Name 05/17/23 1438          Dressing Goal 1 (OT)    Activity/Device (Dressing Goal 1, OT) dressing skills, all  -ES     [place]/Cues Needed (Dressing Goal 1, OT) modified independence  -ES     Time Frame (Dressing Goal 1, OT) long term goal (LTG);10 days  -ES     Progress/Outcome (Dressing Goal 1, OT) continuing progress toward goal  -ES     Row Name 05/17/23 1438          Toileting Goal 1 (OT)    Activity/Device (Toileting Goal 1, OT) toileting skills, all  -ES     [place] Level/Cues Needed (Toileting Goal 1, OT) modified independence  -ES     Time Frame (Toileting Goal 1, OT) long term goal (LTG);10 days  -ES     Progress/Outcome (Toileting Goal 1, OT) continuing progress toward goal  -ES     Row Name 05/17/23 1438          Grooming Goal 1 (OT)    Activity/Device (Grooming Goal 1, OT) grooming skills, all  -ES     [place] (Grooming Goal 1, OT) modified independence  -ES     Time Frame (Grooming Goal 1, OT) long term goal (LTG);10 days  -ES     Progress/Outcome (Grooming Goal 1, OT) continuing progress toward goal  -ES     Row Name 05/17/23 1438          Strength Goal 1 (OT)    Strength Goal 1 (OT) Pt will increase LUE strength 1 muscle grade for increased UE strength  required for ADL transfers and task completion  -ES     Time Frame (Strength Goal 1, OT) long term goal (LTG);10 days  -ES     Progress/Outcome (Strength Goal 1, OT) goal revised this date  -ES     Row Name 05/17/23 1438          Problem Specific Goal 1 (OT)    Problem Specific Goal 1 (OT) Patient will demonstrate fair plus graded dynamic balance in preperation for independent ADL routine completion at time of discharge  -ES     Time Frame (Problem Specific Goal 1, OT) long term goal (LTG);10 days  -ES     Progress/Outcome (Problem Specific Goal 1, OT) new goal  -ES     Row Name 05/17/23 1435          Therapy Assessment/Plan (OT)    Planned Therapy Interventions (OT) activity tolerance training;BADL retraining;functional balance retraining;occupation/activity based interventions;patient/caregiver education/training;strengthening exercise;transfer/mobility retraining  -ES           User Key  (r) = Recorded By, (t) = Taken By, (c) = Cosigned By    Initials Name Provider Type    ES Lita West, OTR/L, CSRS Occupational Therapist               Clinical Impression     Row Name 05/17/23 1438          Plan of Care Review    Plan of Care Reviewed With patient  -ES     Progress declining  -ES     Outcome Evaluation Occupational Therapy reassessment completed the session to monitor patient progress towards goals established per plan of care and new stroke diagnosis.  Patient demonstrates new deficits with left upper and lower extremity strengthening, visual perceptual, coordination, transfers and mobility that impede patient independence with activities of daily living.  Patient would benefit from continued skilled occupational therapy intervention in acute care setting to address above-mentioned deficits.  At time of discharge patient would benefit from post acute rehabilitation placement.  -ES     Row Name 05/17/23 1438          Therapy Assessment/Plan (OT)    Rehab Potential (OT) good, to achieve stated therapy goals   -ES     Criteria for Skilled Therapeutic Interventions Met (OT) yes;meets criteria;skilled treatment is necessary  -ES     Therapy Frequency (OT) 5 times/wk  -ES     Row Name 05/17/23 1437          Therapy Plan Review/Discharge Plan (OT)    Anticipated Discharge Disposition (OT) inpatient rehabilitation facility  -ES     Row Name 05/17/23 1437          Vital Signs    O2 Delivery Pre Treatment room air  -ES     O2 Delivery Intra Treatment room air  -ES     O2 Delivery Post Treatment room air  -ES     Row Name 05/17/23 1437          Positioning and Restraints    Pre-Treatment Position in bed  -ES     Post Treatment Position bed  -ES           User Key  (r) = Recorded By, (t) = Taken By, (c) = Cosigned By    Initials Name Provider Type    ES Lita West, OTR/L, CSRS Occupational Therapist               Outcome Measures     Row Name 05/17/23 1439          How much help from another is currently needed...    Putting on and taking off regular lower body clothing? 2  -ES     Bathing (including washing, rinsing, and drying) 2  -ES     Toileting (which includes using toilet bed pan or urinal) 1  -ES     Putting on and taking off regular upper body clothing 3  -ES     Taking care of personal grooming (such as brushing teeth) 3  -ES     Eating meals 3  -ES     AM-PAC 6 Clicks Score (OT) 14  -ES     Row Name 05/17/23 0737          How much help from another person do you currently need...    Turning from your back to your side while in flat bed without using bedrails? 3  -BA     Moving from lying on back to sitting on the side of a flat bed without bedrails? 3  -BA     Moving to and from a bed to a chair (including a wheelchair)? 2  -BA     Standing up from a chair using your arms (e.g., wheelchair, bedside chair)? 2  -BA     Climbing 3-5 steps with a railing? 1  -BA     To walk in hospital room? 2  -BA     AM-PAC 6 Clicks Score (PT) 13  -BA     Highest level of mobility 4 --> Transferred to chair/commode  -BA     Row Name  05/17/23 1439          Functional Assessment    Outcome Measure Options AM-PAC 6 Clicks Daily Activity (OT);Optimal Instrument  -ES     Row Name 05/17/23 1439          Optimal Instrument    Optimal Instrument Optimal - 3  -ES     Bending/Stooping 4  -ES     Standing 4  -ES     Reaching 3  -ES           User Key  (r) = Recorded By, (t) = Taken By, (c) = Cosigned By    Initials Name Provider Type    Anya Davis, RN Registered Nurse    Lita Kruegre, OTR/L, CSRS Occupational Therapist                Occupational Therapy Education     Title: PT OT SLP Therapies (Done)     Topic: Occupational Therapy (Done)     Point: ADL training (Done)     Description:   Instruct learner(s) on proper safety adaptation and remediation techniques during self care or transfers.   Instruct in proper use of assistive devices.              Learning Progress Summary           Patient Acceptance, E, VU by  at 5/13/2023 1004                   Point: Home exercise program (Done)     Description:   Instruct learner(s) on appropriate technique for monitoring, assisting and/or progressing therapeutic exercises/activities.              Learning Progress Summary           Patient Acceptance, E, VU by  at 5/13/2023 1004                   Point: Precautions (Done)     Description:   Instruct learner(s) on prescribed precautions during self-care and functional transfers.              Learning Progress Summary           Patient Acceptance, E, VU by  at 5/13/2023 1004                   Point: Body mechanics (Done)     Description:   Instruct learner(s) on proper positioning and spine alignment during self-care, functional mobility activities and/or exercises.              Learning Progress Summary           Patient Acceptance, E, VU by  at 5/13/2023 1004                               User Key     Initials Effective Dates Name Provider Type [facility] 06/16/21 -  Celeste Anthony OT Occupational Therapist OT              OT  Recommendation and Plan  Planned Therapy Interventions (OT): activity tolerance training, BADL retraining, functional balance retraining, occupation/activity based interventions, patient/caregiver education/training, strengthening exercise, transfer/mobility retraining  Therapy Frequency (OT): 5 times/wk  Plan of Care Review  Plan of Care Reviewed With: patient  Progress: declining  Outcome Evaluation: Occupational Therapy reassessment completed the session to monitor patient progress towards goals established per plan of care and new stroke diagnosis.  Patient demonstrates new deficits with left upper and lower extremity strengthening, visual perceptual, coordination, transfers and mobility that impede patient independence with activities of daily living.  Patient would benefit from continued skilled occupational therapy intervention in acute care setting to address above-mentioned deficits.  At time of discharge patient would benefit from post acute rehabilitation placement.     Time Calculation:    Time Calculation- OT     Row Name 05/17/23 1440             Time Calculation- OT    OT Received On 05/17/23  -ES      OT Goal Re-Cert Due Date 05/26/23  -ES         Untimed Charges    OT Eval/Re-eval Minutes 20  -ES         Total Minutes    Untimed Charges Total Minutes 20  -ES       Total Minutes 20  -ES            User Key  (r) = Recorded By, (t) = Taken By, (c) = Cosigned By    Initials Name Provider Type    ES Lita West, OTR/L, CSRS Occupational Therapist              Therapy Charges for Today     Code Description Service Date Service Provider Modifiers Qty    [id number]  OT RE-EVAL 2 5/17/2023 Lita West, OTR/L, CSRS GO 1               JHONATAN Scott/L, CSRS  5/17/2023

## 2023-05-17 NOTE — PLAN OF CARE
Goal Outcome Evaluation:  Plan of Care Reviewed With: patient        Progress: declining  Outcome Evaluation: Occupational Therapy reassessment completed the session to monitor patient progress towards goals established per plan of care and new stroke diagnosis.  Patient demonstrates new deficits with left upper and lower extremity strengthening, visual perceptual, coordination, transfers and mobility that impede patient independence with activities of daily living.  Patient would benefit from continued skilled occupational therapy intervention in acute care setting to address above-mentioned deficits.  At time of discharge patient would benefit from post acute rehabilitation placement.

## 2023-05-18 ENCOUNTER — APPOINTMENT (OUTPATIENT)
Dept: CT IMAGING | Facility: HOSPITAL | Age: 88
End: 2023-05-18
Payer: MEDICARE

## 2023-05-18 LAB
ALBUMIN SERPL-MCNC: 3 G/DL (ref 3.5–5.2)
ALBUMIN/GLOB SERPL: 1.2 G/DL
ALP SERPL-CCNC: 90 U/L (ref 39–117)
ALT SERPL W P-5'-P-CCNC: 23 U/L (ref 1–33)
ANION GAP SERPL CALCULATED.3IONS-SCNC: 13 MMOL/L (ref 5–15)
AST SERPL-CCNC: 18 U/L (ref 1–32)
BASOPHILS # BLD AUTO: 0.05 10*3/MM3 (ref 0–0.2)
BASOPHILS NFR BLD AUTO: 0.6 % (ref 0–1.5)
BILIRUB SERPL-MCNC: 0.3 MG/DL (ref 0–1.2)
BUN SERPL-MCNC: 35 MG/DL (ref 8–23)
BUN/CREAT SERPL: 34.7 (ref 7–25)
CALCIUM SPEC-SCNC: 9.5 MG/DL (ref 8.6–10.5)
CHLORIDE SERPL-SCNC: 100 MMOL/L (ref 98–107)
CO2 SERPL-SCNC: 20 MMOL/L (ref 22–29)
CREAT SERPL-MCNC: 1.01 MG/DL (ref 0.57–1)
DEPRECATED RDW RBC AUTO: 44.9 FL (ref 37–54)
EGFRCR SERPLBLD CKD-EPI 2021: 54 ML/MIN/1.73
EOSINOPHIL # BLD AUTO: 0.16 10*3/MM3 (ref 0–0.4)
EOSINOPHIL NFR BLD AUTO: 2 % (ref 0.3–6.2)
ERYTHROCYTE [DISTWIDTH] IN BLOOD BY AUTOMATED COUNT: 15 % (ref 12.3–15.4)
GLOBULIN UR ELPH-MCNC: 2.6 GM/DL
GLUCOSE BLDC GLUCOMTR-MCNC: 139 MG/DL (ref 70–99)
GLUCOSE BLDC GLUCOMTR-MCNC: 163 MG/DL (ref 70–99)
GLUCOSE BLDC GLUCOMTR-MCNC: 165 MG/DL (ref 70–99)
GLUCOSE BLDC GLUCOMTR-MCNC: 266 MG/DL (ref 70–99)
GLUCOSE SERPL-MCNC: 166 MG/DL (ref 65–99)
HCT VFR BLD AUTO: 42.1 % (ref 34–46.6)
HGB BLD-MCNC: 13.8 G/DL (ref 12–15.9)
IMM GRANULOCYTES # BLD AUTO: 0.3 10*3/MM3 (ref 0–0.05)
IMM GRANULOCYTES NFR BLD AUTO: 3.7 % (ref 0–0.5)
LYMPHOCYTES # BLD AUTO: 1.84 10*3/MM3 (ref 0.7–3.1)
LYMPHOCYTES NFR BLD AUTO: 22.5 % (ref 19.6–45.3)
MAGNESIUM SERPL-MCNC: 2.2 MG/DL (ref 1.6–2.4)
MCH RBC QN AUTO: 27.1 PG (ref 26.6–33)
MCHC RBC AUTO-ENTMCNC: 32.8 G/DL (ref 31.5–35.7)
MCV RBC AUTO: 82.5 FL (ref 79–97)
MONOCYTES # BLD AUTO: 0.87 10*3/MM3 (ref 0.1–0.9)
MONOCYTES NFR BLD AUTO: 10.6 % (ref 5–12)
NEUTROPHILS NFR BLD AUTO: 4.97 10*3/MM3 (ref 1.7–7)
NEUTROPHILS NFR BLD AUTO: 60.6 % (ref 42.7–76)
NRBC BLD AUTO-RTO: 0 /100 WBC (ref 0–0.2)
PHOSPHATE SERPL-MCNC: 3.9 MG/DL (ref 2.5–4.5)
PLATELET # BLD AUTO: 216 10*3/MM3 (ref 140–450)
PMV BLD AUTO: 9.3 FL (ref 6–12)
POTASSIUM SERPL-SCNC: 4.5 MMOL/L (ref 3.5–5.2)
PROT SERPL-MCNC: 5.6 G/DL (ref 6–8.5)
RBC # BLD AUTO: 5.1 10*6/MM3 (ref 3.77–5.28)
SODIUM SERPL-SCNC: 133 MMOL/L (ref 136–145)
WBC NRBC COR # BLD: 8.19 10*3/MM3 (ref 3.4–10.8)

## 2023-05-18 PROCEDURE — 84100 ASSAY OF PHOSPHORUS: CPT | Performed by: INTERNAL MEDICINE

## 2023-05-18 PROCEDURE — 70450 CT HEAD/BRAIN W/O DYE: CPT

## 2023-05-18 PROCEDURE — 85025 COMPLETE CBC W/AUTO DIFF WBC: CPT | Performed by: PHYSICIAN ASSISTANT

## 2023-05-18 PROCEDURE — 94799 UNLISTED PULMONARY SVC/PX: CPT

## 2023-05-18 PROCEDURE — 63710000001 INSULIN LISPRO (HUMAN) PER 5 UNITS: Performed by: INTERNAL MEDICINE

## 2023-05-18 PROCEDURE — 80053 COMPREHEN METABOLIC PANEL: CPT | Performed by: INTERNAL MEDICINE

## 2023-05-18 PROCEDURE — 63710000001 INSULIN DETEMIR PER 5 UNITS: Performed by: INTERNAL MEDICINE

## 2023-05-18 PROCEDURE — 92526 ORAL FUNCTION THERAPY: CPT

## 2023-05-18 PROCEDURE — 25010000002 ENOXAPARIN PER 10 MG: Performed by: INTERNAL MEDICINE

## 2023-05-18 PROCEDURE — 83735 ASSAY OF MAGNESIUM: CPT | Performed by: PHYSICIAN ASSISTANT

## 2023-05-18 PROCEDURE — 99233 SBSQ HOSP IP/OBS HIGH 50: CPT | Performed by: INTERNAL MEDICINE

## 2023-05-18 PROCEDURE — 94664 DEMO&/EVAL PT USE INHALER: CPT

## 2023-05-18 PROCEDURE — 82948 REAGENT STRIP/BLOOD GLUCOSE: CPT

## 2023-05-18 RX ORDER — HYDROXYZINE PAMOATE 25 MG/1
25 CAPSULE ORAL NIGHTLY PRN
Status: DISCONTINUED | OUTPATIENT
Start: 2023-05-18 | End: 2023-05-23 | Stop reason: HOSPADM

## 2023-05-18 RX ORDER — SODIUM CHLORIDE 9 MG/ML
75 INJECTION, SOLUTION INTRAVENOUS CONTINUOUS
Status: ACTIVE | OUTPATIENT
Start: 2023-05-18 | End: 2023-05-18

## 2023-05-18 RX ADMIN — FLUTICASONE PROPIONATE 2 PUFF: 110 AEROSOL, METERED RESPIRATORY (INHALATION) at 07:02

## 2023-05-18 RX ADMIN — ASPIRIN 81 MG: 81 TABLET, COATED ORAL at 09:43

## 2023-05-18 RX ADMIN — Medication 10 ML: at 20:54

## 2023-05-18 RX ADMIN — DILTIAZEM HYDROCHLORIDE 240 MG: 240 CAPSULE, COATED, EXTENDED RELEASE ORAL at 09:43

## 2023-05-18 RX ADMIN — METOPROLOL SUCCINATE 25 MG: 25 TABLET, EXTENDED RELEASE ORAL at 09:43

## 2023-05-18 RX ADMIN — ARFORMOTEROL TARTRATE 15 MCG: 15 SOLUTION RESPIRATORY (INHALATION) at 07:00

## 2023-05-18 RX ADMIN — SODIUM CHLORIDE 75 ML/HR: 9 INJECTION, SOLUTION INTRAVENOUS at 12:20

## 2023-05-18 RX ADMIN — DOCUSATE SODIUM 50MG AND SENNOSIDES 8.6MG 1 TABLET: 8.6; 5 TABLET, FILM COATED ORAL at 20:47

## 2023-05-18 RX ADMIN — CLOPIDOGREL BISULFATE 75 MG: 75 TABLET ORAL at 09:43

## 2023-05-18 RX ADMIN — INSULIN LISPRO 3 UNITS: 100 INJECTION, SOLUTION INTRAVENOUS; SUBCUTANEOUS at 17:36

## 2023-05-18 RX ADMIN — HYDROCODONE POLISTIREX AND CHLORPHENIRAMINE POLISTIREX 5 ML: 10; 8 SUSPENSION, EXTENDED RELEASE ORAL at 20:48

## 2023-05-18 RX ADMIN — FLUTICASONE PROPIONATE 2 PUFF: 110 AEROSOL, METERED RESPIRATORY (INHALATION) at 20:20

## 2023-05-18 RX ADMIN — DOCUSATE SODIUM 50MG AND SENNOSIDES 8.6MG 1 TABLET: 8.6; 5 TABLET, FILM COATED ORAL at 09:43

## 2023-05-18 RX ADMIN — MONTELUKAST 10 MG: 10 TABLET, FILM COATED ORAL at 20:48

## 2023-05-18 RX ADMIN — Medication 10 ML: at 09:44

## 2023-05-18 RX ADMIN — ARFORMOTEROL TARTRATE 15 MCG: 15 SOLUTION RESPIRATORY (INHALATION) at 20:20

## 2023-05-18 RX ADMIN — INSULIN LISPRO 3 UNITS: 100 INJECTION, SOLUTION INTRAVENOUS; SUBCUTANEOUS at 12:20

## 2023-05-18 RX ADMIN — INSULIN DETEMIR 20 UNITS: 100 INJECTION, SOLUTION SUBCUTANEOUS at 20:48

## 2023-05-18 RX ADMIN — INSULIN DETEMIR 20 UNITS: 100 INJECTION, SOLUTION SUBCUTANEOUS at 09:43

## 2023-05-18 RX ADMIN — ATORVASTATIN CALCIUM 20 MG: 20 TABLET, FILM COATED ORAL at 20:48

## 2023-05-18 RX ADMIN — NYSTATIN: 100000 POWDER TOPICAL at 09:44

## 2023-05-18 RX ADMIN — HYDROCODONE POLISTIREX AND CHLORPHENIRAMINE POLISTIREX 5 ML: 10; 8 SUSPENSION, EXTENDED RELEASE ORAL at 09:43

## 2023-05-18 RX ADMIN — NYSTATIN: 100000 POWDER TOPICAL at 20:54

## 2023-05-18 RX ADMIN — HYDROXYZINE PAMOATE 25 MG: 25 CAPSULE ORAL at 23:34

## 2023-05-18 RX ADMIN — SERTRALINE HYDROCHLORIDE 50 MG: 50 TABLET ORAL at 09:43

## 2023-05-18 RX ADMIN — ENOXAPARIN SODIUM 40 MG: 100 INJECTION SUBCUTANEOUS at 09:42

## 2023-05-18 RX ADMIN — FAMOTIDINE 10 MG: 10 TABLET ORAL at 09:44

## 2023-05-18 NOTE — PLAN OF CARE
Goal Outcome Evaluation:      Pt had significant change from initial NIH with no movement in the Left extremities and a Left facial droop. MD aware and CT ordered. Pt able to eat and drink thickened liquids with no problems. VSS. Will continue to monitor.

## 2023-05-18 NOTE — PLAN OF CARE
Goal Outcome Evaluation:  Plan of Care Reviewed With: patient   NIH 10 at shift change. Airborne and contact precautions continued. VSS throughout shift. Q2 Turns with staff. No acute changes overnight.     Progress: declining

## 2023-05-18 NOTE — THERAPY TREATMENT NOTE
Acute Care - Speech Language Pathology   Swallow Treatment Note RUPESH Villalobos     Patient Name: Tita Jiménez  : 1935  MRN: [id number]  Today's Date: 2023               Admit Date: 2023    Visit Dx:     ICD-10-CM ICD-9-CM   1. Dehydration  E86.0 276.51   2. Generalized weakness  R53.1 780.79   3. Decreased activities of daily living (ADL)  Z78.9 V49.89   4. Difficulty in walking  R26.2 719.7   5. Oropharyngeal dysphagia  R13.12 787.22     Patient Active Problem List   Diagnosis   • Compression fracture of T1 -T2 vertebra (CMS/HCC)   • Fall   • Contusion of scalp   • Closed head injury   • Asthma   • Depression   • Reflux esophagitis   • Bladder incontinence   • Bowel incontinence   • High cholesterol   • Diabetes mellitus, type II (HCC)   • Hypertension   • Ulcerative colitis (HCC)   • GERD (gastroesophageal reflux disease)   • Benign essential tremor   • Carpal tunnel syndrome   • Ataxia   • Breast cancer screening   • Hypoxia   • Cytokine release syndrome, grade 1   • Pneumonia due to COVID-19 virus   • Weakness   • Cognitive impairment   • Difficulty walking   • At risk for venous thromboembolism (VTE)   • Lower abdominal pain   • 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 skin intact. D/T fall on 10/18/2019   • Depression    • Diabetes mellitus    • GERD (gastroesophageal reflux disease) 2021   • HTN (hypertension) 10/18/2019   • Hyperlipidemia    • Hypertension    • Mitral valve problem    • Polymyalgia rheumatica    • Stroke     short term memory loss   • Ulcerative colitis 2021   • Urinary incontinence      Past Surgical History:   Procedure Laterality Date   • ADENOIDECTOMY     • APPENDECTOMY     • BREAST SURGERY      ( L4-L5)   • COLON SURGERY      colon resection   • HYSTERECTOMY      • OOPHORECTOMY     • TONSILLECTOMY       SPEECH PATHOLOGY DYSPHAGIA TREATMENT    Subjective/Behavioral Observations: Awake, cooperative.  Facial droop increased since evaluation on 5/17/2023.  Patient underwent MRI revealing Acute right CHRISTINE distribution cerebral infarct.  Continues in enhanced airborne precautions (COVID 19 confirmed).        Day/time of Treatment: 5/18/2023          Treatment received: Treatment focused on trials of p.o. intake to determine if previous diet recommendations are to be continued (since change/decline in status).        Results of treatment: Left-sided labial retraction 3+/5.  Lingual protrusion with deviation.  Lingual strength range of motion left 3+/5.  Patient exhibiting consistent signs of aspiration (wet vocal quality and delayed cough) with trials of thin liquids.  This was not observed during evaluation on 5/17/2023.  Trials of nectar thickened liquids by controlled cup drink and controlled straw drink with swallows completed with mild delay.  No overt signs or symptoms of aspiration observed with nectar thick liquid trials.  Intermittent left-sided anterior loss due to decreased labial seal to cup.  Purée by spoon with patient requiring cueing for lingual sweep to clear.  Impulsivity with self-feeding of soft solids resulting in moderate left buccal cavity residue.  Patient requires continuous assist for lingual sweep for clearance.        Progress toward goals: Decline from evaluation        Barriers to Achieving goals: Medical status        Plan of care:/changes in plan: Patient's diet will be downgraded to nectar thickened liquids.  Mechanical soft solids with one-on-one supervision/assist with meals.  Meds whole in applesauce.                SLP Recommendation and Plan                                                                                       EDUCATION  The patient has been educated in the following areas:   Dysphagia (Swallowing Impairment).               Time Calculation:    Time Calculation- SLP     Row Name 05/18/23 1043             Time Calculation- SLP    SLP Stop Time 1030  -SN      SLP Received On 05/18/23  -SN         Untimed Charges    78007-CA Treatment Swallow Minutes 45  -SN         Total Minutes    Untimed Charges Total Minutes 45  -SN       Total Minutes 45  -SN            User Key  (r) = Recorded By, (t) = Taken By, (c) = Cosigned By    Initials Name Provider Type    SN Deirdre Pedroza MS-CCC/SLP, NEGRO Speech and Language Pathologist                Therapy Charges for Today     Code Description Service Date Service Provider Modifiers Qty    34658017133 HC ST EVAL ORAL PHARYNG SWALLOW 4 5/17/2023 Deirdre Pedroza MS-CCC/SLP, CNT GN 1    [id number] HC ST TREATMENT SWALLOW 3 5/18/2023 Deirdre Pedroza MS-CCC/SLP, NEGRO GN 1               NISHA Sterling/JOHN, NEGRO  5/18/2023

## 2023-05-18 NOTE — PROGRESS NOTES
[facility]   Hospitalist Progress Note       Patient Name: Tita Jiménez  : 1935  MRN: [id number]  Primary Care Physician: Madison Ortiz APRN  Date of admission: 2023  Today's Date: 2023  Room / Bed:   219/2  Subjective   Chief Complaint: SOA.  Weakness.    Summary:  Tita Jiménez is a 88 y.o. female  with a past medical history of type 2 diabetes mellitus, hypertension, hyperlipidemia, ulcerative colitis, presented to the ED for evaluation of shortness of breath, cough.  Patient has been recently diagnosed with COVID infection on Monday.  Since the COVID infection, patient has been progressively becoming weak and now she is not able to get up out of bed in chair by herself.    Upon arrival to the ED, vital signs temperature 97.8, pulse 96, respirate rate 18, blood pressure 124/65 on 2 L of nasal cannula saturating around 93%.  Labs showed troponin 17, proBNP 160.4, sodium 135, bicarb 18.3, normal creatinine, albumin 3.4, rest of the CMP is nonsignificant, normal CBC.  Chest x-ray showed no acute cardiopulmonary process.  X-ray ankle of the right showed no acute fracture or traumatic malalignment.  EKG showed no significant ST-T wave changes concerning for ischemia.      Patient admitted for further evaluation and management of generalized weakness, right ankle sprain, COVID-19 infection.  On 23 she was moved to PCU after stroke RRT was called.  Patient had left facial droop and left sided weakness.    Interval Followup: 2023    • More alert and interactive today.  Left facial droop improved.  Left upper extremity and left lower extremity paresis persisting  • Normal sinus rhythm with scattered isolated PVCs.  No A-fib noted.  • /42 -125/56 .... We will hold lisinopril 40 today.  Try to avoid hypotension in the setting of acute stroke.  • Cr bump noted:  0.8  --> 1.0 .... gentle IVFs today        REVIEW OF SYSTEMS: All other systems reviewed and are  negative.   • General weakness.  Shortness of air.  Left upper and lower extremity weakness.  Objective   Temp:  [97.2 °F (36.2 °C)-98.6 °F (37 °C)] 98.1 °F (36.7 °C)  Heart Rate:  [62-72] 71  Resp:  [16-18] 16  BP: (100-128)/(42-56) 111/54  PHYSICAL EXAM   • CON: WN. WD. NAD.  Oriented.  Knows place year month president.  • EYES:  Sclera anicteric. EOMI. Normal conjunctiva.   • ENT:  Oropharyngeal mucosa without ulcers or thrush.    • NECK:  No thyromegaly. No stridor. Trachea midline.  • RESP:  CTA. No wheezes. No crackles.  No work of breathing or tachypnea.   • CV:  Rhythm regular. Rate WNL. No murmur noted.  No edema.  • GI:  Soft and nontender. Nondistended.  Bowel sounds present.   • EXT: Peripheral pulses intact.  No joint deformities or cyanosis.  • LYMPH:  No lymphedema noted.  No cervical lymphadenopathy.  • PSYCH:  Alert. Oriented. Normal affect and mood.  • NEURO:  Left facial droop.  Left upper and lower extremity weakness.  No  paresthesia.  • SKIN: No chronic venous stasis changes or varicosities.  No cellulitis    Results from last 7 days   Lab Units 05/18/23  0510 05/17/23  0434 05/16/23  0626 05/15/23  0437 05/14/23  0650 05/13/23  0531 05/12/23  1911   WBC 10*3/mm3 8.19 8.28 9.28 7.60 6.38 5.68 7.19   HEMOGLOBIN g/dL 13.8 13.4 13.3 12.7 12.2 12.1 13.2   HEMATOCRIT % 42.1 41.0 41.2 38.8 38.4 38.2 40.8   PLATELETS 10*3/mm3 216 212 233 226 206 214 217     Results from last 7 days   Lab Units 05/18/23  0510 05/17/23  0434 05/16/23  0626 05/15/23  0437 05/14/23  0650 05/13/23  0531 05/12/23  1911   SODIUM mmol/L 133* 134* 138 135* 138 139 135*   POTASSIUM mmol/L 4.5 4.1 4.5 4.6 4.4 4.4 3.9   CO2 mmol/L 20.0* 21.1* 27.8 25.7 26.0 23.9 18.3*   CHLORIDE mmol/L 100 101 102 102 104 107 102   ANION GAP mmol/L 13.0 11.9 8.2 7.3 8.0 8.1 14.7   BUN mg/dL 35* 27* 27* 25* 24* 23 25*   CREATININE mg/dL 1.01* 0.76 0.75 0.80 0.74 0.83 0.95   GLUCOSE mg/dL 166* 116* 177* 202* 153* 160* 303*           RESULTS  REVIEWED:  I have personally reviewed the results from the time of this admission to 5/18/2023 11:17 EDT and agree with these findings:  []  Laboratory  []  Microbiology  []  Radiology  []  EKG/Telemetry   []  Cardiology/Vascular   []  Pathology  []  Old records  []  Other:  Assessment / Plan   Assessment:    • Acute CVA in the right CHRISTINE territory  • Left-sided weakness and left facial droop  • History of prior CVA (right frontal, right basal ganglia)  • COVID infection  • DM  • HTN  • Dyslipidemia  • Ulcerative colitis  • CODE STATUS: DNR/DNI       Plan:  • Speech has downgraded diet  • NS @ 75 cc/hr x 10 hours  • Hold Lisinopril  • Avoid hypotension  • Discussed with .  Referrals to rehab.  • Moved to PCU via stroke pathway  • MRI brain today .... Acute CVA noted  • CT head showed old right frontal and old right basal ganglia stroke  • Nebulizers as needed and scheduled  • Levemir and sliding scale insulin for glucose control  • Aspirin and Plavix x21 days.  Then continue with aspirin.  • Statin on board  • Lovenox for DVT prophylaxis  • PT/OT .... Rehab placement (lives in assisted living facility at baseline)  Discussed plan with RN.  DVT prophylaxis:  Medical and mechanical DVT prophylaxis orders are present.  CODE STATUS:      Medical Intervention Limits: NO intubation (DNI)  Level Of Support Discussed With: Patient  Code Status (Patient has no pulse and is not breathing): No CPR (Do Not Attempt to Resuscitate)  Medical Interventions (Patient has pulse or is breathing): Limited Support         Patient independently seen and evaluated, agree with assessment and plan, above documentation reflects plan put forth during bedside rounds.  More than 51% of the time of this patient encounter was performed by me.     Interval history:  Patient with flaccid paralysis left upper extremity, MRI significant for stroke     Exam:  GEN: Drowsy  HEENT: Moist mucous membrane  LUNGS: Equal chest rise  bilaterally  CARDIAC: Regular rate and rhythm  NEURO: Left upper extremity weakness, asymmetric face, left lower extremity weakness  SKIN: No obvious breakdown     Plan:  Agree with assessment and plan as above  Head CT, CTA head and neck personally reviewed, no obvious stroke but areas of decreased blood flow in the posterior circulation  MRI of the brain today personally reviewed, consistent with ischemia  Continue bronchodilators  Continue aspirin and statin  Continue basal bolus insulin for hyperglycemia  Continue Lovenox for DVT prophylaxis  Continue PT/OT  CBC, CMP reviewed  Repeat CBC, CMP, mag and Phos in a.m.  Clinical course will dictate further management     Reviewed patients labs and imaging, and discussed with patient and nurse at bedside.      Electronically signed by Michael Barba MD, 05/18/23, 12:40 PM EDT.

## 2023-05-19 LAB
ALBUMIN SERPL-MCNC: 2.7 G/DL (ref 3.5–5.2)
ALBUMIN/GLOB SERPL: 1 G/DL
ALP SERPL-CCNC: 93 U/L (ref 39–117)
ALT SERPL W P-5'-P-CCNC: 20 U/L (ref 1–33)
ANION GAP SERPL CALCULATED.3IONS-SCNC: 9.4 MMOL/L (ref 5–15)
AST SERPL-CCNC: 15 U/L (ref 1–32)
BASOPHILS # BLD AUTO: 0.06 10*3/MM3 (ref 0–0.2)
BASOPHILS NFR BLD AUTO: 0.8 % (ref 0–1.5)
BILIRUB SERPL-MCNC: 0.5 MG/DL (ref 0–1.2)
BUN SERPL-MCNC: 23 MG/DL (ref 8–23)
BUN/CREAT SERPL: 29.5 (ref 7–25)
CALCIUM SPEC-SCNC: 9.1 MG/DL (ref 8.6–10.5)
CHLORIDE SERPL-SCNC: 104 MMOL/L (ref 98–107)
CO2 SERPL-SCNC: 18.6 MMOL/L (ref 22–29)
CREAT SERPL-MCNC: 0.78 MG/DL (ref 0.57–1)
DEPRECATED RDW RBC AUTO: 48.5 FL (ref 37–54)
EGFRCR SERPLBLD CKD-EPI 2021: 73.6 ML/MIN/1.73
EOSINOPHIL # BLD AUTO: 0.13 10*3/MM3 (ref 0–0.4)
EOSINOPHIL NFR BLD AUTO: 1.7 % (ref 0.3–6.2)
ERYTHROCYTE [DISTWIDTH] IN BLOOD BY AUTOMATED COUNT: 14.8 % (ref 12.3–15.4)
GLOBULIN UR ELPH-MCNC: 2.8 GM/DL
GLUCOSE BLDC GLUCOMTR-MCNC: 147 MG/DL (ref 70–99)
GLUCOSE BLDC GLUCOMTR-MCNC: 182 MG/DL (ref 70–99)
GLUCOSE BLDC GLUCOMTR-MCNC: 231 MG/DL (ref 70–99)
GLUCOSE SERPL-MCNC: 166 MG/DL (ref 65–99)
HCT VFR BLD AUTO: 43.2 % (ref 34–46.6)
HGB BLD-MCNC: 12.9 G/DL (ref 12–15.9)
IMM GRANULOCYTES # BLD AUTO: 0.22 10*3/MM3 (ref 0–0.05)
IMM GRANULOCYTES NFR BLD AUTO: 2.9 % (ref 0–0.5)
LYMPHOCYTES # BLD AUTO: 1.54 10*3/MM3 (ref 0.7–3.1)
LYMPHOCYTES NFR BLD AUTO: 20.5 % (ref 19.6–45.3)
MAGNESIUM SERPL-MCNC: 2.1 MG/DL (ref 1.6–2.4)
MCH RBC QN AUTO: 26.7 PG (ref 26.6–33)
MCHC RBC AUTO-ENTMCNC: 29.9 G/DL (ref 31.5–35.7)
MCV RBC AUTO: 89.4 FL (ref 79–97)
MONOCYTES # BLD AUTO: 0.85 10*3/MM3 (ref 0.1–0.9)
MONOCYTES NFR BLD AUTO: 11.3 % (ref 5–12)
NEUTROPHILS NFR BLD AUTO: 4.7 10*3/MM3 (ref 1.7–7)
NEUTROPHILS NFR BLD AUTO: 62.8 % (ref 42.7–76)
NRBC BLD AUTO-RTO: 0 /100 WBC (ref 0–0.2)
PHOSPHATE SERPL-MCNC: 2.8 MG/DL (ref 2.5–4.5)
PLATELET # BLD AUTO: 191 10*3/MM3 (ref 140–450)
PMV BLD AUTO: 9.1 FL (ref 6–12)
POTASSIUM SERPL-SCNC: 4.6 MMOL/L (ref 3.5–5.2)
PROT SERPL-MCNC: 5.5 G/DL (ref 6–8.5)
QT INTERVAL: 439 MS
RBC # BLD AUTO: 4.83 10*6/MM3 (ref 3.77–5.28)
SODIUM SERPL-SCNC: 132 MMOL/L (ref 136–145)
WBC NRBC COR # BLD: 7.5 10*3/MM3 (ref 3.4–10.8)

## 2023-05-19 PROCEDURE — 63710000001 INSULIN LISPRO (HUMAN) PER 5 UNITS: Performed by: INTERNAL MEDICINE

## 2023-05-19 PROCEDURE — 25010000002 ENOXAPARIN PER 10 MG: Performed by: INTERNAL MEDICINE

## 2023-05-19 PROCEDURE — 84100 ASSAY OF PHOSPHORUS: CPT | Performed by: INTERNAL MEDICINE

## 2023-05-19 PROCEDURE — 99233 SBSQ HOSP IP/OBS HIGH 50: CPT | Performed by: INTERNAL MEDICINE

## 2023-05-19 PROCEDURE — 85025 COMPLETE CBC W/AUTO DIFF WBC: CPT | Performed by: INTERNAL MEDICINE

## 2023-05-19 PROCEDURE — 83735 ASSAY OF MAGNESIUM: CPT | Performed by: INTERNAL MEDICINE

## 2023-05-19 PROCEDURE — 94664 DEMO&/EVAL PT USE INHALER: CPT

## 2023-05-19 PROCEDURE — 82948 REAGENT STRIP/BLOOD GLUCOSE: CPT

## 2023-05-19 PROCEDURE — 94799 UNLISTED PULMONARY SVC/PX: CPT

## 2023-05-19 PROCEDURE — 63710000001 INSULIN DETEMIR PER 5 UNITS: Performed by: INTERNAL MEDICINE

## 2023-05-19 PROCEDURE — 80053 COMPREHEN METABOLIC PANEL: CPT | Performed by: INTERNAL MEDICINE

## 2023-05-19 RX ADMIN — DOCUSATE SODIUM 50MG AND SENNOSIDES 8.6MG 1 TABLET: 8.6; 5 TABLET, FILM COATED ORAL at 20:54

## 2023-05-19 RX ADMIN — HYDROCODONE POLISTIREX AND CHLORPHENIRAMINE POLISTIREX 5 ML: 10; 8 SUSPENSION, EXTENDED RELEASE ORAL at 20:54

## 2023-05-19 RX ADMIN — CLOPIDOGREL BISULFATE 75 MG: 75 TABLET ORAL at 10:09

## 2023-05-19 RX ADMIN — FAMOTIDINE 10 MG: 10 TABLET ORAL at 10:09

## 2023-05-19 RX ADMIN — HYDROCODONE POLISTIREX AND CHLORPHENIRAMINE POLISTIREX 5 ML: 10; 8 SUSPENSION, EXTENDED RELEASE ORAL at 10:10

## 2023-05-19 RX ADMIN — ASPIRIN 81 MG: 81 TABLET, COATED ORAL at 10:08

## 2023-05-19 RX ADMIN — SERTRALINE HYDROCHLORIDE 50 MG: 50 TABLET ORAL at 10:08

## 2023-05-19 RX ADMIN — INSULIN DETEMIR 20 UNITS: 100 INJECTION, SOLUTION SUBCUTANEOUS at 10:10

## 2023-05-19 RX ADMIN — ARFORMOTEROL TARTRATE 15 MCG: 15 SOLUTION RESPIRATORY (INHALATION) at 18:35

## 2023-05-19 RX ADMIN — DOCUSATE SODIUM 50MG AND SENNOSIDES 8.6MG 1 TABLET: 8.6; 5 TABLET, FILM COATED ORAL at 10:07

## 2023-05-19 RX ADMIN — FLUTICASONE PROPIONATE 2 PUFF: 110 AEROSOL, METERED RESPIRATORY (INHALATION) at 18:35

## 2023-05-19 RX ADMIN — INSULIN LISPRO 4 UNITS: 100 INJECTION, SOLUTION INTRAVENOUS; SUBCUTANEOUS at 13:31

## 2023-05-19 RX ADMIN — ATORVASTATIN CALCIUM 20 MG: 20 TABLET, FILM COATED ORAL at 20:54

## 2023-05-19 RX ADMIN — FLUTICASONE PROPIONATE 2 PUFF: 110 AEROSOL, METERED RESPIRATORY (INHALATION) at 07:03

## 2023-05-19 RX ADMIN — METOPROLOL SUCCINATE 25 MG: 25 TABLET, EXTENDED RELEASE ORAL at 10:08

## 2023-05-19 RX ADMIN — Medication 10 ML: at 20:55

## 2023-05-19 RX ADMIN — NYSTATIN: 100000 POWDER TOPICAL at 20:54

## 2023-05-19 RX ADMIN — ARFORMOTEROL TARTRATE 15 MCG: 15 SOLUTION RESPIRATORY (INHALATION) at 07:02

## 2023-05-19 RX ADMIN — DILTIAZEM HYDROCHLORIDE 240 MG: 240 CAPSULE, COATED, EXTENDED RELEASE ORAL at 10:08

## 2023-05-19 RX ADMIN — INSULIN LISPRO 3 UNITS: 100 INJECTION, SOLUTION INTRAVENOUS; SUBCUTANEOUS at 18:05

## 2023-05-19 RX ADMIN — NYSTATIN: 100000 POWDER TOPICAL at 10:09

## 2023-05-19 RX ADMIN — ENOXAPARIN SODIUM 40 MG: 100 INJECTION SUBCUTANEOUS at 10:09

## 2023-05-19 RX ADMIN — Medication 10 ML: at 10:10

## 2023-05-19 RX ADMIN — INSULIN DETEMIR 20 UNITS: 100 INJECTION, SOLUTION SUBCUTANEOUS at 20:54

## 2023-05-19 RX ADMIN — MONTELUKAST 10 MG: 10 TABLET, FILM COATED ORAL at 20:54

## 2023-05-19 NOTE — THERAPY TREATMENT NOTE
Acute Care - Physical Therapy Treatment Note  RUPESH Villalobos     Patient Name: Tita Jiménez  : 1935  MRN: [id number]  Today's Date: 2023      Visit Dx:     ICD-10-CM ICD-9-CM   1. Dehydration  E86.0 276.51   2. Generalized weakness  R53.1 780.79   3. Decreased activities of daily living (ADL)  Z78.9 V49.89   4. Difficulty in walking  R26.2 719.7   5. Oropharyngeal dysphagia  R13.12 787.22     Patient Active Problem List   Diagnosis   • Compression fracture of T1 -T2 vertebra (CMS/HCC)   • Fall   • Contusion of scalp   • Closed head injury   • Asthma   • Depression   • Reflux esophagitis   • Bladder incontinence   • Bowel incontinence   • High cholesterol   • Diabetes mellitus, type II (HCC)   • Hypertension   • Ulcerative colitis (HCC)   • GERD (gastroesophageal reflux disease)   • Benign essential tremor   • Carpal tunnel syndrome   • Ataxia   • Breast cancer screening   • Hypoxia   • Cytokine release syndrome, grade 1   • Pneumonia due to COVID-19 virus   • Weakness   • Cognitive impairment   • Difficulty walking   • At risk for venous thromboembolism (VTE)   • Lower abdominal pain   • 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 skin intact. D/T fall on 10/18/2019   • Depression    • Diabetes mellitus    • GERD (gastroesophageal reflux disease) 2021   • HTN (hypertension) 10/18/2019   • Hyperlipidemia    • Hypertension    • Mitral valve problem    • Polymyalgia rheumatica    • Stroke     short term memory loss   • Ulcerative colitis 2021   • Urinary incontinence      Past Surgical History:   Procedure Laterality Date   • ADENOIDECTOMY     • APPENDECTOMY     • BREAST SURGERY      ( L4-L5)   • COLON SURGERY      colon resection   • HYSTERECTOMY     • OOPHORECTOMY     • TONSILLECTOMY       PT  Assessment (last 12 hours)     PT Evaluation and Treatment     Row Name 05/19/23 1517          Physical Therapy Time and Intention    Subjective Information no complaints (P)   -     Document Type therapy note (daily note) (P)   -     Mode of Treatment individual therapy;physical therapy (P)   -     Patient Effort good (P)   -     Symptoms Noted During/After Treatment fatigue;nausea (P)   -     Row Name 05/19/23 1517          General Information    Patient Profile Reviewed yes (P)   -     Patient Observations alert;cooperative;agree to therapy (P)   -     Equipment Currently Used at Home wheelchair;walker, rolling (P)   -     Existing Precautions/Restrictions fall (P)   -     Barriers to Rehab none identified (P)   -     Row Name 05/19/23 1517          Bed Mobility    Bed Mobility supine-sit-supine;scooting/bridging (P)   -     Scooting/Bridging [place] (Bed Mobility) maximum assist (25% patient effort);2 person assist (P)   -     Supine-Sit-Supine [place] (Bed Mobility) moderate assist (50% patient effort);maximum assist (25% patient effort);1 person assist (P)   -     Bed Mobility, Safety Issues decreased use of arms for pushing/pulling;decreased use of legs for bridging/pushing;impaired trunk control for bed mobility (P)   -     Assistive Device (Bed Mobility) draw sheet;head of bed elevated (P)   -     Row Name 05/19/23 1517          Transfers    Transfers -- (P)   Deferred due to patient's poor sitting balance  -     Row Name 05/19/23 1517          Safety Issues, Functional Mobility    Impairments Affecting Function (Mobility) balance;cognition;endurance/activity tolerance;muscle tone abnormal;strength (P)   -     Row Name 05/19/23 1517          Balance    Balance Assessment sitting static balance (P)   -     Static Sitting Balance moderate assist;maximum assist;1-person assist (P)   -     Position, Sitting Balance supported;sitting edge of bed (P)   -     Row  Name 05/19/23 1517          Progress Summary (PT)    Progress Toward Functional Goals (PT) progress toward functional goals is fair (P)   -JF     Daily Progress Summary (PT) Patient tolerated sitting EOB for approximately 8 minutes today. Patient was unable to hold herself upright, requiring assistance to stabilize her. It was noted that the patient was unable to use her left arm or leg to assist in bed mobility. She will continue to benefit from physical therapy services while in the hospital. (P)   -           User Key  (r) = Recorded By, (t) = Taken By, (c) = Cosigned By    Initials Name Provider Type     Juwan Dave, PT Student PT Student                Physical Therapy Education     Title: PT OT SLP Therapies (Done)     Topic: Physical Therapy (Done)     Point: Mobility training (Done)     Learning Progress Summary           Patient Acceptance, E, VU by  at 5/13/2023 1004                   Point: Home exercise program (Done)     Learning Progress Summary           Patient Acceptance, E, VU by  at 5/13/2023 1004                   Point: Body mechanics (Done)     Learning Progress Summary           Patient Acceptance, E, VU by  at 5/13/2023 1004                   Point: Precautions (Done)     Learning Progress Summary           Patient Acceptance, E, VU by  at 5/13/2023 1004                               User Key     Initials Effective Dates Name Provider Type [facility] 06/16/21 -  Celeste Anthony OT Occupational Therapist OT              PT Recommendation and Plan     Progress Summary (PT)  Progress Toward Functional Goals (PT): (P) progress toward functional goals is fair  Daily Progress Summary (PT): (P) Patient tolerated sitting EOB for approximately 8 minutes today. Patient was unable to hold herself upright, requiring assistance to stabilize her. It was noted that the patient was unable to use her left arm or leg to assist in bed mobility. She will continue to benefit from physical  therapy services while in the hospital.   Outcome Measures     Row Name 05/19/23 1524 05/17/23 1448          How much help from another person do you currently need...    Turning from your back to your side while in flat bed without using bedrails? 2 (P)   -JF 2  -MYESHA     Moving from lying on back to sitting on the side of a flat bed without bedrails? 2 (P)   -JF 2  -MYESHA     Moving to and from a bed to a chair (including a wheelchair)? 1 (P)   -JF 1  -MYESHA     Standing up from a chair using your arms (e.g., wheelchair, bedside chair)? 1 (P)   -JF 2  -MYESHA     Climbing 3-5 steps with a railing? 1 (P)   -JF 1  -MYESHA     To walk in hospital room? 1 (P)   -JF 1  -MYESHA     AM-PAC 6 Clicks Score (PT) 8 (P)   -JF 9  -MYESHA        Functional Assessment    Outcome Measure Options AM-PAC 6 Clicks Basic Mobility (PT) (P)   -JF AM-PAC 6 Clicks Basic Mobility (PT)  -MYESHA           User Key  (r) = Recorded By, (t) = Taken By, (c) = Cosigned By    Initials Name Provider Type    MYESHA Jeff Shell, PT Physical Therapist    Juwan Hartman, PT Student PT Student                 Time Calculation:         PT G-Codes  Outcome Measure Options: (P) AM-PAC 6 Clicks Basic Mobility (PT)  AM-PAC 6 Clicks Score (PT): (P) 8  AM-PAC 6 Clicks Score (OT): 14    Juwan Dave, ALLEN Student  5/19/2023

## 2023-05-19 NOTE — PLAN OF CARE
Goal Outcome Evaluation:      No acute events this shift. Pt did not eat well today. Pt agreeable to go to SNF on the 3rd floor or [facility]. Sister updated.

## 2023-05-19 NOTE — PROGRESS NOTES
[facility]   Hospitalist Progress Note       Patient Name: Tita Jiménez  : 1935  MRN: [id number]  Primary Care Physician: Madison Ortiz APRN  Date of admission: 2023  Today's Date: 2023  Room / Bed:   219/2  Subjective   Chief Complaint: SOA.  Weakness.    Summary:  Tita Jiménez is a 88 y.o. female  with a past medical history of type 2 diabetes mellitus, hypertension, hyperlipidemia, ulcerative colitis, presented to the ED for evaluation of shortness of breath, cough.  Patient has been recently diagnosed with COVID infection on Monday.  Since the COVID infection, patient has been progressively becoming weak and now she is not able to get up out of bed in chair by herself.    Upon arrival to the ED, vital signs temperature 97.8, pulse 96, respirate rate 18, blood pressure 124/65 on 2 L of nasal cannula saturating around 93%.  Labs showed troponin 17, proBNP 160.4, sodium 135, bicarb 18.3, normal creatinine, albumin 3.4, rest of the CMP is nonsignificant, normal CBC.  Chest x-ray showed no acute cardiopulmonary process.  X-ray ankle of the right showed no acute fracture or traumatic malalignment.  EKG showed no significant ST-T wave changes concerning for ischemia.      Patient admitted for further evaluation and management of generalized weakness, right ankle sprain, COVID-19 infection.  On 23 she was moved to PCU after stroke RRT was called.  Patient had left facial droop and left sided weakness.    Interval Followup: 2023    • Alert and interactive today.    • Still with left facial droop but improved.    • Still with left upper extremity weakness but improved   • Still with left lower extremity paresis, not improved yet  • NSR + isolated PVCs; no A-fib noted.  • Holding home lisinopril 40 today.  Try to avoid hypotension in the setting of acute stroke .... /61 - 151/78 range  • Completed NS @ 75 cc/hr x 10 hours ...... Cr improved: 1.0 --->  0.7        REVIEW OF SYSTEMS: All other systems reviewed and are negative.   • General weakness.  Shortness of air.  Left upper and lower extremity weakness.  Objective   Temp:  [97.3 °F (36.3 °C)-98.5 °F (36.9 °C)] 98.4 °F (36.9 °C)  Heart Rate:  [70-98] 98  Resp:  [14-18] 18  BP: (122-156)/(55-80) 151/78  PHYSICAL EXAM   • CON: WN. WD. NAD.  Oriented.  Knows place year month president.  • EYES:  Sclera anicteric. EOMI. Normal conjunctiva.   • ENT:  Oropharyngeal mucosa without ulcers or thrush.    • NECK:  No thyromegaly. No stridor. Trachea midline.  • RESP:  CTA. No wheezes. No crackles.  No work of breathing or tachypnea.   • CV:  Rhythm regular. Rate WNL. No murmur noted.  No edema.  • GI:  Soft and nontender. Nondistended.  Bowel sounds present.   • EXT: Peripheral pulses intact.  No joint deformities or cyanosis.  • LYMPH:  No lymphedema noted.  No cervical lymphadenopathy.  • PSYCH:  Alert. Oriented. Normal affect and mood.  • NEURO:  Left facial droop.  Left upper and lower extremity weakness.  No  paresthesia.  • SKIN: No chronic venous stasis changes or varicosities.  No cellulitis    Results from last 7 days   Lab Units 05/19/23  0442 05/18/23  0510 05/17/23  0434 05/16/23  0626 05/15/23  0437 05/14/23  0650 05/13/23  0531   WBC 10*3/mm3 7.50 8.19 8.28 9.28 7.60 6.38 5.68   HEMOGLOBIN g/dL 12.9 13.8 13.4 13.3 12.7 12.2 12.1   HEMATOCRIT % 43.2 42.1 41.0 41.2 38.8 38.4 38.2   PLATELETS 10*3/mm3 191 216 212 233 226 206 214     Results from last 7 days   Lab Units 05/19/23  0442 05/18/23  0510 05/17/23  0434 05/16/23  0626 05/15/23  0437 05/14/23  0650 05/13/23  0531   SODIUM mmol/L 132* 133* 134* 138 135* 138 139   POTASSIUM mmol/L 4.6 4.5 4.1 4.5 4.6 4.4 4.4   CO2 mmol/L 18.6* 20.0* 21.1* 27.8 25.7 26.0 23.9   CHLORIDE mmol/L 104 100 101 102 102 104 107   ANION GAP mmol/L 9.4 13.0 11.9 8.2 7.3 8.0 8.1   BUN mg/dL 23 35* 27* 27* 25* 24* 23   CREATININE mg/dL 0.78 1.01* 0.76 0.75 0.80 0.74 0.83   GLUCOSE  mg/dL 166* 166* 116* 177* 202* 153* 160*           RESULTS REVIEWED:  I have personally reviewed the results from the time of this admission to 5/19/2023 13:51 EDT and agree with these findings:  []  Laboratory  []  Microbiology  []  Radiology  []  EKG/Telemetry   []  Cardiology/Vascular   []  Pathology  []  Old records  []  Other:  Assessment / Plan   Assessment:    • Acute CVA in the right CHRISTINE territory  • Left-sided weakness and left facial droop  • History of prior CVA (right frontal, right basal ganglia)  • COVID infection  • DM  • HTN  • Dyslipidemia  • Ulcerative colitis  • CODE STATUS: DNR/DNI       Plan:  • Speech has downgraded diet  • Completed NS @ 75 cc/hr x 10 hours ...... Cr improved: 1.0 ---> 0.7  • Continue to hold Lisinopril  • Avoid hypotension  • Discussed with .  Referrals to rehab.  • Moved to PCU via stroke pathway  • MRI brain today .... Acute CVA noted  • CT head showed old right frontal and old right basal ganglia stroke  • Nebulizers as needed and scheduled  • Levemir and sliding scale insulin for glucose control  • Aspirin and Plavix x21 days.  Then continue with aspirin.  • Statin on board  • Lovenox for DVT prophylaxis  • PT/OT .... Rehab placement (lives in assisted living facility at baseline)  Discussed plan with RN.  DVT prophylaxis:  Medical and mechanical DVT prophylaxis orders are present.  CODE STATUS:      Medical Intervention Limits: NO intubation (DNI)  Level Of Support Discussed With: Patient  Code Status (Patient has no pulse and is not breathing): No CPR (Do Not Attempt to Resuscitate)  Medical Interventions (Patient has pulse or is breathing): Limited Support         Patient independently seen and evaluated, agree with assessment and plan, above documentation reflects plan put forth during bedside rounds.  More than 51% of the time of this patient encounter was performed by me.     Interval history:   No acute events overnight, weakness on the left is  improving     Exam:  GEN: Drowsy  HEENT: Moist mucous membrane  LUNGS: Equal chest rise bilaterally  CARDIAC: Regular rate and rhythm  NEURO: Left upper extremity weakness, asymmetric face, continued left lower extremity weakness  SKIN: No obvious breakdown     Plan:  Agree with assessment and plan as above  MRI of the brain reviewed, consistent with new stroke  Continue stroke work-up and prevention  Continue bronchodilators  Continue aspirin and statin  Continue basal bolus insulin for hyperglycemia  Continue Lovenox for DVT prophylaxis  Continue PT/OT  CBC, CMP reviewed  Repeat CBC, CMP, mag and Phos in a.m.  Clinical course will dictate further management     Reviewed patients labs and imaging, and discussed with patient and nurse at bedside.      Electronically signed by Michael Barba MD, 05/19/23, 2:04 PM EDT.

## 2023-05-19 NOTE — PLAN OF CARE
Goal Outcome Evaluation:  Plan of Care Reviewed With: patient   NIH 12, patient able to swallow pills with Nectar thick liquids. Q2 turns with staff. No new changes during shift.      Progress: no change

## 2023-05-20 ENCOUNTER — APPOINTMENT (OUTPATIENT)
Dept: CT IMAGING | Facility: HOSPITAL | Age: 88
End: 2023-05-20
Payer: MEDICARE

## 2023-05-20 LAB
ALBUMIN SERPL-MCNC: 2.7 G/DL (ref 3.5–5.2)
ALBUMIN/GLOB SERPL: 1 G/DL
ALP SERPL-CCNC: 89 U/L (ref 39–117)
ALT SERPL W P-5'-P-CCNC: 19 U/L (ref 1–33)
ANION GAP SERPL CALCULATED.3IONS-SCNC: 10.6 MMOL/L (ref 5–15)
AST SERPL-CCNC: 10 U/L (ref 1–32)
BASOPHILS # BLD AUTO: 0.03 10*3/MM3 (ref 0–0.2)
BASOPHILS NFR BLD AUTO: 0.4 % (ref 0–1.5)
BILIRUB SERPL-MCNC: 0.7 MG/DL (ref 0–1.2)
BILIRUB UR QL STRIP: NEGATIVE
BUN SERPL-MCNC: 17 MG/DL (ref 8–23)
BUN/CREAT SERPL: 25 (ref 7–25)
CALCIUM SPEC-SCNC: 9.2 MG/DL (ref 8.6–10.5)
CHLORIDE SERPL-SCNC: 101 MMOL/L (ref 98–107)
CLARITY UR: ABNORMAL
CO2 SERPL-SCNC: 20.4 MMOL/L (ref 22–29)
COLOR UR: YELLOW
CREAT SERPL-MCNC: 0.68 MG/DL (ref 0.57–1)
DEPRECATED RDW RBC AUTO: 46.4 FL (ref 37–54)
EGFRCR SERPLBLD CKD-EPI 2021: 84.4 ML/MIN/1.73
EOSINOPHIL # BLD AUTO: 0.09 10*3/MM3 (ref 0–0.4)
EOSINOPHIL NFR BLD AUTO: 1.1 % (ref 0.3–6.2)
ERYTHROCYTE [DISTWIDTH] IN BLOOD BY AUTOMATED COUNT: 14.9 % (ref 12.3–15.4)
GLOBULIN UR ELPH-MCNC: 2.8 GM/DL
GLUCOSE BLDC GLUCOMTR-MCNC: 134 MG/DL (ref 70–99)
GLUCOSE BLDC GLUCOMTR-MCNC: 148 MG/DL (ref 70–99)
GLUCOSE BLDC GLUCOMTR-MCNC: 152 MG/DL (ref 70–99)
GLUCOSE BLDC GLUCOMTR-MCNC: 208 MG/DL (ref 70–99)
GLUCOSE SERPL-MCNC: 180 MG/DL (ref 65–99)
GLUCOSE UR STRIP-MCNC: ABNORMAL MG/DL
HCT VFR BLD AUTO: 41.5 % (ref 34–46.6)
HGB BLD-MCNC: 13 G/DL (ref 12–15.9)
HGB UR QL STRIP.AUTO: NEGATIVE
IMM GRANULOCYTES # BLD AUTO: 0.12 10*3/MM3 (ref 0–0.05)
IMM GRANULOCYTES NFR BLD AUTO: 1.5 % (ref 0–0.5)
KETONES UR QL STRIP: NEGATIVE
LEUKOCYTE ESTERASE UR QL STRIP.AUTO: NEGATIVE
LYMPHOCYTES # BLD AUTO: 1.28 10*3/MM3 (ref 0.7–3.1)
LYMPHOCYTES NFR BLD AUTO: 15.5 % (ref 19.6–45.3)
MAGNESIUM SERPL-MCNC: 2 MG/DL (ref 1.6–2.4)
MCH RBC QN AUTO: 26.8 PG (ref 26.6–33)
MCHC RBC AUTO-ENTMCNC: 31.3 G/DL (ref 31.5–35.7)
MCV RBC AUTO: 85.6 FL (ref 79–97)
MONOCYTES # BLD AUTO: 1.01 10*3/MM3 (ref 0.1–0.9)
MONOCYTES NFR BLD AUTO: 12.2 % (ref 5–12)
NEUTROPHILS NFR BLD AUTO: 5.72 10*3/MM3 (ref 1.7–7)
NEUTROPHILS NFR BLD AUTO: 69.3 % (ref 42.7–76)
NITRITE UR QL STRIP: NEGATIVE
NRBC BLD AUTO-RTO: 0 /100 WBC (ref 0–0.2)
PH UR STRIP.AUTO: 6 [PH] (ref 5–8)
PHOSPHATE SERPL-MCNC: 2.6 MG/DL (ref 2.5–4.5)
PLATELET # BLD AUTO: 169 10*3/MM3 (ref 140–450)
PMV BLD AUTO: 9.3 FL (ref 6–12)
POTASSIUM SERPL-SCNC: 4.5 MMOL/L (ref 3.5–5.2)
PROT SERPL-MCNC: 5.5 G/DL (ref 6–8.5)
PROT UR QL STRIP: NEGATIVE
RBC # BLD AUTO: 4.85 10*6/MM3 (ref 3.77–5.28)
SODIUM SERPL-SCNC: 132 MMOL/L (ref 136–145)
SP GR UR STRIP: 1.03 (ref 1–1.03)
UROBILINOGEN UR QL STRIP: ABNORMAL
WBC NRBC COR # BLD: 8.25 10*3/MM3 (ref 3.4–10.8)

## 2023-05-20 PROCEDURE — 84100 ASSAY OF PHOSPHORUS: CPT | Performed by: INTERNAL MEDICINE

## 2023-05-20 PROCEDURE — 25010000002 HALOPERIDOL LACTATE PER 5 MG: Performed by: PHYSICIAN ASSISTANT

## 2023-05-20 PROCEDURE — 97110 THERAPEUTIC EXERCISES: CPT

## 2023-05-20 PROCEDURE — 85025 COMPLETE CBC W/AUTO DIFF WBC: CPT | Performed by: PHYSICIAN ASSISTANT

## 2023-05-20 PROCEDURE — 99232 SBSQ HOSP IP/OBS MODERATE 35: CPT | Performed by: INTERNAL MEDICINE

## 2023-05-20 PROCEDURE — 94799 UNLISTED PULMONARY SVC/PX: CPT

## 2023-05-20 PROCEDURE — 81003 URINALYSIS AUTO W/O SCOPE: CPT | Performed by: PHYSICIAN ASSISTANT

## 2023-05-20 PROCEDURE — 80053 COMPREHEN METABOLIC PANEL: CPT | Performed by: INTERNAL MEDICINE

## 2023-05-20 PROCEDURE — 63710000001 INSULIN DETEMIR PER 5 UNITS: Performed by: INTERNAL MEDICINE

## 2023-05-20 PROCEDURE — 25010000002 DIPHENHYDRAMINE PER 50 MG: Performed by: PHYSICIAN ASSISTANT

## 2023-05-20 PROCEDURE — 63710000001 INSULIN LISPRO (HUMAN) PER 5 UNITS: Performed by: INTERNAL MEDICINE

## 2023-05-20 PROCEDURE — 82948 REAGENT STRIP/BLOOD GLUCOSE: CPT

## 2023-05-20 PROCEDURE — 94664 DEMO&/EVAL PT USE INHALER: CPT

## 2023-05-20 PROCEDURE — 83735 ASSAY OF MAGNESIUM: CPT | Performed by: PHYSICIAN ASSISTANT

## 2023-05-20 PROCEDURE — 25010000002 ENOXAPARIN PER 10 MG: Performed by: INTERNAL MEDICINE

## 2023-05-20 RX ORDER — HALOPERIDOL 5 MG/ML
0.5 INJECTION INTRAMUSCULAR ONCE
Status: COMPLETED | OUTPATIENT
Start: 2023-05-20 | End: 2023-05-20

## 2023-05-20 RX ORDER — DIPHENHYDRAMINE HYDROCHLORIDE 50 MG/ML
25 INJECTION INTRAMUSCULAR; INTRAVENOUS ONCE
Status: COMPLETED | OUTPATIENT
Start: 2023-05-20 | End: 2023-05-20

## 2023-05-20 RX ADMIN — INSULIN LISPRO 5 UNITS: 100 INJECTION, SOLUTION INTRAVENOUS; SUBCUTANEOUS at 12:47

## 2023-05-20 RX ADMIN — INSULIN LISPRO 3 UNITS: 100 INJECTION, SOLUTION INTRAVENOUS; SUBCUTANEOUS at 17:05

## 2023-05-20 RX ADMIN — Medication 10 ML: at 10:01

## 2023-05-20 RX ADMIN — SERTRALINE HYDROCHLORIDE 50 MG: 50 TABLET ORAL at 10:00

## 2023-05-20 RX ADMIN — ARFORMOTEROL TARTRATE 15 MCG: 15 SOLUTION RESPIRATORY (INHALATION) at 07:16

## 2023-05-20 RX ADMIN — Medication 10 ML: at 20:14

## 2023-05-20 RX ADMIN — INSULIN DETEMIR 20 UNITS: 100 INJECTION, SOLUTION SUBCUTANEOUS at 10:01

## 2023-05-20 RX ADMIN — CLOPIDOGREL BISULFATE 75 MG: 75 TABLET ORAL at 10:01

## 2023-05-20 RX ADMIN — ARFORMOTEROL TARTRATE 15 MCG: 15 SOLUTION RESPIRATORY (INHALATION) at 18:42

## 2023-05-20 RX ADMIN — FLUTICASONE PROPIONATE 2 PUFF: 110 AEROSOL, METERED RESPIRATORY (INHALATION) at 18:42

## 2023-05-20 RX ADMIN — NYSTATIN: 100000 POWDER TOPICAL at 10:01

## 2023-05-20 RX ADMIN — DILTIAZEM HYDROCHLORIDE 240 MG: 240 CAPSULE, COATED, EXTENDED RELEASE ORAL at 10:01

## 2023-05-20 RX ADMIN — FAMOTIDINE 10 MG: 10 TABLET ORAL at 10:01

## 2023-05-20 RX ADMIN — ASPIRIN 81 MG: 81 TABLET, COATED ORAL at 10:00

## 2023-05-20 RX ADMIN — INSULIN DETEMIR 20 UNITS: 100 INJECTION, SOLUTION SUBCUTANEOUS at 20:14

## 2023-05-20 RX ADMIN — HALOPERIDOL LACTATE 0.5 MG: 5 INJECTION, SOLUTION INTRAMUSCULAR at 21:16

## 2023-05-20 RX ADMIN — DIPHENHYDRAMINE HYDROCHLORIDE 25 MG: 50 INJECTION, SOLUTION INTRAMUSCULAR; INTRAVENOUS at 20:14

## 2023-05-20 RX ADMIN — ENOXAPARIN SODIUM 40 MG: 100 INJECTION SUBCUTANEOUS at 10:01

## 2023-05-20 RX ADMIN — FLUTICASONE PROPIONATE 2 PUFF: 110 AEROSOL, METERED RESPIRATORY (INHALATION) at 07:27

## 2023-05-20 RX ADMIN — METOPROLOL SUCCINATE 25 MG: 25 TABLET, EXTENDED RELEASE ORAL at 10:01

## 2023-05-20 NOTE — PROGRESS NOTES
[facility]   Hospitalist Progress Note       Patient Name: Tita Jiménez  : 1935  MRN: [id number]  Primary Care Physician: Madison Ortiz APRN  Date of admission: 2023  Today's Date: 2023  Room / Bed:   219/2  Subjective   Chief Complaint: SOA.  Weakness.    Summary:  Tita Jiménez is a 88 y.o. female  with a past medical history of type 2 diabetes mellitus, hypertension, hyperlipidemia, ulcerative colitis, presented to the ED for evaluation of shortness of breath, cough.  Patient has been recently diagnosed with COVID infection on Monday.  Since the COVID infection, patient has been progressively becoming weak and now she is not able to get up out of bed in chair by herself.    Upon arrival to the ED, vital signs temperature 97.8, pulse 96, respirate rate 18, blood pressure 124/65 on 2 L of nasal cannula saturating around 93%.  Labs showed troponin 17, proBNP 160.4, sodium 135, bicarb 18.3, normal creatinine, albumin 3.4, rest of the CMP is nonsignificant, normal CBC.  Chest x-ray showed no acute cardiopulmonary process.  X-ray ankle of the right showed no acute fracture or traumatic malalignment.  EKG showed no significant ST-T wave changes concerning for ischemia.      Patient admitted for further evaluation and management of generalized weakness, right ankle sprain, COVID-19 infection.  On 23 she was moved to PCU after stroke RRT was called.  Patient had left facial droop and left sided weakness.    Interval Followup: 2023    • Alert and interactive today.    • Very pleasant.  Asking about rehab options.  • Left facial droop improved.    • Left upper extremity weakness improved   • Left lower extremity paresis, modestly improved  • NSR + isolated PVCs; no A-fib noted.  • Cr stable.  Better.  • Glucose:: 134  -  208          REVIEW OF SYSTEMS: All other systems reviewed and are negative.   • General weakness.  Shortness of air.  Left upper and lower extremity  weakness.  Objective   Temp:  [98.1 °F (36.7 °C)-99.3 °F (37.4 °C)] 99.1 °F (37.3 °C)  Heart Rate:  [72-90] 90  Resp:  [16-20] 20  BP: (122-152)/(56-84) 145/84  PHYSICAL EXAM   • CON: WN. WD. NAD.  Oriented.  Knows place year month president.  • EYES:  Sclera anicteric. EOMI. Normal conjunctiva.   • ENT:  Oropharyngeal mucosa without ulcers or thrush.    • NECK:  No thyromegaly. No stridor. Trachea midline.  • RESP:  CTA. No wheezes. No crackles.  No work of breathing or tachypnea.   • CV:  Rhythm regular. Rate WNL. No murmur noted.  No edema.  • GI:  Soft and nontender. Nondistended.  Bowel sounds present.   • EXT: Peripheral pulses intact.  No joint deformities or cyanosis.  • LYMPH:  No lymphedema noted.  No cervical lymphadenopathy.  • PSYCH:  Alert. Oriented. Normal affect and mood.  • NEURO:  Left facial droop.  Left upper and lower extremity weakness.  No  paresthesia.  • SKIN: No chronic venous stasis changes or varicosities.  No cellulitis    Results from last 7 days   Lab Units 05/20/23 0457 05/19/23 0442 05/18/23  0510 05/17/23  0434 05/16/23  0626 05/15/23  0437 05/14/23  0650   WBC 10*3/mm3 8.25 7.50 8.19 8.28 9.28 7.60 6.38   HEMOGLOBIN g/dL 13.0 12.9 13.8 13.4 13.3 12.7 12.2   HEMATOCRIT % 41.5 43.2 42.1 41.0 41.2 38.8 38.4   PLATELETS 10*3/mm3 169 191 216 212 233 226 206     Results from last 7 days   Lab Units 05/20/23 0457 05/19/23 0442 05/18/23  0510 05/17/23  0434 05/16/23  0626 05/15/23  0437 05/14/23  0650   SODIUM mmol/L 132* 132* 133* 134* 138 135* 138   POTASSIUM mmol/L 4.5 4.6 4.5 4.1 4.5 4.6 4.4   CO2 mmol/L 20.4* 18.6* 20.0* 21.1* 27.8 25.7 26.0   CHLORIDE mmol/L 101 104 100 101 102 102 104   ANION GAP mmol/L 10.6 9.4 13.0 11.9 8.2 7.3 8.0   BUN mg/dL 17 23 35* 27* 27* 25* 24*   CREATININE mg/dL 0.68 0.78 1.01* 0.76 0.75 0.80 0.74   GLUCOSE mg/dL 180* 166* 166* 116* 177* 202* 153*           RESULTS REVIEWED:  I have personally reviewed the results from the time of this admission to  5/20/2023 12:34 EDT and agree with these findings:  []  Laboratory  []  Microbiology  []  Radiology  []  EKG/Telemetry   []  Cardiology/Vascular   []  Pathology  []  Old records  []  Other:  Assessment / Plan   Assessment:    • Acute CVA in the right CHRISTINE territory  • Left-sided weakness and left facial droop  • History of prior CVA (right frontal, right basal ganglia)  • COVID infection  • DM  • HTN  • Dyslipidemia  • Ulcerative colitis  • CODE STATUS: DNR/DNI       Plan:  • Continue PT, OT, Speech.  Continue Rehab precert/referral.  • Continue to hold Lisinopril (/62  -  145/84)  • Avoid hypotension  • Discussed with .  Referrals to rehab.  • Moved to PCU via stroke pathway  • MRI brain today .... Acute CVA noted  • CT head showed old right frontal and old right basal ganglia stroke  • Nebulizers as needed and scheduled  • Levemir and sliding scale insulin for glucose control  • Aspirin and Plavix x21 days.  Then continue with aspirin.  • Statin on board  • Lovenox for DVT prophylaxis  • PT/OT .... Rehab placement (lives in assisted living facility at baseline)  Discussed plan with RN.  DVT prophylaxis:  Medical and mechanical DVT prophylaxis orders are present.  CODE STATUS:      Medical Intervention Limits: NO intubation (DNI)  Level Of Support Discussed With: Patient  Code Status (Patient has no pulse and is not breathing): No CPR (Do Not Attempt to Resuscitate)  Medical Interventions (Patient has pulse or is breathing): Limited Support         Patient independently seen and evaluated, agree with assessment and plan, above documentation reflects plan put forth during bedside rounds.  More than 51% of the time of this patient encounter was performed by me.     Interval history:    No acute events overnight, weakness is improving     Exam:  GEN: Drowsy  HEENT: Moist mucous membrane  LUNGS: Equal chest rise bilaterally  CARDIAC: Regular rate and rhythm  NEURO: Left upper extremity weakness,  asymmetric face, continued left lower extremity weakness  SKIN: No obvious breakdown     Plan:  Agree with assessment and plan as above  MRI of the brain reviewed, consistent with new stroke  Continue stroke work-up and prevention  Strength is improving in the left lower and left upper extremities  Continue bronchodilators  Continue aspirin and statin  Continue basal bolus insulin for hyperglycemia  Continue Lovenox for DVT prophylaxis  Continue PT/OT  CBC, CMP reviewed  Repeat CBC, CMP, mag and Phos in a.m.  Patient will need extensive rehab  Clinical course will dictate further management     Reviewed patients labs and imaging, and discussed with patient and nurse at bedside.      Electronically signed by Michael Barba MD, 05/20/23, 12:49 PM EDT.

## 2023-05-20 NOTE — PLAN OF CARE
Goal Outcome Evaluation:   No acute changes this shift. VSS thus far. Call light is within reach of the patient.     Larry Colon RN

## 2023-05-20 NOTE — THERAPY TREATMENT NOTE
Acute Care - Physical Therapy Treatment Note  RUPESH Villalobos     Patient Name: Tita Jiménez  : 1935  MRN: [id number]  Today's Date: 2023      Visit Dx:     ICD-10-CM ICD-9-CM   1. Dehydration  E86.0 276.51   2. Generalized weakness  R53.1 780.79   3. Decreased activities of daily living (ADL)  Z78.9 V49.89   4. Difficulty in walking  R26.2 719.7   5. Oropharyngeal dysphagia  R13.12 787.22     Patient Active Problem List   Diagnosis   • Compression fracture of T1 -T2 vertebra (CMS/HCC)   • Fall   • Contusion of scalp   • Closed head injury   • Asthma   • Depression   • Reflux esophagitis   • Bladder incontinence   • Bowel incontinence   • High cholesterol   • Diabetes mellitus, type II (HCC)   • Hypertension   • Ulcerative colitis (HCC)   • GERD (gastroesophageal reflux disease)   • Benign essential tremor   • Carpal tunnel syndrome   • Ataxia   • Breast cancer screening   • Hypoxia   • Cytokine release syndrome, grade 1   • Pneumonia due to COVID-19 virus   • Weakness   • Cognitive impairment   • Difficulty walking   • At risk for venous thromboembolism (VTE)   • Lower abdominal pain   • 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 skin intact. D/T fall on 10/18/2019   • Depression    • Diabetes mellitus    • GERD (gastroesophageal reflux disease) 2021   • HTN (hypertension) 10/18/2019   • Hyperlipidemia    • Hypertension    • Mitral valve problem    • Polymyalgia rheumatica    • Stroke     short term memory loss   • Ulcerative colitis 2021   • Urinary incontinence      Past Surgical History:   Procedure Laterality Date   • ADENOIDECTOMY     • APPENDECTOMY     • BREAST SURGERY      ( L4-L5)   • COLON SURGERY      colon resection   • HYSTERECTOMY     • OOPHORECTOMY     • TONSILLECTOMY       PT  Assessment (last 12 hours)     PT Evaluation and Treatment     Row Name 05/20/23 1539          Physical Therapy Time and Intention    Subjective Information complains of;weakness  -RH     Document Type therapy note (daily note)  -RH     Mode of Treatment physical therapy;individual therapy  -RH     Patient Effort fair  -RH     Symptoms Noted During/After Treatment --  Pt c/o LLE weakness.  -     Row Name 05/20/23 1539          Pain    Additional Documentation Pain Scale: FACES Pre/Post-Treatment (Group)  -     Row Name 05/20/23 1539          Pain Scale: FACES Pre/Post-Treatment    Pain: FACES Scale, Pretreatment 2-->hurts little bit  -RH     Posttreatment Pain Rating 2-->hurts little bit  -RH     Pain Location - Side/Orientation Left  -RH     Pain Location - wrist  -     Row Name 05/20/23 1539          Bed Mobility    Bed Mobility scooting/bridging  -     Scooting/Bridging [place] (Bed Mobility) maximum assist (25% patient effort)  -     Bed Mobility, Safety Issues decreased use of legs for bridging/pushing;decreased use of arms for pushing/pulling  -     Comment, (Bed Mobility) Pt requires max assist for moving to head of bed.  -     Row Name 05/20/23 1539          Vital Signs    O2 Delivery Intra Treatment room air  -     Row Name 05/20/23 1539          Progress Summary (PT)    Progress Toward Functional Goals (PT) progress toward functional goals is gradual  -           User Key  (r) = Recorded By, (t) = Taken By, (c) = Cosigned By    Initials Name Provider Type     Elian Guevara PTA Physical Therapist Assistant               Left Lower Extremity   Exercise  Reps  Sets    Short arc quads   10 2   Heel slides  10 2   Ankle pumps  10 2   Quad sets  10 2   Straight leg raise  10 2   Hip ab/adduction 10 2        Physical Therapy Education     Title: PT OT SLP Therapies (Done)     Topic: Physical Therapy (Done)     Point: Mobility training (Done)     Learning Progress Summary           Patient  Acceptance, E, VU by  at 5/13/2023 1004                   Point: Home exercise program (Done)     Learning Progress Summary           Patient Acceptance, E, VU by  at 5/13/2023 1004                   Point: Body mechanics (Done)     Learning Progress Summary           Patient Acceptance, E, VU by  at 5/13/2023 1004                   Point: Precautions (Done)     Learning Progress Summary           Patient Acceptance, E, VU by  at 5/13/2023 1004                               User Key     Initials Effective Dates Name Provider Type [facility] 06/16/21 -  Celeste Anthony OT Occupational Therapist OT              PT Recommendation and Plan     Progress Summary (PT)  Progress Toward Functional Goals (PT): progress toward functional goals is gradual   Outcome Measures     Row Name 05/20/23 1500 05/19/23 1524          How much help from another person do you currently need...    Turning from your back to your side while in flat bed without using bedrails? 2  - 2  -DP (r) JF (t) DP (c)     Moving from lying on back to sitting on the side of a flat bed without bedrails? 2  - 2  -DP (r) JF (t) DP (c)     Moving to and from a bed to a chair (including a wheelchair)? 1  - 1  -DP (r) JF (t) DP (c)     Standing up from a chair using your arms (e.g., wheelchair, bedside chair)? 1  - 1  -DP (r) JF (t) DP (c)     Climbing 3-5 steps with a railing? 1  - 1  -DP (r) JF (t) DP (c)     To walk in hospital room? 1  - 1  -DP (r) JF (t) DP (c)     AM-PAC 6 Clicks Score (PT) 8  - 8  -DP (r) JF (t)        Functional Assessment    Outcome Measure Options -- AM-PAC 6 Clicks Basic Mobility (PT)  -DP (r) JF (t) DP (c)           User Key  (r) = Recorded By, (t) = Taken By, (c) = Cosigned By    Initials Name Provider Type     Elian Guevara, PTA Physical Therapist Assistant    Luis Alberto Lombardo, PT Physical Therapist    JF Juwan Dave, PT Student PT Student                 Time Calculation:    PT Charges     Row  Name 05/20/23 1538             Time Calculation    PT Received On 05/20/23  -RH         Timed Charges    29325 - PT Therapeutic Exercise Minutes 13  -RH         Total Minutes    Timed Charges Total Minutes 13  -RH       Total Minutes 13  -RH            User Key  (r) = Recorded By, (t) = Taken By, (c) = Cosigned By    Initials Name Provider Type     Elian Guevara PTA Physical Therapist Assistant              Therapy Charges for Today     Code Description Service Date Service Provider Modifiers Qty    08010647108 HC PT THER PROC EA 15 MIN 5/20/2023 Elian Guevara PTA GP 1          PT G-Codes  Outcome Measure Options: AM-PAC 6 Clicks Basic Mobility (PT)  AM-PAC 6 Clicks Score (PT): 8  AM-PAC 6 Clicks Score (OT): 14    Elian Guevara PTA  5/20/2023

## 2023-05-21 ENCOUNTER — APPOINTMENT (OUTPATIENT)
Dept: CT IMAGING | Facility: HOSPITAL | Age: 88
End: 2023-05-21
Payer: MEDICARE

## 2023-05-21 ENCOUNTER — APPOINTMENT (OUTPATIENT)
Dept: GENERAL RADIOLOGY | Facility: HOSPITAL | Age: 88
End: 2023-05-21
Payer: MEDICARE

## 2023-05-21 LAB
ALBUMIN SERPL-MCNC: 2.9 G/DL (ref 3.5–5.2)
ALBUMIN/GLOB SERPL: 1 G/DL
ALP SERPL-CCNC: 92 U/L (ref 39–117)
ALT SERPL W P-5'-P-CCNC: 18 U/L (ref 1–33)
ANION GAP SERPL CALCULATED.3IONS-SCNC: 8.7 MMOL/L (ref 5–15)
AST SERPL-CCNC: 10 U/L (ref 1–32)
BASOPHILS # BLD AUTO: 0.03 10*3/MM3 (ref 0–0.2)
BASOPHILS NFR BLD AUTO: 0.3 % (ref 0–1.5)
BILIRUB SERPL-MCNC: 0.7 MG/DL (ref 0–1.2)
BUN SERPL-MCNC: 15 MG/DL (ref 8–23)
BUN/CREAT SERPL: 20.5 (ref 7–25)
CALCIUM SPEC-SCNC: 9.4 MG/DL (ref 8.6–10.5)
CHLORIDE SERPL-SCNC: 101 MMOL/L (ref 98–107)
CO2 SERPL-SCNC: 23.3 MMOL/L (ref 22–29)
CREAT SERPL-MCNC: 0.73 MG/DL (ref 0.57–1)
DEPRECATED RDW RBC AUTO: 44.4 FL (ref 37–54)
EGFRCR SERPLBLD CKD-EPI 2021: 79.7 ML/MIN/1.73
EOSINOPHIL # BLD AUTO: 0.11 10*3/MM3 (ref 0–0.4)
EOSINOPHIL NFR BLD AUTO: 1.2 % (ref 0.3–6.2)
ERYTHROCYTE [DISTWIDTH] IN BLOOD BY AUTOMATED COUNT: 14.9 % (ref 12.3–15.4)
GLOBULIN UR ELPH-MCNC: 2.8 GM/DL
GLUCOSE BLDC GLUCOMTR-MCNC: 118 MG/DL (ref 70–99)
GLUCOSE BLDC GLUCOMTR-MCNC: 153 MG/DL (ref 70–99)
GLUCOSE BLDC GLUCOMTR-MCNC: 239 MG/DL (ref 70–99)
GLUCOSE SERPL-MCNC: 128 MG/DL (ref 65–99)
HCT VFR BLD AUTO: 38 % (ref 34–46.6)
HGB BLD-MCNC: 12.7 G/DL (ref 12–15.9)
IMM GRANULOCYTES # BLD AUTO: 0.1 10*3/MM3 (ref 0–0.05)
IMM GRANULOCYTES NFR BLD AUTO: 1.1 % (ref 0–0.5)
LYMPHOCYTES # BLD AUTO: 1.4 10*3/MM3 (ref 0.7–3.1)
LYMPHOCYTES NFR BLD AUTO: 15.8 % (ref 19.6–45.3)
MAGNESIUM SERPL-MCNC: 1.9 MG/DL (ref 1.6–2.4)
MCH RBC QN AUTO: 27.4 PG (ref 26.6–33)
MCHC RBC AUTO-ENTMCNC: 33.4 G/DL (ref 31.5–35.7)
MCV RBC AUTO: 81.9 FL (ref 79–97)
MONOCYTES # BLD AUTO: 1.22 10*3/MM3 (ref 0.1–0.9)
MONOCYTES NFR BLD AUTO: 13.8 % (ref 5–12)
NEUTROPHILS NFR BLD AUTO: 6.01 10*3/MM3 (ref 1.7–7)
NEUTROPHILS NFR BLD AUTO: 67.8 % (ref 42.7–76)
NRBC BLD AUTO-RTO: 0 /100 WBC (ref 0–0.2)
NT-PROBNP SERPL-MCNC: 69.5 PG/ML (ref 0–1800)
PHOSPHATE SERPL-MCNC: 2.4 MG/DL (ref 2.5–4.5)
PLATELET # BLD AUTO: 208 10*3/MM3 (ref 140–450)
PMV BLD AUTO: 9.3 FL (ref 6–12)
POTASSIUM SERPL-SCNC: 3.9 MMOL/L (ref 3.5–5.2)
PROT SERPL-MCNC: 5.7 G/DL (ref 6–8.5)
RBC # BLD AUTO: 4.64 10*6/MM3 (ref 3.77–5.28)
SODIUM SERPL-SCNC: 133 MMOL/L (ref 136–145)
WBC NRBC COR # BLD: 8.87 10*3/MM3 (ref 3.4–10.8)

## 2023-05-21 PROCEDURE — 94799 UNLISTED PULMONARY SVC/PX: CPT

## 2023-05-21 PROCEDURE — 70450 CT HEAD/BRAIN W/O DYE: CPT

## 2023-05-21 PROCEDURE — 71046 X-RAY EXAM CHEST 2 VIEWS: CPT

## 2023-05-21 PROCEDURE — 85025 COMPLETE CBC W/AUTO DIFF WBC: CPT | Performed by: PHYSICIAN ASSISTANT

## 2023-05-21 PROCEDURE — 63710000001 INSULIN LISPRO (HUMAN) PER 5 UNITS: Performed by: INTERNAL MEDICINE

## 2023-05-21 PROCEDURE — 99232 SBSQ HOSP IP/OBS MODERATE 35: CPT | Performed by: INTERNAL MEDICINE

## 2023-05-21 PROCEDURE — 84100 ASSAY OF PHOSPHORUS: CPT | Performed by: INTERNAL MEDICINE

## 2023-05-21 PROCEDURE — 83880 ASSAY OF NATRIURETIC PEPTIDE: CPT | Performed by: PHYSICIAN ASSISTANT

## 2023-05-21 PROCEDURE — 25010000002 ENOXAPARIN PER 10 MG: Performed by: INTERNAL MEDICINE

## 2023-05-21 PROCEDURE — 97112 NEUROMUSCULAR REEDUCATION: CPT

## 2023-05-21 PROCEDURE — 83735 ASSAY OF MAGNESIUM: CPT | Performed by: PHYSICIAN ASSISTANT

## 2023-05-21 PROCEDURE — 63710000001 INSULIN DETEMIR PER 5 UNITS: Performed by: INTERNAL MEDICINE

## 2023-05-21 PROCEDURE — 97110 THERAPEUTIC EXERCISES: CPT

## 2023-05-21 PROCEDURE — 94664 DEMO&/EVAL PT USE INHALER: CPT

## 2023-05-21 PROCEDURE — 80053 COMPREHEN METABOLIC PANEL: CPT | Performed by: INTERNAL MEDICINE

## 2023-05-21 PROCEDURE — 82948 REAGENT STRIP/BLOOD GLUCOSE: CPT

## 2023-05-21 RX ORDER — ACETAMINOPHEN 500 MG
1000 TABLET ORAL ONCE
Status: COMPLETED | OUTPATIENT
Start: 2023-05-21 | End: 2023-05-21

## 2023-05-21 RX ORDER — TRAZODONE HYDROCHLORIDE 50 MG/1
25 TABLET ORAL NIGHTLY
Status: DISCONTINUED | OUTPATIENT
Start: 2023-05-21 | End: 2023-05-23 | Stop reason: HOSPADM

## 2023-05-21 RX ADMIN — FLUTICASONE PROPIONATE 2 PUFF: 110 AEROSOL, METERED RESPIRATORY (INHALATION) at 19:39

## 2023-05-21 RX ADMIN — SERTRALINE HYDROCHLORIDE 50 MG: 50 TABLET ORAL at 08:46

## 2023-05-21 RX ADMIN — Medication 10 ML: at 08:47

## 2023-05-21 RX ADMIN — FAMOTIDINE 10 MG: 10 TABLET ORAL at 08:46

## 2023-05-21 RX ADMIN — NYSTATIN: 100000 POWDER TOPICAL at 08:47

## 2023-05-21 RX ADMIN — METOPROLOL SUCCINATE 25 MG: 25 TABLET, EXTENDED RELEASE ORAL at 08:46

## 2023-05-21 RX ADMIN — ASPIRIN 81 MG: 81 TABLET, COATED ORAL at 08:45

## 2023-05-21 RX ADMIN — ENOXAPARIN SODIUM 40 MG: 100 INJECTION SUBCUTANEOUS at 08:47

## 2023-05-21 RX ADMIN — FLUTICASONE PROPIONATE 2 PUFF: 110 AEROSOL, METERED RESPIRATORY (INHALATION) at 08:22

## 2023-05-21 RX ADMIN — CLOPIDOGREL BISULFATE 75 MG: 75 TABLET ORAL at 08:46

## 2023-05-21 RX ADMIN — ARFORMOTEROL TARTRATE 15 MCG: 15 SOLUTION RESPIRATORY (INHALATION) at 19:39

## 2023-05-21 RX ADMIN — INSULIN LISPRO 3 UNITS: 100 INJECTION, SOLUTION INTRAVENOUS; SUBCUTANEOUS at 17:05

## 2023-05-21 RX ADMIN — ACETAMINOPHEN 1000 MG: 500 TABLET ORAL at 23:40

## 2023-05-21 RX ADMIN — Medication 10 ML: at 21:31

## 2023-05-21 RX ADMIN — INSULIN DETEMIR 20 UNITS: 100 INJECTION, SOLUTION SUBCUTANEOUS at 08:46

## 2023-05-21 RX ADMIN — ARFORMOTEROL TARTRATE 15 MCG: 15 SOLUTION RESPIRATORY (INHALATION) at 08:22

## 2023-05-21 RX ADMIN — MONTELUKAST 10 MG: 10 TABLET, FILM COATED ORAL at 21:30

## 2023-05-21 RX ADMIN — ATORVASTATIN CALCIUM 20 MG: 20 TABLET, FILM COATED ORAL at 21:30

## 2023-05-21 RX ADMIN — TRAZODONE HYDROCHLORIDE 25 MG: 50 TABLET ORAL at 21:30

## 2023-05-21 RX ADMIN — DILTIAZEM HYDROCHLORIDE 240 MG: 240 CAPSULE, COATED, EXTENDED RELEASE ORAL at 08:46

## 2023-05-21 RX ADMIN — NYSTATIN: 100000 POWDER TOPICAL at 21:31

## 2023-05-21 RX ADMIN — INSULIN DETEMIR 20 UNITS: 100 INJECTION, SOLUTION SUBCUTANEOUS at 21:30

## 2023-05-21 RX ADMIN — INSULIN LISPRO 5 UNITS: 100 INJECTION, SOLUTION INTRAVENOUS; SUBCUTANEOUS at 13:06

## 2023-05-21 NOTE — PROGRESS NOTES
[facility]   Hospitalist Progress Note       Patient Name: Tita Jiménez  : 1935  MRN: [id number]  Primary Care Physician: Madison Ortiz APRN  Date of admission: 2023  Today's Date: 2023  Room / Bed:   219/2  Subjective   Chief Complaint: SOA.  Weakness.    Summary:  Tita Jiménez is a 88 y.o. female  with a past medical history of type 2 diabetes mellitus, hypertension, hyperlipidemia, ulcerative colitis, presented to the ED for evaluation of shortness of breath, cough.  Patient has been recently diagnosed with COVID infection on Monday.  Since the COVID infection, patient has been progressively becoming weak and now she is not able to get up out of bed in chair by herself.    Upon arrival to the ED, vital signs temperature 97.8, pulse 96, respirate rate 18, blood pressure 124/65 on 2 L of nasal cannula saturating around 93%.  Labs showed troponin 17, proBNP 160.4, sodium 135, bicarb 18.3, normal creatinine, albumin 3.4, rest of the CMP is nonsignificant, normal CBC.  Chest x-ray showed no acute cardiopulmonary process.  X-ray ankle of the right showed no acute fracture or traumatic malalignment.  EKG showed no significant ST-T wave changes concerning for ischemia.      Patient admitted for further evaluation and management of generalized weakness, right ankle sprain, COVID-19 infection.  On 23 she was moved to PCU after stroke RRT was called.  Patient had left facial droop and left sided weakness.    Interval Followup: 2023    • Had some nighttime confusion.  Required low-dose Haldol and safety sitter temporarily.  • Much improved today.  Sister, Sarah at bedside.  Patient is alert and interactive.  Very pleasant.  Asking about rehab options.  • Her left facial droop has improved.    • Her left upper extremity weakness has improved   • Her left lower extremity paresis, modestly improved /significant deficits still  • NSR + isolated PVCs on monitor  • Cr  stable.  0.7  • Glucose:: 120-230 range  • Was on room air.  Now on 2 L.   Will check chest x-ray          REVIEW OF SYSTEMS: All other systems reviewed and are negative.   • General weakness.  Shortness of air.  Left upper and lower extremity weakness.  Objective   Temp:  [97.4 °F (36.3 °C)-98.8 °F (37.1 °C)] 98.2 °F (36.8 °C)  Heart Rate:  [61-81] 61  Resp:  [16-18] 17  BP: (131-146)/(56-66) 140/60  Flow (L/min):  [2] 2  PHYSICAL EXAM   • CON: WN. WD. NAD.  Oriented.  Knows place year month president.  • EYES:  Sclera anicteric. EOMI. Normal conjunctiva.   • ENT:  Oropharyngeal mucosa without ulcers or thrush.    • NECK:  No thyromegaly. No stridor. Trachea midline.  • RESP:  CTA. No wheezes. No crackles.  No work of breathing or tachypnea.   • CV:  Rhythm regular. Rate WNL. No murmur noted.  No edema.  • GI:  Soft and nontender. Nondistended.  Bowel sounds present.   • EXT: Peripheral pulses intact.  No joint deformities or cyanosis.  • LYMPH:  No lymphedema noted.  No cervical lymphadenopathy.  • PSYCH:  Alert. Oriented. Normal affect and mood.  • NEURO:  Left facial droop.  Left upper and lower extremity weakness.  No  paresthesia.  • SKIN: No chronic venous stasis changes or varicosities.  No cellulitis    Results from last 7 days   Lab Units 05/21/23  0551 05/20/23  0457 05/19/23  0442 05/18/23  0510 05/17/23  0434 05/16/23  0626 05/15/23  0437   WBC 10*3/mm3 8.87 8.25 7.50 8.19 8.28 9.28 7.60   HEMOGLOBIN g/dL 12.7 13.0 12.9 13.8 13.4 13.3 12.7   HEMATOCRIT % 38.0 41.5 43.2 42.1 41.0 41.2 38.8   PLATELETS 10*3/mm3 208 169 191 216 212 233 226     Results from last 7 days   Lab Units 05/21/23  0551 05/20/23  0457 05/19/23  0442 05/18/23  0510 05/17/23  0434 05/16/23  0626 05/15/23  0437   SODIUM mmol/L 133* 132* 132* 133* 134* 138 135*   POTASSIUM mmol/L 3.9 4.5 4.6 4.5 4.1 4.5 4.6   CO2 mmol/L 23.3 20.4* 18.6* 20.0* 21.1* 27.8 25.7   CHLORIDE mmol/L 101 101 104 100 101 102 102   ANION GAP mmol/L 8.7 10.6 9.4  13.0 11.9 8.2 7.3   BUN mg/dL 15 17 23 35* 27* 27* 25*   CREATININE mg/dL 0.73 0.68 0.78 1.01* 0.76 0.75 0.80   GLUCOSE mg/dL 128* 180* 166* 166* 116* 177* 202*           RESULTS REVIEWED:  I have personally reviewed the results from the time of this admission to 5/21/2023 11:42 EDT and agree with these findings:  []  Laboratory  []  Microbiology  []  Radiology  []  EKG/Telemetry   []  Cardiology/Vascular   []  Pathology  []  Old records  []  Other:  Assessment / Plan   Assessment:    • Acute CVA in the right CHRISTINE territory  • Left-sided weakness and left facial droop  • History of prior CVA (right frontal, right basal ganglia)  • COVID infection  • DM  • HTN  • Dyslipidemia  • Ulcerative colitis  • CODE STATUS: DNR/DNI    Plan:  • CXR today  • Delirium precautions.  Trazodone at night.  • Continue PT, OT, Speech.  Continue Rehab precert/referral.  • Continue to hold Lisinopril (-140 / 60s)  • Avoid hypotension  • Continue PCU via stroke pathway  • MRI brain today .... Acute CVA noted  • CT head showed old right frontal and old right basal ganglia stroke  • Nebulizers as needed and scheduled  • Levemir and sliding scale insulin for glucose control  • Aspirin and Plavix x21 days.  Then continue with aspirin.  • Statin on board  • Lovenox for DVT prophylaxis  • PT/OT .... Rehab placement (lives in assisted living facility at baseline)  Discussed plan with RN.  DVT prophylaxis:  Medical and mechanical DVT prophylaxis orders are present.  CODE STATUS:      Medical Intervention Limits: NO intubation (DNI)  Level Of Support Discussed With: Patient  Code Status (Patient has no pulse and is not breathing): No CPR (Do Not Attempt to Resuscitate)  Medical Interventions (Patient has pulse or is breathing): Limited Support         Patient independently seen and evaluated, agree with assessment and plan, above documentation reflects plan put forth during bedside rounds.  More than 51% of the time of this patient encounter  was performed by me.     Interval history:     Some worsening confusion overnight, weakness is improving     Exam:  GEN: Drowsy  HEENT: Moist mucous membrane  LUNGS: Equal chest rise bilaterally  CARDIAC: Regular rate and rhythm  NEURO: Left upper extremity weakness, asymmetric face, continued left lower extremity weakness  SKIN: No obvious breakdown     Plan:  Agree with assessment and plan as above  MRI of the brain reviewed, consistent with new stroke  Continue stroke work-up and prevention  Strength continues to improve  Continue bronchodilators  Continue aspirin and statin  Continue basal bolus insulin for hyperglycemia  Continue Lovenox for DVT prophylaxis  Continue PT/OT, will need extensive rehab  CBC, CMP reviewed  Repeat CBC, CMP, mag and Phos in a.m.  Patient starting to get delirious, start delirium precautions  Trazodone added at night  Clinical course will dictate further management     Reviewed patients labs and imaging, and discussed with patient and nurse at bedside.      Electronically signed by Michael Barba MD, 05/21/23, 12:25 PM EDT.

## 2023-05-21 NOTE — PLAN OF CARE
Goal Outcome Evaluation:  No acute changes during this shift. VSS thus far. Call light is within reach of the patient.     Larry Colon RN

## 2023-05-21 NOTE — PLAN OF CARE
Goal Outcome Evaluation:      Pt sitter D/Paul this AM in order to get bed placement at [facility]. Pt had a difficult time at first without someone in the room to talk to and continually hit call bell. (10 times in 30 minutes.) RN went in and reassured patient that it is OK to be scared and lonely but that she needs to verbalize her feelings so we can help her appropriately. She then discussed her concerns and fears, including loss of dignity. RN advised patient that it is OK to feel however she feels, and that the patient should never feel judged by staff for expressing her fears, loneliness, or difficulty with loss of independence. Pt has been calm and cooperative ever since this conversation and sharing of feelings occurred.

## 2023-05-21 NOTE — THERAPY TREATMENT NOTE
Acute Care - Physical Therapy Progress Note  RUPESH Villalobos     Patient Name: Tita Jiménez  : 1935  MRN: [id number]  Today's Date: 2023      Visit Dx:     ICD-10-CM ICD-9-CM   1. Dehydration  E86.0 276.51   2. Generalized weakness  R53.1 780.79   3. Decreased activities of daily living (ADL)  Z78.9 V49.89   4. Difficulty in walking  R26.2 719.7   5. Oropharyngeal dysphagia  R13.12 787.22     Patient Active Problem List   Diagnosis   • Compression fracture of T1 -T2 vertebra (CMS/HCC)   • Fall   • Contusion of scalp   • Closed head injury   • Asthma   • Depression   • Reflux esophagitis   • Bladder incontinence   • Bowel incontinence   • High cholesterol   • Diabetes mellitus, type II (HCC)   • Hypertension   • Ulcerative colitis (HCC)   • GERD (gastroesophageal reflux disease)   • Benign essential tremor   • Carpal tunnel syndrome   • Ataxia   • Breast cancer screening   • Hypoxia   • Cytokine release syndrome, grade 1   • Pneumonia due to COVID-19 virus   • Weakness   • Cognitive impairment   • Difficulty walking   • At risk for venous thromboembolism (VTE)   • Lower abdominal pain   • 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 skin intact. D/T fall on 10/18/2019   • Depression    • Diabetes mellitus    • GERD (gastroesophageal reflux disease) 2021   • HTN (hypertension) 10/18/2019   • Hyperlipidemia    • Hypertension    • Mitral valve problem    • Polymyalgia rheumatica    • Stroke     short term memory loss   • Ulcerative colitis 2021   • Urinary incontinence      Past Surgical History:   Procedure Laterality Date   • ADENOIDECTOMY     • APPENDECTOMY     • BREAST SURGERY      ( L4-L5)   • COLON SURGERY      colon resection   • HYSTERECTOMY     • OOPHORECTOMY     • TONSILLECTOMY       PT  Assessment (last 12 hours)     PT Evaluation and Treatment     Row Name 05/21/23 1600          Physical Therapy Time and Intention    Subjective Information complains of;weakness  -CS     Document Type therapy note (daily note)  -CS     Mode of Treatment individual therapy;physical therapy  -CS     Patient Effort good  -CS     Symptoms Noted During/After Treatment fatigue  -     Row Name 05/21/23 1600          Pain    Pretreatment Pain Rating 0/10 - no pain  -CS     Posttreatment Pain Rating 0/10 - no pain  -     Row Name 05/21/23 1600          Bed Mobility    Supine-Sit [place] (Bed Mobility) verbal cues;moderate assist (50% patient effort);1 person assist  -CS     Sit-Supine [place] (Bed Mobility) verbal cues;maximum assist (25% patient effort);1 person assist  -CS     Bed Mobility, Safety Issues decreased use of legs for bridging/pushing;decreased use of arms for pushing/pulling  -     Assistive Device (Bed Mobility) bed rails;draw sheet;head of bed elevated  -     Row Name 05/21/23 1600          Motor Skills    Therapeutic Exercise shoulder;elbow/forearm;wrist;hand  -     Row Name 05/21/23 1600          Shoulder (Therapeutic Exercise)    Shoulder (Therapeutic Exercise) AAROM (active assistive range of motion)  -     Shoulder AAROM (Therapeutic Exercise) left;flexion;extension;aBduction;aDduction;external rotation;internal rotation;supine;10 repetitions  -     Row Name 05/21/23 1600          Elbow/Forearm (Therapeutic Exercise)    Elbow/Forearm (Therapeutic Exercise) AAROM (active assistive range of motion)  -     Elbow/Forearm AAROM (Therapeutic Exercise) left;flexion;extension;supination;pronation;supine;10 repetitions  -     Row Name 05/21/23 1600          Wrist (Therapeutic Exercise)    Wrist (Therapeutic Exercise) PROM (passive range of motion)  -     Wrist PROM (Therapeutic Exercise) left;flexion;extension;radial deviation;ulnar deviation;10 repetitions  -     Row Name  05/21/23 1600          Hand (Therapeutic Exercise)    Hand (Therapeutic Exercise) PROM (passive range of motion)  -     Hand PROM (Therapeutic Exercise) left;finger flexion;finger extension;thumb extension;thumb flexion;10 repetitions  -     Row Name 05/21/23 1600          Hip (Therapeutic Exercise)    Hip AROM (Therapeutic Exercise) right;flexion;extension;aBduction;aDduction;sitting;15 repititions  -CS     Hip AAROM (Therapeutic Exercise) left;flexion;extension;aBduction;aDduction;sitting;10 repetitions;5 repetitions  -     Row Name 05/21/23 1600          Knee (Therapeutic Exercise)    Knee AROM (Therapeutic Exercise) right;LAQ (long arc quad);sitting;15 repititions  -CS     Knee AAROM (Therapeutic Exercise) left;flexion;extension;sitting;10 repetitions;5 repetitions  -     Row Name 05/21/23 1600          Ankle (Therapeutic Exercise)    Ankle AROM (Therapeutic Exercise) right;dorsiflexion;plantarflexion;sitting;15 repititions  -CS     Ankle AAROM (Therapeutic Exercise) left;dorsiflexion;plantarflexion;sitting;10 repetitions;5 repetitions  -     Row Name 05/21/23 1600          Positioning and Restraints    Pre-Treatment Position in bed  -CS     Post Treatment Position bed  -CS     In Bed fowlers;call light within reach;encouraged to call for assist;exit alarm on;notified nsg  -     Row Name 05/21/23 1600          Progress Summary (PT)    Progress Toward Functional Goals (PT) progress toward functional goals is fair  -CS           User Key  (r) = Recorded By, (t) = Taken By, (c) = Cosigned By    Initials Name Provider Type    Allan Gayle PTA Physical Therapist Assistant                Physical Therapy Education     Title: PT OT SLP Therapies (Done)     Topic: Physical Therapy (Done)     Point: Mobility training (Done)     Learning Progress Summary           Patient Acceptance, E, VU by AC at 5/13/2023 1004                   Point: Home exercise program (Done)     Learning Progress Summary            Patient Acceptance, E, VU by  at 5/13/2023 1004                   Point: Body mechanics (Done)     Learning Progress Summary           Patient Acceptance, E, VU by  at 5/13/2023 1004                   Point: Precautions (Done)     Learning Progress Summary           Patient Acceptance, E, VU by  at 5/13/2023 1004                               User Key     Initials Effective Dates Name Provider Type Discipline     06/16/21 -  Celeste Anthony OT Occupational Therapist OT              PT Recommendation and Plan     Progress Summary (PT)  Progress Toward Functional Goals (PT): progress toward functional goals is fair   Outcome Measures     Row Name 05/21/23 1600 05/20/23 1500 05/19/23 5754       How much help from another person do you currently need...    Turning from your back to your side while in flat bed without using bedrails? 2  -CS 2  -RH 2  -DP (r) JF (t) DP (c)    Moving from lying on back to sitting on the side of a flat bed without bedrails? 2  -CS 2  -RH 2  -DP (r) JF (t) DP (c)    Moving to and from a bed to a chair (including a wheelchair)? 1  -CS 1  -RH 1  -DP (r) JF (t) DP (c)    Standing up from a chair using your arms (e.g., wheelchair, bedside chair)? 1  -CS 1  -RH 1  -DP (r) JF (t) DP (c)    Climbing 3-5 steps with a railing? 1  -CS 1  -RH 1  -DP (r) JF (t) DP (c)    To walk in hospital room? 1  -CS 1  -RH 1  -DP (r) JF (t) DP (c)    AM-PAC 6 Clicks Score (PT) 8  -CS 8  -RH 8  -DP (r) JF (t)       Functional Assessment    Outcome Measure Options AM-PAC 6 Clicks Basic Mobility (PT)  -CS -- AM-PAC 6 Clicks Basic Mobility (PT)  -DP (r) JF (t) DP (c)          User Key  (r) = Recorded By, (t) = Taken By, (c) = Cosigned By    Initials Name Provider Type     Elian Guevara, PTA Physical Therapist Assistant    Luis Alberto Lombardo, PT Physical Therapist    CS Allan Sung, MANAV Physical Therapist Assistant    JF Juwan Dave, PT Student PT Student                 Time Calculation:    PT  Charges     Row Name 05/21/23 1624             Time Calculation    Start Time 1519  -CS      PT Received On 05/21/23  -CS         Timed Charges    00271 - PT Therapeutic Exercise Minutes 28  -CS      51095 -  PT Neuromuscular Reeducation Minutes 8  -CS      76069 - PT Therapeutic Activity Minutes 4  -CS         Total Minutes    Timed Charges Total Minutes 40  -CS       Total Minutes 40  -CS            User Key  (r) = Recorded By, (t) = Taken By, (c) = Cosigned By    Initials Name Provider Type    CS Allan Sung PTA Physical Therapist Assistant              Therapy Charges for Today     Code Description Service Date Service Provider Modifiers Qty    90647802698 HC PT NEUROMUSC RE EDUCATION EA 15 MIN 5/21/2023 Allan Sung PTA GP 1    04016374198 HC PT THER PROC EA 15 MIN 5/21/2023 Allan Sung PTA GP 2          PT G-Codes  Outcome Measure Options: AM-PAC 6 Clicks Basic Mobility (PT)  AM-PAC 6 Clicks Score (PT): 8  AM-PAC 6 Clicks Score (OT): 14    Allan Sung PTA  5/21/2023

## 2023-05-22 LAB
ANION GAP SERPL CALCULATED.3IONS-SCNC: 8.1 MMOL/L (ref 5–15)
BASOPHILS # BLD AUTO: 0.03 10*3/MM3 (ref 0–0.2)
BASOPHILS NFR BLD AUTO: 0.5 % (ref 0–1.5)
BUN SERPL-MCNC: 17 MG/DL (ref 8–23)
BUN/CREAT SERPL: 21.3 (ref 7–25)
CALCIUM SPEC-SCNC: 9.5 MG/DL (ref 8.6–10.5)
CHLORIDE SERPL-SCNC: 101 MMOL/L (ref 98–107)
CO2 SERPL-SCNC: 24.9 MMOL/L (ref 22–29)
CREAT SERPL-MCNC: 0.8 MG/DL (ref 0.57–1)
DEPRECATED RDW RBC AUTO: 44.3 FL (ref 37–54)
EGFRCR SERPLBLD CKD-EPI 2021: 71.4 ML/MIN/1.73
EOSINOPHIL # BLD AUTO: 0.15 10*3/MM3 (ref 0–0.4)
EOSINOPHIL NFR BLD AUTO: 2.3 % (ref 0.3–6.2)
ERYTHROCYTE [DISTWIDTH] IN BLOOD BY AUTOMATED COUNT: 14.7 % (ref 12.3–15.4)
GLUCOSE BLDC GLUCOMTR-MCNC: 113 MG/DL (ref 70–99)
GLUCOSE BLDC GLUCOMTR-MCNC: 142 MG/DL (ref 70–99)
GLUCOSE BLDC GLUCOMTR-MCNC: 147 MG/DL (ref 70–99)
GLUCOSE BLDC GLUCOMTR-MCNC: 173 MG/DL (ref 70–99)
GLUCOSE SERPL-MCNC: 158 MG/DL (ref 65–99)
HCT VFR BLD AUTO: 38.5 % (ref 34–46.6)
HGB BLD-MCNC: 12.5 G/DL (ref 12–15.9)
IMM GRANULOCYTES # BLD AUTO: 0.04 10*3/MM3 (ref 0–0.05)
IMM GRANULOCYTES NFR BLD AUTO: 0.6 % (ref 0–0.5)
LYMPHOCYTES # BLD AUTO: 1.19 10*3/MM3 (ref 0.7–3.1)
LYMPHOCYTES NFR BLD AUTO: 18.3 % (ref 19.6–45.3)
MAGNESIUM SERPL-MCNC: 2 MG/DL (ref 1.6–2.4)
MCH RBC QN AUTO: 27 PG (ref 26.6–33)
MCHC RBC AUTO-ENTMCNC: 32.5 G/DL (ref 31.5–35.7)
MCV RBC AUTO: 83.2 FL (ref 79–97)
MONOCYTES # BLD AUTO: 0.94 10*3/MM3 (ref 0.1–0.9)
MONOCYTES NFR BLD AUTO: 14.5 % (ref 5–12)
NEUTROPHILS NFR BLD AUTO: 4.14 10*3/MM3 (ref 1.7–7)
NEUTROPHILS NFR BLD AUTO: 63.8 % (ref 42.7–76)
NRBC BLD AUTO-RTO: 0 /100 WBC (ref 0–0.2)
PLATELET # BLD AUTO: 184 10*3/MM3 (ref 140–450)
PMV BLD AUTO: 9.7 FL (ref 6–12)
POTASSIUM SERPL-SCNC: 4.1 MMOL/L (ref 3.5–5.2)
RBC # BLD AUTO: 4.63 10*6/MM3 (ref 3.77–5.28)
SODIUM SERPL-SCNC: 134 MMOL/L (ref 136–145)
WBC NRBC COR # BLD: 6.49 10*3/MM3 (ref 3.4–10.8)

## 2023-05-22 PROCEDURE — 82948 REAGENT STRIP/BLOOD GLUCOSE: CPT

## 2023-05-22 PROCEDURE — 94799 UNLISTED PULMONARY SVC/PX: CPT

## 2023-05-22 PROCEDURE — 63710000001 INSULIN DETEMIR PER 5 UNITS: Performed by: INTERNAL MEDICINE

## 2023-05-22 PROCEDURE — 99232 SBSQ HOSP IP/OBS MODERATE 35: CPT | Performed by: INTERNAL MEDICINE

## 2023-05-22 PROCEDURE — 97530 THERAPEUTIC ACTIVITIES: CPT

## 2023-05-22 PROCEDURE — 83735 ASSAY OF MAGNESIUM: CPT | Performed by: PHYSICIAN ASSISTANT

## 2023-05-22 PROCEDURE — 25010000002 ONDANSETRON PER 1 MG: Performed by: PHYSICIAN ASSISTANT

## 2023-05-22 PROCEDURE — 80048 BASIC METABOLIC PNL TOTAL CA: CPT | Performed by: PHYSICIAN ASSISTANT

## 2023-05-22 PROCEDURE — 25010000002 ENOXAPARIN PER 10 MG: Performed by: INTERNAL MEDICINE

## 2023-05-22 PROCEDURE — 94664 DEMO&/EVAL PT USE INHALER: CPT

## 2023-05-22 PROCEDURE — 85025 COMPLETE CBC W/AUTO DIFF WBC: CPT | Performed by: PHYSICIAN ASSISTANT

## 2023-05-22 RX ADMIN — TRAZODONE HYDROCHLORIDE 25 MG: 50 TABLET ORAL at 21:35

## 2023-05-22 RX ADMIN — ARFORMOTEROL TARTRATE 15 MCG: 15 SOLUTION RESPIRATORY (INHALATION) at 18:19

## 2023-05-22 RX ADMIN — ENOXAPARIN SODIUM 40 MG: 100 INJECTION SUBCUTANEOUS at 09:42

## 2023-05-22 RX ADMIN — ONDANSETRON 4 MG: 2 INJECTION INTRAMUSCULAR; INTRAVENOUS at 12:10

## 2023-05-22 RX ADMIN — Medication 10 ML: at 21:35

## 2023-05-22 RX ADMIN — FLUTICASONE PROPIONATE 2 PUFF: 110 AEROSOL, METERED RESPIRATORY (INHALATION) at 18:19

## 2023-05-22 RX ADMIN — METOPROLOL SUCCINATE 25 MG: 25 TABLET, EXTENDED RELEASE ORAL at 09:43

## 2023-05-22 RX ADMIN — FLUTICASONE PROPIONATE 2 PUFF: 110 AEROSOL, METERED RESPIRATORY (INHALATION) at 07:23

## 2023-05-22 RX ADMIN — MONTELUKAST 10 MG: 10 TABLET, FILM COATED ORAL at 21:35

## 2023-05-22 RX ADMIN — ASPIRIN 81 MG: 81 TABLET, COATED ORAL at 09:43

## 2023-05-22 RX ADMIN — INSULIN DETEMIR 20 UNITS: 100 INJECTION, SOLUTION SUBCUTANEOUS at 09:42

## 2023-05-22 RX ADMIN — SERTRALINE HYDROCHLORIDE 50 MG: 50 TABLET ORAL at 09:42

## 2023-05-22 RX ADMIN — ARFORMOTEROL TARTRATE 15 MCG: 15 SOLUTION RESPIRATORY (INHALATION) at 07:23

## 2023-05-22 RX ADMIN — CLOPIDOGREL BISULFATE 75 MG: 75 TABLET ORAL at 09:43

## 2023-05-22 RX ADMIN — FAMOTIDINE 10 MG: 10 TABLET ORAL at 09:42

## 2023-05-22 RX ADMIN — Medication 10 ML: at 09:44

## 2023-05-22 RX ADMIN — DILTIAZEM HYDROCHLORIDE 240 MG: 240 CAPSULE, COATED, EXTENDED RELEASE ORAL at 09:42

## 2023-05-22 RX ADMIN — INSULIN DETEMIR 20 UNITS: 100 INJECTION, SOLUTION SUBCUTANEOUS at 21:34

## 2023-05-22 RX ADMIN — NYSTATIN: 100000 POWDER TOPICAL at 09:44

## 2023-05-22 RX ADMIN — ATORVASTATIN CALCIUM 20 MG: 20 TABLET, FILM COATED ORAL at 21:35

## 2023-05-22 RX ADMIN — NYSTATIN: 100000 POWDER TOPICAL at 21:36

## 2023-05-22 RX ADMIN — HYDROXYZINE PAMOATE 25 MG: 25 CAPSULE ORAL at 23:08

## 2023-05-22 NOTE — PLAN OF CARE
Goal Outcome Evaluation:  Plan of Care Reviewed With: patient        Progress: no change     VSS. Pt has rested this shift. N/V earlier in shift, zofran helped to relieve. Plans for [facility] or [facility] for rehab. Will continue to monitor

## 2023-05-22 NOTE — DISCHARGE SUMMARY
[facility]  HOSPITALIST  DISCHARGE SUMMARY       Patient Name: Tita Jiménez  : 1935  MRN: [id number]  Primary Care Physician: Madison Ortiz APRN    Date of Admission: 2023  Date of Discharge: 2023    Discharge Diagnoses   • Acute CVA in the right CHRISTINE territory (medial aspect of the right frontal and parietal lobes)    • Left-sided weakness and left facial droop  • History of prior CVA (right frontal, right basal ganglia)  • COVID infection  • DM  • HTN  • Dyslipidemia  • Stable focal stenosis of the distal right vertebral artery and left posterior cerebral artery.  New concentric high-grade stenosis of the distal 7 mm of the left vertebral artery  • Ulcerative colitis  • CODE STATUS: DNR/DNI  Hospital Course   Hospital Course:  Tita Jiménez is a pleasant 88 y.o. female with a past medical history of type 2 diabetes mellitus, hypertension, hyperlipidemia, ulcerative colitis,.  She initially presented to the ED for evaluation of shortness of breath and cough.  She had been recently diagnosed with COVID infection.  Since the COVID infection, she had been becoming weak and unable to get out of bed on her own.  (She lives at [facility]).      In the ED, T 97.8, pulse 96, respirate rate 18, /65 on 2 L nc w 93% sats.  Labs showed troponin 17, proBNP 160, normal Cr, rest of the CMP is nonsignificant, and normal CBC.  Chest x-ray showed no acute cardiopulmonary process.  X-ray ankle of the right showed no acute fracture or traumatic malalignment.  EKG showed no significant ST-T wave changes concerning for ischemia.       She was admitted for further evaluation and management of generalized weakness, immobility, right ankle sprain, and COVID-19 infection.  On 23 she was moved to PCU after stroke RRT was called.  Patient had left facial droop and left sided weakness. Follow up MRI confirmed acute CVA in R CHRISTINE territory.  She remained in PCU via  "stroke protocol.  No atrial fib on monitoring.  CTA showed patent CHRISTINE, carotids.  Also found Stable focal stenosis of the distal right vertebral artery and left posterior cerebral artery w new concentric high-grade stenosis of the distal 7 mm of the left vertebral artery  Continued on ASA/Plavix/Statin with speech/PT/OT.  Over time, she had improvement in L facial droop and L upper ext paresis/ataxia.  Her LLE paresis persisting though.  She is needing more rehab and is being considered at several local facilities.    Discharge Follow Up / Recommendations (labs, diagnostics, meds, etc):   • Continue ASA/Plavix x 21days, then Plavix thereafter  • PCP after rehab  • Home Lisinopril on hold still  • Return to assisted living ([facility]) pending functional status  • Trial of Trazodone for insomnia  Future Appointments   Date Time Provider Department Center   7/13/2023 10:00 AM [facility] OP EEG MACHINE 1 [facility] NEURO JORDYN     Consultants     Consults     Date and Time Order Name Status Description    5/13/2023 12:48 AM Inpatient Hospitalist Consult          On Day of Discharge   VS: /58 (BP Location: Left arm, Patient Position: Lying)   Pulse 99   Temp 97.2 °F (36.2 °C) (Oral)   Resp 18   Ht 162.6 cm (64\")   Wt 82.3 kg (181 lb 7 oz)   SpO2 92%   BMI 31.14 kg/m²      EXAM:  (refer to progress note from 5/23/2023)     Discharge Medications      New Medications      Instructions Start Date   clopidogrel 75 MG tablet  Commonly known as: PLAVIX   75 mg, Oral, Daily, After your stroke take both aspirin and Plavix daily for 21 days; then just continue with Plavix 75 mg daily thereafter.      melatonin 5 MG tablet tablet   5 mg, Oral, Nightly      traZODone 50 MG tablet  Commonly known as: DESYREL   25 mg, Oral, Nightly         Changes to Medications      Instructions Start Date   aspirin 81 MG EC tablet  What changed: additional instructions   81 mg, Oral, Daily, After your stroke, take both aspirin and Plavix daily for " 21 days; then just continue with Plavix 75 mg daily thereafter.         Continue These Medications      Instructions Start Date   albuterol sulfate  (90 Base) MCG/ACT inhaler  Commonly known as: PROVENTIL HFA;VENTOLIN HFA;PROAIR HFA   2 puffs, Inhalation, Every 4 Hours PRN, PRN      atorvastatin 20 MG tablet  Commonly known as: LIPITOR   20 mg, Oral, Every Night at Bedtime      calcium carb-cholecalciferol 600-800 MG-UNIT tablet   1 tablet, Oral, Daily      dilTIAZem  MG 24 hr capsule  Commonly known as: CARDIZEM CD   240 mg, Oral, Every Night at Bedtime      famotidine 10 MG tablet  Commonly known as: PEPCID   10 mg, Oral, Every Morning Before Breakfast      Farxiga 10 MG tablet  Generic drug: dapagliflozin Propanediol   10 mg, Oral, Daily      Januvia 50 MG tablet  Generic drug: SITagliptin   50 mg, Oral, Every Night at Bedtime      Lantus SoloStar 100 UNIT/ML injection pen  Generic drug: Insulin Glargine   28-30 Units, Subcutaneous, 2 Times Daily      memantine 10 MG tablet  Commonly known as: Namenda   10 mg, Oral, 2 Times Daily      metoprolol succinate XL 25 MG 24 hr tablet  Commonly known as: TOPROL-XL   25 mg, Oral, Daily      mometasone-formoterol 200-5 MCG/ACT inhaler  Commonly known as: DULERA 200   2 puffs, Inhalation, 2 Times Daily      montelukast 10 MG tablet  Commonly known as: SINGULAIR   10 mg, Oral, Nightly      polycarbophil 625 MG tablet tablet   625 mg, Oral, 2 Times Daily      sertraline 50 MG tablet  Commonly known as: ZOLOFT   1 tablet, Oral, Daily      Womens 50+ Multi Vitamin/Min tablet tablet   1 tablet, Oral, Daily         Stop These Medications    lisinopril 20 MG tablet  Commonly known as: PRINIVIL,ZESTRIL     pantoprazole 40 MG EC tablet  Commonly known as: PROTONIX          Procedures     Imaging     XR Ankle 3+ View Right    Result Date: 5/12/2023  PROCEDURE: XR ANKLE 3+ VW RIGHT  COMPARISON: None  INDICATIONS: FALL. RIGHT ANKLE PAIN  FINDINGS:  BONES: No acute fracture  is identified.  No focal osseous abnormality is identified.  There is a small enthesophyte along the inferior calcaneus. SOFT TISSUES: Negative.  No visible soft tissue swelling.  EFFUSION: None visible.  OTHER: Negative.        No acute fracture or traumatic malalignment identified.      SARAH ARTHUR MD       Electronically Signed and Approved By: SARAH ARTHUR MD on 5/12/2023 at 20:46             CT Head Without Contrast    Result Date: 5/21/2023  PROCEDURE: CT HEAD WO CONTRAST  COMPARISONS: 5/18/2023; 5/17/2023; 2/24/2022.  INDICATIONS: Altered mental status (AMS).  PROTOCOL:   Standard CT imaging protocol performed.    RADIATION:   Total DLP: 1,018.2 mGy*cm.   MA and/or KV were/was adjusted to minimize radiation dose.    TECHNIQUE: After obtaining the patient's consent, 130 CT images were obtained without non-ionic intravenous contrast material.  DISCUSSION:  A routine nonenhanced head CT was performed.  No acute brain abnormality is identified.  No acute intracranial hemorrhage.  No definite acute infarction.  No acute skull fracture.  No midline shift or acute intracranial mass effect is seen.  Severe chronic small vessel ischemia/infarction is suspected.  There are arterial calcifications. The extra-axial spaces and the ventricular system are prominent, suggesting central atrophy.  Encephalomalacia is seen, especially within the high anterior right frontal lobe, and suggests an old infarct(s).  Other areas of encephalomalacia may be present elsewhere.  The patient has undergone bilateral cataract extractions with intra-ocular lens implants.  Age-indeterminate congestive and/or inflammatory changes involve the bilateral middle ear clefts and may be chronic in nature.  Degenerative changes involve the temporomandibular joints and the atlantoaxial joint.  Mild age-indeterminate mucosal thickening involves the imaged paranasal sinuses.  No air-fluid interfaces are seen within the imaged paranasal sinuses.   Chronic nasal septal deviation is identified.        No acute brain abnormality is seen.  No acute intracranial hemorrhage.  No midline shift or acute intracranial herniation syndrome.  No definite acute infarct is appreciated by nonenhanced head CT.  No acute skull fracture.   Please note that portions of this note were completed with a voice recognition program.  KLEBER DELVALLE JR, MD       Electronically Signed and Approved By: KLEBER DELVALLE JR, MD on 5/21/2023 at 1:26             CT Head Without Contrast    Result Date: 5/18/2023  PROCEDURE: CT HEAD WO CONTRAST  COMPARISON:  [facility], CT, CT HEAD WO CONTRAST, 5/16/2023, 12:29. INDICATIONS: STROKE FOLLOW UP  PROTOCOL:   Standard imaging protocol performed    RADIATION:   DLP: 909 mGy*cm   MA and/or KV was adjusted to minimize radiation dose.     TECHNIQUE: After obtaining the patient's consent, CT images were obtained without non-ionic intravenous contrast material.  FINDINGS:  There is chronic volume loss with enlargement of the sulci, fissures, ventricles, and basal cisterns consistent with age-appropriate atrophy.  There is encephalomalacia in the right frontal lobe from a prior infarct.  There are areas of decreased density throughout the subcortical and periventricular white matter felt to represent chronic microvascular ischemia.  There are old lacunar infarcts within and adjacent to the basal ganglia on the right side.  There is no acute hemorrhage, midline shift, or suspicious extra-axial fluid collections.  There are atherosclerotic vascular calcifications within the vertebral arteries, basilar artery, and cavernous carotid arteries.  There is thinning of the lenses suggestive of previous cataract surgery.  The visualized paranasal and mastoid sinuses are clear.  The calvarium is unremarkable.        1. Chronic volume loss secondary to cerebral atrophy. 2. Chronic ischemic changes. 3. No acute findings.     GAGANDEEP GABRIEL MD        Electronically Signed and Approved By: GAGANDEEP GABRIEL MD on 5/18/2023 at 19:19             CT Head Without Contrast    Result Date: 5/16/2023  PROCEDURE: CT HEAD WO CONTRAST  COMPARISON:  [facility], CT, CT HEAD WO CONTRAST, 2/07/2023, 18:23. INDICATIONS: Neuro deficit, acute, stroke suspected; Left side weakness  PROTOCOL:   Standard imaging protocol performed    RADIATION:   DLP: 850 mGy*cm   MA and/or KV was adjusted to minimize radiation dose.     TECHNIQUE: After obtaining the patient's consent, CT images were obtained without non-ionic intravenous contrast material.  FINDINGS:  There is a chronic right frontal lobe infarct.  There is extensive white matter hypoattenuation.  There is a chronic lacunar-type infarct in the right lentiform nucleus.  There is generalized parenchymal volume loss.  No new hypoattenuating lesions in the brain.  No mass effect or midline shift.  No acute intracranial hemorrhage.  No abnormal extra-axial collection.  There is minor right mastoid effusion.  The left mastoid and the paranasal sinuses appear well-aerated.  The calvarium is intact.  Superficial soft tissues are unremarkable.        1. No acute intracranial abnormality. 2. Chronic right frontal lobe infarct and chronic infarct in the right basal ganglia. 3. Advanced chronic small vessel ischemic change. 4. Right mastoid effusion.     TAYA DUBOSE MD       Electronically Signed and Approved By: TAYA DUBOSE MD on 5/16/2023 at 13:11             MRI Brain Without Contrast    Result Date: 5/17/2023  PROCEDURE: MRI BRAIN WO CONTRAST  COMPARISON: [facility], , MRI BRAIN WO CONTRAST, 2/24/2022, 11:31.  INDICATIONS: EVALAUTE FOR STROKE, WEAKNESS, COVID POSITIVE      TECHNIQUE: A variety of imaging planes and parameters were utilized for visualization of suspected pathology.  Images were performed without contrast.  FINDINGS:  Several of the pulse sequences are motion degraded.  There are  restricted areas of diffusion in the medial aspect of the right frontal and parietal lobes.  There is also involvement of the anterior aspect of the corpus callosum on the right side.  The signal abnormalities are hypointense on the ADC map which is compatible with an acute right CHRISTINE distribution cerebral infarct.  The largest area of restricted diffusion measures approximately 7 mm in diameter.  There is no associated hemorrhage.  The patient has chronic volume loss with enlargement of the sulci, fissures, ventricles, and basal cisterns.  There are abnormal signal changes within the periventricular and subcortical white matter consistent with a combination of chronic microvascular ischemia and old lacunar infarcts.  There is encephalomalacia and both superior frontal lobes, right greater than left side secondary to previous cortical/subcortical infarcts.  There are also old lacunar infarcts within the right basal ganglia region.  There are normal signal voids within the intracranial arteries and the major dural sinuses.  There is fluid/mucosal thickening in the mastoid sinuses bilaterally.  There is mild mucosal thickening in the paranasal sinuses.  This would indicate chronic sinus disease.  There is thinning of the lenses suggestive of previous cataract surgery.        1. Acute right CHRISTINE distribution cerebral infarct as described. 2. Volume loss secondary to cerebral atrophy. 3. Chronic ischemic changes.  4. Note is made that several of the pulse sequences are motion degraded.      GAGANDEEP GABRIEL MD       Electronically Signed and Approved By: GAGANDEEP GABRIEL MD on 5/17/2023 at 15:01             XR Chest 1 View    Result Date: 5/12/2023  PROCEDURE: XR CHEST 1 VW  COMPARISON: [facility], , XR CHEST 1 VW, 2/07/2023, 15:39.  INDICATIONS: Asthma attack.  FINDINGS:  LUNGS: Normal.  No significant pulmonary parenchymal abnormalities.  VASCULATURE: Normal.  Unremarkable pulmonary vasculature.  CARDIAC: Normal.   No cardiac silhouette abnormality or cardiomegaly.  MEDIASTINUM: Normal.  No visible mass or adenopathy.  PLEURA: Normal.  No effusion or pleural thickening.  BONES: Normal.  No fracture or visible bony lesion.  OTHER: Negative.        No acute cardiopulmonary process identified.       SARAH ARTHUR MD       Electronically Signed and Approved By: SARAH ARTHUR MD on 5/12/2023 at 19:34             CT Angiogram Carotids    Result Date: 5/16/2023  PROCEDURE: CT ANGIOGRAM CAROTIDS  COMPARISON: [facility], CT, CTA HEAD AND NECK WITH CONTRAST  STROKE PROTOCOL, 12/14/2018, 19:38.  [facility], CT, CT HEAD WO CONTRAST, 5/16/2023, 12:29.  INDICATIONS: Neuro deficit, acute, stroke suspected; left side weakness  PROTOCOL:   Standard imaging protocol performed    RADIATION:   DLP: 1049 mGy*cm   Automated exposure control was utilized to minimize radiation dose. CONTRAST: 100 cc Isovue 370 I.V.  TECHNIQUE: After obtaining the patient's consent, CT images of the head were obtained with non-ionic contrast, and multi-planar/3-D imaging were created and interpreted to optimize visualization of vascular anatomy. Unless otherwise stated in this report, all vascular stenoses involving the internal carotid arteries reported for this examination are derived by dividing the lesion diameter by the diameter of the normal internal carotid artery more distally.  FINDINGS:   The aortic arch has a normal branching pattern.  The arch vessels are patent.  The common carotid arteries, internal carotid arteries and external carotid arteries are patent.  No evidence of high-grade stenosis, dissection or aneurysm.  There is atherosclerotic plaque at the carotid bifurcations.  The vertebral arteries are patent.  The lung apices are clear.  Degenerative changes are present in the cervical spine.  IMPRESSION: No evidence of hemodynamically significant internal carotid artery stenosis.     NUBIA ANDREWS MD        Electronically Signed and Approved By: NUBIA ANDREWS MD on 5/16/2023 at 13:33             XR Chest PA & Lateral    Result Date: 5/21/2023  PROCEDURE: XR CHEST PA AND LATERAL  COMPARISON: [facility], CR, XR CHEST 1 VW, 5/12/2023, 19:24.  INDICATIONS: hypoxia  FINDINGS:   There is mild enlargement of the cardiac silhouette.  There is patchy airspace opacity throughout the lung fields.  No evidence of pneumothorax or large pleural effusion.  IMPRESSION: Patchy airspace opacity throughout the lung fields may be secondary to pulmonary edema or multifocal atelectasis/pneumonia.   NUBIA ANDREWS MD       Electronically Signed and Approved By: NUBIA ANDREWS MD on 5/21/2023 at 12:42             CT Angiogram Head w AI Analysis of LVO    Result Date: 5/16/2023  PROCEDURE: CT ANGIOGRAM HEAD W AI ANALYSIS OF LVO  COMPARISON: [facility], CT, CT ANGIOGRAM CAROTIDS, 5/16/2023, 12:38.  [facility], CT, CTA HEAD AND NECK WITH CONTRAST  STROKE PROTOCOL, 12/14/2018, 19:38.  [facility], CT, CT HEAD WO CONTRAST, 5/16/2023, 12:29.  INDICATIONS: weakness left side  PROTOCOL:   Standard imaging protocol performed    RADIATION:   DLP: 1049 mGy*cm   Automated exposure control was utilized to minimize radiation dose. CONTRAST: 100 cc Isovue 370 I.V. RAPID: CTA imaging was analyzed using RAPID AI to enable computer assisted triage notification to rapidly detect a large vessel occlusion (LVO) and shorten notification time  TECHNIQUE: After obtaining the patient's consent, CT images of the head were obtained without and with non-ionic contrast, and multi-planar/3-D imaging were created and interpreted to optimize visualization of vascular anatomy.   FINDINGS:   Atherosclerotic calcification is present.  The intracranial portion of the internal carotid arteries, major MCA and CHRISTINE branches are patent.  The anterior cerebral arteries have a common origin on the right.  No evidence of  large vessel occlusion, high-grade stenosis, dissection or aneurysm.  There are areas of focal stenosis in the left posterior cerebral artery.  The right posterior cerebral artery is patent.  The basilar artery is patent.  Stable focal stenosis of the distal right vertebral artery.  There is new concentric high-grade stenosis of the distal 7 mm of the left vertebral artery.  IMPRESSION: Stable focal stenosis of the distal right vertebral artery and left posterior cerebral artery.  New concentric high-grade stenosis of the distal 7 mm of the left vertebral artery.   NUBIA ANDREWS MD       Electronically Signed and Approved By: NUBIA ANDREWS MD on 5/16/2023 at 13:40             Discharge Details   Hospital Diet:     Diet Order   Procedures   • Diet: Regular/House Diet; No Mixed Consistencies; Texture: Mechanical Ground (NDD 2); Fluid Consistency: Nectar Thick     CODE STATUS:    Code Status and Medical Interventions:   Ordered at: 05/15/23 1546     Medical Intervention Limits:    NO intubation (DNI)     Level Of Support Discussed With:    Patient     Code Status (Patient has no pulse and is not breathing):    No CPR (Do Not Attempt to Resuscitate)     Medical Interventions (Patient has pulse or is breathing):    Limited Support     Additional Instructions for the Follow-ups that You Need to Schedule     Discharge Follow-up with PCP   As directed       Currently Documented PCP:    Madison Ortiz APRN    PCP Phone Number:    [phone]     Follow Up Details: After rehab             Discharge Disposition: Rehab Facility or Unit (DC - External)  Pertinent  Labs   LAB RESULTS:      Lab 05/22/23  0417 05/21/23  0551 05/20/23  0457 05/19/23  0442 05/18/23  0510   WBC 6.49 8.87 8.25 7.50 8.19   HEMOGLOBIN 12.5 12.7 13.0 12.9 13.8   HEMATOCRIT 38.5 38.0 41.5 43.2 42.1   PLATELETS 184 208 169 191 216   NEUTROS ABS 4.14 6.01 5.72 4.70 4.97   IMMATURE GRANS (ABS) 0.04 0.10* 0.12* 0.22* 0.30*   LYMPHS ABS 1.19  1.40 1.28 1.54 1.84   MONOS ABS 0.94* 1.22* 1.01* 0.85 0.87   EOS ABS 0.15 0.11 0.09 0.13 0.16   MCV 83.2 81.9 85.6 89.4 82.5         Lab 05/22/23  0417 05/21/23  0551 05/20/23  0457 05/19/23  0442 05/18/23  0510 05/17/23  0434   SODIUM 134* 133* 132* 132* 133* 134*   POTASSIUM 4.1 3.9 4.5 4.6 4.5 4.1   CHLORIDE 101 101 101 104 100 101   CO2 24.9 23.3 20.4* 18.6* 20.0* 21.1*   ANION GAP 8.1 8.7 10.6 9.4 13.0 11.9   BUN 17 15 17 23 35* 27*   CREATININE 0.80 0.73 0.68 0.78 1.01* 0.76   EGFR 71.4 79.7 84.4 73.6 54.0* 75.9   GLUCOSE 158* 128* 180* 166* 166* 116*   CALCIUM 9.5 9.4 9.2 9.1 9.5 9.4   MAGNESIUM 2.0 1.9 2.0 2.1 2.2 2.1   PHOSPHORUS  --  2.4* 2.6 2.8 3.9 3.1         Lab 05/21/23  0551 05/20/23  0457 05/19/23  0442 05/18/23  0510 05/17/23  0434   TOTAL PROTEIN 5.7* 5.5* 5.5* 5.6* 5.7*   ALBUMIN 2.9* 2.7* 2.7* 3.0* 3.2*   GLOBULIN 2.8 2.8 2.8 2.6 2.5   ALT (SGPT) 18 19 20 23 24   AST (SGOT) 10 10 15 18 19   BILIRUBIN 0.7 0.7 0.5 0.3 0.3   ALK PHOS 92 89 93 90 88         Lab 05/21/23  0551   PROBNP 69.5         Lab 05/23/23  0508   CHOLESTEROL 182   LDL CHOL 123*   HDL CHOL 35*   TRIGLYCERIDES 130             Brief Urine Lab Results  (Last result in the past 365 days)      Color   Clarity   Blood   Leuk Est   Nitrite   Protein   CREAT   Urine HCG        05/20/23 2159 Yellow   Cloudy   Negative   Negative   Negative   Negative               Microbiology Results (last 10 days)     Procedure Component Value - Date/Time    COVID-19,APTIMA PANTHER(LEONIDES),[facility]/ [facility], NP/OP SWAB IN UTM/VTM/SALINE TRANSPORT MEDIA,24 HR TAT - Swab, Nasal Cavity [[id number]]  (Abnormal) Collected: 05/17/23 0356    Lab Status: Final result Specimen: Swab from Nasal Cavity Updated: 05/17/23 1404     COVID19 Detected    Narrative:      Fact sheet for providers: https://www.fda.gov/media/154852/download     Fact sheet for patients: https://www.fda.gov/media/788347/download    Test performed by RT PCR.        Labs Pending at Discharge:   Time  spent on Discharge including face to face service: > 30 minutes  Electronically signed by ROSARIO Zhu, 05/22/23, 12:59 PM EDT.       Patient independently seen and evaluated, agree with assessment and plan, above documentation reflects plan put forth during bedside rounds.  More than 51% of the time of this patient encounter was performed by me.    Briefly patient is a 88-year-old female admitted to the hospital worsening shortness of breath, COVID-19 infection, patient stay was complicated by acute ischemic stroke in the right CHRISTINE territory, patient was optimized medically, her symptoms were improving, she did have residual left upper extremity and left lower extremity weakness.  Patient was discharged to rehab today in stable condition.    Exam:  GEN: Drowsy  HEENT: Moist mucous membrane  LUNGS: Equal chest rise bilaterally  CARDIAC: Regular rate and rhythm  NEURO: Left upper extremity weakness, asymmetric face, continued left lower extremity weakness  SKIN: No obvious breakdown       Electronically signed by Michael Barba MD, 05/23/23, 12:11 PM EDT.

## 2023-05-22 NOTE — PROGRESS NOTES
[facility]   Hospitalist Progress Note       Patient Name: Tita Jiménez  : 1935  MRN: [id number]  Primary Care Physician: Madison Ortiz APRN  Date of admission: 2023  Today's Date: 2023  Room / Bed:   219/2  Subjective   Chief Complaint: SOA.  Weakness.    Summary:  Tita Jiménez is a 88 y.o. female  with a past medical history of type 2 diabetes mellitus, hypertension, hyperlipidemia, ulcerative colitis, presented to the ED for evaluation of shortness of breath, cough.  Patient has been recently diagnosed with COVID infection on Monday.  Since the COVID infection, patient has been progressively becoming weak and now she is not able to get up out of bed in chair by herself.    Upon arrival to the ED, vital signs temperature 97.8, pulse 96, respirate rate 18, blood pressure 124/65 on 2 L of nasal cannula saturating around 93%.  Labs showed troponin 17, proBNP 160.4, sodium 135, bicarb 18.3, normal creatinine, albumin 3.4, rest of the CMP is nonsignificant, normal CBC.  Chest x-ray showed no acute cardiopulmonary process.  X-ray ankle of the right showed no acute fracture or traumatic malalignment.  EKG showed no significant ST-T wave changes concerning for ischemia.      Patient admitted for further evaluation and management of generalized weakness, right ankle sprain, COVID-19 infection.  On 23 she was moved to PCU after stroke RRT was called.  Patient had left facial droop and left sided weakness.    Interval Followup: 2023    • Patient is alert and interactive.  Very pleasant.  Asking about rehab options.  • Her left facial droop has improved.    • Her left upper extremity weakness & ataxia continues to improve   • Her left lower extremity paresis, modestly improved /significant deficits remain  • NSR w single PVCs on telemetry  • Cr stable.  0.7  • Glucose:: 120-230 range  • On room air.    • Discussed Rehab / Disposition        REVIEW OF SYSTEMS: All other  systems reviewed and are negative.   • General weakness.  Shortness of air.  Left upper and lower extremity weakness.  Objective   Temp:  [97.7 °F (36.5 °C)-98.9 °F (37.2 °C)] 97.7 °F (36.5 °C)  Heart Rate:  [64-93] 93  Resp:  [18] 18  BP: (140-153)/(61-79) 144/70  Flow (L/min):  [2] 2  PHYSICAL EXAM   • CON: WN. WD. NAD.  Oriented.  Knows place year month president.  • EYES:  Sclera anicteric. EOMI. Normal conjunctiva.   • ENT:  Oropharyngeal mucosa without ulcers or thrush.    • NECK:  No thyromegaly. No stridor. Trachea midline.  • RESP:  CTA. No wheezes. No crackles.  No work of breathing or tachypnea.   • CV:  Rhythm regular. Rate WNL. No murmur noted.  No edema.  • GI:  Soft and nontender. Nondistended.  Bowel sounds present.   • EXT: Peripheral pulses intact.  No joint deformities or cyanosis.  • LYMPH:  No lymphedema noted.  No cervical lymphadenopathy.  • PSYCH:  Alert. Oriented. Normal affect and mood.  • NEURO:  Left facial droop.  Left upper and lower extremity weakness.  No  paresthesia.  • SKIN: No chronic venous stasis changes or varicosities.  No cellulitis    Results from last 7 days   Lab Units 05/22/23 0417 05/21/23  0551 05/20/23  0457 05/19/23  0442 05/18/23  0510 05/17/23  0434 05/16/23  0626   WBC 10*3/mm3 6.49 8.87 8.25 7.50 8.19 8.28 9.28   HEMOGLOBIN g/dL 12.5 12.7 13.0 12.9 13.8 13.4 13.3   HEMATOCRIT % 38.5 38.0 41.5 43.2 42.1 41.0 41.2   PLATELETS 10*3/mm3 184 208 169 191 216 212 233     Results from last 7 days   Lab Units 05/22/23  0417 05/21/23  0551 05/20/23  0457 05/19/23  0442 05/18/23  0510 05/17/23  0434 05/16/23  0626   SODIUM mmol/L 134* 133* 132* 132* 133* 134* 138   POTASSIUM mmol/L 4.1 3.9 4.5 4.6 4.5 4.1 4.5   CO2 mmol/L 24.9 23.3 20.4* 18.6* 20.0* 21.1* 27.8   CHLORIDE mmol/L 101 101 101 104 100 101 102   ANION GAP mmol/L 8.1 8.7 10.6 9.4 13.0 11.9 8.2   BUN mg/dL 17 15 17 23 35* 27* 27*   CREATININE mg/dL 0.80 0.73 0.68 0.78 1.01* 0.76 0.75   GLUCOSE mg/dL 158* 128* 180*  166* 166* 116* 177*           RESULTS REVIEWED:  I have personally reviewed the results from the time of this admission to 5/22/2023 12:56 EDT and agree with these findings:  []  Laboratory  []  Microbiology  []  Radiology  []  EKG/Telemetry   []  Cardiology/Vascular   []  Pathology  []  Old records  []  Other:  Assessment / Plan   Assessment:    • Acute CVA in the right CHRISTINE territory  • Left-sided weakness and left facial droop  • History of prior CVA (right frontal, right basal ganglia)  • COVID infection  • DM  • HTN  • Dyslipidemia  • Ulcerative colitis  • CODE STATUS: DNR/DNI    Plan:  • Rehab soon  • Continue Trazodone at night.  • Continue PT, OT, Speech.  Continue Rehab precert/referral.  • Continue to hold Lisinopril (-140 / 60s)  • Avoid hypotension  • Continue PCU via stroke pathway  • MRI brain .... Acute CVA noted  • CT head showed old right frontal and old right basal ganglia stroke  • Nebulizers as needed and scheduled  • Levemir and sliding scale insulin for glucose control  • Aspirin and Plavix x21 days.  Then continue with aspirin.  • Statin on board  • Lovenox for DVT prophylaxis  • PT/OT .... Rehab placement (lives in assisted living facility at baseline)  Discussed plan with RN.  DVT prophylaxis:  Medical and mechanical DVT prophylaxis orders are present.  CODE STATUS:      Medical Intervention Limits: NO intubation (DNI)  Level Of Support Discussed With: Patient  Code Status (Patient has no pulse and is not breathing): No CPR (Do Not Attempt to Resuscitate)  Medical Interventions (Patient has pulse or is breathing): Limited Support           Patient independently seen and evaluated, agree with assessment and plan, above documentation reflects plan put forth during bedside rounds.  More than 51% of the time of this patient encounter was performed by me.     Interval history: No acute events overnight, left upper extremity weakness is improving, left lower extremity weakness  stable     Exam:  GEN: Drowsy  HEENT: Moist mucous membrane  LUNGS: Equal chest rise bilaterally  CARDIAC: Regular rate and rhythm  NEURO: Left upper extremity weakness, asymmetric face, continued left lower extremity weakness  SKIN: No obvious breakdown     Plan:  Agree with assessment and plan as above  MRI of the brain reviewed, consistent with new stroke  Continue stroke work-up and prevention  Strength continues to improve  Continue bronchodilators  Continue aspirin and statin  Continue basal bolus insulin for hyperglycemia  Continue Lovenox for DVT prophylaxis  Continue PT/OT, will need extensive rehab  CBC, CMP reviewed  Repeat CBC, CMP, mag and Phos in a.m.  Mental status stable  Continue trazodone added at night  Patient will need extensive rehab  Clinical course will dictate further management     Reviewed patients labs and imaging, and discussed with patient and nurse at bedside.      Electronically signed by Michael Barba MD, 05/22/23, 1:15 PM EDT.

## 2023-05-22 NOTE — PLAN OF CARE
Goal Outcome Evaluation:   Pt has rested throughout the night. Complaints of left leg pain and one-time dose of Tylenol given. No acute changes this shift. Call light is within reach of the patient.   Larry Colon RN

## 2023-05-22 NOTE — CONSULTS
"Nutrition Services    Patient Name: Tita Jiménez  YOB: 1935  MRN: [id number]  Admission date: 5/12/2023      CLINICAL NUTRITION ASSESSMENT      Reason for Assessment  LOS     H&P:    Past Medical History:   Diagnosis Date   • Asthma    • Benign essential tremor 9/23/2021   • Carpal tunnel syndrome    • Contusion     small on scalp. skin intact. D/T fall on 10/18/2019   • Depression    • Diabetes mellitus    • GERD (gastroesophageal reflux disease) 9/23/2021   • HTN (hypertension) 10/18/2019   • Hyperlipidemia    • Hypertension    • Mitral valve problem    • Polymyalgia rheumatica    • Stroke     short term memory loss   • Ulcerative colitis 9/23/2021   • Urinary incontinence         Current Problems:   Active Hospital Problems    Diagnosis    • **Dehydration         Nutrition/Diet History         Narrative     Pt followed by RD for LOS. Consuming % all meals. Weight stable, trending up. Pt is at low risk per nutrition risk screening. No acute nutrition concerns or interventions at this time. RD will continue to follow and monitor per protocol.     Anthropometrics        Current Height, Weight Height: 162.6 cm (64\")  Weight: 82.3 kg (181 lb 7 oz)   Current BMI Body mass index is 31.14 kg/m².       Weight Hx  Wt Readings from Last 30 Encounters:   05/13/23 0334 82.3 kg (181 lb 7 oz)   05/12/23 1902 71.2 kg (156 lb 15.5 oz)   03/03/22 1247 71.2 kg (157 lb)   01/27/22 1440 74.2 kg (163 lb 9.6 oz)   01/19/22 1116 72.9 kg (160 lb 12.8 oz)   11/18/21 1324 72.9 kg (160 lb 12.8 oz)   10/20/21 2333 73.1 kg (161 lb 2.5 oz)   09/23/21 1125 71.7 kg (158 lb)   10/18/19 2118 77.3 kg (170 lb 6.4 oz)   10/19/19 1443 77.1 kg (170 lb)            Wt Change Observation Stable, trending up     Estimated/Assessed Needs       Energy Requirements 30-35 kcal/kg adj. BW (61.6 kg)   EST Needs (kcal/day) 0628-9272       Protein Requirements 1.0-1.3 g/kg adj. BW   EST Daily Needs (g/day) 62-80       Fluid Requirements " 30 ml/kg    Estimated Needs (mL/day) 1848     Labs/Medications         Pertinent Labs Reviewed.   Results from last 7 days   Lab Units 05/22/23 0417 05/21/23  0551 05/20/23  0457 05/19/23 0442   SODIUM mmol/L 134* 133* 132* 132*   POTASSIUM mmol/L 4.1 3.9 4.5 4.6   CHLORIDE mmol/L 101 101 101 104   CO2 mmol/L 24.9 23.3 20.4* 18.6*   BUN mg/dL 17 15 17 23   CREATININE mg/dL 0.80 0.73 0.68 0.78   CALCIUM mg/dL 9.5 9.4 9.2 9.1   BILIRUBIN mg/dL  --  0.7 0.7 0.5   ALK PHOS U/L  --  92 89 93   ALT (SGPT) U/L  --  18 19 20   AST (SGOT) U/L  --  10 10 15   GLUCOSE mg/dL 158* 128* 180* 166*     Results from last 7 days   Lab Units 05/22/23 0417 05/21/23 0551 05/20/23 0457   MAGNESIUM mg/dL 2.0 1.9 2.0   PHOSPHORUS mg/dL  --  2.4* 2.6   HEMOGLOBIN g/dL 12.5 12.7 13.0   HEMATOCRIT % 38.5 38.0 41.5     COVID19   Date Value Ref Range Status   05/17/2023 Detected (C) Not Detected - Ref. Range Final     Lab Results   Component Value Date    HGBA1C 9.00 (H) 03/03/2022         Pertinent Medications Reviewed.     Current Nutrition Orders & Evaluation of Intake       Oral Nutrition     Current PO Diet Diet: Regular/House Diet; No Mixed Consistencies; Texture: Mechanical Ground (NDD 2); Fluid Consistency: Nectar Thick   Supplement No active supplement orders       Malnutrition Severity Assessment                Nutrition Diagnosis         Nutrition Dx Problem 1 No nutrition diagnosis at this time.       Nutrition Intervention         No intervention indicated.      Medical Nutrition Therapy/Nutrition Education          Learner     Readiness N/A  N/A     Method     Response N/A  N/A     Monitor/Evaluation        Monitor Per protocol.       Nutrition Discharge Plan         No nutrition needs identified at this time.       Electronically signed by:  Aracelis Shay RD  05/22/23 07:55 EDT

## 2023-05-22 NOTE — THERAPY TREATMENT NOTE
Acute Care - Physical Therapy Treatment Note  RUPESH Villalobos     Patient Name: Tita Jiménez  : 1935  MRN: [id number]  Today's Date: 2023      Visit Dx:     ICD-10-CM ICD-9-CM   1. Dehydration  E86.0 276.51   2. Generalized weakness  R53.1 780.79   3. Decreased activities of daily living (ADL)  Z78.9 V49.89   4. Difficulty in walking  R26.2 719.7   5. Oropharyngeal dysphagia  R13.12 787.22     Patient Active Problem List   Diagnosis   • Compression fracture of T1 -T2 vertebra (CMS/HCC)   • Fall   • Contusion of scalp   • Closed head injury   • Asthma   • Depression   • Reflux esophagitis   • Bladder incontinence   • Bowel incontinence   • High cholesterol   • Diabetes mellitus, type II (HCC)   • Hypertension   • Ulcerative colitis (HCC)   • GERD (gastroesophageal reflux disease)   • Benign essential tremor   • Carpal tunnel syndrome   • Ataxia   • Breast cancer screening   • Hypoxia   • Cytokine release syndrome, grade 1   • Pneumonia due to COVID-19 virus   • Weakness   • Cognitive impairment   • Difficulty walking   • At risk for venous thromboembolism (VTE)   • Lower abdominal pain   • 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 skin intact. D/T fall on 10/18/2019   • Depression    • Diabetes mellitus    • GERD (gastroesophageal reflux disease) 2021   • HTN (hypertension) 10/18/2019   • Hyperlipidemia    • Hypertension    • Mitral valve problem    • Polymyalgia rheumatica    • Stroke     short term memory loss   • Ulcerative colitis 2021   • Urinary incontinence      Past Surgical History:   Procedure Laterality Date   • ADENOIDECTOMY     • APPENDECTOMY     • BREAST SURGERY      ( L4-L5)   • COLON SURGERY      colon resection   • HYSTERECTOMY     • OOPHORECTOMY     • TONSILLECTOMY       PT  Assessment (last 12 hours)     PT Evaluation and Treatment     Row Name 05/22/23 1243          Physical Therapy Time and Intention    Subjective Information complains of;weakness;fatigue;nausea/vomiting  -     Document Type therapy note (daily note)  -     Mode of Treatment physical therapy;individual therapy  -     Patient Effort adequate  -     Row Name 05/22/23 1243          Pain Scale: FACES Pre/Post-Treatment    Pain: FACES Scale, Pretreatment 0-->no hurt  -     Posttreatment Pain Rating 0-->no hurt  -     Row Name 05/22/23 1243          Bed Mobility    Bed Mobility scooting/bridging;supine-sit;sit-supine  -     All Activities, [place] (Bed Mobility) moderate assist (50% patient effort)  -     Scooting/Bridging [place] (Bed Mobility) moderate assist (50% patient effort)  -     Supine-Sit [place] (Bed Mobility) moderate assist (50% patient effort)  -     Sit-Supine [place] (Bed Mobility) moderate assist (50% patient effort)  -     Bed Mobility, Safety Issues decreased use of legs for bridging/pushing;decreased use of arms for pushing/pulling  -     Assistive Device (Bed Mobility) bed rails  -     Comment, (Bed Mobility) Pt maintains sitting with min assist.  -     Row Name 05/22/23 1243          Transfers    Transfers sit-stand transfer;stand-sit transfer  -     Row Name 05/22/23 1243          Sit-Stand Transfer    Sit-Stand [place] (Transfers) maximum assist (25% patient effort)  -     Assistive Device (Sit-Stand Transfers) --  Pt transfers to stand with max assist without AD.  -     Comment, (Sit-Stand Transfer) Pt able to maintain standing x 2 minutes while receiving care from aide.  Pt unable to take steps.  -     Row Name 05/22/23 1243          Stand-Sit Transfer    Stand-Sit [place] (Transfers) moderate assist (50% patient effort)  -     Row Name 05/22/23 1243          Balance    Static Standing Balance maximum assist  -     Row Name  05/22/23 1243          Vital Signs    O2 Delivery Intra Treatment room air  -RH     Row Name 05/22/23 1243          Progress Summary (PT)    Progress Toward Functional Goals (PT) progress toward functional goals is gradual  -RH           User Key  (r) = Recorded By, (t) = Taken By, (c) = Cosigned By    Initials Name Provider Type     Elian Guevara PTA Physical Therapist Assistant                Physical Therapy Education     Title: PT OT SLP Therapies (Done)     Topic: Physical Therapy (Done)     Point: Mobility training (Done)     Learning Progress Summary           Patient Acceptance, E, VU by  at 5/13/2023 1004                   Point: Home exercise program (Done)     Learning Progress Summary           Patient Acceptance, E, VU by  at 5/13/2023 1004                   Point: Body mechanics (Done)     Learning Progress Summary           Patient Acceptance, E, VU by  at 5/13/2023 1004                   Point: Precautions (Done)     Learning Progress Summary           Patient Acceptance, E, VU by  at 5/13/2023 1004                               User Key     Initials Effective Dates Name Provider Type Discipline     06/16/21 -  Celeste Anthony OT Occupational Therapist OT              PT Recommendation and Plan     Progress Summary (PT)  Progress Toward Functional Goals (PT): progress toward functional goals is gradual   Outcome Measures     Row Name 05/22/23 1200 05/21/23 1600 05/20/23 1500       How much help from another person do you currently need...    Turning from your back to your side while in flat bed without using bedrails? 2  -RH 2  -CS 2  -RH    Moving from lying on back to sitting on the side of a flat bed without bedrails? 2  -RH 2  -CS 2  -RH    Moving to and from a bed to a chair (including a wheelchair)? 1  -RH 1  -CS 1  -RH    Standing up from a chair using your arms (e.g., wheelchair, bedside chair)? 1  -RH 1  -CS 1  -RH    Climbing 3-5 steps with a railing? 1  -RH 1  -CS 1  -RH    To  walk in hospital room? 1  -RH 1  -CS 1  -RH    AM-PAC 6 Clicks Score (PT) 8  -RH 8  -CS 8  -RH       Functional Assessment    Outcome Measure Options -- AM-PAC 6 Clicks Basic Mobility (PT)  - --    Row Name 05/19/23 1524             How much help from another person do you currently need...    Turning from your back to your side while in flat bed without using bedrails? 2  -DP (r) JF (t) DP (c)      Moving from lying on back to sitting on the side of a flat bed without bedrails? 2  -DP (r) JF (t) DP (c)      Moving to and from a bed to a chair (including a wheelchair)? 1  -DP (r) JF (t) DP (c)      Standing up from a chair using your arms (e.g., wheelchair, bedside chair)? 1  -DP (r) JF (t) DP (c)      Climbing 3-5 steps with a railing? 1  -DP (r) JF (t) DP (c)      To walk in hospital room? 1  -DP (r) JF (t) DP (c)      AM-PAC 6 Clicks Score (PT) 8  -DP (r) JF (t)         Functional Assessment    Outcome Measure Options AM-PAC 6 Clicks Basic Mobility (PT)  -DP (r) JF (t) DP (c)            User Key  (r) = Recorded By, (t) = Taken By, (c) = Cosigned By    Initials Name Provider Type    RH Elian Guevara PTA Physical Therapist Assistant    Luis Alberto Lombardo, PT Physical Therapist    Allan Gayle PTA Physical Therapist Assistant    Juwan Hartman, PT Student PT Student                 Time Calculation:    PT Charges     Row Name 05/22/23 1241             Time Calculation    PT Received On 05/22/23  -RH         Timed Charges    43790 - PT Therapeutic Activity Minutes 14  -RH         Total Minutes    Timed Charges Total Minutes 14  -RH       Total Minutes 14  -RH            User Key  (r) = Recorded By, (t) = Taken By, (c) = Cosigned By    Initials Name Provider Type     Elian Guevara PTA Physical Therapist Assistant              Therapy Charges for Today     Code Description Service Date Service Provider Modifiers Qty    40337260472 HC PT THERAPEUTIC ACT EA 15 MIN 5/22/2023 Elian Guevara PTA GP 1     [id number]  PT THERAPEUTIC ACT EA 15 MIN 5/22/2023 Elian Guevara, PTA GP 1          PT G-Codes  Outcome Measure Options: AM-PAC 6 Clicks Basic Mobility (PT)  AM-PAC 6 Clicks Score (PT): 8  AM-PAC 6 Clicks Score (OT): 14    Elian Guevara PTA  5/22/2023

## 2023-05-23 VITALS
HEART RATE: 84 BPM | WEIGHT: 181.44 LBS | BODY MASS INDEX: 30.98 KG/M2 | DIASTOLIC BLOOD PRESSURE: 58 MMHG | SYSTOLIC BLOOD PRESSURE: 127 MMHG | OXYGEN SATURATION: 92 % | HEIGHT: 64 IN | TEMPERATURE: 98.2 F | RESPIRATION RATE: 20 BRPM

## 2023-05-23 LAB
CHOLEST SERPL-MCNC: 182 MG/DL (ref 0–200)
GLUCOSE BLDC GLUCOMTR-MCNC: 105 MG/DL (ref 70–99)
GLUCOSE BLDC GLUCOMTR-MCNC: 260 MG/DL (ref 70–99)
HDLC SERPL-MCNC: 35 MG/DL (ref 40–60)
LDLC SERPL CALC-MCNC: 123 MG/DL (ref 0–100)
LDLC/HDLC SERPL: 3.46 {RATIO}
TRIGL SERPL-MCNC: 130 MG/DL (ref 0–150)
VLDLC SERPL-MCNC: 24 MG/DL (ref 5–40)

## 2023-05-23 PROCEDURE — 80061 LIPID PANEL: CPT | Performed by: PHYSICIAN ASSISTANT

## 2023-05-23 PROCEDURE — 25010000002 ENOXAPARIN PER 10 MG: Performed by: INTERNAL MEDICINE

## 2023-05-23 PROCEDURE — 94799 UNLISTED PULMONARY SVC/PX: CPT

## 2023-05-23 PROCEDURE — 99239 HOSP IP/OBS DSCHRG MGMT >30: CPT | Performed by: INTERNAL MEDICINE

## 2023-05-23 PROCEDURE — 63710000001 INSULIN DETEMIR PER 5 UNITS: Performed by: INTERNAL MEDICINE

## 2023-05-23 PROCEDURE — 97530 THERAPEUTIC ACTIVITIES: CPT

## 2023-05-23 PROCEDURE — 82948 REAGENT STRIP/BLOOD GLUCOSE: CPT

## 2023-05-23 PROCEDURE — 94664 DEMO&/EVAL PT USE INHALER: CPT

## 2023-05-23 PROCEDURE — 63710000001 INSULIN LISPRO (HUMAN) PER 5 UNITS: Performed by: INTERNAL MEDICINE

## 2023-05-23 RX ORDER — CHOLECALCIFEROL (VITAMIN D3) 125 MCG
5 CAPSULE ORAL NIGHTLY
Status: DISCONTINUED | OUTPATIENT
Start: 2023-05-23 | End: 2023-05-23 | Stop reason: HOSPADM

## 2023-05-23 RX ORDER — TRAZODONE HYDROCHLORIDE 50 MG/1
25 TABLET ORAL NIGHTLY
Qty: 10 TABLET | Refills: 0
Start: 2023-05-23

## 2023-05-23 RX ORDER — ASPIRIN 81 MG/1
81 TABLET ORAL DAILY
Qty: 21 TABLET | Refills: 0 | Status: SHIPPED | OUTPATIENT
Start: 2023-05-23

## 2023-05-23 RX ORDER — CHOLECALCIFEROL (VITAMIN D3) 125 MCG
5 CAPSULE ORAL NIGHTLY
Start: 2023-05-23

## 2023-05-23 RX ORDER — CLOPIDOGREL BISULFATE 75 MG/1
75 TABLET ORAL DAILY
Qty: 30 TABLET | Refills: 0 | Status: SHIPPED | OUTPATIENT
Start: 2023-05-23

## 2023-05-23 RX ADMIN — ARFORMOTEROL TARTRATE 15 MCG: 15 SOLUTION RESPIRATORY (INHALATION) at 07:28

## 2023-05-23 RX ADMIN — FLUTICASONE PROPIONATE 2 PUFF: 110 AEROSOL, METERED RESPIRATORY (INHALATION) at 07:28

## 2023-05-23 RX ADMIN — METOPROLOL SUCCINATE 25 MG: 25 TABLET, EXTENDED RELEASE ORAL at 09:13

## 2023-05-23 RX ADMIN — FAMOTIDINE 10 MG: 10 TABLET ORAL at 09:13

## 2023-05-23 RX ADMIN — INSULIN LISPRO 8 UNITS: 100 INJECTION, SOLUTION INTRAVENOUS; SUBCUTANEOUS at 13:32

## 2023-05-23 RX ADMIN — NYSTATIN: 100000 POWDER TOPICAL at 09:14

## 2023-05-23 RX ADMIN — ENOXAPARIN SODIUM 40 MG: 100 INJECTION SUBCUTANEOUS at 09:13

## 2023-05-23 RX ADMIN — CLOPIDOGREL BISULFATE 75 MG: 75 TABLET ORAL at 09:13

## 2023-05-23 RX ADMIN — POLYETHYLENE GLYCOL 3350 17 G: 17 POWDER, FOR SOLUTION ORAL at 09:13

## 2023-05-23 RX ADMIN — Medication 10 ML: at 09:14

## 2023-05-23 RX ADMIN — INSULIN DETEMIR 20 UNITS: 100 INJECTION, SOLUTION SUBCUTANEOUS at 09:13

## 2023-05-23 RX ADMIN — DILTIAZEM HYDROCHLORIDE 240 MG: 240 CAPSULE, COATED, EXTENDED RELEASE ORAL at 09:13

## 2023-05-23 RX ADMIN — SERTRALINE HYDROCHLORIDE 50 MG: 50 TABLET ORAL at 09:13

## 2023-05-23 RX ADMIN — ASPIRIN 81 MG: 81 TABLET, COATED ORAL at 09:13

## 2023-05-23 NOTE — PLAN OF CARE
Goal Outcome Evaluation: Order for Venous Foot pumps, unable to find venous foot pumps. Contacted , [employer] processing, store room and none are available. Contacted Store room at [phone]  and still no venous foot pumps are available. Pt. Had confusion, anxiety during shift treated per MAR. Resting with call light in reach.

## 2023-05-23 NOTE — PROGRESS NOTES
[facility]   Hospitalist Progress Note       Patient Name: Tita Jiménez  : 1935  MRN: [id number]  Primary Care Physician: Madison Ortiz APRN  Date of admission: 2023  Today's Date: 2023  Room / Bed:   219/2  Subjective   Chief Complaint: SOA.  Weakness.    Summary:  Tita Jiménez is a 88 y.o. female  with a past medical history of type 2 diabetes mellitus, hypertension, hyperlipidemia, ulcerative colitis, presented to the ED for evaluation of shortness of breath, cough.  Patient has been recently diagnosed with COVID infection on Monday.  Since the COVID infection, patient has been progressively becoming weak and now she is not able to get up out of bed in chair by herself.    Upon arrival to the ED, vital signs temperature 97.8, pulse 96, respirate rate 18, blood pressure 124/65 on 2 L of nasal cannula saturating around 93%.  Labs showed troponin 17, proBNP 160.4, sodium 135, bicarb 18.3, normal creatinine, albumin 3.4, rest of the CMP is nonsignificant, normal CBC.  Chest x-ray showed no acute cardiopulmonary process.  X-ray ankle of the right showed no acute fracture or traumatic malalignment.  EKG showed no significant ST-T wave changes concerning for ischemia.      Patient admitted for further evaluation and management of generalized weakness, right ankle sprain, COVID-19 infection.  On 23 she was moved to PCU after stroke RRT was called.  Patient had left facial droop and left sided weakness.    Interval Followup: 2023    • Some mild insomnia.    • Patient is alert and interactive.    • Very pleasant.  • Her left facial droop has improved.    • Her left upper extremity weakness & ataxia continues to improve   • Her left lower extremity paresis, modestly improved /significant deficits remain  • NSR w isolated PVCs on telemetry  • On room air.            REVIEW OF SYSTEMS: All other systems reviewed and are negative.   • General weakness.  Shortness of  air.  Left upper and lower extremity weakness.  Objective   Temp:  [97.2 °F (36.2 °C)-98.2 °F (36.8 °C)] 97.2 °F (36.2 °C)  Heart Rate:  [77-99] 99  Resp:  [18] 18  BP: (121-143)/(58-81) 134/58  Flow (L/min):  [2] 2  PHYSICAL EXAM   • CON: WN. WD. NAD.  Oriented.  Knows place year month president.  • EYES:  Sclera anicteric. EOMI. Normal conjunctiva.   • ENT:  Oropharyngeal mucosa without ulcers or thrush.    • NECK:  No thyromegaly. No stridor. Trachea midline.  • RESP:  CTA. No wheezes. No crackles.  No work of breathing or tachypnea.   • CV:  Rhythm regular. Rate WNL. No murmur noted.  No edema.  • GI:  Soft and nontender. Nondistended.  Bowel sounds present.   • EXT: Peripheral pulses intact.  No joint deformities or cyanosis.  • LYMPH:  No lymphedema noted.  No cervical lymphadenopathy.  • PSYCH:  Alert. Oriented. Normal affect and mood.  • NEURO:  Left facial droop.  Left upper and lower extremity weakness.  No  paresthesia.  • SKIN: No chronic venous stasis changes or varicosities.  No cellulitis    Results from last 7 days   Lab Units 05/22/23 0417 05/21/23 0551 05/20/23 0457 05/19/23  0442 05/18/23  0510 05/17/23  0434   WBC 10*3/mm3 6.49 8.87 8.25 7.50 8.19 8.28   HEMOGLOBIN g/dL 12.5 12.7 13.0 12.9 13.8 13.4   HEMATOCRIT % 38.5 38.0 41.5 43.2 42.1 41.0   PLATELETS 10*3/mm3 184 208 169 191 216 212     Results from last 7 days   Lab Units 05/22/23 0417 05/21/23  0551 05/20/23 0457 05/19/23  0442 05/18/23  0510 05/17/23  0434   SODIUM mmol/L 134* 133* 132* 132* 133* 134*   POTASSIUM mmol/L 4.1 3.9 4.5 4.6 4.5 4.1   CO2 mmol/L 24.9 23.3 20.4* 18.6* 20.0* 21.1*   CHLORIDE mmol/L 101 101 101 104 100 101   ANION GAP mmol/L 8.1 8.7 10.6 9.4 13.0 11.9   BUN mg/dL 17 15 17 23 35* 27*   CREATININE mg/dL 0.80 0.73 0.68 0.78 1.01* 0.76   GLUCOSE mg/dL 158* 128* 180* 166* 166* 116*           RESULTS REVIEWED:  I have personally reviewed the results from the time of this admission to 5/23/2023 11:50 EDT and agree  with these findings:  []  Laboratory  []  Microbiology  []  Radiology  []  EKG/Telemetry   []  Cardiology/Vascular   []  Pathology  []  Old records  []  Other:  Assessment / Plan   Assessment:    • Acute CVA in the right CHRISTINE territory  • Left-sided weakness and left facial droop  • History of prior CVA (right frontal, right basal ganglia)  • COVID infection  • DM  • HTN  • Dyslipidemia  • Ulcerative colitis  • CODE STATUS: DNR/DNI    Plan:  • Rehab today  • Continue Melatonin / Trazodone at night.  • Continue PT, OT, Speech.  Continue Rehab precert/referral.  • Continue to hold Lisinopril (-140 / 60s)  • Avoid hypotension  • Continue PCU via stroke pathway  • MRI brain .... Acute CVA noted  • CT head showed old right frontal and old right basal ganglia stroke  • Nebulizers as needed and scheduled  • Levemir and sliding scale insulin for glucose control  • Aspirin and Plavix x21 days.  Then continue with aspirin.  • Statin on board  • Lovenox for DVT prophylaxis  • PT/OT .... Rehab placement (lives in assisted living facility at baseline)  Discussed plan with RN.  DVT prophylaxis:  Medical and mechanical DVT prophylaxis orders are present.  CODE STATUS:      Medical Intervention Limits: NO intubation (DNI)  Level Of Support Discussed With: Patient  Code Status (Patient has no pulse and is not breathing): No CPR (Do Not Attempt to Resuscitate)  Medical Interventions (Patient has pulse or is breathing): Limited Support

## 2023-05-23 NOTE — THERAPY TREATMENT NOTE
Acute Care - Physical Therapy Treatment Note  RUPESH Villalobos     Patient Name: Tita Jiménez  : 1935  MRN: [id number]  Today's Date: 2023      Visit Dx:     ICD-10-CM ICD-9-CM   1. Dehydration  E86.0 276.51   2. Generalized weakness  R53.1 780.79   3. Decreased activities of daily living (ADL)  Z78.9 V49.89   4. Difficulty in walking  R26.2 719.7   5. Oropharyngeal dysphagia  R13.12 787.22     Patient Active Problem List   Diagnosis   • Compression fracture of T1 -T2 vertebra (CMS/HCC)   • Fall   • Contusion of scalp   • Closed head injury   • Asthma   • Depression   • Reflux esophagitis   • Bladder incontinence   • Bowel incontinence   • High cholesterol   • Diabetes mellitus, type II (HCC)   • Hypertension   • Ulcerative colitis (HCC)   • GERD (gastroesophageal reflux disease)   • Benign essential tremor   • Carpal tunnel syndrome   • Ataxia   • Breast cancer screening   • Hypoxia   • Cytokine release syndrome, grade 1   • Pneumonia due to COVID-19 virus   • Weakness   • Cognitive impairment   • Difficulty walking   • At risk for venous thromboembolism (VTE)   • Lower abdominal pain   • 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 skin intact. D/T fall on 10/18/2019   • Depression    • Diabetes mellitus    • GERD (gastroesophageal reflux disease) 2021   • HTN (hypertension) 10/18/2019   • Hyperlipidemia    • Hypertension    • Mitral valve problem    • Polymyalgia rheumatica    • Stroke     short term memory loss   • Ulcerative colitis 2021   • Urinary incontinence      Past Surgical History:   Procedure Laterality Date   • ADENOIDECTOMY     • APPENDECTOMY     • BREAST SURGERY      ( L4-L5)   • COLON SURGERY      colon resection   • HYSTERECTOMY     • OOPHORECTOMY     • TONSILLECTOMY       PT  Assessment (last 12 hours)     PT Evaluation and Treatment     Row Name 05/23/23 0822          Physical Therapy Time and Intention    Subjective Information complains of;weakness  -     Document Type therapy note (daily note)  -     Mode of Treatment physical therapy;individual therapy  -     Patient Effort adequate  -     Row Name 05/23/23 0822          Pain Scale: FACES Pre/Post-Treatment    Pain: FACES Scale, Pretreatment 0-->no hurt  -     Posttreatment Pain Rating 0-->no hurt  -     Row Name 05/23/23 0822          Bed Mobility    Bed Mobility scooting/bridging;supine-sit  -     All Activities, [place] (Bed Mobility) moderate assist (50% patient effort)  -     Scooting/Bridging [place] (Bed Mobility) moderate assist (50% patient effort)  -     Supine-Sit [place] (Bed Mobility) moderate assist (50% patient effort)  -     Bed Mobility, Safety Issues decreased use of legs for bridging/pushing;decreased use of arms for pushing/pulling  -     Assistive Device (Bed Mobility) bed rails  -     Comment, (Bed Mobility) Pt maintains sitting at side of bed with CGA/Min assist with RUE assist.  -     Row Name 05/23/23 0822          Transfers    Transfers stand pivot/stand step transfer  -     Comment, (Transfers) Pt with minimal shuffling steps to assist with pivot transfer.  -     Row Name 05/23/23 0822          Stand Pivot/Stand Step Transfer    Stand Pivot/Stand Step [place] (Transfers) moderate assist (50% patient effort);maximum assist (25% patient effort)  -     Assistive Device (Stand Pivot Stand Step Transfer) --  Pt transfers without AD.  -     Comment, (Stand Pivot Transfer) Pt with shuffling steps to assist with pivot transfer.  -     Row Name 05/23/23 0822          Balance    Dynamic Standing Balance moderate assist;maximum assist  -     Row Name 05/23/23 0822          Vital Signs    O2 Delivery Intra Treatment room air  -     Row Name 05/23/23 0822           Progress Summary (PT)    Progress Toward Functional Goals (PT) progress toward functional goals is gradual  -RH           User Key  (r) = Recorded By, (t) = Taken By, (c) = Cosigned By    Initials Name Provider Type     Elian Guevara PTA Physical Therapist Assistant                Physical Therapy Education     Title: PT OT SLP Therapies (Done)     Topic: Physical Therapy (Done)     Point: Mobility training (Done)     Learning Progress Summary           Patient Acceptance, E, VU by  at 5/22/2023 1552    Acceptance, E, VU by  at 5/13/2023 1004                   Point: Home exercise program (Done)     Learning Progress Summary           Patient Acceptance, E, VU by  at 5/22/2023 1552    Acceptance, E, VU by  at 5/13/2023 1004                   Point: Body mechanics (Done)     Learning Progress Summary           Patient Acceptance, E, VU by  at 5/22/2023 1552    Acceptance, E, VU by  at 5/13/2023 1004                   Point: Precautions (Done)     Learning Progress Summary           Patient Acceptance, E, VU by  at 5/22/2023 1552    Acceptance, E, VU by  at 5/13/2023 1004                               User Key     Initials Effective Dates Name Provider Type Discipline     06/16/21 -  Celeste Anthony OT Occupational Therapist OT     02/17/22 -  Olga Lyles RN Registered Nurse Nurse              PT Recommendation and Plan     Progress Summary (PT)  Progress Toward Functional Goals (PT): progress toward functional goals is gradual   Outcome Measures     Row Name 05/23/23 0800 05/22/23 1200 05/21/23 1600       How much help from another person do you currently need...    Turning from your back to your side while in flat bed without using bedrails? 2  -RH 2  -RH 2  -CS    Moving from lying on back to sitting on the side of a flat bed without bedrails? 2  -RH 2  -RH 2  -CS    Moving to and from a bed to a chair (including a wheelchair)? 1  -RH 1  -RH 1  -CS    Standing up from a chair using  your arms (e.g., wheelchair, bedside chair)? 1  -RH 1  -RH 1  -CS    Climbing 3-5 steps with a railing? 1  -RH 1  -RH 1  -CS    To walk in hospital room? 1  -RH 1  -RH 1  -CS    AM-PAC 6 Clicks Score (PT) 8  -RH 8  -RH 8  -CS       Functional Assessment    Outcome Measure Options -- -- AM-PAC 6 Clicks Basic Mobility (PT)  -CS    Row Name 05/20/23 1500             How much help from another person do you currently need...    Turning from your back to your side while in flat bed without using bedrails? 2  -RH      Moving from lying on back to sitting on the side of a flat bed without bedrails? 2  -RH      Moving to and from a bed to a chair (including a wheelchair)? 1  -RH      Standing up from a chair using your arms (e.g., wheelchair, bedside chair)? 1  -RH      Climbing 3-5 steps with a railing? 1  -RH      To walk in hospital room? 1  -RH      AM-PAC 6 Clicks Score (PT) 8  -RH            User Key  (r) = Recorded By, (t) = Taken By, (c) = Cosigned By    Initials Name Provider Type     Elian Guevara PTA Physical Therapist Assistant    Allan Gayle PTA Physical Therapist Assistant                 Time Calculation:    PT Charges     Row Name 05/23/23 0821             Time Calculation    PT Received On 05/23/23  -RH         Timed Charges    40670 - PT Therapeutic Activity Minutes 25  -RH         Total Minutes    Timed Charges Total Minutes 25  -RH       Total Minutes 25  -RH            User Key  (r) = Recorded By, (t) = Taken By, (c) = Cosigned By    Initials Name Provider Type     Elian Guevara PTA Physical Therapist Assistant              Therapy Charges for Today     Code Description Service Date Service Provider Modifiers Qty    33810898896 HC PT THERAPEUTIC ACT EA 15 MIN 5/22/2023 Elian Guevara PTA GP 1    83914936629 HC PT THERAPEUTIC ACT EA 15 MIN 5/22/2023 Elian Guevara PTA GP 1    15924036703 HC PT THERAPEUTIC ACT EA 15 MIN 5/23/2023 Elian Guevara PTA GP 2          PT G-Codes  Outcome Measure  Options: AM-PAC 6 Clicks Basic Mobility (PT)  AM-PAC 6 Clicks Score (PT): 8  AM-PAC 6 Clicks Score (OT): 14    Elian Guevara, PTA  5/23/2023

## 2023-05-26 ENCOUNTER — HOSPITAL ENCOUNTER (OUTPATIENT)
Facility: HOSPITAL | Age: 88
Setting detail: OBSERVATION
Discharge: NURSING, REHAB OR LONG-TERM CARE | End: 2023-05-29
Attending: EMERGENCY MEDICINE | Admitting: INTERNAL MEDICINE
Payer: MEDICARE

## 2023-05-26 ENCOUNTER — APPOINTMENT (OUTPATIENT)
Dept: GENERAL RADIOLOGY | Facility: HOSPITAL | Age: 88
End: 2023-05-26
Payer: MEDICARE

## 2023-05-26 ENCOUNTER — APPOINTMENT (OUTPATIENT)
Dept: CT IMAGING | Facility: HOSPITAL | Age: 88
End: 2023-05-26
Payer: MEDICARE

## 2023-05-26 DIAGNOSIS — R26.2 DIFFICULTY WALKING: ICD-10-CM

## 2023-05-26 DIAGNOSIS — R07.89 CHEST DISCOMFORT: Primary | ICD-10-CM

## 2023-05-26 DIAGNOSIS — R11.2 NAUSEA AND VOMITING, UNSPECIFIED VOMITING TYPE: ICD-10-CM

## 2023-05-26 LAB
ALBUMIN SERPL-MCNC: 3.1 G/DL (ref 3.5–5.2)
ALBUMIN/GLOB SERPL: 0.9 G/DL
ALP SERPL-CCNC: 124 U/L (ref 39–117)
ALT SERPL W P-5'-P-CCNC: 29 U/L (ref 1–33)
ANION GAP SERPL CALCULATED.3IONS-SCNC: 12.5 MMOL/L (ref 5–15)
AST SERPL-CCNC: 31 U/L (ref 1–32)
BASOPHILS # BLD AUTO: 0.04 10*3/MM3 (ref 0–0.2)
BASOPHILS NFR BLD AUTO: 0.5 % (ref 0–1.5)
BILIRUB SERPL-MCNC: 0.4 MG/DL (ref 0–1.2)
BILIRUB UR QL STRIP: NEGATIVE
BUN SERPL-MCNC: 17 MG/DL (ref 8–23)
BUN/CREAT SERPL: 17.7 (ref 7–25)
CALCIUM SPEC-SCNC: 11 MG/DL (ref 8.6–10.5)
CHLORIDE SERPL-SCNC: 99 MMOL/L (ref 98–107)
CLARITY UR: ABNORMAL
CO2 SERPL-SCNC: 24.5 MMOL/L (ref 22–29)
COLOR UR: YELLOW
CREAT SERPL-MCNC: 0.96 MG/DL (ref 0.57–1)
D-LACTATE SERPL-SCNC: 1.5 MMOL/L (ref 0.5–2)
DEPRECATED RDW RBC AUTO: 45.1 FL (ref 37–54)
EGFRCR SERPLBLD CKD-EPI 2021: 57.4 ML/MIN/1.73
EOSINOPHIL # BLD AUTO: 0.12 10*3/MM3 (ref 0–0.4)
EOSINOPHIL NFR BLD AUTO: 1.5 % (ref 0.3–6.2)
ERYTHROCYTE [DISTWIDTH] IN BLOOD BY AUTOMATED COUNT: 14.9 % (ref 12.3–15.4)
GEN 5 2HR TROPONIN T REFLEX: 24 NG/L
GLOBULIN UR ELPH-MCNC: 3.6 GM/DL
GLUCOSE BLDC GLUCOMTR-MCNC: 99 MG/DL (ref 70–99)
GLUCOSE SERPL-MCNC: 153 MG/DL (ref 65–99)
GLUCOSE UR STRIP-MCNC: ABNORMAL MG/DL
HCT VFR BLD AUTO: 40.7 % (ref 34–46.6)
HGB BLD-MCNC: 13 G/DL (ref 12–15.9)
HGB UR QL STRIP.AUTO: NEGATIVE
HOLD SPECIMEN: NORMAL
HOLD SPECIMEN: NORMAL
IMM GRANULOCYTES # BLD AUTO: 0.04 10*3/MM3 (ref 0–0.05)
IMM GRANULOCYTES NFR BLD AUTO: 0.5 % (ref 0–0.5)
KETONES UR QL STRIP: NEGATIVE
LEUKOCYTE ESTERASE UR QL STRIP.AUTO: NEGATIVE
LIPASE SERPL-CCNC: 29 U/L (ref 13–60)
LYMPHOCYTES # BLD AUTO: 1.17 10*3/MM3 (ref 0.7–3.1)
LYMPHOCYTES NFR BLD AUTO: 15.1 % (ref 19.6–45.3)
MAGNESIUM SERPL-MCNC: 2 MG/DL (ref 1.6–2.4)
MCH RBC QN AUTO: 26.6 PG (ref 26.6–33)
MCHC RBC AUTO-ENTMCNC: 31.9 G/DL (ref 31.5–35.7)
MCV RBC AUTO: 83.4 FL (ref 79–97)
MONOCYTES # BLD AUTO: 0.85 10*3/MM3 (ref 0.1–0.9)
MONOCYTES NFR BLD AUTO: 10.9 % (ref 5–12)
NEUTROPHILS NFR BLD AUTO: 5.55 10*3/MM3 (ref 1.7–7)
NEUTROPHILS NFR BLD AUTO: 71.5 % (ref 42.7–76)
NITRITE UR QL STRIP: NEGATIVE
NRBC BLD AUTO-RTO: 0 /100 WBC (ref 0–0.2)
NT-PROBNP SERPL-MCNC: 117.8 PG/ML (ref 0–1800)
PH UR STRIP.AUTO: 7 [PH] (ref 5–8)
PLATELET # BLD AUTO: 246 10*3/MM3 (ref 140–450)
PMV BLD AUTO: 9.4 FL (ref 6–12)
POTASSIUM SERPL-SCNC: 4.8 MMOL/L (ref 3.5–5.2)
PROT SERPL-MCNC: 6.7 G/DL (ref 6–8.5)
PROT UR QL STRIP: NEGATIVE
QT INTERVAL: 368 MS
RBC # BLD AUTO: 4.88 10*6/MM3 (ref 3.77–5.28)
SODIUM SERPL-SCNC: 136 MMOL/L (ref 136–145)
SP GR UR STRIP: 1.01 (ref 1–1.03)
TROPONIN T DELTA: 3 NG/L
TROPONIN T SERPL HS-MCNC: 21 NG/L
TROPONIN T SERPL HS-MCNC: 27 NG/L
UROBILINOGEN UR QL STRIP: ABNORMAL
WBC NRBC COR # BLD: 7.77 10*3/MM3 (ref 3.4–10.8)
WHOLE BLOOD HOLD COAG: NORMAL
WHOLE BLOOD HOLD SPECIMEN: NORMAL

## 2023-05-26 PROCEDURE — G0378 HOSPITAL OBSERVATION PER HR: HCPCS

## 2023-05-26 PROCEDURE — 85025 COMPLETE CBC W/AUTO DIFF WBC: CPT

## 2023-05-26 PROCEDURE — 70450 CT HEAD/BRAIN W/O DYE: CPT

## 2023-05-26 PROCEDURE — 83690 ASSAY OF LIPASE: CPT | Performed by: EMERGENCY MEDICINE

## 2023-05-26 PROCEDURE — 96374 THER/PROPH/DIAG INJ IV PUSH: CPT

## 2023-05-26 PROCEDURE — 83880 ASSAY OF NATRIURETIC PEPTIDE: CPT | Performed by: EMERGENCY MEDICINE

## 2023-05-26 PROCEDURE — 81003 URINALYSIS AUTO W/O SCOPE: CPT | Performed by: EMERGENCY MEDICINE

## 2023-05-26 PROCEDURE — 93005 ELECTROCARDIOGRAM TRACING: CPT

## 2023-05-26 PROCEDURE — 96372 THER/PROPH/DIAG INJ SC/IM: CPT

## 2023-05-26 PROCEDURE — 71045 X-RAY EXAM CHEST 1 VIEW: CPT

## 2023-05-26 PROCEDURE — 84484 ASSAY OF TROPONIN QUANT: CPT | Performed by: EMERGENCY MEDICINE

## 2023-05-26 PROCEDURE — 74018 RADEX ABDOMEN 1 VIEW: CPT

## 2023-05-26 PROCEDURE — 96375 TX/PRO/DX INJ NEW DRUG ADDON: CPT

## 2023-05-26 PROCEDURE — 80053 COMPREHEN METABOLIC PANEL: CPT | Performed by: EMERGENCY MEDICINE

## 2023-05-26 PROCEDURE — 25010000002 ONDANSETRON PER 1 MG: Performed by: EMERGENCY MEDICINE

## 2023-05-26 PROCEDURE — 36415 COLL VENOUS BLD VENIPUNCTURE: CPT | Performed by: INTERNAL MEDICINE

## 2023-05-26 PROCEDURE — 93005 ELECTROCARDIOGRAM TRACING: CPT | Performed by: EMERGENCY MEDICINE

## 2023-05-26 PROCEDURE — 99285 EMERGENCY DEPT VISIT HI MDM: CPT

## 2023-05-26 PROCEDURE — 84484 ASSAY OF TROPONIN QUANT: CPT | Performed by: INTERNAL MEDICINE

## 2023-05-26 PROCEDURE — 25010000002 ENOXAPARIN PER 10 MG: Performed by: INTERNAL MEDICINE

## 2023-05-26 PROCEDURE — 82948 REAGENT STRIP/BLOOD GLUCOSE: CPT

## 2023-05-26 PROCEDURE — 96361 HYDRATE IV INFUSION ADD-ON: CPT

## 2023-05-26 PROCEDURE — 94799 UNLISTED PULMONARY SVC/PX: CPT

## 2023-05-26 PROCEDURE — 83735 ASSAY OF MAGNESIUM: CPT | Performed by: EMERGENCY MEDICINE

## 2023-05-26 PROCEDURE — 83605 ASSAY OF LACTIC ACID: CPT | Performed by: EMERGENCY MEDICINE

## 2023-05-26 RX ORDER — FAMOTIDINE 10 MG/ML
20 INJECTION, SOLUTION INTRAVENOUS ONCE
Status: COMPLETED | OUTPATIENT
Start: 2023-05-26 | End: 2023-05-26

## 2023-05-26 RX ORDER — ASPIRIN 81 MG/1
324 TABLET, CHEWABLE ORAL ONCE
Status: DISCONTINUED | OUTPATIENT
Start: 2023-05-26 | End: 2023-05-26

## 2023-05-26 RX ORDER — LIDOCAINE 50 MG/G
1 PATCH TOPICAL DAILY PRN
Status: DISCONTINUED | OUTPATIENT
Start: 2023-05-26 | End: 2023-05-29 | Stop reason: HOSPADM

## 2023-05-26 RX ORDER — SODIUM CHLORIDE 0.9 % (FLUSH) 0.9 %
10 SYRINGE (ML) INJECTION AS NEEDED
Status: DISCONTINUED | OUTPATIENT
Start: 2023-05-26 | End: 2023-05-26

## 2023-05-26 RX ORDER — TRAZODONE HYDROCHLORIDE 50 MG/1
25 TABLET ORAL NIGHTLY
Status: DISCONTINUED | OUTPATIENT
Start: 2023-05-26 | End: 2023-05-29 | Stop reason: HOSPADM

## 2023-05-26 RX ORDER — SODIUM CHLORIDE 9 MG/ML
100 INJECTION, SOLUTION INTRAVENOUS CONTINUOUS
Status: ACTIVE | OUTPATIENT
Start: 2023-05-26 | End: 2023-05-27

## 2023-05-26 RX ORDER — ASPIRIN 81 MG/1
81 TABLET, CHEWABLE ORAL DAILY
Status: DISCONTINUED | OUTPATIENT
Start: 2023-05-27 | End: 2023-05-29 | Stop reason: HOSPADM

## 2023-05-26 RX ORDER — SODIUM CHLORIDE 9 MG/ML
40 INJECTION, SOLUTION INTRAVENOUS AS NEEDED
Status: DISCONTINUED | OUTPATIENT
Start: 2023-05-26 | End: 2023-05-29 | Stop reason: HOSPADM

## 2023-05-26 RX ORDER — BISACODYL 10 MG
10 SUPPOSITORY, RECTAL RECTAL DAILY PRN
Status: DISCONTINUED | OUTPATIENT
Start: 2023-05-26 | End: 2023-05-29 | Stop reason: HOSPADM

## 2023-05-26 RX ORDER — ACETAMINOPHEN 325 MG/1
650 TABLET ORAL EVERY 4 HOURS PRN
Status: DISCONTINUED | OUTPATIENT
Start: 2023-05-26 | End: 2023-05-29 | Stop reason: HOSPADM

## 2023-05-26 RX ORDER — CLOPIDOGREL BISULFATE 75 MG/1
75 TABLET ORAL DAILY
Status: DISCONTINUED | OUTPATIENT
Start: 2023-05-27 | End: 2023-05-29 | Stop reason: HOSPADM

## 2023-05-26 RX ORDER — CETIRIZINE HYDROCHLORIDE 10 MG/1
10 TABLET ORAL DAILY
Status: DISCONTINUED | OUTPATIENT
Start: 2023-05-27 | End: 2023-05-29 | Stop reason: HOSPADM

## 2023-05-26 RX ORDER — ONDANSETRON 2 MG/ML
4 INJECTION INTRAMUSCULAR; INTRAVENOUS ONCE
Status: COMPLETED | OUTPATIENT
Start: 2023-05-26 | End: 2023-05-26

## 2023-05-26 RX ORDER — DILTIAZEM HYDROCHLORIDE 240 MG/1
240 CAPSULE, COATED, EXTENDED RELEASE ORAL NIGHTLY
Status: DISCONTINUED | OUTPATIENT
Start: 2023-05-26 | End: 2023-05-29 | Stop reason: HOSPADM

## 2023-05-26 RX ORDER — IBUPROFEN 600 MG/1
1 TABLET ORAL
Status: DISCONTINUED | OUTPATIENT
Start: 2023-05-26 | End: 2023-05-29 | Stop reason: HOSPADM

## 2023-05-26 RX ORDER — DEXTROSE MONOHYDRATE 25 G/50ML
25 INJECTION, SOLUTION INTRAVENOUS
Status: DISCONTINUED | OUTPATIENT
Start: 2023-05-26 | End: 2023-05-29 | Stop reason: HOSPADM

## 2023-05-26 RX ORDER — NICOTINE POLACRILEX 4 MG
15 LOZENGE BUCCAL
Status: DISCONTINUED | OUTPATIENT
Start: 2023-05-26 | End: 2023-05-29 | Stop reason: HOSPADM

## 2023-05-26 RX ORDER — ALUMINA, MAGNESIA, AND SIMETHICONE 2400; 2400; 240 MG/30ML; MG/30ML; MG/30ML
15 SUSPENSION ORAL EVERY 6 HOURS PRN
Status: DISCONTINUED | OUTPATIENT
Start: 2023-05-26 | End: 2023-05-29 | Stop reason: HOSPADM

## 2023-05-26 RX ORDER — FLUTICASONE PROPIONATE 50 MCG
2 SPRAY, SUSPENSION (ML) NASAL DAILY
Status: DISCONTINUED | OUTPATIENT
Start: 2023-05-26 | End: 2023-05-29 | Stop reason: HOSPADM

## 2023-05-26 RX ORDER — SODIUM CHLORIDE 0.9 % (FLUSH) 0.9 %
10 SYRINGE (ML) INJECTION EVERY 12 HOURS SCHEDULED
Status: DISCONTINUED | OUTPATIENT
Start: 2023-05-26 | End: 2023-05-29 | Stop reason: HOSPADM

## 2023-05-26 RX ORDER — HYDROXYZINE HYDROCHLORIDE 25 MG/1
25 TABLET, FILM COATED ORAL 3 TIMES DAILY PRN
Status: DISCONTINUED | OUTPATIENT
Start: 2023-05-26 | End: 2023-05-29 | Stop reason: HOSPADM

## 2023-05-26 RX ORDER — ARFORMOTEROL TARTRATE 15 UG/2ML
15 SOLUTION RESPIRATORY (INHALATION)
Status: DISCONTINUED | OUTPATIENT
Start: 2023-05-26 | End: 2023-05-29 | Stop reason: HOSPADM

## 2023-05-26 RX ORDER — MONTELUKAST SODIUM 10 MG/1
10 TABLET ORAL NIGHTLY
Status: DISCONTINUED | OUTPATIENT
Start: 2023-05-26 | End: 2023-05-29 | Stop reason: HOSPADM

## 2023-05-26 RX ORDER — MEMANTINE HYDROCHLORIDE 10 MG/1
10 TABLET ORAL 2 TIMES DAILY
Status: DISCONTINUED | OUTPATIENT
Start: 2023-05-26 | End: 2023-05-29 | Stop reason: HOSPADM

## 2023-05-26 RX ORDER — METOPROLOL SUCCINATE 25 MG/1
25 TABLET, EXTENDED RELEASE ORAL DAILY
Status: DISCONTINUED | OUTPATIENT
Start: 2023-05-26 | End: 2023-05-29 | Stop reason: HOSPADM

## 2023-05-26 RX ORDER — CHOLECALCIFEROL (VITAMIN D3) 125 MCG
5 CAPSULE ORAL NIGHTLY
Status: DISCONTINUED | OUTPATIENT
Start: 2023-05-26 | End: 2023-05-29 | Stop reason: HOSPADM

## 2023-05-26 RX ORDER — ROSUVASTATIN CALCIUM 20 MG/1
20 TABLET, COATED ORAL NIGHTLY
Status: DISCONTINUED | OUTPATIENT
Start: 2023-05-26 | End: 2023-05-29 | Stop reason: HOSPADM

## 2023-05-26 RX ORDER — POLYETHYLENE GLYCOL 3350 17 G/17G
17 POWDER, FOR SOLUTION ORAL 2 TIMES DAILY
Status: DISCONTINUED | OUTPATIENT
Start: 2023-05-26 | End: 2023-05-29 | Stop reason: HOSPADM

## 2023-05-26 RX ORDER — ENOXAPARIN SODIUM 100 MG/ML
40 INJECTION SUBCUTANEOUS NIGHTLY
Status: DISCONTINUED | OUTPATIENT
Start: 2023-05-26 | End: 2023-05-29 | Stop reason: HOSPADM

## 2023-05-26 RX ORDER — INSULIN LISPRO 100 [IU]/ML
2-9 INJECTION, SOLUTION INTRAVENOUS; SUBCUTANEOUS
Status: DISCONTINUED | OUTPATIENT
Start: 2023-05-26 | End: 2023-05-29 | Stop reason: HOSPADM

## 2023-05-26 RX ORDER — SODIUM CHLORIDE 0.9 % (FLUSH) 0.9 %
10 SYRINGE (ML) INJECTION AS NEEDED
Status: DISCONTINUED | OUTPATIENT
Start: 2023-05-26 | End: 2023-05-29 | Stop reason: HOSPADM

## 2023-05-26 RX ORDER — FAMOTIDINE 20 MG/1
20 TABLET, FILM COATED ORAL
Status: DISCONTINUED | OUTPATIENT
Start: 2023-05-26 | End: 2023-05-29 | Stop reason: HOSPADM

## 2023-05-26 RX ORDER — FLUTICASONE PROPIONATE 110 UG/1
2 AEROSOL, METERED RESPIRATORY (INHALATION)
Status: DISCONTINUED | OUTPATIENT
Start: 2023-05-26 | End: 2023-05-29 | Stop reason: HOSPADM

## 2023-05-26 RX ORDER — DILTIAZEM HYDROCHLORIDE 240 MG/1
240 CAPSULE, COATED, EXTENDED RELEASE ORAL
Status: DISCONTINUED | OUTPATIENT
Start: 2023-05-27 | End: 2023-05-26

## 2023-05-26 RX ORDER — ALBUTEROL SULFATE 2.5 MG/3ML
2.5 SOLUTION RESPIRATORY (INHALATION) EVERY 6 HOURS PRN
Status: DISCONTINUED | OUTPATIENT
Start: 2023-05-26 | End: 2023-05-29 | Stop reason: HOSPADM

## 2023-05-26 RX ORDER — ONDANSETRON 2 MG/ML
4 INJECTION INTRAMUSCULAR; INTRAVENOUS EVERY 6 HOURS PRN
Status: DISCONTINUED | OUTPATIENT
Start: 2023-05-26 | End: 2023-05-29 | Stop reason: HOSPADM

## 2023-05-26 RX ORDER — SERTRALINE HYDROCHLORIDE 25 MG/1
50 TABLET, FILM COATED ORAL DAILY
Status: DISCONTINUED | OUTPATIENT
Start: 2023-05-26 | End: 2023-05-29 | Stop reason: HOSPADM

## 2023-05-26 RX ORDER — AMOXICILLIN 250 MG
1 CAPSULE ORAL 2 TIMES DAILY
Status: DISCONTINUED | OUTPATIENT
Start: 2023-05-26 | End: 2023-05-29 | Stop reason: HOSPADM

## 2023-05-26 RX ADMIN — MONTELUKAST 10 MG: 10 TABLET, FILM COATED ORAL at 20:44

## 2023-05-26 RX ADMIN — Medication 10 ML: at 20:47

## 2023-05-26 RX ADMIN — FAMOTIDINE 20 MG: 10 INJECTION INTRAVENOUS at 13:08

## 2023-05-26 RX ADMIN — ROSUVASTATIN CALCIUM 20 MG: 20 TABLET, FILM COATED ORAL at 20:44

## 2023-05-26 RX ADMIN — METOPROLOL SUCCINATE 25 MG: 25 TABLET, EXTENDED RELEASE ORAL at 20:44

## 2023-05-26 RX ADMIN — SERTRALINE HYDROCHLORIDE 50 MG: 50 TABLET ORAL at 20:44

## 2023-05-26 RX ADMIN — SODIUM CHLORIDE 100 ML/HR: 9 INJECTION, SOLUTION INTRAVENOUS at 20:47

## 2023-05-26 RX ADMIN — ONDANSETRON 4 MG: 2 INJECTION INTRAMUSCULAR; INTRAVENOUS at 13:07

## 2023-05-26 RX ADMIN — FLUTICASONE PROPIONATE 2 SPRAY: 50 SPRAY, METERED NASAL at 20:45

## 2023-05-26 RX ADMIN — ENOXAPARIN SODIUM 40 MG: 100 INJECTION SUBCUTANEOUS at 20:45

## 2023-05-26 RX ADMIN — MEMANTINE 10 MG: 10 TABLET ORAL at 20:44

## 2023-05-26 RX ADMIN — TRAZODONE HYDROCHLORIDE 25 MG: 50 TABLET ORAL at 20:44

## 2023-05-26 RX ADMIN — DILTIAZEM HYDROCHLORIDE 240 MG: 240 CAPSULE, COATED, EXTENDED RELEASE ORAL at 20:44

## 2023-05-26 RX ADMIN — POLYETHYLENE GLYCOL 3350 17 G: 17 POWDER, FOR SOLUTION ORAL at 20:45

## 2023-05-26 RX ADMIN — FAMOTIDINE 20 MG: 20 TABLET ORAL at 20:44

## 2023-05-26 RX ADMIN — Medication 5 MG: at 20:43

## 2023-05-26 RX ADMIN — DOCUSATE SODIUM 50MG AND SENNOSIDES 8.6MG 1 TABLET: 8.6; 5 TABLET, FILM COATED ORAL at 20:44

## 2023-05-26 NOTE — H&P
[facility]   HOSPITALIST HISTORY AND PHYSICAL    Date of admission: 5/26/2023  Patient Name: Tita Jiménez   1935  Primary Care Physician:  Madison Ortiz APRN    Subjective     Chief Complaint   Patient presents with   • Chest Pain        HPI:  88-year-old female history of ulcerative colitis and with recent discharge after treatment for COVID-19 infection and an acute CVA with left-sided deficits clear discharge [facility].  Patient states he been doing okay at discharge 2 nights ago however in the evening after having some hot chocolate she developed nausea and episode of vomiting.  Yesterday she continued to have nausea with a few more episodes of vomiting.  She was able to keep food down and did have appetite but then noted that the food would come up shortly thereafter.  She notes some chest tightness that has occurred after the episodes of vomiting.  She attributes this to anxiety.  Denies any current chest pain/pressure/numbness or tingling/jaw pain or acute shortness of air.  She has not had a bowel movement in about 4 days and feels constipated.  Denies any prior cardiac history.    Family was present earlier but not at time of my evaluation patient history somewhat limited given some level of confusion/suspect underlying dementia.  Patient had previously been on assisted living facility prior to her most recent hospitalization and discharged to [facility].      [facility] notable for:       Active Ambulatory Problems     Diagnosis Date Noted   • Compression fracture of T1 -T2 vertebra (CMS/HCC) 10/18/2019   • Fall 10/18/2019   • Contusion of scalp 10/18/2019   • Closed head injury 10/18/2019   • Asthma 10/18/2019   • Depression 10/18/2019   • Reflux esophagitis 09/23/2021   • Bladder incontinence 09/23/2021   • Bowel incontinence 09/23/2021   • High cholesterol 09/23/2021   • Diabetes mellitus, type II (HCC) 09/23/2021   • Hypertension 09/23/2021   • Ulcerative colitis ([facility])  09/23/2021   • GERD (gastroesophageal reflux disease) 09/23/2021   • Benign essential tremor 09/23/2021   • Carpal tunnel syndrome    • Ataxia 09/23/2021   • Breast cancer screening 09/23/2021   • Hypoxia 10/20/2021   • Cytokine release syndrome, grade 1 11/01/2021   • Pneumonia due to COVID-19 virus 11/18/2021   • Weakness 01/19/2022   • Cognitive impairment 01/19/2022   • Difficulty walking 01/19/2022   • At risk for venous thromboembolism (VTE) 11/02/2021   • Lower abdominal pain 01/28/2022   • Frontal lobe and executive function deficit following cerebral infarction 03/03/2022   • Dehydration 05/13/2023     Resolved Ambulatory Problems     Diagnosis Date Noted   • HTN (hypertension) 10/18/2019     Past Medical History:   Diagnosis Date   • Contusion    • Diabetes mellitus    • Hyperlipidemia    • Mitral valve problem    • Polymyalgia rheumatica    • Stroke    • Urinary incontinence        Past Surgical History:   Procedure Laterality Date   • ADENOIDECTOMY     • APPENDECTOMY     • BREAST SURGERY      ( L4-L5)   • COLON SURGERY      colon resection   • HYSTERECTOMY     • OOPHORECTOMY     • TONSILLECTOMY          History reviewed. No pertinent family history.    Social History     Socioeconomic History   • Marital status:    Tobacco Use   • Smoking status: Former     Types: Cigarettes     Passive exposure: Past   • Smokeless tobacco: Never   Vaping Use   • Vaping Use: Never used   Substance and Sexual Activity   • Alcohol use: No   • Drug use: No   • Sexual activity: Not Currently         Objective     Vitals:   Temp:  [97.6 °F (36.4 °C)] 97.6 °F (36.4 °C)  Heart Rate:  [] 93  Resp:  [18] 18  BP: ()/(54-85) 167/85    Physical Exam   Awake conversant pleasant   Ctab, no wrr, some sinus congestion  Mildly elevated rate, regular, no le edema  Abdomen obese, not acutely tender, no guarding/renetta, +BS, soft  Alert to self, hospital, basic condition and question, some short term memory and higher level  questioning difficulties  Persistent lue weakness and mild left lower facial     Result Review    Vital signs, labs and any relevant imaging reviewed.     ekg with rbb sinus, no acute st elevation, similar to prior   cxr personally reviewed appears improved compared to recent  Assessment / Plan     Atypical chest pain, ACS rule out  Nausea vomiting suspect 2/2 constipation vs viral ge   Recent CVA with residual left-sided deficits  Recent COVID19 infection   IDDM 2  Hypertension  Morbid obesity  History of ulcerative colitis  Hyperlipidemia  Depression/anxiety  Dementia/memory impairment  GERD    monitor on telemetry, trend troponin, essentially flat thus far and symptoms atypical, received 325mg aspirin on route, will continue 81 mg daily for now and recent medications for patient's stroke.   continue statin, plavix (pt on aspirin/plavix from recent discharge regarding her CVA)  Ct head on admission showing prior right mca cva, no acute findings  cont home metoprolol, is also on diltiazem at night unclear if this is for other indication.  No history of A-fib noted  We will check a 2D echo as apparently did not have on with recent CVA.  If abnormal left rising troponin will consider cardiology consult  Check KUB, check nausea and vomiting setting of several days of constipation.  Start on scheduled bowel regimen.  As needed antiemetics  increase pt's famotidine dosing - had previously been on pantoprazole last admission may need to resume if higher dose faomtidine inadequate   Borderline HARPAL, give IV fluids, has had decreased p.o. setting of above  Continue covid isolation for 10 days from 5/17 positive testing, deisolate after or as per infection control   Mild hypercalcemia suspect in setting of dehydration, reassess after IV fluids/normal saline in a.m. we will also hold calcium supplements  Needed PT and OT, still with residual left-sided stroke deficits, no acute symptoms noted.  Awaiting return to [facility] if  otherwise stable tomorrow.  As needed inhalers for recent COVID, chest x-ray reviewed showing improvement from prior no acute infiltrate noted.  Continue on treatment for diabetes with insulin/ssi  cont memantine and psych meds for dementia/mood  Add flonase/cetirizine for sinus congestion/complaints    -discussed initial management and workup with ED provider who requested admission primarily for chest pain r/o concerns and elevated heart score of ~6  -based on the above problems patient warrants hospitalization for continued evaluation, treatment and monitoring - will admit under observation overnight     DVT prophylaxis:  Medical DVT prophylaxis orders are present.  Code: dnr/dni as per recent hospitalization as well     CBC        5/22/2023    04:17 5/24/2023    04:00 5/26/2023    10:08 5/26/2023    11:39   CBC   WBC 6.49   5.59   8.33   7.77     RBC 4.63   4.52   4.80   4.88     Hemoglobin 12.5   12.1   12.9   13.0     Hematocrit 38.5   36.7   39.1   40.7     MCV 83.2   81.2   81.5   83.4     MCH 27.0   26.8   26.9   26.6     MCHC 32.5   33.0   33.0   31.9     RDW 14.7   14.8   14.8   14.9     Platelets 184   208   247   246         CMP        5/22/2023    04:17 5/24/2023    04:00 5/26/2023    10:08 5/26/2023    11:39   CMP   Glucose 158   133   189   153     BUN 17   15   17   17     Creatinine 0.80   0.74   1.03   0.96     EGFR 71.4   78.4   52.7   57.4     Sodium 134   136   139   136     Potassium 4.1   4.0   4.2   4.8     Chloride 101   103   99   99     Calcium 9.5   9.8   11.2   11.0     Total Protein  5.7   6.5   6.7     Albumin  3.0   3.4   3.1     Globulin  2.7   3.1   3.6     Total Bilirubin  0.5   0.4   0.4     Alkaline Phosphatase  96   123   124     AST (SGOT)  10   17   31     ALT (SGPT)  17   27   29     Albumin/Globulin Ratio  1.1   1.1   0.9     BUN/Creatinine Ratio 21.3   20.3   16.5   17.7     Anion Gap 8.1   9.2   11.9   12.5

## 2023-05-26 NOTE — ED PROVIDER NOTES
Time: 1:03 PM EDT  Date of encounter:  5/26/2023  Independent Historian/Clinical History and Information was obtained by:   Family/ sister   Chief Complaint: Vomiting    History is limited by: Altered Mental Status    History of Present Illness:  Patient is a 88 y.o. year old female who presents to the emergency department for evaluation of vomiting.  The patient was recently hospitalized the first week of May to the hospital for acute stroke and COVID.  The patient was discharged from the hospital 3 days ago and transferred to [facility] for rehab.  The patient was previously in assisted living.  The patient began vomiting last night.  It seemed to improve.  There is no reports of coffee-ground emesis or hematic emesis.  There is no report of fever.  The sister at bedside states that the patient's had intermittent confusion but has had that since hospitalization but feels that maybe it is slightly worse than normal.  Her stroke is left her with left-sided hemiparesis.  The patient complained of chest pain to the [facility] staff this morning.  She denies that to me but states her chest was tight.  The sister contributes that she does have asthma.  The patient currently complains of nausea.  She denies abdominal pain.  There is no complaints of rash.  There is no complaints of diarrhea.  The patient has no urinary complaints.  The patient has no headache.      HPI    Patient Care Team  Primary Care Provider: Madison Ortiz APRN    Past Medical History:     Allergies   Allergen Reactions   • Paxil [Paroxetine Hcl] Palpitations   • Aricept [Donepezil Hcl] Other (See Comments)     Nightmares   • Levaquin [Levofloxacin] Diarrhea   • Reglan [Metoclopramide] Other (See Comments)     Jitters   • Statins Myalgia     Pravachol, Lipitor, Livalo     Past Medical History:   Diagnosis Date   • Asthma    • Benign essential tremor 9/23/2021   • Carpal tunnel syndrome    • Contusion     small on scalp. skin intact. D/T  fall on 10/18/2019   • Depression    • Diabetes mellitus    • GERD (gastroesophageal reflux disease) 9/23/2021   • HTN (hypertension) 10/18/2019   • Hyperlipidemia    • Hypertension    • Mitral valve problem    • Polymyalgia rheumatica    • Stroke     short term memory loss   • Ulcerative colitis 9/23/2021   • Urinary incontinence      Past Surgical History:   Procedure Laterality Date   • ADENOIDECTOMY     • APPENDECTOMY     • BREAST SURGERY      ( L4-L5)   • COLON SURGERY      colon resection   • HYSTERECTOMY     • OOPHORECTOMY     • TONSILLECTOMY       History reviewed. No pertinent family history.    Home Medications:  Prior to Admission medications    Medication Sig Start Date End Date Taking? Authorizing Provider   albuterol sulfate  (90 Base) MCG/ACT inhaler Inhale 2 puffs Every 4 (Four) Hours As Needed for Wheezing. PRN 3/7/22   Angela Doherty MD   aspirin 81 MG EC tablet Take 1 tablet by mouth Daily. After your stroke, take both aspirin and Plavix daily for 21 days; then just continue with Plavix 75 mg daily thereafter. 5/23/23   Jose Luis Moran PA   atorvastatin (LIPITOR) 20 MG tablet Take 1 tablet by mouth every night at bedtime. 3/7/22   Angela Doherty MD   calcium carb-cholecalciferol (Calcium + D3) 600-800 MG-UNIT tablet Take 1 tablet by mouth Daily. 3/7/22   Angela Doherty MD   clopidogrel (PLAVIX) 75 MG tablet Take 1 tablet by mouth Daily. After your stroke take both aspirin and Plavix daily for 21 days; then just continue with Plavix 75 mg daily thereafter. 5/23/23   Jose Luis Moran PA   dapagliflozin Propanediol (Farxiga) 10 MG tablet Take 10 mg by mouth Daily.    Provider, MD Awais   dilTIAZem CD (CARDIZEM CD) 240 MG 24 hr capsule Take 1 capsule by mouth every night at bedtime. 3/7/22   Anglea Doherty MD   famotidine (PEPCID) 10 MG tablet Take 1 tablet by mouth Every Morning Before Breakfast. 3/7/22   Angela Doherty MD   Insulin Glargine (Lantus SoloStar) 100 UNIT/ML  "injection pen Inject 28-30 Units under the skin into the appropriate area as directed 2 (Two) Times a Day.    Awais Stephens MD   Januvia 50 MG tablet Take 1 tablet by mouth every night at bedtime. 4/24/23   Awais Stephens MD   melatonin 5 MG tablet tablet Take 1 tablet by mouth Every Night. 5/23/23   Jose Luis Moran PA   memantine (Namenda) 10 MG tablet Take 1 tablet by mouth 2 (Two) Times a Day. 3/3/22   Angela Doherty MD   metoprolol succinate XL (TOPROL-XL) 25 MG 24 hr tablet Take 1 tablet by mouth Daily. 3/7/22   Angela Doherty MD   mometasone-formoterol (DULERA 200) 200-5 MCG/ACT inhaler Inhale 2 puffs 2 (Two) Times a Day.    Awais Stephens MD   montelukast (SINGULAIR) 10 MG tablet Take 1 tablet by mouth Every Night. 3/7/22   Angela Doherty MD   multivitamin with minerals (Womens 50+ Multi Vitamin/Min) tablet tablet Take 1 tablet by mouth Daily.    Awais Stephens MD   polycarbophil 625 MG tablet tablet Take 1 tablet by mouth 2 (Two) Times a Day.    Awais Stephens MD   sertraline (ZOLOFT) 50 MG tablet Take 1 tablet by mouth Daily. 4/17/23   Awais Stephens MD   traZODone (DESYREL) 50 MG tablet Take 0.5 tablets by mouth Every Night. 5/23/23   Jose Luis Moran PA        Social History:   Social History     Tobacco Use   • Smoking status: Former     Types: Cigarettes     Passive exposure: Past   • Smokeless tobacco: Never   Vaping Use   • Vaping Use: Never used   Substance Use Topics   • Alcohol use: No   • Drug use: No         Review of Systems:  Review of Systems   Reason unable to perform ROS: A complete thorough review of systems cannot be performed secondary the patient's somnolence.  She will answer some questions appropriately and then falls asleep.        Physical Exam:  /62 (BP Location: Right arm, Patient Position: Lying)   Pulse 66   Temp 97.5 °F (36.4 °C) (Oral)   Resp 20   Ht 162.6 cm (64\")   Wt 83 kg (182 lb 15.7 oz)   SpO2 93%   BMI 31.41 " kg/m²     Physical Exam  Vitals and nursing note reviewed.   Constitutional:       General: She is not in acute distress.     Appearance: Normal appearance. She is ill-appearing. She is not toxic-appearing or diaphoretic.      Comments: Somnolent   HENT:      Head: Normocephalic and atraumatic.      Mouth/Throat:      Mouth: Mucous membranes are moist.   Eyes:      Pupils: Pupils are equal, round, and reactive to light.   Cardiovascular:      Rate and Rhythm: Regular rhythm. Tachycardia present.      Pulses: Normal pulses.           Carotid pulses are 2+ on the right side and 2+ on the left side.       Radial pulses are 2+ on the right side and 2+ on the left side.        Femoral pulses are 2+ on the right side and 2+ on the left side.       Popliteal pulses are 2+ on the right side and 2+ on the left side.        Dorsalis pedis pulses are 2+ on the right side and 2+ on the left side.        Posterior tibial pulses are 2+ on the right side and 2+ on the left side.      Heart sounds: Murmur heard.   Pulmonary:      Effort: Pulmonary effort is normal. No tachypnea, bradypnea, accessory muscle usage, respiratory distress or retractions.      Breath sounds: Examination of the right-upper field reveals decreased breath sounds. Examination of the left-upper field reveals decreased breath sounds. Examination of the right-middle field reveals decreased breath sounds. Examination of the left-middle field reveals decreased breath sounds. Examination of the right-lower field reveals decreased breath sounds. Examination of the left-lower field reveals decreased breath sounds. Decreased breath sounds present. No wheezing, rhonchi or rales.   Chest:      Chest wall: No mass or tenderness.   Abdominal:      General: Abdomen is flat. There is no distension or abdominal bruit.      Palpations: Abdomen is soft. There is no mass or pulsatile mass.      Tenderness: There is no abdominal tenderness. There is no right CVA tenderness,  left CVA tenderness, guarding or rebound.      Comments: No rigidity   Musculoskeletal:         General: No swelling, tenderness or deformity.      Cervical back: Neck supple. No tenderness.      Right lower leg: No tenderness. No edema.      Left lower leg: No tenderness. No edema.   Skin:     General: Skin is warm and dry.      Capillary Refill: Capillary refill takes less than 2 seconds.      Coloration: Skin is not jaundiced or pale.      Findings: Ecchymosis present. No erythema.      Comments: The patient is patchy areas of ecchymosis on the stomach.  The sister contributes that this is from insulin shots.  She is unsure whether the patient was given Lovenox injections while in the hospital   Neurological:      Mental Status: Mental status is at baseline. She is disoriented.      Cranial Nerves: Facial asymmetry present.      Sensory: Sensory deficit present.      Motor: Weakness present.      Comments: The patient is somnolent.  She will wake up and talk to you,.  She will answer most questions appropriately then falls asleep before completing the answer.      A complete neurological exam cannot be performed secondary to somnolence    The patient has complete left-sided hemiparesis    He also has right-sided weakness at this time but appears to be to somnolence   Psychiatric:         Mood and Affect: Mood normal.         Behavior: Behavior normal.                  Procedures:  Procedures      Medical Decision Making:      Comorbidities that affect care:    Diabetes, hyperlipidemia, stroke, mitral valve prolapse, hypertension, asthma, polymyalgia rheumatica, GERD    External Notes reviewed:    None      The following orders were placed and all results were independently analyzed by me:  Orders Placed This Encounter   Procedures   • XR Chest 1 View   • CT Head Without Contrast   • XR Abdomen KUB   • [place] Draw   • Comprehensive Metabolic Panel   • Lipase   • Urinalysis With Microscopic If Indicated (No  Culture) - Urine, Clean Catch   • Lactic Acid, Plasma   • High Sensitivity Troponin T   • BNP   • Magnesium   • CBC Auto Differential   • High Sensitivity Troponin T 2Hr   • High Sensitivity Troponin T   • Albumin   • CBC Auto Differential   • CBC Auto Differential   • Undress & Gown   • Undress & Gown   • Continuous Pulse Oximetry   • Please perform rectal temperature and notify physician  Nursing Communication   • Notify Provider (With Default Parameters)   • Activity - Ad Mary Jane   • Up in Chair   • Weigh Patient   • Saline Lock & Maintain IV Access   • Advance Diet As Tolerated -   • Soap Suds Enema   • Please give a suppository this afternoon if patient has not had a bowel movement today  Nursing Communication   • Discontinue Cardiac Monitoring   • Patient Isolation Enhanced Droplet / Contact   • PT Plan of Care Cert / Re-Cert   • POC Glucose Once   • POC Glucose Once   • POC Glucose Once   • POC Glucose Once   • POC Glucose Once   • POC Glucose Once   • POC Glucose Once   • POC Glucose Once   • POC Glucose Once   • POC Glucose Once   • POC Glucose Once   • ECG 12 Lead ED Triage Standing Order; Chest Pain   • SCANNED - TELEMETRY     • SCANNED - TELEMETRY     • SCANNED - TELEMETRY     • SCANNED - TELEMETRY     • Adult Transthoracic Echo Complete W/ Cont if Necessary Per Protocol   • Initiate Observation Status   • Transfer Patient   • Discharge patient   • CBC & Differential   • Green Top (Gel)   • Lavender Top   • Gold Top - SST   • Light Blue Top       Medications Given in the Emergency Department:  Medications   sodium chloride 0.9 % infusion (0 mL/hr Intravenous Stopped 5/27/23 1823)   bisacodyl (DULCOLAX) suppository 10 mg (10 mg Rectal Not Given 5/27/23 1149)   ondansetron (ZOFRAN) injection 4 mg (4 mg Intravenous Given 5/26/23 1307)   famotidine (PEPCID) injection 20 mg (20 mg Intravenous Given 5/26/23 1308)   potassium & sodium phosphates (PHOS-NAK) oral packet (1 packet Oral Given 5/28/23 1124)        ED  Course:    ED Course as of 06/01/23 0219   Fri May 26, 2023   1130 EKG:    Rhythm: Sinus tachycardia  Rate: 103  Right bundle branch block  Intervals: Normal LA and QT interval  T-wave: T wave inversion V2, V3, III, nonspecific T wave flattening in aVF  ST Segment: Nonspecific ST segment V2, V3, III, aVF    EKG Comparison: No obvious acute ST changes from the EKG performed May 16, 2020    Interpreted by me   [SD]   1413 EKG:    Rhythm: Normal sinus rhythm  Rate: 90  Right bundle branch block  Intervals: Normal LA and QT interval  T-wave: T wave inversion V2, V3, III, nonspecific T wave flattening  ST Segment: Nonspecific ST segment V2, V3, III, aVF    EKG Comparison: No significant change in the QRS and ST morphology from EKG that was performed earlier in the department    Interpreted by me   [SD]      ED Course User Index  [SD] Erich Reynolds DO       Labs:    Lab Results (last 24 hours)     Procedure Component Value Units Date/Time    Urine Culture [[id number]] Collected: 05/31/23 1005    Specimen: Urine, Clean Catch Updated: 05/31/23 1659    Urinalysis With / Microscopic If Indicated [[id number]]  (Abnormal) Collected: 05/31/23 1005    Specimen: Urine, Clean Catch Updated: 05/31/23 1723     Color, UA Yellow     Appearance, UA Turbid     pH, UA 5.5     Specific Gravity, UA >1.030     Glucose, UA >=1000 mg/dL (3+)     Ketones, UA Negative     Bilirubin, UA Negative     Blood, UA Moderate (2+)     Protein, UA 30 mg/dL (1+)     Leuk Esterase, UA Moderate (2+)     Nitrite, UA Negative     Urobilinogen, UA 0.2 E.U./dL    Urinalysis, Microscopic Only - Urine, Clean Catch [[id number]]  (Abnormal) Collected: 05/31/23 1005    Specimen: Urine, Clean Catch Updated: 05/31/23 1723     RBC, UA 0-2 /HPF      WBC, UA Too Numerous to Count /HPF      Bacteria, UA 4+ /HPF      Squamous Epithelial Cells, UA 0-2 /HPF      Hyaline Casts, UA None Seen /LPF      WBC Clumps, UA Moderate/2+ /HPF      Methodology Manual Light Microscopy            Imaging:    No Radiology Exams Resulted Within Past 24 Hours      Differential Diagnosis and Discussion:    Vomiting: Differential diagnosis includes but is not limited to migraine, labyrinthine disorders, psychogenic, metabolic and endocrine causes, peptic ulcer, gastric outlet obstruction, gastritis, gastroenteritis, appendicitis, intestinal obstruction, paralytic ileus, food poisoning, cholecystitis, acute hepatitis, acute pancreatitis, acute febrile illness, and myocardial infarction.    All labs were reviewed and interpreted by me.  All X-rays impressions were independently interpreted by me.  EKG was interpreted by me.  CT scan radiology impression was interpreted by me.    MDM  Number of Diagnoses or Management Options  Chest discomfort  Nausea and vomiting, unspecified vomiting type  Diagnosis management comments: Vital signs were stable in the emergency room    The patient's CMP was reviewed and shows no abnormalities of critical concern.  Of note, the patient's sodium and potassium are acceptable.  The patient's liver enzymes are unremarkable.  The patient's renal function including creatinine is preserved.  The patient has a normal anion gap.    The patient's CBC was reviewed and shows no abnormalities of critical concern.  Of note, there is no anemia requiring a blood transfusion and the platelet count is acceptable    Patient had an EKG that demonstrated normal sinus rhythm with rate of 90.  There was a right bundle branch block.  There is no obvious acute myocardial injury or dysrhythmia    CT scan of the brain demonstrated a remote right MCA distribution infarct but there is no obvious acute    X-ray of the abdomen demonstrated no acute abnormality specifically no obvious acute obstruction      HEART Score for Major Cardiac Events - MDCalc  Calculated on May 26 2023 4:31 PM  6 points -> Moderate Score (4-6 points) Risk of MACE of 12-16.6%.  If troponin is positive, many experts recommend further  workup and admission even with a low HEART Score.    The patient has a moderate risk for cardiovascular event over the next 6 weeks..  I discussed this with the patient and family.  They are requesting admission to the hospital for further evaluation of the chest discomfort and vomiting       Amount and/or Complexity of Data Reviewed  Clinical lab tests: reviewed  Tests in the radiology section of CPT®: reviewed  Tests in the medicine section of CPT®: reviewed  Discuss the patient with other providers: yes (16:56 EDT  I discussed the case with the hospitalist.  We have discussed the patient's presenting symptoms, laboratory values, imaging and condition at the time of admission.  They will evaluate the patient in the emergency room and admit the patient to the hospital)               Social Determinants of Health:    Patient is a nursing home/assisted living resident and has reliable access to care.      Disposition and Care Coordination:    Admit:   Through independent evaluation of the patient's history, physical, and imperical data, the patient meets criteria for observation/admission to the hospital.        Final diagnoses:   Chest discomfort   Nausea and vomiting, unspecified vomiting type        ED Disposition     ED Disposition   Decision to Admit    Condition   --    Comment   Level of Care: Telemetry [5]   Diagnosis: Chest discomfort [[id number]]   Admitting Physician: TAYA CAMACHO [[id number]]   Attending Physician: TAYA CAMACHO [[id number]]               This medical record created using voice recognition software.           Erich Reynolds DO  06/01/23 0219

## 2023-05-27 ENCOUNTER — APPOINTMENT (OUTPATIENT)
Dept: CARDIOLOGY | Facility: HOSPITAL | Age: 88
End: 2023-05-27
Payer: MEDICARE

## 2023-05-27 LAB
ALBUMIN SERPL-MCNC: 2.8 G/DL (ref 3.5–5.2)
ANION GAP SERPL CALCULATED.3IONS-SCNC: 9.7 MMOL/L (ref 5–15)
BASOPHILS # BLD AUTO: 0.03 10*3/MM3 (ref 0–0.2)
BASOPHILS NFR BLD AUTO: 0.5 % (ref 0–1.5)
BUN SERPL-MCNC: 16 MG/DL (ref 8–23)
BUN/CREAT SERPL: 18 (ref 7–25)
CALCIUM SPEC-SCNC: 9.9 MG/DL (ref 8.6–10.5)
CHLORIDE SERPL-SCNC: 104 MMOL/L (ref 98–107)
CO2 SERPL-SCNC: 23.3 MMOL/L (ref 22–29)
CREAT SERPL-MCNC: 0.89 MG/DL (ref 0.57–1)
DEPRECATED RDW RBC AUTO: 46.5 FL (ref 37–54)
EGFRCR SERPLBLD CKD-EPI 2021: 62.8 ML/MIN/1.73
EOSINOPHIL # BLD AUTO: 0.15 10*3/MM3 (ref 0–0.4)
EOSINOPHIL NFR BLD AUTO: 2.5 % (ref 0.3–6.2)
ERYTHROCYTE [DISTWIDTH] IN BLOOD BY AUTOMATED COUNT: 14.8 % (ref 12.3–15.4)
GLUCOSE BLDC GLUCOMTR-MCNC: 119 MG/DL (ref 70–99)
GLUCOSE BLDC GLUCOMTR-MCNC: 129 MG/DL (ref 70–99)
GLUCOSE BLDC GLUCOMTR-MCNC: 149 MG/DL (ref 70–99)
GLUCOSE BLDC GLUCOMTR-MCNC: 185 MG/DL (ref 70–99)
GLUCOSE SERPL-MCNC: 139 MG/DL (ref 65–99)
HCT VFR BLD AUTO: 37.7 % (ref 34–46.6)
HGB BLD-MCNC: 11.8 G/DL (ref 12–15.9)
IMM GRANULOCYTES # BLD AUTO: 0.03 10*3/MM3 (ref 0–0.05)
IMM GRANULOCYTES NFR BLD AUTO: 0.5 % (ref 0–0.5)
LYMPHOCYTES # BLD AUTO: 1.29 10*3/MM3 (ref 0.7–3.1)
LYMPHOCYTES NFR BLD AUTO: 21.1 % (ref 19.6–45.3)
MAGNESIUM SERPL-MCNC: 1.9 MG/DL (ref 1.6–2.4)
MCH RBC QN AUTO: 26.9 PG (ref 26.6–33)
MCHC RBC AUTO-ENTMCNC: 31.3 G/DL (ref 31.5–35.7)
MCV RBC AUTO: 85.9 FL (ref 79–97)
MONOCYTES # BLD AUTO: 0.7 10*3/MM3 (ref 0.1–0.9)
MONOCYTES NFR BLD AUTO: 11.5 % (ref 5–12)
NEUTROPHILS NFR BLD AUTO: 3.9 10*3/MM3 (ref 1.7–7)
NEUTROPHILS NFR BLD AUTO: 63.9 % (ref 42.7–76)
NRBC BLD AUTO-RTO: 0 /100 WBC (ref 0–0.2)
PHOSPHATE SERPL-MCNC: 3.5 MG/DL (ref 2.5–4.5)
PLATELET # BLD AUTO: 223 10*3/MM3 (ref 140–450)
PMV BLD AUTO: 9.5 FL (ref 6–12)
POTASSIUM SERPL-SCNC: 3.8 MMOL/L (ref 3.5–5.2)
RBC # BLD AUTO: 4.39 10*6/MM3 (ref 3.77–5.28)
SODIUM SERPL-SCNC: 137 MMOL/L (ref 136–145)
WBC NRBC COR # BLD: 6.1 10*3/MM3 (ref 3.4–10.8)

## 2023-05-27 PROCEDURE — 96361 HYDRATE IV INFUSION ADD-ON: CPT

## 2023-05-27 PROCEDURE — 83735 ASSAY OF MAGNESIUM: CPT | Performed by: INTERNAL MEDICINE

## 2023-05-27 PROCEDURE — 93306 TTE W/DOPPLER COMPLETE: CPT | Performed by: INTERNAL MEDICINE

## 2023-05-27 PROCEDURE — 82948 REAGENT STRIP/BLOOD GLUCOSE: CPT

## 2023-05-27 PROCEDURE — G0378 HOSPITAL OBSERVATION PER HR: HCPCS

## 2023-05-27 PROCEDURE — 93306 TTE W/DOPPLER COMPLETE: CPT

## 2023-05-27 PROCEDURE — 94799 UNLISTED PULMONARY SVC/PX: CPT

## 2023-05-27 PROCEDURE — 94640 AIRWAY INHALATION TREATMENT: CPT

## 2023-05-27 PROCEDURE — 63710000001 INSULIN DETEMIR PER 5 UNITS: Performed by: INTERNAL MEDICINE

## 2023-05-27 PROCEDURE — 25010000002 ENOXAPARIN PER 10 MG: Performed by: INTERNAL MEDICINE

## 2023-05-27 PROCEDURE — 80069 RENAL FUNCTION PANEL: CPT | Performed by: INTERNAL MEDICINE

## 2023-05-27 PROCEDURE — 85025 COMPLETE CBC W/AUTO DIFF WBC: CPT | Performed by: INTERNAL MEDICINE

## 2023-05-27 PROCEDURE — 63710000001 INSULIN LISPRO (HUMAN) PER 5 UNITS: Performed by: INTERNAL MEDICINE

## 2023-05-27 PROCEDURE — 97161 PT EVAL LOW COMPLEX 20 MIN: CPT

## 2023-05-27 PROCEDURE — 96372 THER/PROPH/DIAG INJ SC/IM: CPT

## 2023-05-27 RX ORDER — BISACODYL 10 MG
10 SUPPOSITORY, RECTAL RECTAL DAILY
Status: ACTIVE | OUTPATIENT
Start: 2023-05-27 | End: 2023-05-28

## 2023-05-27 RX ADMIN — DILTIAZEM HYDROCHLORIDE 240 MG: 240 CAPSULE, COATED, EXTENDED RELEASE ORAL at 20:37

## 2023-05-27 RX ADMIN — CETIRIZINE HYDROCHLORIDE 10 MG: 10 TABLET, FILM COATED ORAL at 09:19

## 2023-05-27 RX ADMIN — INSULIN DETEMIR 10 UNITS: 100 INJECTION, SOLUTION SUBCUTANEOUS at 09:18

## 2023-05-27 RX ADMIN — FLUTICASONE PROPIONATE 2 SPRAY: 50 SPRAY, METERED NASAL at 09:20

## 2023-05-27 RX ADMIN — MEMANTINE 10 MG: 10 TABLET ORAL at 20:36

## 2023-05-27 RX ADMIN — ASPIRIN 81 MG 81 MG: 81 TABLET ORAL at 09:19

## 2023-05-27 RX ADMIN — Medication 5 MG: at 20:36

## 2023-05-27 RX ADMIN — DOCUSATE SODIUM 50MG AND SENNOSIDES 8.6MG 1 TABLET: 8.6; 5 TABLET, FILM COATED ORAL at 09:19

## 2023-05-27 RX ADMIN — TRAZODONE HYDROCHLORIDE 25 MG: 50 TABLET ORAL at 20:36

## 2023-05-27 RX ADMIN — METOPROLOL SUCCINATE 25 MG: 25 TABLET, EXTENDED RELEASE ORAL at 09:19

## 2023-05-27 RX ADMIN — POLYETHYLENE GLYCOL 3350 17 G: 17 POWDER, FOR SOLUTION ORAL at 20:35

## 2023-05-27 RX ADMIN — FLUTICASONE PROPIONATE 2 PUFF: 110 AEROSOL, METERED RESPIRATORY (INHALATION) at 10:37

## 2023-05-27 RX ADMIN — SODIUM CHLORIDE 100 ML/HR: 9 INJECTION, SOLUTION INTRAVENOUS at 05:08

## 2023-05-27 RX ADMIN — FAMOTIDINE 20 MG: 20 TABLET ORAL at 09:19

## 2023-05-27 RX ADMIN — Medication 10 ML: at 20:36

## 2023-05-27 RX ADMIN — SERTRALINE HYDROCHLORIDE 50 MG: 50 TABLET ORAL at 09:19

## 2023-05-27 RX ADMIN — MEMANTINE 10 MG: 10 TABLET ORAL at 09:19

## 2023-05-27 RX ADMIN — ROSUVASTATIN CALCIUM 20 MG: 20 TABLET, FILM COATED ORAL at 20:36

## 2023-05-27 RX ADMIN — INSULIN DETEMIR 10 UNITS: 100 INJECTION, SOLUTION SUBCUTANEOUS at 20:35

## 2023-05-27 RX ADMIN — CLOPIDOGREL BISULFATE 75 MG: 75 TABLET ORAL at 09:19

## 2023-05-27 RX ADMIN — ENOXAPARIN SODIUM 40 MG: 100 INJECTION SUBCUTANEOUS at 20:35

## 2023-05-27 RX ADMIN — ARFORMOTEROL TARTRATE 15 MCG: 15 SOLUTION RESPIRATORY (INHALATION) at 10:37

## 2023-05-27 RX ADMIN — FAMOTIDINE 20 MG: 20 TABLET ORAL at 18:49

## 2023-05-27 RX ADMIN — INSULIN LISPRO 2 UNITS: 100 INJECTION, SOLUTION INTRAVENOUS; SUBCUTANEOUS at 11:54

## 2023-05-27 RX ADMIN — FLUTICASONE PROPIONATE 2 PUFF: 110 AEROSOL, METERED RESPIRATORY (INHALATION) at 21:22

## 2023-05-27 RX ADMIN — MONTELUKAST 10 MG: 10 TABLET, FILM COATED ORAL at 20:36

## 2023-05-27 RX ADMIN — POLYETHYLENE GLYCOL 3350 17 G: 17 POWDER, FOR SOLUTION ORAL at 09:18

## 2023-05-27 RX ADMIN — Medication 10 ML: at 09:19

## 2023-05-27 RX ADMIN — ARFORMOTEROL TARTRATE 15 MCG: 15 SOLUTION RESPIRATORY (INHALATION) at 21:22

## 2023-05-27 NOTE — THERAPY EVALUATION
Acute Care - Physical Therapy Initial Evaluation   Griselda     Patient Name: Tita Jiménez  : 1935  MRN: [id number]  Today's Date: 2023      Visit Dx: Admit date: 2023     Referring Physician: Nestor Louie MD     Surgery Date:* No surgery found *            ICD-10-CM ICD-9-CM   1. Chest discomfort  R07.89 786.59   2. Nausea and vomiting, unspecified vomiting type  R11.2 787.01   3. Difficulty walking  R26.2 719.7     Patient Active Problem List   Diagnosis   • Compression fracture of T1 -T2 vertebra (CMS/HCC)   • Fall   • Contusion of scalp   • Closed head injury   • Asthma   • Depression   • Reflux esophagitis   • Bladder incontinence   • Bowel incontinence   • High cholesterol   • Diabetes mellitus, type II (HCC)   • Hypertension   • Ulcerative colitis (HCC)   • GERD (gastroesophageal reflux disease)   • Benign essential tremor   • Carpal tunnel syndrome   • Ataxia   • Breast cancer screening   • Hypoxia   • Cytokine release syndrome, grade 1   • Pneumonia due to COVID-19 virus   • Weakness   • Cognitive impairment   • Difficulty walking   • At risk for venous thromboembolism (VTE)   • Lower abdominal pain   • 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 skin intact. D/T fall on 10/18/2019   • Depression    • Diabetes mellitus    • GERD (gastroesophageal reflux disease) 2021   • HTN (hypertension) 10/18/2019   • Hyperlipidemia    • Hypertension    • Mitral valve problem    • Polymyalgia rheumatica    • Stroke     short term memory loss   • Ulcerative colitis 2021   • Urinary incontinence      Past Surgical History:   Procedure Laterality Date   • ADENOIDECTOMY     • APPENDECTOMY     • BREAST SURGERY      ( L4-L5)   • COLON SURGERY      colon resection   •  HYSTERECTOMY     • OOPHORECTOMY     • TONSILLECTOMY       PT Assessment (last 12 hours)     PT Evaluation and Treatment     Row Name 05/27/23 1011          Physical Therapy Time and Intention    Subjective Information complains of;weakness;fatigue  -DP     Document Type evaluation  -DP     Mode of Treatment individual therapy;physical therapy  -DP     Patient Effort good  -DP     Symptoms Noted During/After Treatment none  -DP     Row Name 05/27/23 1011          General Information    Patient Profile Reviewed yes  -DP     Patient Observations alert;cooperative;agree to therapy  -DP     Prior Level of Function independent:;gait;transfer;bed mobility;min assist:;ADL's  At baseline, patient was able to perform all mobility independently, but required assistance from facility staff for ADLs.  -DP     Equipment Currently Used at Home wheelchair;walker, rolling  -DP     Pertinent History of Current Functional Problem Patient had a CVA during last hospital admission that affected her left side.  -DP     Existing Precautions/Restrictions fall  -DP     Barriers to Rehab none identified  -DP     Row Name 05/27/23 1011          Living Environment    Current Living Arrangements assisted living facility  -DP     Home Accessibility wheelchair accessible  -DP     People in Home facility resident  -DP     Primary Care Provided by self;other (see comments)  facility staff  -DP     Row Name 05/27/23 1011          Range of Motion (ROM)    Range of Motion ROM is WFL;bilateral lower extremities  -DP     Row Name 05/27/23 1011          Strength (Manual Muscle Testing)    Strength (Manual Muscle Testing) bilateral lower extremities;left upper extremity  RLE: 3+/5, LLE: 2+/5, noted little to no movement in LUE  -DP     Row Name 05/27/23 1011          Bed Mobility    Bed Mobility rolling left;rolling right;scooting/bridging;supine-sit-supine  -DP     Rolling Left [place] (Bed Mobility) standby assist  -DP     Rolling Right [place]  (Bed Mobility) standby assist  -DP     Scooting/Bridging Person (Bed Mobility) maximum assist (25% patient effort);2 person assist  -DP     Supine-Sit-Supine Person (Bed Mobility) minimum assist (75% patient effort);moderate assist (50% patient effort);1 person assist  -DP     Bed Mobility, Safety Issues decreased use of arms for pushing/pulling;decreased use of legs for bridging/pushing;impaired trunk control for bed mobility  -DP     Assistive Device (Bed Mobility) bed rails;draw sheet;head of bed elevated  -DP     Row Name 05/27/23 1011          Transfers    Transfers --  Further transfers deferred due to poor static sitting balance  -DP     Row Name 05/27/23 1011          Safety Issues, Functional Mobility    Impairments Affecting Function (Mobility) balance;endurance/activity tolerance;motor control;strength  -DP     Row Name 05/27/23 1011          Balance    Balance Assessment sitting dynamic balance  -DP     Dynamic Sitting Balance minimal assist;moderate assist;1-person assist  -DP     Position, Sitting Balance supported;sitting edge of bed  -DP     Comment, Balance Patient was unable to hold herself upright sitting EOB without support. Patient kept wanting to lean backwards, despite verbal and tactile cues. Upon sitting EOB, we completed the MMT assessment, but patient was unable to get through the full ROM agaisnt gravity on her LLE.  -DP     Row Name 05/27/23 1011          Plan of Care Review    Plan of Care Reviewed With patient  -DP     Outcome Evaluation Patient presents with limitations including balance, strength, endurance, and mobility. She will benefit from physical therapy services while in the hospital. Patient noted she needs to be able to walk to the bathoom and back with a walker at the assisted living facility before returning. Upon discharge, it is recommended she goes to a rehab facility to further address the deficits listed above.  -DP     Row Name 05/27/23 1011           Therapy Assessment/Plan (PT)    Rehab Potential (PT) good, to achieve stated therapy goals  -DP     Criteria for Skilled Interventions Met (PT) skilled treatment is necessary  -DP     Therapy Frequency (PT) daily  -DP     Predicted Duration of Therapy Intervention (PT) 10 days  -DP     Problem List (PT) problems related to;balance;mobility;motor control;strength  -DP     Activity Limitations Related to Problem List (PT) unable to transfer safely  -DP     Row Name 05/27/23 1011          PT Evaluation Complexity    History, PT Evaluation Complexity 1-2 personal factors and/or comorbidities  -DP     Examination of Body Systems (PT Eval Complexity) total of 4 or more elements  -DP     Clinical Presentation (PT Evaluation Complexity) stable  -DP     Clinical Decision Making (PT Evaluation Complexity) low complexity  -DP     Overall Complexity (PT Evaluation Complexity) low complexity  -DP     Row Name 05/27/23 1011          Therapy Plan Review/Discharge Plan (PT)    Therapy Plan Review (PT) evaluation/treatment results reviewed;patient  -DP     Row Name 05/27/23 1011          Physical Therapy Goals    Bed Mobility Goal Selection (PT) bed mobility, PT goal 1  -DP     Transfer Goal Selection (PT) transfer, PT goal 1  -DP     Gait Training Goal Selection (PT) gait training, PT goal 1  -DP     Row Name 05/27/23 1011          Bed Mobility Goal 1 (PT)    Activity/Assistive Device (Bed Mobility Goal 1, PT) bed mobility activities, all  -DP     [place] Level/Cues Needed (Bed Mobility Goal 1, PT) independent  -DP     Time Frame (Bed Mobility Goal 1, PT) long term goal (LTG);10 days  -DP     Row Name 05/27/23 1011          Transfer Goal 1 (PT)    Activity/Assistive Device (Transfer Goal 1, PT) transfers, all;walker, rolling  -DP     [place] Level/Cues Needed (Transfer Goal 1, PT) contact guard required  -DP     Time Frame (Transfer Goal 1, PT) long term goal (LTG);10 days  -DP     Row Name 05/27/23 1011          Gait  Training Goal 1 (PT)    Activity/Assistive Device (Gait Training Goal 1, PT) gait (walking locomotion);assistive device use;improve balance and speed;increase endurance/gait distance;walker, rolling  -DP     [place] Level (Gait Training Goal 1, PT) contact guard required  -DP     Distance (Gait Training Goal 1, PT) 30  -DP     Time Frame (Gait Training Goal 1, PT) long term goal (LTG);10 days  -DP           User Key  (r) = Recorded By, (t) = Taken By, (c) = Cosigned By    Initials Name Provider Type    DP Luis Alberto Centeno, PT Physical Therapist                  PT Recommendation and Plan  Anticipated Discharge Disposition (PT): inpatient rehabilitation facility  Planned Therapy Interventions (PT): balance training, bed mobility training, gait training, motor coordination training, neuromuscular re-education, strengthening, transfer training  Therapy Frequency (PT): daily  Plan of Care Reviewed With: patient  Outcome Evaluation: Patient presents with limitations including balance, strength, endurance, and mobility. She will benefit from physical therapy services while in the hospital. Patient noted she needs to be able to walk to the bathoom and back with a walker at the assisted living facility before returning. Upon discharge, it is recommended she goes to a rehab facility to further address the deficits listed above.   Outcome Measures     Row Name 05/27/23 1000             How much help from another person do you currently need...    Turning from your back to your side while in flat bed without using bedrails? 3  -DP      Moving from lying on back to sitting on the side of a flat bed without bedrails? 2  -DP      Moving to and from a bed to a chair (including a wheelchair)? 2  -DP      Standing up from a chair using your arms (e.g., wheelchair, bedside chair)? 2  -DP      Climbing 3-5 steps with a railing? 1  -DP      To walk in hospital room? 2  -DP      AM-PAC 6 Clicks Score (PT) 12  -DP         Functional  Assessment    Outcome Measure Options AM-PAC 6 Clicks Basic Mobility (PT)  -DP            User Key  (r) = Recorded By, (t) = Taken By, (c) = Cosigned By    Initials Name Provider Type    Luis Alberto Lombardo PT Physical Therapist                 Time Calculation:    PT Charges     Row Name 05/27/23 1025             Time Calculation    PT Received On 05/27/23  -DP      PT Goal Re-Cert Due Date 06/05/23  -DP         Untimed Charges    PT Eval/Re-eval Minutes 35  -DP         Total Minutes    Untimed Charges Total Minutes 35  -DP       Total Minutes 35  -DP            User Key  (r) = Recorded By, (t) = Taken By, (c) = Cosigned By    Initials Name Provider Type    Luis Alberto Lombardo PT Physical Therapist              Therapy Charges for Today     Code Description Service Date Service Provider Modifiers Qty    [id number] HC PT EVAL LOW COMPLEXITY 3 5/27/2023 Luis Alberto Centeno, PT GP 1          PT G-Codes  Outcome Measure Options: AM-PAC 6 Clicks Basic Mobility (PT)  AM-PAC 6 Clicks Score (PT): 12    Luis Alberto Centeno, PT  5/27/2023

## 2023-05-27 NOTE — PLAN OF CARE
Goal Outcome Evaluation:  Plan of Care Reviewed With: patient           Outcome Evaluation: Patient presents with limitations including balance, strength, endurance, and mobility. She will benefit from physical therapy services while in the hospital. Patient noted she needs to be able to walk to the bathoom and back with a walker at the assisted living facility before returning. Upon discharge, it is recommended she goes to a rehab facility to further address the deficits listed above.

## 2023-05-27 NOTE — PROGRESS NOTES
[facility]   Hospitalist Progress Note    Date of admission: 5/26/2023  Patient Name: Tita Jiménez  1935  Date: 5/27/2023      Subjective     Chief Complaint   Patient presents with   • Chest Pain       Summary: 88 y.o. history of ulcerative colitis and with recent discharge after treatment for COVID-19 infection and an acute CVA with left-sided deficits who had discharged to [facility] rehab a few days prior to admission who presents with complaints of chest tightness and ~2days of nausea vomiting and constipation.      Interval Followup: no aeon. Denies nausea or vomiting today. Is tired and did not sleep well. Pt's sister at bedside and updated. No bowel movement yet.  Denies any chest pain/tightness/pressure or acute symptoms.  Echo still pending      Objective     Vitals:   Temp:  [97.4 °F (36.3 °C)-98.4 °F (36.9 °C)] 97.4 °F (36.3 °C)  Heart Rate:  [] 67  Resp:  [12-22] 18  BP: (136-153)/(61-92) 145/72    Physical Exam  Sleeping, wakes easily, but tired, conversant  Ctab, no wrr  Rrr, no le pitting edema  abd soft nt nd +BS  Alert to self, basic questions, recognizes family  lue weakness 2/2 recent cva at baseline    Result Review:  Vital signs, labs and recent relevant imaging reviewed.      arformoterol, 15 mcg, Nebulization, BID - RT  aspirin, 81 mg, Oral, Daily  bisacodyl, 10 mg, Rectal, Daily  cetirizine, 10 mg, Oral, Daily  clopidogrel, 75 mg, Oral, Daily  dilTIAZem CD, 240 mg, Oral, Nightly  enoxaparin, 40 mg, Subcutaneous, Nightly  famotidine, 20 mg, Oral, BID AC  fluticasone, 2 spray, Each Nare, Daily  fluticasone, 2 puff, Inhalation, BID - RT  insulin detemir, 10 Units, Subcutaneous, Q12H  insulin lispro, 2-9 Units, Subcutaneous, 4x Daily AC & at Bedtime  melatonin, 5 mg, Oral, Nightly  memantine, 10 mg, Oral, BID  metoprolol succinate XL, 25 mg, Oral, Daily  montelukast, 10 mg, Oral, Nightly  polyethylene glycol, 17 g, Oral, BID  rosuvastatin, 20 mg, Oral,  Nightly  senna-docusate sodium, 1 tablet, Oral, BID  sertraline, 50 mg, Oral, Daily  sodium chloride, 10 mL, Intravenous, Q12H  traZODone, 25 mg, Oral, Nightly        •  acetaminophen  •  albuterol  •  aluminum-magnesium hydroxide-simethicone  •  bisacodyl  •  dextrose  •  dextrose  •  Diclofenac Sodium  •  glucagon (human recombinant)  •  hydrOXYzine  •  lidocaine  •  ondansetron  •  sodium chloride  •  sodium chloride      Assessment / Plan     Assessment/Plan:  Atypical chest pain, ACS rule out  Nausea vomiting suspect 2/2 constipation vs viral ge   Recent CVA with residual left-sided deficits  Recent COVID19 infection   IDDM 2  Hypertension  Morbid obesity  History of ulcerative colitis  Hyperlipidemia  Depression/anxiety  Dementia/memory impairment  GERD     • Rate stable on telemetry, trop trend essentially flat, no further symptoms  • F/u 2d echo, if acute abnormalities will ask cardiology to evaluate, but do not suspect acute acs at this time  • Continue aspirin, statin, plavix; monitor ekg/repeat troponin if any additional symptoms  ? Ct head on admission showing prior right mca cva, no acute findings  • cont home metoprolol, is also on diltiazem at night unclear if this is for other indication.  No history of A-fib noted  • Having some lethargy, suspect component of lack of sleep/recent covid/advanced age, monitor mentation   • kub personally reviewed - has notable stool burden  • Increase scheduled bowel regimen, give a suppository and given enema later if no response   • Prn antiemetics  • Continue diet as tolerated  • Iv fluids , encouraging diet as tolerated  • Famotidine bid - had previously been on pantoprazole last admission may need to resume if higher dose faomtidine inadequate   • Borderline HARPAL, continue iv fluids, creatinine improving with resuscitation  • Hypercalcemia resolved with colume resuscitation, monitor  • Replace magnesium  • Cont pt/ot  • Prn inhalers/resp hygiene   • Continue on  treatment for diabetes with insulin/ssi  • cont memantine, trazodone, sertraline , monitor mentation  • Added flonase/cetirizine for sinus congestion/complaints, doing better   • Check a.m. CBC, BMP, magnesium, phosphorus  • Continue hospital monitoring and treatment at current level of care - does not need upgraded at this time.  • Discuss with saroj dinh  o Return to rehab at [facility] at discharge. dispo pending echo/further response to tx and ability to tolerate bowel regimen       DVT prophylaxis:  Medical DVT prophylaxis orders are present.    Medical Intervention Limits: NO intubation (DNI); NO cardioversion  Level Of Support Discussed With: Patient  Code Status (Patient has no pulse and is not breathing): No CPR (Do Not Attempt to Resuscitate)  Medical Interventions (Patient has pulse or is breathing): Limited Support        CBC        5/24/2023    04:00 5/26/2023    10:08 5/26/2023    11:39 5/27/2023    04:08   CBC   WBC 5.59   8.33   7.77   6.10     RBC 4.52   4.80   4.88   4.39     Hemoglobin 12.1   12.9   13.0   11.8     Hematocrit 36.7   39.1   40.7   37.7     MCV 81.2   81.5   83.4   85.9     MCH 26.8   26.9   26.6   26.9     MCHC 33.0   33.0   31.9   31.3     RDW 14.8   14.8   14.9   14.8     Platelets 208   247   246   223         CMP        5/24/2023    04:00 5/26/2023    10:08 5/26/2023    11:39 5/27/2023    04:08   CMP   Glucose 133   189   153   139     BUN 15   17   17   16     Creatinine 0.74   1.03   0.96   0.89     EGFR 78.4   52.7   57.4   62.8     Sodium 136   139   136   137     Potassium 4.0   4.2   4.8   3.8     Chloride 103   99   99   104     Calcium 9.8   11.2   11.0   9.9     Total Protein 5.7   6.5   6.7      Albumin 3.0   3.4   3.1   2.8     Globulin 2.7   3.1   3.6      Total Bilirubin 0.5   0.4   0.4      Alkaline Phosphatase 96   123   124      AST (SGOT) 10   17   31      ALT (SGPT) 17   27   29      Albumin/Globulin Ratio 1.1   1.1   0.9      BUN/Creatinine Ratio 20.3   16.5    17.7   18.0     Anion Gap 9.2   11.9   12.5   9.7

## 2023-05-27 NOTE — PROGRESS NOTES
Respiratory Therapist Broncho-Pulmonary Hygiene Progress Note      Patient Name:  Tita Jiménez  YOB: 1935    Tita Jiménez meets the qualification for Level 2 of the Bronco-Pulmonary Hygiene Protocol. This was based on my daily patient assessment and includes review of chest x-ray results, cough ability and quality, oxygenation, secretions or risk for secretion development and patient mobility.     Broncho-Pulmonary Hygiene Assessment:    Level of Movement: Actively changing positions without assistance  Alert/ oriented/ cooperative    Breath Sounds: Clear to slightly diminished    Cough: Strong, effective    Chest X-Ray: Possible signs of consolidation and/or atelectasis or clear.     Sputum Productions: None or small amount of thin or watery secretions with effective cough    History and Physical: None    SpO2 to Oxygen Need: greater than 92% on room air or  less than 3L nasal canula    Current SpO2 is: 92 on room air  Based on this information, I have completed the following interventions: Aerobika with bronchodialtor medication or TID      Electronically signed by Jenifer Dorado, RRT, 05/27/23, 11:15 AM EDT.

## 2023-05-27 NOTE — PLAN OF CARE
Goal Outcome Evaluation:  Patient alert and orientated x3 overnight; periods of intermittent confusion; IV fluids continue per orders; no s/s of distress noted overnight; will continue to monitor;

## 2023-05-27 NOTE — PLAN OF CARE
Goal Outcome Evaluation:      Patient went down for ECHO today.  Soap suds enema was given to patient.  She states not having a BM for a week.  Patient up to chair for dinner.  No acute changes this shift.  No signs of distress noted at this time.

## 2023-05-28 LAB
ANION GAP SERPL CALCULATED.3IONS-SCNC: 9.3 MMOL/L (ref 5–15)
BASOPHILS # BLD AUTO: 0.02 10*3/MM3 (ref 0–0.2)
BASOPHILS NFR BLD AUTO: 0.3 % (ref 0–1.5)
BH CV ECHO MEAS - AO ROOT DIAM: 3.4 CM
BH CV ECHO MEAS - EDV(CUBED): 94.2 ML
BH CV ECHO MEAS - EDV(MOD-SP2): 37.1 ML
BH CV ECHO MEAS - EDV(MOD-SP4): 38.4 ML
BH CV ECHO MEAS - EF(MOD-BP): 57.1 %
BH CV ECHO MEAS - EF(MOD-SP2): 54.4 %
BH CV ECHO MEAS - EF(MOD-SP4): 58.1 %
BH CV ECHO MEAS - ESV(CUBED): 22.4 ML
BH CV ECHO MEAS - ESV(MOD-SP2): 16.9 ML
BH CV ECHO MEAS - ESV(MOD-SP4): 16.1 ML
BH CV ECHO MEAS - FS: 38 %
BH CV ECHO MEAS - IVS/LVPW: 0.95 CM
BH CV ECHO MEAS - IVSD: 1.26 CM
BH CV ECHO MEAS - LA DIMENSION: 3.7 CM
BH CV ECHO MEAS - LAT PEAK E' VEL: 5.7 CM/SEC
BH CV ECHO MEAS - LV DIASTOLIC VOL/BSA (35-75): 20.4 CM2
BH CV ECHO MEAS - LV MASS(C)D: 223.8 GRAMS
BH CV ECHO MEAS - LV SYSTOLIC VOL/BSA (12-30): 8.6 CM2
BH CV ECHO MEAS - LVIDD: 4.6 CM
BH CV ECHO MEAS - LVIDS: 2.8 CM
BH CV ECHO MEAS - LVOT AREA: 3.1 CM2
BH CV ECHO MEAS - LVOT DIAM: 2 CM
BH CV ECHO MEAS - LVPWD: 1.32 CM
BH CV ECHO MEAS - MED PEAK E' VEL: 4.2 CM/SEC
BH CV ECHO MEAS - MV A MAX VEL: 127 CM/SEC
BH CV ECHO MEAS - MV DEC SLOPE: 721 CM/SEC2
BH CV ECHO MEAS - MV DEC TIME: 0.17 MSEC
BH CV ECHO MEAS - MV E MAX VEL: 124 CM/SEC
BH CV ECHO MEAS - MV E/A: 0.98
BH CV ECHO MEAS - RVDD: 2.8 CM
BH CV ECHO MEAS - SI(MOD-SP2): 10.7 ML/M2
BH CV ECHO MEAS - SI(MOD-SP4): 11.9 ML/M2
BH CV ECHO MEAS - SV(MOD-SP2): 20.2 ML
BH CV ECHO MEAS - SV(MOD-SP4): 22.3 ML
BH CV ECHO MEAS - TAPSE (>1.6): 2.21 CM
BH CV ECHO MEASUREMENTS AVERAGE E/E' RATIO: 25.05
BUN SERPL-MCNC: 14 MG/DL (ref 8–23)
BUN/CREAT SERPL: 17.9 (ref 7–25)
CALCIUM SPEC-SCNC: 9.5 MG/DL (ref 8.6–10.5)
CHLORIDE SERPL-SCNC: 103 MMOL/L (ref 98–107)
CO2 SERPL-SCNC: 23.7 MMOL/L (ref 22–29)
CREAT SERPL-MCNC: 0.78 MG/DL (ref 0.57–1)
DEPRECATED RDW RBC AUTO: 45.7 FL (ref 37–54)
EGFRCR SERPLBLD CKD-EPI 2021: 73.6 ML/MIN/1.73
EOSINOPHIL # BLD AUTO: 0.16 10*3/MM3 (ref 0–0.4)
EOSINOPHIL NFR BLD AUTO: 2.2 % (ref 0.3–6.2)
ERYTHROCYTE [DISTWIDTH] IN BLOOD BY AUTOMATED COUNT: 14.8 % (ref 12.3–15.4)
GLUCOSE BLDC GLUCOMTR-MCNC: 128 MG/DL (ref 70–99)
GLUCOSE BLDC GLUCOMTR-MCNC: 138 MG/DL (ref 70–99)
GLUCOSE BLDC GLUCOMTR-MCNC: 163 MG/DL (ref 70–99)
GLUCOSE BLDC GLUCOMTR-MCNC: 185 MG/DL (ref 70–99)
GLUCOSE SERPL-MCNC: 156 MG/DL (ref 65–99)
HCT VFR BLD AUTO: 37.9 % (ref 34–46.6)
HGB BLD-MCNC: 11.9 G/DL (ref 12–15.9)
IMM GRANULOCYTES # BLD AUTO: 0.03 10*3/MM3 (ref 0–0.05)
IMM GRANULOCYTES NFR BLD AUTO: 0.4 % (ref 0–0.5)
LEFT ATRIUM VOLUME INDEX: 22.2 ML/M2
LYMPHOCYTES # BLD AUTO: 1.06 10*3/MM3 (ref 0.7–3.1)
LYMPHOCYTES NFR BLD AUTO: 14.9 % (ref 19.6–45.3)
MAGNESIUM SERPL-MCNC: 1.8 MG/DL (ref 1.6–2.4)
MAXIMAL PREDICTED HEART RATE: 133 BPM
MCH RBC QN AUTO: 26.6 PG (ref 26.6–33)
MCHC RBC AUTO-ENTMCNC: 31.4 G/DL (ref 31.5–35.7)
MCV RBC AUTO: 84.8 FL (ref 79–97)
MONOCYTES # BLD AUTO: 0.66 10*3/MM3 (ref 0.1–0.9)
MONOCYTES NFR BLD AUTO: 9.3 % (ref 5–12)
NEUTROPHILS NFR BLD AUTO: 5.2 10*3/MM3 (ref 1.7–7)
NEUTROPHILS NFR BLD AUTO: 72.9 % (ref 42.7–76)
NRBC BLD AUTO-RTO: 0 /100 WBC (ref 0–0.2)
PHOSPHATE SERPL-MCNC: 2.4 MG/DL (ref 2.5–4.5)
PLATELET # BLD AUTO: 215 10*3/MM3 (ref 140–450)
PMV BLD AUTO: 9.2 FL (ref 6–12)
POTASSIUM SERPL-SCNC: 4.1 MMOL/L (ref 3.5–5.2)
RBC # BLD AUTO: 4.47 10*6/MM3 (ref 3.77–5.28)
SODIUM SERPL-SCNC: 136 MMOL/L (ref 136–145)
STRESS TARGET HR: 113 BPM
WBC NRBC COR # BLD: 7.13 10*3/MM3 (ref 3.4–10.8)

## 2023-05-28 PROCEDURE — 94664 DEMO&/EVAL PT USE INHALER: CPT

## 2023-05-28 PROCEDURE — 94799 UNLISTED PULMONARY SVC/PX: CPT

## 2023-05-28 PROCEDURE — G0378 HOSPITAL OBSERVATION PER HR: HCPCS

## 2023-05-28 PROCEDURE — 25010000002 ENOXAPARIN PER 10 MG: Performed by: INTERNAL MEDICINE

## 2023-05-28 PROCEDURE — 84100 ASSAY OF PHOSPHORUS: CPT | Performed by: INTERNAL MEDICINE

## 2023-05-28 PROCEDURE — 63710000001 INSULIN DETEMIR PER 5 UNITS: Performed by: INTERNAL MEDICINE

## 2023-05-28 PROCEDURE — 83735 ASSAY OF MAGNESIUM: CPT | Performed by: INTERNAL MEDICINE

## 2023-05-28 PROCEDURE — 36415 COLL VENOUS BLD VENIPUNCTURE: CPT | Performed by: INTERNAL MEDICINE

## 2023-05-28 PROCEDURE — 96376 TX/PRO/DX INJ SAME DRUG ADON: CPT

## 2023-05-28 PROCEDURE — 25010000002 ONDANSETRON PER 1 MG: Performed by: INTERNAL MEDICINE

## 2023-05-28 PROCEDURE — 85025 COMPLETE CBC W/AUTO DIFF WBC: CPT | Performed by: INTERNAL MEDICINE

## 2023-05-28 PROCEDURE — 82948 REAGENT STRIP/BLOOD GLUCOSE: CPT

## 2023-05-28 PROCEDURE — 96372 THER/PROPH/DIAG INJ SC/IM: CPT

## 2023-05-28 PROCEDURE — 80048 BASIC METABOLIC PNL TOTAL CA: CPT | Performed by: INTERNAL MEDICINE

## 2023-05-28 PROCEDURE — 63710000001 INSULIN LISPRO (HUMAN) PER 5 UNITS: Performed by: INTERNAL MEDICINE

## 2023-05-28 RX ADMIN — FLUTICASONE PROPIONATE 2 SPRAY: 50 SPRAY, METERED NASAL at 08:27

## 2023-05-28 RX ADMIN — DICLOFENAC SODIUM 2 G: 10 GEL TOPICAL at 17:02

## 2023-05-28 RX ADMIN — MEMANTINE 10 MG: 10 TABLET ORAL at 08:28

## 2023-05-28 RX ADMIN — Medication 10 MG: at 17:02

## 2023-05-28 RX ADMIN — METOPROLOL SUCCINATE 25 MG: 25 TABLET, EXTENDED RELEASE ORAL at 08:28

## 2023-05-28 RX ADMIN — ONDANSETRON 4 MG: 2 INJECTION INTRAMUSCULAR; INTRAVENOUS at 19:14

## 2023-05-28 RX ADMIN — INSULIN LISPRO 2 UNITS: 100 INJECTION, SOLUTION INTRAVENOUS; SUBCUTANEOUS at 21:57

## 2023-05-28 RX ADMIN — FAMOTIDINE 20 MG: 20 TABLET ORAL at 17:02

## 2023-05-28 RX ADMIN — INSULIN LISPRO 2 UNITS: 100 INJECTION, SOLUTION INTRAVENOUS; SUBCUTANEOUS at 12:30

## 2023-05-28 RX ADMIN — TRAZODONE HYDROCHLORIDE 25 MG: 50 TABLET ORAL at 20:21

## 2023-05-28 RX ADMIN — ENOXAPARIN SODIUM 40 MG: 100 INJECTION SUBCUTANEOUS at 20:20

## 2023-05-28 RX ADMIN — DILTIAZEM HYDROCHLORIDE 240 MG: 240 CAPSULE, COATED, EXTENDED RELEASE ORAL at 20:21

## 2023-05-28 RX ADMIN — FAMOTIDINE 20 MG: 20 TABLET ORAL at 08:28

## 2023-05-28 RX ADMIN — FLUTICASONE PROPIONATE 2 PUFF: 110 AEROSOL, METERED RESPIRATORY (INHALATION) at 06:19

## 2023-05-28 RX ADMIN — ROSUVASTATIN CALCIUM 20 MG: 20 TABLET, FILM COATED ORAL at 20:22

## 2023-05-28 RX ADMIN — MONTELUKAST 10 MG: 10 TABLET, FILM COATED ORAL at 20:22

## 2023-05-28 RX ADMIN — MEMANTINE 10 MG: 10 TABLET ORAL at 20:21

## 2023-05-28 RX ADMIN — Medication 10 ML: at 20:22

## 2023-05-28 RX ADMIN — Medication 400 MG: at 08:28

## 2023-05-28 RX ADMIN — ASPIRIN 81 MG 81 MG: 81 TABLET ORAL at 08:29

## 2023-05-28 RX ADMIN — CETIRIZINE HYDROCHLORIDE 10 MG: 10 TABLET, FILM COATED ORAL at 08:28

## 2023-05-28 RX ADMIN — Medication 5 MG: at 20:21

## 2023-05-28 RX ADMIN — Medication 10 ML: at 08:29

## 2023-05-28 RX ADMIN — SERTRALINE HYDROCHLORIDE 50 MG: 50 TABLET ORAL at 08:28

## 2023-05-28 RX ADMIN — DICLOFENAC SODIUM 2 G: 10 GEL TOPICAL at 12:30

## 2023-05-28 RX ADMIN — INSULIN DETEMIR 10 UNITS: 100 INJECTION, SOLUTION SUBCUTANEOUS at 08:27

## 2023-05-28 RX ADMIN — DOCUSATE SODIUM 50MG AND SENNOSIDES 8.6MG 1 TABLET: 8.6; 5 TABLET, FILM COATED ORAL at 08:27

## 2023-05-28 RX ADMIN — FLUTICASONE PROPIONATE 2 PUFF: 110 AEROSOL, METERED RESPIRATORY (INHALATION) at 20:27

## 2023-05-28 RX ADMIN — DOCUSATE SODIUM 50MG AND SENNOSIDES 8.6MG 1 TABLET: 8.6; 5 TABLET, FILM COATED ORAL at 20:21

## 2023-05-28 RX ADMIN — POLYETHYLENE GLYCOL 3350 17 G: 17 POWDER, FOR SOLUTION ORAL at 08:27

## 2023-05-28 RX ADMIN — ARFORMOTEROL TARTRATE 15 MCG: 15 SOLUTION RESPIRATORY (INHALATION) at 20:27

## 2023-05-28 RX ADMIN — CLOPIDOGREL BISULFATE 75 MG: 75 TABLET ORAL at 08:28

## 2023-05-28 RX ADMIN — DICLOFENAC SODIUM 2 G: 10 GEL TOPICAL at 20:22

## 2023-05-28 RX ADMIN — POLYETHYLENE GLYCOL 3350 17 G: 17 POWDER, FOR SOLUTION ORAL at 20:20

## 2023-05-28 RX ADMIN — INSULIN DETEMIR 10 UNITS: 100 INJECTION, SOLUTION SUBCUTANEOUS at 21:57

## 2023-05-28 RX ADMIN — ARFORMOTEROL TARTRATE 15 MCG: 15 SOLUTION RESPIRATORY (INHALATION) at 06:17

## 2023-05-28 RX ADMIN — POTASSIUM & SODIUM PHOSPHATES POWDER PACK 280-160-250 MG 1 PACKET: 280-160-250 PACK at 11:24

## 2023-05-28 NOTE — PLAN OF CARE
Goal Outcome Evaluation:  Patient alert and orientated overnight; no s/s of distress; VSS; will continue to monitor;

## 2023-05-28 NOTE — PROGRESS NOTES
[facility]   Hospitalist Progress Note    Date of admission: 5/26/2023  Patient Name: Tita Jiménez  1935  Date: 5/28/2023      Subjective     Chief Complaint   Patient presents with   • Chest Pain       Summary: 88 y.o. history of ulcerative colitis and with recent discharge after treatment for COVID-19 infection and an acute CVA with left-sided deficits who had discharged to [facility] rehab a few days prior to admission who presents with complaints of chest tightness and ~2days of nausea vomiting and constipation.      Interval Followup: Patient states tired had some trouble sleeping.  No chest pain pressure or palpitation.  Had partial bowel movement yesterday still feels constipated.  Discussed with patient need to keep working on increasing activity.  She is still willing to return to [facility] rehab.  Had extended discussion with patient's family and they understand that she may end up needing to go to nursing home with rehab with an adequate recovery at [facility].  Patient denies any new neurological complaints feels like her left upper extremity strength is at least stable if not slightly better    Objective     Vitals:   Temp:  [97.3 °F (36.3 °C)-98.5 °F (36.9 °C)] 98.2 °F (36.8 °C)  Heart Rate:  [66-74] 72  Resp:  [17-18] 18  BP: (129-173)/(59-81) 135/68    Physical Exam  Sleeping, wakes easily, but tired, conversant  Ctab, no wrr  Rrr, no le pitting edema  abd soft nt nondistended hypoactive bowel sounds  AOx3 answers basic questions appropriately, does require some reorientation  lue weakness 2/2 recent cva at baseline    Result Review:  Vital signs, labs and recent relevant imaging reviewed.      arformoterol, 15 mcg, Nebulization, BID - RT  aspirin, 81 mg, Oral, Daily  cetirizine, 10 mg, Oral, Daily  clopidogrel, 75 mg, Oral, Daily  Diclofenac Sodium, 2 g, Topical, 4x Daily  dilTIAZem CD, 240 mg, Oral, Nightly  enoxaparin, 40 mg, Subcutaneous, Nightly  famotidine, 20 mg, Oral, BID  AC  fluticasone, 2 spray, Each Nare, Daily  fluticasone, 2 puff, Inhalation, BID - RT  insulin detemir, 10 Units, Subcutaneous, Q12H  insulin lispro, 2-9 Units, Subcutaneous, 4x Daily AC & at Bedtime  magnesium oxide, 400 mg, Oral, Daily  melatonin, 5 mg, Oral, Nightly  memantine, 10 mg, Oral, BID  metoprolol succinate XL, 25 mg, Oral, Daily  montelukast, 10 mg, Oral, Nightly  polyethylene glycol, 17 g, Oral, BID  rosuvastatin, 20 mg, Oral, Nightly  senna-docusate sodium, 1 tablet, Oral, BID  sertraline, 50 mg, Oral, Daily  sodium chloride, 10 mL, Intravenous, Q12H  traZODone, 25 mg, Oral, Nightly        •  acetaminophen  •  albuterol  •  aluminum-magnesium hydroxide-simethicone  •  bisacodyl  •  dextrose  •  dextrose  •  Diclofenac Sodium  •  glucagon (human recombinant)  •  hydrOXYzine  •  lidocaine  •  ondansetron  •  sodium chloride  •  sodium chloride      Assessment / Plan     Assessment/Plan:  Atypical chest pain, ACS rule out  Nausea vomiting suspect 2/2 constipation vs viral ge   Recent CVA with residual left-sided deficits  Recent COVID19 infection   IDDM 2  Hypertension  Morbid obesity  History of ulcerative colitis  Hyperlipidemia  Depression/anxiety  Dementia/memory impairment  GERD  Hypophosphatemia  Hypomagnesemia  Diastolic CHF 56 to 60% grade 1A    • Echo resulted stable valves, EF preserved, grade 1A diastolic dysfunction noted, no overt signs of acute fluid overload on exam.  Troponin flat no chest pain  • Replace phosphorus and magnesium  • Increase bowel regimen, give additional suppository or enema if needed  • Continue aspirin statin and Plavix  • Continue metoprolol and diltiazem, rate stable  • Ct head on admission showing prior right mca cva, no acute findings  • Encouraging increased activity, PT OT, up to chair.   • suspect component of lack of sleep/recent covid/advanced age contributing to intermittent lethargy, doing better today  • Prn antiemetics  • Continue Famotidine bid - had  previously been on pantoprazole last admission may need to resume if higher dose faomtidine inadequate   • Creatinine down to normal after initial fluid resuscitaiton  • Prn inhalers/resp hygiene   • Continue on treatment for diabetes with insulin/ssi  • cont memantine, trazodone, sertraline , monitor mentation  • flonase/cetirizine for sinus congestion/complaints, doing better   • Discussed with social work bed not available at [facility] today hopefully tomorrow  • Discussed with family we will proceed with [facility] rehab and may ultimately need long-term care/nursing home/rehab but ideally would like for her to return to assisted living after improving if she is able      DVT prophylaxis:  Medical DVT prophylaxis orders are present.    Medical Intervention Limits: NO intubation (DNI); NO cardioversion  Level Of Support Discussed With: Patient  Code Status (Patient has no pulse and is not breathing): No CPR (Do Not Attempt to Resuscitate)  Medical Interventions (Patient has pulse or is breathing): Limited Support        CBC        5/26/2023    10:08 5/26/2023    11:39 5/27/2023    04:08 5/28/2023    04:02   CBC   WBC 8.33   7.77   6.10   7.13     RBC 4.80   4.88   4.39   4.47     Hemoglobin 12.9   13.0   11.8   11.9     Hematocrit 39.1   40.7   37.7   37.9     MCV 81.5   83.4   85.9   84.8     MCH 26.9   26.6   26.9   26.6     MCHC 33.0   31.9   31.3   31.4     RDW 14.8   14.9   14.8   14.8     Platelets 247   246   223   215         CMP        5/26/2023    10:08 5/26/2023    11:39 5/27/2023    04:08 5/28/2023    04:02   CMP   Glucose 189   153   139   156     BUN 17   17   16   14     Creatinine 1.03   0.96   0.89   0.78     EGFR 52.7   57.4   62.8   73.6     Sodium 139   136   137   136     Potassium 4.2   4.8   3.8   4.1     Chloride 99   99   104   103     Calcium 11.2   11.0   9.9   9.5     Total Protein 6.5   6.7       Albumin 3.4   3.1   2.8      Globulin 3.1   3.6       Total Bilirubin 0.4   0.4        Alkaline Phosphatase 123   124       AST (SGOT) 17   31       ALT (SGPT) 27   29       Albumin/Globulin Ratio 1.1   0.9       BUN/Creatinine Ratio 16.5   17.7   18.0   17.9     Anion Gap 11.9   12.5   9.7   9.3      >50m spent on pt care coordination including extended family discussion

## 2023-05-28 NOTE — PLAN OF CARE
Goal Outcome Evaluation:            No complaints of chest pain or nausea, attempted activity of ambulating and up to chair, patient refused several times but did sit up in bed for a few minutes. VSS, no signs of intermittent confusion. CHAN, RN

## 2023-05-28 NOTE — CASE MANAGEMENT/SOCIAL WORK
3 DAY FROM PREVIOUS ADMISSION - 5/12/2023 - 5/23/2023 (11 days)  [facility]

## 2023-05-29 VITALS
OXYGEN SATURATION: 93 % | BODY MASS INDEX: 31.24 KG/M2 | DIASTOLIC BLOOD PRESSURE: 62 MMHG | RESPIRATION RATE: 20 BRPM | TEMPERATURE: 97.5 F | SYSTOLIC BLOOD PRESSURE: 135 MMHG | HEIGHT: 64 IN | HEART RATE: 66 BPM | WEIGHT: 182.98 LBS

## 2023-05-29 LAB
GLUCOSE BLDC GLUCOMTR-MCNC: 126 MG/DL (ref 70–99)
GLUCOSE BLDC GLUCOMTR-MCNC: 130 MG/DL (ref 70–99)

## 2023-05-29 PROCEDURE — 94664 DEMO&/EVAL PT USE INHALER: CPT

## 2023-05-29 PROCEDURE — 82948 REAGENT STRIP/BLOOD GLUCOSE: CPT

## 2023-05-29 PROCEDURE — 94799 UNLISTED PULMONARY SVC/PX: CPT

## 2023-05-29 PROCEDURE — G0378 HOSPITAL OBSERVATION PER HR: HCPCS

## 2023-05-29 PROCEDURE — 63710000001 INSULIN DETEMIR PER 5 UNITS: Performed by: INTERNAL MEDICINE

## 2023-05-29 RX ORDER — PANTOPRAZOLE SODIUM 40 MG/1
40 TABLET, DELAYED RELEASE ORAL DAILY
Start: 2023-05-29

## 2023-05-29 RX ORDER — POLYETHYLENE GLYCOL 3350 17 G/17G
17 POWDER, FOR SOLUTION ORAL 2 TIMES DAILY PRN
Qty: 20 EACH | Refills: 0
Start: 2023-05-29

## 2023-05-29 RX ORDER — INSULIN GLARGINE 100 [IU]/ML
20 INJECTION, SOLUTION SUBCUTANEOUS DAILY
Start: 2023-05-29

## 2023-05-29 RX ORDER — ROSUVASTATIN CALCIUM 20 MG/1
20 TABLET, COATED ORAL NIGHTLY
Qty: 90 TABLET
Start: 2023-05-29

## 2023-05-29 RX ADMIN — DICLOFENAC SODIUM 2 G: 10 GEL TOPICAL at 08:15

## 2023-05-29 RX ADMIN — INSULIN DETEMIR 10 UNITS: 100 INJECTION, SOLUTION SUBCUTANEOUS at 08:13

## 2023-05-29 RX ADMIN — Medication 10 ML: at 08:15

## 2023-05-29 RX ADMIN — DICLOFENAC SODIUM 2 G: 10 GEL TOPICAL at 11:35

## 2023-05-29 RX ADMIN — SERTRALINE HYDROCHLORIDE 50 MG: 50 TABLET ORAL at 08:13

## 2023-05-29 RX ADMIN — ASPIRIN 81 MG 81 MG: 81 TABLET ORAL at 08:13

## 2023-05-29 RX ADMIN — METOPROLOL SUCCINATE 25 MG: 25 TABLET, EXTENDED RELEASE ORAL at 08:13

## 2023-05-29 RX ADMIN — ARFORMOTEROL TARTRATE 15 MCG: 15 SOLUTION RESPIRATORY (INHALATION) at 07:59

## 2023-05-29 RX ADMIN — FAMOTIDINE 20 MG: 20 TABLET ORAL at 06:36

## 2023-05-29 RX ADMIN — CETIRIZINE HYDROCHLORIDE 10 MG: 10 TABLET, FILM COATED ORAL at 08:13

## 2023-05-29 RX ADMIN — CLOPIDOGREL BISULFATE 75 MG: 75 TABLET ORAL at 08:13

## 2023-05-29 RX ADMIN — POLYETHYLENE GLYCOL 3350 17 G: 17 POWDER, FOR SOLUTION ORAL at 08:13

## 2023-05-29 RX ADMIN — FLUTICASONE PROPIONATE 2 PUFF: 110 AEROSOL, METERED RESPIRATORY (INHALATION) at 08:00

## 2023-05-29 RX ADMIN — FLUTICASONE PROPIONATE 2 SPRAY: 50 SPRAY, METERED NASAL at 08:14

## 2023-05-29 RX ADMIN — MEMANTINE 10 MG: 10 TABLET ORAL at 08:16

## 2023-05-29 RX ADMIN — Medication 400 MG: at 08:13

## 2023-05-29 NOTE — PLAN OF CARE
Goal Outcome Evaluation:      VS stable. Patient alert and oriented with intermittent confusion. Patient to discharge to [facility].

## 2023-05-29 NOTE — PLAN OF CARE
Goal Outcome Evaluation:               Nausea and vomiting at beginning of shift, relieved with PRN Zofran. Was able to rest comfortably, but interrupted with frequent urination, incontinent at times.

## 2023-05-29 NOTE — DISCHARGE SUMMARY
[facility]         HOSPITALIST  DISCHARGE SUMMARY    Patient Name: Tita Jiménez    : 1935    MRN: [id number]    Date of Admission: 2023  Date of Discharge:  23  Primary Care Physician: Madison Ortiz APRN    Consults     Date and Time Order Name Status Description    2023  4:34 PM Inpatient Hospitalist Consult            Final Diagnosis:  Atypical chest pain, ACS ruled out   Nausea vomiting suspect 2/2 constipation and possibly from resolving COVID-19  Recent CVA with residual left-sided deficits  Recent COVID19 infection, no longer requiring isolation  IDDM 2  Hypertension  Morbid obesity  History of ulcerative colitis  Hyperlipidemia  Depression/anxiety  Dementia/memory impairment  GERD  Hypophosphatemia  Hypomagnesemia  Diastolic CHF 56 to 60% grade 1A    Hospital Course     Hospital Course:  88 y.o. history of ulcerative colitis and with recent discharge after treatment for COVID-19 infection and an acute CVA with left-sided deficits who had discharged to [facility] a few days prior to admission who presents with complaints of chest tightness and ~2days of nausea vomiting and constipation.      Troponin trend was essentially flat EKG unremarkable and 2D echo obtained without any significant valvular abnormalities, some mild diastolic dysfunction noted otherwise preserved EF.  Patient's symptoms were atypical and suspect that she had chest discomfort from her nausea and vomiting.    Nausea and vomiting showed improvement with bowel regimen.  KUB with notable stool burden and she required several laxatives before having improvement.  Will discharge on scheduled bowel regimen may need further titration.    Did have extended discussion with family and given patient's age, comorbidities they understand that she may ultimately need nursing home/long-term care as she does have predisposition for not wanting to be very active with therapy but they note  INFECTIOUS DISEASE PROGRESS NOTE    Reyes Lezama Patient Status:  Inpatient    1946 MRN [id number]   [facility] 3NE-A Attending Marcia De Leon MD   [address] [facility] [address] PCP Cecilio Lopez Metoclopramide HCl (REGLAN) injection 5 mg, 5 mg, Intravenous, Q8H PRN  •  cetirizine (ZYRTEC) tab 10 mg, 10 mg, Oral, BID with meals  •  diphenhydrAMINE (BENADRYL) cap/tab 25 mg, 25 mg, Oral, Q6H PRN  •  famoTIDine (PEPCID) tab 40 mg, 40 mg, Oral, QAM  • she can be stubborn and if motivated/encouraged usually does respond fairly well.  Patient's left upper extremity residual stroke deficits were stable/mildly improving and hopefully will continue to show steady improvement with therapy.    Discharge to [facility]      CODE STATUS:  Code Status and Medical Interventions:   Ordered at: 05/26/23 1727     Medical Intervention Limits:    NO intubation (DNI)    NO cardioversion     Level Of Support Discussed With:    Patient     Code Status (Patient has no pulse and is not breathing):    No CPR (Do Not Attempt to Resuscitate)     Medical Interventions (Patient has pulse or is breathing):    Limited Support           Day of Discharge     Vital Signs:  Temp:  [97.9 °F (36.6 °C)-98.4 °F (36.9 °C)] 98.2 °F (36.8 °C)  Heart Rate:  [70-86] 74  Resp:  [18-20] 20  BP: (135-171)/(65-77) 153/77    Physical Exam  Gen: Sleeping wakes easily tired but conversant breathing comfortably on room air regular rate and rhythm no lower extremity pitting edema abdomen soft nontender positive bowel sounds alert to self, hospital basic condition, answers questions appropriately does have some intermittent confusion    Discharge Details        Discharge Medications      New Medications      Instructions Start Date   pantoprazole 40 MG EC tablet  Commonly known as: PROTONIX   40 mg, Oral, Daily      polyethylene glycol 17 g packet  Commonly known as: MIRALAX   17 g, Oral, 2 Times Daily PRN      rosuvastatin 20 MG tablet  Commonly known as: CRESTOR   20 mg, Oral, Nightly         Changes to Medications      Instructions Start Date   Lantus SoloStar 100 UNIT/ML injection pen  Generic drug: Insulin Glargine  What changed:   how much to take  when to take this   20 Units, Subcutaneous, Daily         Continue These Medications      Instructions Start Date   albuterol sulfate  (90 Base) MCG/ACT inhaler  Commonly known as: PROVENTIL HFA;VENTOLIN HFA;PROAIR HFA   2 puffs, Inhalation, Every 4  03/12/20  0714 03/13/20  1034 03/14/20  0709   RBC 3.24* 3.61* 3.55*   HGB 9.4* 10.4* 10.3*   HCT 29.8* 33.0* 31.9*   MCV 92.0 91.4 89.9   MCH 29.0 28.8 29.0   MCHC 31.5 31.5 32.3   RDW 14.7 14.7 14.5   NEPRELIM 4.72 5.32 5.25   WBC 7.7 8.5 8.1   . 0 Hours PRN, PRN      aspirin 81 MG EC tablet   81 mg, Oral, Daily, After your stroke, take both aspirin and Plavix daily for 21 days; then just continue with Plavix 75 mg daily thereafter.      calcium carb-cholecalciferol 600-800 MG-UNIT tablet   1 tablet, Oral, Daily      clopidogrel 75 MG tablet  Commonly known as: PLAVIX   75 mg, Oral, Daily, After your stroke take both aspirin and Plavix daily for 21 days; then just continue with Plavix 75 mg daily thereafter.      dilTIAZem  MG 24 hr capsule  Commonly known as: CARDIZEM CD   240 mg, Oral, Every Night at Bedtime      Farxiga 10 MG tablet  Generic drug: dapagliflozin Propanediol   10 mg, Oral, Daily      Januvia 50 MG tablet  Generic drug: SITagliptin   50 mg, Oral, Every Night at Bedtime      melatonin 5 MG tablet tablet   5 mg, Oral, Nightly      memantine 10 MG tablet  Commonly known as: Namenda   10 mg, Oral, 2 Times Daily      metoprolol succinate XL 25 MG 24 hr tablet  Commonly known as: TOPROL-XL   25 mg, Oral, Daily      mometasone-formoterol 200-5 MCG/ACT inhaler  Commonly known as: DULERA 200   2 puffs, Inhalation, 2 Times Daily      montelukast 10 MG tablet  Commonly known as: SINGULAIR   10 mg, Oral, Nightly      multivitamin with minerals tablet tablet   1 tablet, Oral, Daily      polycarbophil 625 MG tablet tablet   625 mg, Oral, 2 Times Daily      sertraline 50 MG tablet  Commonly known as: ZOLOFT   1 tablet, Oral, Daily      traZODone 50 MG tablet  Commonly known as: DESYREL   25 mg, Oral, Nightly         Stop These Medications    atorvastatin 20 MG tablet  Commonly known as: LIPITOR     famotidine 10 MG tablet  Commonly known as: PEPCID              Discharge Disposition:  Rehab Facility or Unit (DC - External)    Diet: patient counseled on dietary changes made during hospital and plans to  advance as tolerated     Discharge Activity: advance as tolerated          Pertinent  and/or Most Recent Results       LAB RESULTS:      Lab  05/28/23  0402 05/27/23 0408 05/26/23  1139 05/26/23  1008 05/24/23  0400   WBC 7.13 6.10 7.77 8.33 5.59   HEMOGLOBIN 11.9* 11.8* 13.0 12.9 12.1   HEMATOCRIT 37.9 37.7 40.7 39.1 36.7   PLATELETS 215 223 246 247 208   NEUTROS ABS 5.20 3.90 5.55 6.28 3.57   IMMATURE GRANS (ABS) 0.03 0.03 0.04 0.03 0.02   LYMPHS ABS 1.06 1.29 1.17 1.03 1.11   MONOS ABS 0.66 0.70 0.85 0.87 0.75   EOS ABS 0.16 0.15 0.12 0.10 0.12   MCV 84.8 85.9 83.4 81.5 81.2   LACTATE  --   --  1.5  --   --          Lab 05/28/23  0402 05/27/23  0408 05/26/23  1139 05/26/23  1008 05/24/23  0400   SODIUM 136 137 136 139 136   POTASSIUM 4.1 3.8 4.8 4.2 4.0   CHLORIDE 103 104 99 99 103   CO2 23.7 23.3 24.5 28.1 23.8   ANION GAP 9.3 9.7 12.5 11.9 9.2   BUN 14 16 17 17 15   CREATININE 0.78 0.89 0.96 1.03* 0.74   EGFR 73.6 62.8 57.4* 52.7* 78.4   GLUCOSE 156* 139* 153* 189* 133*   CALCIUM 9.5 9.9 11.0* 11.2* 9.8   MAGNESIUM 1.8 1.9 2.0  --   --    PHOSPHORUS 2.4* 3.5  --   --   --          Lab 05/27/23  0408 05/26/23  1139 05/26/23  1008 05/24/23  0400   TOTAL PROTEIN  --  6.7 6.5 5.7*   ALBUMIN 2.8* 3.1* 3.4* 3.0*   GLOBULIN  --  3.6 3.1 2.7   ALT (SGPT)  --  29 27 17   AST (SGOT)  --  31 17 10   BILIRUBIN  --  0.4 0.4 0.5   ALK PHOS  --  124* 123* 96   LIPASE  --  29  --   --          Lab 05/26/23  1941 05/26/23  1429 05/26/23  1139 05/24/23  0400   PROBNP  --   --  117.8 46.3   HSTROP T 27* 24* 21*  --          Lab 05/23/23  0508   CHOLESTEROL 182   LDL CHOL 123*   HDL CHOL 35*   TRIGLYCERIDES 130             Brief Urine Lab Results  (Last result in the past 365 days)      Color   Clarity   Blood   Leuk Est   Nitrite   Protein   CREAT   Urine HCG        05/26/23 1352 Yellow   Slightly Cloudy   Negative   Negative   Negative   Negative               Microbiology Results (last 10 days)     ** No results found for the last 240 hours. **          RADIOLOGY:    XR Ankle 3+ View Right    Result Date: 5/12/2023  Impression:  No acute fracture or traumatic  Procalcitonin 0.46. No other obvious source of infection (see below)  2. Staph sp bacteremia- no central lines although does have a PPM, still suspect this may more likely be a contaminant  3.  PAD- with stable changes to LLE, does not seem to be the source malalignment identified.      SARAH ARTHUR MD       Electronically Signed and Approved By: SARAH ARTHUR MD on 5/12/2023 at 20:46             CT Head Without Contrast    Result Date: 5/26/2023  Impression:   1. Remote right MCA distribution infarct and extensive white matter changes compatible with small-vessel ischemic disease appear similar when accounting for limitations of the exam and differences in technique.  No definite acute intracranial abnormality noted 2. Right mastoid effusion similar to the prior study     HIGINIO KING MD       Electronically Signed and Approved By: HIGINIO KING MD on 5/26/2023 at 13:14             CT Head Without Contrast    Result Date: 5/21/2023  Impression:   No acute brain abnormality is seen.  No acute intracranial hemorrhage.  No midline shift or acute intracranial herniation syndrome.  No definite acute infarct is appreciated by nonenhanced head CT.  No acute skull fracture.   Please note that portions of this note were completed with a voice recognition program.  KLEBER DELVALLE JR, MD       Electronically Signed and Approved By: KLEBER DELVALLE JR, MD on 5/21/2023 at 1:26             CT Head Without Contrast    Result Date: 5/18/2023  Impression:   1. Chronic volume loss secondary to cerebral atrophy. 2. Chronic ischemic changes. 3. No acute findings.     GAGANDEEP GABRIEL MD       Electronically Signed and Approved By: GAGANDEEP GABRIEL MD on 5/18/2023 at 19:19             CT Head Without Contrast    Result Date: 5/16/2023  Impression:   1. No acute intracranial abnormality. 2. Chronic right frontal lobe infarct and chronic infarct in the right basal ganglia. 3. Advanced chronic small vessel ischemic change. 4. Right mastoid effusion.     TAYA DUBOSE MD       Electronically Signed and Approved By: TAYA DUBOSE MD on 5/16/2023 at 13:11             MRI Brain Without Contrast    Result Date: 5/17/2023  Impression:   1. Acute right CHRISTINE distribution cerebral infarct as  described. 2. Volume loss secondary to cerebral atrophy. 3. Chronic ischemic changes.  4. Note is made that several of the pulse sequences are motion degraded.      GAGANDEEP GABRIEL MD       Electronically Signed and Approved By: GAGANDEEP GABRIEL MD on 5/17/2023 at 15:01             XR Chest 1 View    Result Date: 5/12/2023  Impression:  No acute cardiopulmonary process identified.       SARAH ARTHUR MD       Electronically Signed and Approved By: SARAH ARTHUR MD on 5/12/2023 at 19:34             XR Abdomen KUB    Result Date: 5/26/2023  Impression:  No acute process identified.     SARAH ARTHUR MD       Electronically Signed and Approved By: SARAH ARTHUR MD on 5/26/2023 at 18:39                       Results for orders placed during the hospital encounter of 05/26/23    Adult Transthoracic Echo Complete W/ Cont if Necessary Per Protocol    Interpretation Summary  •  Left ventricular ejection fraction appears to be 56 - 60%.  •  Left ventricular diastolic function is consistent with (grade Ia w/high LAP) impaired relaxation.  •  No evidence of significant valvular disease      Labs Pending at Discharge:        Time spent on Discharge including face to face service:  >35 minutes

## 2023-06-01 NOTE — PROGRESS NOTES
Enter Query Response Below      Query Response:     Ruled in, and present on admit    Electronically signed by Michael Barba MD, 23, 12:46 PM EDT.       If applicable, please update the problem list.     Patient: Tita Jiménez        : 1935  Account: [id number]           Admit Date: 2023        How to Respond to this query:       a. Click New Note     b. Answer query within the yellow box.                c. Update the Problem List, if applicable.      If you have any questions about this query contact me at: [email or web address]    Dr. Barba,    Patient admitted  for shortness of breath and cough. H&P on  includes “Patient has been recently diagnosed with COVID infection on Monday,” and “Since the COVID infection, patient has been progressively becoming weak.” Chest x-ray  notes no acute cardiopulmonary process, and chest x-ray dated  notes “Patchy airspace opacity throughout the lung fields may be secondary to pulmonary edema or multifocal atelectasis/pneumonia.” Progress notes beginning  include COVID-19 pneumonia. COVID test dated  was positive. Discharge summary notes COVID infection. Patient was treated with monitoring, supplemental oxygen, nebulizers, and PO decadron  - 5/15.    Can you clarify the diagnosis of COVID-19 pneumonia as:    - Ruled in, and present on admit  - Ruled in, NOT present on admit  - Ruled out  - Other _____________________  - Unable to clinically determine    By submitting this query, we are merely seeking further clarification of documentation to accurately reflect all conditions that you are monitoring, evaluating, treating or that extend the hospitalization or utilize additional resources of care. Please utilize your independent clinical judgment when addressing the question(s) above.     This query and your response, once completed, will be entered into the legal medical record.    Sincerely,  Slade Stapleton RN, CDS  Clinical  Documentation Integrity Program

## 2023-06-02 ENCOUNTER — HOSPITAL ENCOUNTER (EMERGENCY)
Facility: HOSPITAL | Age: 88
Discharge: HOME | End: 2023-06-03
Attending: EMERGENCY MEDICINE
Payer: MEDICARE

## 2023-06-02 DIAGNOSIS — R56.9 NEW ONSET SEIZURE: Primary | ICD-10-CM

## 2023-06-02 DIAGNOSIS — Z86.73 HISTORY OF STROKE: ICD-10-CM

## 2023-06-02 LAB
ALBUMIN SERPL-MCNC: 3.3 G/DL (ref 3.5–5.2)
ALBUMIN/GLOB SERPL: 1.1 G/DL
ALP SERPL-CCNC: 121 U/L (ref 39–117)
ALT SERPL W P-5'-P-CCNC: 18 U/L (ref 1–33)
ANION GAP SERPL CALCULATED.3IONS-SCNC: 13.2 MMOL/L (ref 5–15)
AST SERPL-CCNC: 16 U/L (ref 1–32)
BASOPHILS # BLD AUTO: 0.03 10*3/MM3 (ref 0–0.2)
BASOPHILS NFR BLD AUTO: 0.6 % (ref 0–1.5)
BILIRUB SERPL-MCNC: 0.3 MG/DL (ref 0–1.2)
BUN SERPL-MCNC: 16 MG/DL (ref 8–23)
BUN/CREAT SERPL: 16.7 (ref 7–25)
CALCIUM SPEC-SCNC: 9.8 MG/DL (ref 8.6–10.5)
CHLORIDE SERPL-SCNC: 102 MMOL/L (ref 98–107)
CO2 SERPL-SCNC: 23.8 MMOL/L (ref 22–29)
CREAT SERPL-MCNC: 0.96 MG/DL (ref 0.57–1)
DEPRECATED RDW RBC AUTO: 45.3 FL (ref 37–54)
EGFRCR SERPLBLD CKD-EPI 2021: 57.4 ML/MIN/1.73
EOSINOPHIL # BLD AUTO: 0.17 10*3/MM3 (ref 0–0.4)
EOSINOPHIL NFR BLD AUTO: 3.7 % (ref 0.3–6.2)
ERYTHROCYTE [DISTWIDTH] IN BLOOD BY AUTOMATED COUNT: 15.4 % (ref 12.3–15.4)
GLOBULIN UR ELPH-MCNC: 2.9 GM/DL
GLUCOSE SERPL-MCNC: 192 MG/DL (ref 65–99)
HCT VFR BLD AUTO: 37.1 % (ref 34–46.6)
HGB BLD-MCNC: 11.9 G/DL (ref 12–15.9)
HOLD SPECIMEN: NORMAL
HOLD SPECIMEN: NORMAL
IMM GRANULOCYTES # BLD AUTO: 0.05 10*3/MM3 (ref 0–0.05)
IMM GRANULOCYTES NFR BLD AUTO: 1.1 % (ref 0–0.5)
LYMPHOCYTES # BLD AUTO: 1.06 10*3/MM3 (ref 0.7–3.1)
LYMPHOCYTES NFR BLD AUTO: 22.8 % (ref 19.6–45.3)
MCH RBC QN AUTO: 26.3 PG (ref 26.6–33)
MCHC RBC AUTO-ENTMCNC: 32.1 G/DL (ref 31.5–35.7)
MCV RBC AUTO: 82.1 FL (ref 79–97)
MONOCYTES # BLD AUTO: 0.71 10*3/MM3 (ref 0.1–0.9)
MONOCYTES NFR BLD AUTO: 15.3 % (ref 5–12)
NEUTROPHILS NFR BLD AUTO: 2.63 10*3/MM3 (ref 1.7–7)
NEUTROPHILS NFR BLD AUTO: 56.5 % (ref 42.7–76)
NRBC BLD AUTO-RTO: 0 /100 WBC (ref 0–0.2)
PLATELET # BLD AUTO: 259 10*3/MM3 (ref 140–450)
PMV BLD AUTO: 9.3 FL (ref 6–12)
POTASSIUM SERPL-SCNC: 4.7 MMOL/L (ref 3.5–5.2)
PROT SERPL-MCNC: 6.2 G/DL (ref 6–8.5)
RBC # BLD AUTO: 4.52 10*6/MM3 (ref 3.77–5.28)
SODIUM SERPL-SCNC: 139 MMOL/L (ref 136–145)
WBC NRBC COR # BLD: 4.65 10*3/MM3 (ref 3.4–10.8)
WHOLE BLOOD HOLD COAG: NORMAL
WHOLE BLOOD HOLD SPECIMEN: NORMAL

## 2023-06-02 PROCEDURE — 85025 COMPLETE CBC W/AUTO DIFF WBC: CPT | Performed by: EMERGENCY MEDICINE

## 2023-06-02 PROCEDURE — 83735 ASSAY OF MAGNESIUM: CPT | Performed by: EMERGENCY MEDICINE

## 2023-06-02 PROCEDURE — 84484 ASSAY OF TROPONIN QUANT: CPT | Performed by: EMERGENCY MEDICINE

## 2023-06-02 PROCEDURE — 99284 EMERGENCY DEPT VISIT MOD MDM: CPT

## 2023-06-02 PROCEDURE — 80053 COMPREHEN METABOLIC PANEL: CPT | Performed by: EMERGENCY MEDICINE

## 2023-06-02 RX ORDER — SODIUM CHLORIDE 0.9 % (FLUSH) 0.9 %
10 SYRINGE (ML) INJECTION AS NEEDED
Status: DISCONTINUED | OUTPATIENT
Start: 2023-06-02 | End: 2023-06-03 | Stop reason: HOSPADM

## 2023-06-03 ENCOUNTER — APPOINTMENT (OUTPATIENT)
Dept: CT IMAGING | Facility: HOSPITAL | Age: 88
End: 2023-06-03
Payer: MEDICARE

## 2023-06-03 VITALS
RESPIRATION RATE: 16 BRPM | TEMPERATURE: 98.1 F | OXYGEN SATURATION: 98 % | SYSTOLIC BLOOD PRESSURE: 158 MMHG | HEART RATE: 83 BPM | WEIGHT: 182.1 LBS | HEIGHT: 64 IN | BODY MASS INDEX: 31.09 KG/M2 | DIASTOLIC BLOOD PRESSURE: 87 MMHG

## 2023-06-03 LAB
BILIRUB UR QL STRIP: NEGATIVE
CLARITY UR: ABNORMAL
COLOR UR: YELLOW
GLUCOSE UR STRIP-MCNC: ABNORMAL MG/DL
HGB UR QL STRIP.AUTO: NEGATIVE
KETONES UR QL STRIP: NEGATIVE
LEUKOCYTE ESTERASE UR QL STRIP.AUTO: NEGATIVE
MAGNESIUM SERPL-MCNC: 2.3 MG/DL (ref 1.6–2.4)
NITRITE UR QL STRIP: NEGATIVE
PH UR STRIP.AUTO: 8 [PH] (ref 5–8)
PROT UR QL STRIP: NEGATIVE
QT INTERVAL: 396 MS
SP GR UR STRIP: 1.02 (ref 1–1.03)
TROPONIN T SERPL HS-MCNC: 19 NG/L
UROBILINOGEN UR QL STRIP: ABNORMAL

## 2023-06-03 PROCEDURE — 93005 ELECTROCARDIOGRAM TRACING: CPT | Performed by: EMERGENCY MEDICINE

## 2023-06-03 PROCEDURE — 81003 URINALYSIS AUTO W/O SCOPE: CPT | Performed by: EMERGENCY MEDICINE

## 2023-06-03 PROCEDURE — 70450 CT HEAD/BRAIN W/O DYE: CPT

## 2023-06-03 PROCEDURE — 96374 THER/PROPH/DIAG INJ IV PUSH: CPT

## 2023-06-03 PROCEDURE — 25010000002 LEVETIRACETAM IN NACL 0.82% 500 MG/100ML SOLUTION: Performed by: EMERGENCY MEDICINE

## 2023-06-03 RX ORDER — LEVETIRACETAM 5 MG/ML
500 INJECTION INTRAVASCULAR ONCE
Status: COMPLETED | OUTPATIENT
Start: 2023-06-03 | End: 2023-06-03

## 2023-06-03 RX ORDER — LEVETIRACETAM 500 MG/1
500 TABLET ORAL 2 TIMES DAILY
Qty: 60 TABLET | Refills: 0 | Status: SHIPPED | OUTPATIENT
Start: 2023-06-03 | End: 2023-07-03

## 2023-06-03 RX ADMIN — LEVETIRACETAM 500 MG: 5 INJECTION INTRAVENOUS at 07:34

## 2023-06-03 NOTE — DISCHARGE INSTRUCTIONS
Please follow-up with the neurologist this week and discuss need for EEG to confirm the diagnosis of seizure    Return to the emergency room immediately for altered mental status, recurrent seizure, chest pain, chest pressure, shortness of breath, near passing out, passing out or any new symptoms you are concerned with

## 2023-06-05 LAB — QT INTERVAL: 390 MS

## 2023-06-06 LAB — QT INTERVAL: 368 MS

## 2023-07-13 NOTE — ED PROVIDER NOTES
Time: 11:26 PM EDT  Date of encounter:  6/2/2023  Independent Historian/Clinical History and Information was obtained by:   Patient, Family, Chart and EMS  Chief Complaint: Sent from [facility] for stroke/seizure    History is limited by: N/A    History of Present Illness:  Patient is a 88 y.o. year old female who presents to the emergency department for evaluation of possible seizure and concerns for stroke. Pt does not recall what she is here for. She reports residual left side weakness and facial droop from stroke that occurred in May 2023.  According to the nursing home notes the patient was found acutely confused in her room.  They suspected a seizure.  It was also reported she was combative.  The patient's symptoms resolved by the time of arrival in the emergency room.  The patient has been alert and oriented x3 the entire time in the emergency room.  The patient does have her chronic left-sided deficits.  Patient has no complaints at this time.    HPI    Patient Care Team  Primary Care Provider: Madison Ortiz APRN    Past Medical History:     Allergies   Allergen Reactions    Paxil [Paroxetine Hcl] Palpitations    Aricept [Donepezil Hcl] Other (See Comments)     Nightmares    Levaquin [Levofloxacin] Diarrhea    Reglan [Metoclopramide] Other (See Comments)     Jitters    Statins Myalgia     Pravachol, Lipitor, Livalo     Past Medical History:   Diagnosis Date    Asthma     Benign essential tremor 9/23/2021    Carpal tunnel syndrome     Contusion     small on scalp. skin intact. D/T fall on 10/18/2019    Depression     Diabetes mellitus     GERD (gastroesophageal reflux disease) 9/23/2021    HTN (hypertension) 10/18/2019    Hyperlipidemia     Hypertension     Mitral valve problem     Polymyalgia rheumatica     Stroke     short term memory loss    Ulcerative colitis 9/23/2021    Urinary incontinence      Past Surgical History:   Procedure Laterality Date    ADENOIDECTOMY      APPENDECTOMY       BREAST SURGERY      ( L4-L5)    COLON SURGERY      colon resection    HYSTERECTOMY      OOPHORECTOMY      TONSILLECTOMY       History reviewed. No pertinent family history.    Home Medications:  Prior to Admission medications    Medication Sig Start Date End Date Taking? Authorizing Provider   albuterol sulfate  (90 Base) MCG/ACT inhaler Inhale 2 puffs Every 4 (Four) Hours As Needed for Wheezing. PRN 3/7/22   Angela Doherty MD   aspirin 81 MG EC tablet Take 1 tablet by mouth Daily. After your stroke, take both aspirin and Plavix daily for 21 days; then just continue with Plavix 75 mg daily thereafter. 5/23/23   Jose Luis Moran PA   calcium carb-cholecalciferol (Calcium + D3) 600-800 MG-UNIT tablet Take 1 tablet by mouth Daily. 3/7/22   Angela Doherty MD   clopidogrel (PLAVIX) 75 MG tablet Take 1 tablet by mouth Daily. After your stroke take both aspirin and Plavix daily for 21 days; then just continue with Plavix 75 mg daily thereafter. 5/23/23   Jose Luis Moran PA   dapagliflozin Propanediol (Farxiga) 10 MG tablet Take 10 mg by mouth Daily.    ProviderAwais MD   dilTIAZem CD (CARDIZEM CD) 240 MG 24 hr capsule Take 1 capsule by mouth every night at bedtime. 3/7/22   Angela Doherty MD   Insulin Glargine (Lantus SoloStar) 100 UNIT/ML injection pen Inject 20 Units under the skin into the appropriate area as directed Daily. 5/29/23   Nestor Louie MD   Januvia 50 MG tablet Take 1 tablet by mouth every night at bedtime. 4/24/23   Awais Stephens MD   melatonin 5 MG tablet tablet Take 1 tablet by mouth Every Night. 5/23/23   Jose Luis Moran PA   memantine (Namenda) 10 MG tablet Take 1 tablet by mouth 2 (Two) Times a Day. 3/3/22   Angela Doherty MD   metoprolol succinate XL (TOPROL-XL) 25 MG 24 hr tablet Take 1 tablet by mouth Daily. 3/7/22   Angela Doherty MD   mometasone-formoterol (DULERA 200) 200-5 MCG/ACT inhaler Inhale 2 puffs 2 (Two) Times a Day.    ProviderAwais MD    montelukast (SINGULAIR) 10 MG tablet Take 1 tablet by mouth Every Night. 3/7/22   Angela Doherty MD   multivitamin with minerals tablet tablet Take 1 tablet by mouth Daily.    Awais Stephens MD   pantoprazole (PROTONIX) 40 MG EC tablet Take 1 tablet by mouth Daily. 5/29/23   Nestor Louie MD   polycarbophil 625 MG tablet tablet Take 1 tablet by mouth 2 (Two) Times a Day.    Awais Stephens MD   polyethylene glycol (MIRALAX) 17 g packet Take 17 g by mouth 2 (Two) Times a Day As Needed (constipation). 5/29/23   Nestor Louie MD   rosuvastatin (CRESTOR) 20 MG tablet Take 1 tablet by mouth Every Night. 5/29/23   Nestor Louie MD   sertraline (ZOLOFT) 50 MG tablet Take 1 tablet by mouth Daily. 4/17/23   Awais Stephens MD   traZODone (DESYREL) 50 MG tablet Take 0.5 tablets by mouth Every Night. 5/23/23   Jose Luis Moran PA        Social History:   Social History     Tobacco Use    Smoking status: Former     Types: Cigarettes     Passive exposure: Past    Smokeless tobacco: Never   Vaping Use    Vaping Use: Never used   Substance Use Topics    Alcohol use: No    Drug use: No         Review of Systems:  Review of Systems   Constitutional:  Negative for chills, diaphoresis and fever.   HENT:  Negative for congestion, postnasal drip, rhinorrhea and sore throat.    Eyes:  Negative for photophobia.   Respiratory:  Negative for cough, chest tightness and shortness of breath.    Cardiovascular:  Negative for chest pain, palpitations and leg swelling.   Gastrointestinal:  Negative for abdominal pain, diarrhea, nausea and vomiting.   Genitourinary:  Negative for difficulty urinating, dysuria, flank pain, frequency, hematuria and urgency.   Musculoskeletal:  Negative for neck pain and neck stiffness.   Skin:  Negative for pallor and rash.   Neurological:  Positive for facial asymmetry, weakness and numbness. Negative for dizziness, syncope and headaches.   Hematological:  Negative for adenopathy. Does  "not bruise/bleed easily.   Psychiatric/Behavioral:  Positive for agitation and confusion.       Physical Exam:  /87 (BP Location: Right arm, Patient Position: Lying)   Pulse 83   Temp 98.1 °F (36.7 °C) (Oral)   Resp 16   Ht 162.6 cm (64\")   Wt 82.6 kg (182 lb 1.6 oz)   SpO2 94%   BMI 31.26 kg/m²     Physical Exam  Vitals and nursing note reviewed.   Constitutional:       General: She is not in acute distress.     Appearance: Normal appearance. She is not ill-appearing, toxic-appearing or diaphoretic.   HENT:      Head: Normocephalic and atraumatic.      Mouth/Throat:      Mouth: Mucous membranes are moist.      Comments: Supple slight abrasion to the left side of the anterior tongue  Eyes:      Pupils: Pupils are equal, round, and reactive to light.   Cardiovascular:      Rate and Rhythm: Normal rate and regular rhythm.      Pulses: Normal pulses.           Carotid pulses are 2+ on the right side and 2+ on the left side.       Radial pulses are 2+ on the right side and 2+ on the left side.        Femoral pulses are 2+ on the right side and 2+ on the left side.       Popliteal pulses are 2+ on the right side and 2+ on the left side.        Dorsalis pedis pulses are 2+ on the right side and 2+ on the left side.        Posterior tibial pulses are 2+ on the right side and 2+ on the left side.      Heart sounds: Normal heart sounds. No murmur heard.  Pulmonary:      Effort: Pulmonary effort is normal. No accessory muscle usage, respiratory distress or retractions.      Breath sounds: Normal breath sounds. No wheezing, rhonchi or rales.   Abdominal:      General: Abdomen is flat. There is no distension.      Palpations: Abdomen is soft. There is no mass or pulsatile mass.      Tenderness: There is no abdominal tenderness. There is no right CVA tenderness, left CVA tenderness, guarding or rebound.      Comments: No rigidity   Musculoskeletal:         General: No swelling, tenderness or deformity.      Cervical " back: Neck supple. No tenderness.      Right lower leg: No edema.      Left lower leg: No edema.   Skin:     General: Skin is warm and dry.      Capillary Refill: Capillary refill takes less than 2 seconds.      Coloration: Skin is not jaundiced or pale.      Findings: No erythema.   Neurological:      General: No focal deficit present.      Mental Status: She is alert and oriented to person, place, and time. Mental status is at baseline.      Cranial Nerves: Cranial nerves 2-12 are intact. Cranial nerve deficit present.      Sensory: Sensation is intact. Sensory deficit present.      Motor: Motor function is intact. Weakness present. No pronator drift.      Coordination: Coordination is intact. Coordination normal.      Comments: Patient has left facial droop, left arm weakness and left leg weakness.  The patient has slightly decreased light touch to the left upper and left lower extremity.  The patient is alert and orient x3, cooperative and pleasant.  She states that these are all old findings from her stroke in May.  According to the patient she has no new neurodeficits   Psychiatric:         Mood and Affect: Mood normal.         Behavior: Behavior normal.                Procedures:  Procedures      Medical Decision Making:      Comorbidities that affect care:    Previous stroke, diabetes, hyperlipidemia, asthma, hypertension        The following orders were placed and all results were independently analyzed by me:  Orders Placed This Encounter   Procedures    CT Head Without Contrast    [place] Draw    Comprehensive Metabolic Panel    CBC Auto Differential    Magnesium    Urinalysis With Culture If Indicated - Straight Cath    High Sensitivity Troponin T    High Sensitivity Troponin T 2Hr    Straight cath    ECG 12 Lead Rhythm Change    Insert peripheral IV    CBC & Differential    Green Top (Gel)    Lavender Top    Gold Top - SST    Light Blue Top       Medications Given in the Emergency  Department:  Medications   sodium chloride 0.9 % flush 10 mL (has no administration in time range)   levETIRAcetam in NaCl 0.82% (KEPPRA) IVPB 500 mg (has no administration in time range)        ED Course:    ED Course as of 06/03/23 0702   Sat Jun 03, 2023   0603 EKG:    Rhythm: Sinus rhythm  Rate: 82  Right bundle branch block  VH by criteria in aVL  Intervals: Normal NJ and QT interval  T-wave: Nonspecific T wave flattening  ST Segment: Nonspecific ST segment and III, aVF, no obvious pathological ST elevation or reciprocal ST depression to suggest acute myocardial infarction    EKG Comparison: No change in the QRS and ST morphology from the EKG performed May 26, 2023    Interpreted by me   [SD]      ED Course User Index  [SD] Erich Reynolds DO       Labs:    Lab Results (last 24 hours)       Procedure Component Value Units Date/Time    Comprehensive Metabolic Panel [[id number]]  (Abnormal) Collected: 06/02/23 2150    Specimen: Blood Updated: 06/02/23 2230     Glucose 192 mg/dL      BUN 16 mg/dL      Creatinine 0.96 mg/dL      Sodium 139 mmol/L      Potassium 4.7 mmol/L      Comment: Slight hemolysis detected by analyzer. Results may be affected.        Chloride 102 mmol/L      CO2 23.8 mmol/L      Calcium 9.8 mg/dL      Total Protein 6.2 g/dL      Albumin 3.3 g/dL      ALT (SGPT) 18 U/L      AST (SGOT) 16 U/L      Comment: Slight hemolysis detected by analyzer. Results may be affected.        Alkaline Phosphatase 121 U/L      Total Bilirubin 0.3 mg/dL      Globulin 2.9 gm/dL      A/G Ratio 1.1 g/dL      BUN/Creatinine Ratio 16.7     Anion Gap 13.2 mmol/L      eGFR 57.4 mL/min/1.73     Narrative:      GFR Normal >60  Chronic Kidney Disease <60  Kidney Failure <15    The GFR formula is only valid for adults with stable renal function between ages 18 and 70.    CBC & Differential [[id number]]  (Abnormal) Collected: 06/02/23 2150    Specimen: Blood Updated: 06/02/23 2208    Narrative:      The following orders were  created for panel order CBC & Differential.  Procedure                               Abnormality         Status                     ---------                               -----------         ------                     CBC Auto Differential[[id number]]        Abnormal            Final result                 Please view results for these tests on the individual orders.    CBC Auto Differential [[id number]]  (Abnormal) Collected: 06/02/23 2150    Specimen: Blood Updated: 06/02/23 2208     WBC 4.65 10*3/mm3      RBC 4.52 10*6/mm3      Hemoglobin 11.9 g/dL      Hematocrit 37.1 %      MCV 82.1 fL      MCH 26.3 pg      MCHC 32.1 g/dL      RDW 15.4 %      RDW-SD 45.3 fl      MPV 9.3 fL      Platelets 259 10*3/mm3      Neutrophil % 56.5 %      Lymphocyte % 22.8 %      Monocyte % 15.3 %      Eosinophil % 3.7 %      Basophil % 0.6 %      Immature Grans % 1.1 %      Neutrophils, Absolute 2.63 10*3/mm3      Lymphocytes, Absolute 1.06 10*3/mm3      Monocytes, Absolute 0.71 10*3/mm3      Eosinophils, Absolute 0.17 10*3/mm3      Basophils, Absolute 0.03 10*3/mm3      Immature Grans, Absolute 0.05 10*3/mm3      nRBC 0.0 /100 WBC     Magnesium [[id number]]  (Normal) Collected: 06/02/23 2150    Specimen: Blood Updated: 06/03/23 0616     Magnesium 2.3 mg/dL     High Sensitivity Troponin T [[id number]]  (Abnormal) Collected: 06/02/23 2150    Specimen: Blood Updated: 06/03/23 0618     HS Troponin T 19 ng/L     Narrative:      High Sensitive Troponin T Reference Range:  <10.0 ng/L- Negative Female for AMI  <15.0 ng/L- Negative Male for AMI  >=10 - Abnormal Female indicating possible myocardial injury.  >=15 - Abnormal Male indicating possible myocardial injury.   Clinicians would have to utilize clinical acumen, EKG, Troponin, and serial changes to determine if it is an Acute Myocardial Infarction or myocardial injury due to an underlying chronic condition.         Urinalysis With Culture If Indicated - Straight Cath [[id number]] Collected:  06/03/23 0643    Specimen: Urine from Straight Cath Updated: 06/03/23 0645             Imaging:    CT Head Without Contrast    Result Date: 6/3/2023  PROCEDURE: CT HEAD WO CONTRAST  COMPARISONS: 5/26/2023; 5/21/2023.  INDICATIONS: Seizure, new-onset; no history of trauma.  PROTOCOL:   Standard CT imaging protocol performed    RADIATION:   Total DLP: 1,018.2 mGy*cm   MA and/or KV were/was adjusted to minimize radiation dose.    TECHNIQUE: After obtaining the patient's consent, 130 CT images were obtained without non-ionic intravenous contrast material.  The study is motion-limited.  DISCUSSION:  A routine nonenhanced head CT was performed. No acute brain abnormality is identified. No acute intracranial hemorrhage. No acute infarction. No acute skull fracture. No midline shift or acute intracranial mass effect is seen.  Severe chronic small vessel ischemia/infarction is suspected. There are arterial calcifications. The extra-axial spaces and the ventricular system are prominent.  No significant interval change is seen since the prior head CT studies from 5/26/2023 and 5/21/2023.  Please see those reports for further detail regarding additional findings.        No acute brain abnormality is seen.  No acute intracranial hemorrhage.  No acute skull fracture.  No acute infarct.    Please note that portions of this note were completed with a voice recognition program.  KLEEBR DELVALLE JR, MD       Electronically Signed and Approved By: KLEBER DELVALLE JR, MD on 6/03/2023 at 2:03                 Differential Diagnosis and Discussion:    Altered Mental Status: Based on the patient's signs and symptoms, differential diagnosis includes but is not limited to meningitis, stroke, sepsis, subarachnoid hemorrhage, intracranial bleeding, encephalitis, and metabolic encephalopathy.    All labs were reviewed and interpreted by me.  EKG was interpreted by me.  CT scan radiology impression was interpreted by me.    MDM  Number of  Diagnoses or Management Options  History of stroke  New onset seizure  Diagnosis management comments:     The patient had no altered mental status while in the emergency room.  The patient was not combative while in the emergency room.  The patient was alert oriented x3 cooperative and pleasant the entire time.  The patient had no syncopal episodes.  The patient was monitored in the emergency room for over 9 hours.  The patient's CT scan demonstrated no acute abnormalities.  The patient did appear to have maybe mild abrasions on the left side of the tongue.  I suspect the diagnosis of seizure is most likely.  The patient was given IV Keppra in the emergency room.  The patient was started on oral Keppra twice a day.  The patient will be referred back to the neurologist this week for outpatient EEG.  The patient will return for any recurrent episodes, altered mental status, syncope, possible seizure, chest pain, chest pressure, shortness of breath or any new symptoms.       Amount and/or Complexity of Data Reviewed  Clinical lab tests: reviewed               Social Determinants of Health:    Patient is a nursing home/assisted living resident and has reliable access to care.      Disposition and Care Coordination:    Discharged: I considered escalation of care by admitting this patient for observation, however the patient has improved and is suitable and  stable for discharge.    I have explained discharge medications and the need for follow up with the patient/caretakers. This was also printed in the discharge instructions. Patient was discharged with the following medications and follow up:      Medication List        New Prescriptions      levETIRAcetam 500 MG tablet  Commonly known as: KEPPRA  Take 1 tablet by mouth 2 (Two) Times a Day for 30 days.               Where to Get Your Medications        These medications were sent to [facility] - [place], [place] - [address] [address] - [phone]  -  [phone] FX  [address] [facility] [address] [place] [address]      Phone: [phone]   levETIRAcetam 500 MG tablet      Madison Ortiz, APRN  [address] [address] [address]  [address]  [place] [address]  [phone]    On 6/5/2023  Altered mental status, call for appointment    Arnaud Mishra Jr., MD  [facility] [place] [address]  [phone]    On 6/5/2023  Call for appointment, discuss need for EEG       Final diagnoses:   New onset seizure   History of stroke        ED Disposition       ED Disposition   Discharge    Condition   Stable    Comment   --               This medical record created using voice recognition software.           Erich Reynolds DO  06/03/23 0702     No

## 2023-12-07 ENCOUNTER — LAB REQUISITION (OUTPATIENT)
Dept: LAB | Facility: HOSPITAL | Age: 88
End: 2023-12-07
Payer: MEDICARE

## 2023-12-07 DIAGNOSIS — R53.1 WEAKNESS: ICD-10-CM

## 2023-12-07 DIAGNOSIS — R30.0 DYSURIA: ICD-10-CM

## 2023-12-07 DIAGNOSIS — N39.0 URINARY TRACT INFECTION, SITE NOT SPECIFIED: ICD-10-CM

## 2023-12-07 LAB
BACTERIA UR QL AUTO: ABNORMAL /HPF
BILIRUB UR QL STRIP: NEGATIVE
CLARITY UR: ABNORMAL
COLOR UR: YELLOW
GLUCOSE UR STRIP-MCNC: ABNORMAL MG/DL
HGB UR QL STRIP.AUTO: ABNORMAL
HYALINE CASTS UR QL AUTO: ABNORMAL /LPF
KETONES UR QL STRIP: NEGATIVE
LEUKOCYTE ESTERASE UR QL STRIP.AUTO: ABNORMAL
NITRITE UR QL STRIP: POSITIVE
PH UR STRIP.AUTO: 6.5 [PH] (ref 5–8)
PROT UR QL STRIP: NEGATIVE
RBC # UR STRIP: ABNORMAL /HPF
REF LAB TEST METHOD: ABNORMAL
SP GR UR STRIP: 1.03 (ref 1–1.03)
SQUAMOUS #/AREA URNS HPF: ABNORMAL /HPF
UROBILINOGEN UR QL STRIP: ABNORMAL
WBC # UR STRIP: ABNORMAL /HPF
YEAST URNS QL MICRO: ABNORMAL /HPF

## 2023-12-07 PROCEDURE — 87186 SC STD MICRODIL/AGAR DIL: CPT | Performed by: NURSE PRACTITIONER

## 2023-12-07 PROCEDURE — 87077 CULTURE AEROBIC IDENTIFY: CPT | Performed by: NURSE PRACTITIONER

## 2023-12-07 PROCEDURE — 81001 URINALYSIS AUTO W/SCOPE: CPT | Performed by: NURSE PRACTITIONER

## 2023-12-07 PROCEDURE — 87086 URINE CULTURE/COLONY COUNT: CPT | Performed by: NURSE PRACTITIONER

## 2023-12-09 LAB — BACTERIA SPEC AEROBE CULT: ABNORMAL

## 2024-01-19 ENCOUNTER — LAB REQUISITION (OUTPATIENT)
Dept: LAB | Facility: HOSPITAL | Age: 89
End: 2024-01-19
Payer: MEDICARE

## 2024-01-19 DIAGNOSIS — J18.9 PNEUMONIA, UNSPECIFIED ORGANISM: ICD-10-CM

## 2024-01-19 LAB
ALBUMIN SERPL-MCNC: 3.5 G/DL (ref 3.5–5.2)
ALBUMIN/GLOB SERPL: 1.3 G/DL
ALP SERPL-CCNC: 115 U/L (ref 39–117)
ALT SERPL W P-5'-P-CCNC: 12 U/L (ref 1–33)
ANION GAP SERPL CALCULATED.3IONS-SCNC: 12.8 MMOL/L (ref 5–15)
AST SERPL-CCNC: 10 U/L (ref 1–32)
BASOPHILS # BLD AUTO: 0.04 10*3/MM3 (ref 0–0.2)
BASOPHILS NFR BLD AUTO: 0.7 % (ref 0–1.5)
BILIRUB SERPL-MCNC: 0.2 MG/DL (ref 0–1.2)
BUN SERPL-MCNC: 17 MG/DL (ref 8–23)
BUN/CREAT SERPL: 21.5 (ref 7–25)
CALCIUM SPEC-SCNC: 9.7 MG/DL (ref 8.6–10.5)
CHLORIDE SERPL-SCNC: 104 MMOL/L (ref 98–107)
CO2 SERPL-SCNC: 22.2 MMOL/L (ref 22–29)
CREAT SERPL-MCNC: 0.79 MG/DL (ref 0.57–1)
DEPRECATED RDW RBC AUTO: 46.8 FL (ref 37–54)
EGFRCR SERPLBLD CKD-EPI 2021: 72.5 ML/MIN/1.73
EOSINOPHIL # BLD AUTO: 0.14 10*3/MM3 (ref 0–0.4)
EOSINOPHIL NFR BLD AUTO: 2.4 % (ref 0.3–6.2)
ERYTHROCYTE [DISTWIDTH] IN BLOOD BY AUTOMATED COUNT: 15.5 % (ref 12.3–15.4)
GLOBULIN UR ELPH-MCNC: 2.8 GM/DL
GLUCOSE SERPL-MCNC: 177 MG/DL (ref 65–99)
HCT VFR BLD AUTO: 39.7 % (ref 34–46.6)
HGB BLD-MCNC: 12.5 G/DL (ref 12–15.9)
IMM GRANULOCYTES # BLD AUTO: 0.01 10*3/MM3 (ref 0–0.05)
IMM GRANULOCYTES NFR BLD AUTO: 0.2 % (ref 0–0.5)
LYMPHOCYTES # BLD AUTO: 1.13 10*3/MM3 (ref 0.7–3.1)
LYMPHOCYTES NFR BLD AUTO: 19.1 % (ref 19.6–45.3)
MCH RBC QN AUTO: 26 PG (ref 26.6–33)
MCHC RBC AUTO-ENTMCNC: 31.5 G/DL (ref 31.5–35.7)
MCV RBC AUTO: 82.7 FL (ref 79–97)
MONOCYTES # BLD AUTO: 0.9 10*3/MM3 (ref 0.1–0.9)
MONOCYTES NFR BLD AUTO: 15.2 % (ref 5–12)
NEUTROPHILS NFR BLD AUTO: 3.71 10*3/MM3 (ref 1.7–7)
NEUTROPHILS NFR BLD AUTO: 62.4 % (ref 42.7–76)
NRBC BLD AUTO-RTO: 0 /100 WBC (ref 0–0.2)
PLATELET # BLD AUTO: 204 10*3/MM3 (ref 140–450)
PMV BLD AUTO: 9.4 FL (ref 6–12)
POTASSIUM SERPL-SCNC: 3.6 MMOL/L (ref 3.5–5.2)
PROT SERPL-MCNC: 6.3 G/DL (ref 6–8.5)
RBC # BLD AUTO: 4.8 10*6/MM3 (ref 3.77–5.28)
SODIUM SERPL-SCNC: 139 MMOL/L (ref 136–145)
WBC NRBC COR # BLD AUTO: 5.93 10*3/MM3 (ref 3.4–10.8)

## 2024-01-19 PROCEDURE — 85025 COMPLETE CBC W/AUTO DIFF WBC: CPT | Performed by: NURSE PRACTITIONER

## 2024-01-19 PROCEDURE — 80053 COMPREHEN METABOLIC PANEL: CPT | Performed by: NURSE PRACTITIONER

## 2024-05-01 ENCOUNTER — APPOINTMENT (OUTPATIENT)
Dept: GENERAL RADIOLOGY | Facility: HOSPITAL | Age: 89
End: 2024-05-01
Payer: MEDICARE

## 2024-05-01 ENCOUNTER — HOSPITAL ENCOUNTER (EMERGENCY)
Facility: HOSPITAL | Age: 89
Discharge: HOME | End: 2024-05-02
Attending: EMERGENCY MEDICINE
Payer: MEDICARE
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
## 2024-05-01 DIAGNOSIS — S76.012A HIP STRAIN, LEFT, INITIAL ENCOUNTER: Primary | ICD-10-CM

## 2024-05-01 LAB
ALBUMIN SERPL-MCNC: 3.4 G/DL (ref 3.5–5.2)
ALBUMIN/GLOB SERPL: 1.3 G/DL
ALP SERPL-CCNC: 108 U/L (ref 39–117)
ALT SERPL W P-5'-P-CCNC: 9 U/L (ref 1–33)
ANION GAP SERPL CALCULATED.3IONS-SCNC: 10.9 MMOL/L (ref 5–15)
AST SERPL-CCNC: 6 U/L (ref 1–32)
BASOPHILS # BLD AUTO: 0.04 10*3/MM3 (ref 0–0.2)
BASOPHILS NFR BLD AUTO: 0.6 % (ref 0–1.5)
BILIRUB SERPL-MCNC: 0.2 MG/DL (ref 0–1.2)
BUN SERPL-MCNC: 14 MG/DL (ref 8–23)
BUN/CREAT SERPL: 20.9 (ref 7–25)
CALCIUM SPEC-SCNC: 9.9 MG/DL (ref 8.6–10.5)
CHLORIDE SERPL-SCNC: 108 MMOL/L (ref 98–107)
CK SERPL-CCNC: 23 U/L (ref 20–180)
CO2 SERPL-SCNC: 23.1 MMOL/L (ref 22–29)
CREAT SERPL-MCNC: 0.67 MG/DL (ref 0.57–1)
DEPRECATED RDW RBC AUTO: 48.2 FL (ref 37–54)
EGFRCR SERPLBLD CKD-EPI 2021: 84.2 ML/MIN/1.73
EOSINOPHIL # BLD AUTO: 0.19 10*3/MM3 (ref 0–0.4)
EOSINOPHIL NFR BLD AUTO: 2.7 % (ref 0.3–6.2)
ERYTHROCYTE [DISTWIDTH] IN BLOOD BY AUTOMATED COUNT: 16 % (ref 12.3–15.4)
GLOBULIN UR ELPH-MCNC: 2.6 GM/DL
GLUCOSE SERPL-MCNC: 154 MG/DL (ref 65–99)
HCT VFR BLD AUTO: 35.7 % (ref 34–46.6)
HGB BLD-MCNC: 11.2 G/DL (ref 12–15.9)
IMM GRANULOCYTES # BLD AUTO: 0.02 10*3/MM3 (ref 0–0.05)
IMM GRANULOCYTES NFR BLD AUTO: 0.3 % (ref 0–0.5)
LYMPHOCYTES # BLD AUTO: 1.29 10*3/MM3 (ref 0.7–3.1)
LYMPHOCYTES NFR BLD AUTO: 18.3 % (ref 19.6–45.3)
MCH RBC QN AUTO: 26.1 PG (ref 26.6–33)
MCHC RBC AUTO-ENTMCNC: 31.4 G/DL (ref 31.5–35.7)
MCV RBC AUTO: 83.2 FL (ref 79–97)
MONOCYTES # BLD AUTO: 1.11 10*3/MM3 (ref 0.1–0.9)
MONOCYTES NFR BLD AUTO: 15.7 % (ref 5–12)
NEUTROPHILS NFR BLD AUTO: 4.4 10*3/MM3 (ref 1.7–7)
NEUTROPHILS NFR BLD AUTO: 62.4 % (ref 42.7–76)
NRBC BLD AUTO-RTO: 0 /100 WBC (ref 0–0.2)
PLATELET # BLD AUTO: 205 10*3/MM3 (ref 140–450)
PMV BLD AUTO: 9 FL (ref 6–12)
POTASSIUM SERPL-SCNC: 3.6 MMOL/L (ref 3.5–5.2)
PROT SERPL-MCNC: 6 G/DL (ref 6–8.5)
RBC # BLD AUTO: 4.29 10*6/MM3 (ref 3.77–5.28)
SODIUM SERPL-SCNC: 142 MMOL/L (ref 136–145)
WBC NRBC COR # BLD AUTO: 7.05 10*3/MM3 (ref 3.4–10.8)

## 2024-05-01 PROCEDURE — 99283 EMERGENCY DEPT VISIT LOW MDM: CPT

## 2024-05-01 PROCEDURE — 82550 ASSAY OF CK (CPK): CPT

## 2024-05-01 PROCEDURE — 85025 COMPLETE CBC W/AUTO DIFF WBC: CPT

## 2024-05-01 PROCEDURE — 73502 X-RAY EXAM HIP UNI 2-3 VIEWS: CPT

## 2024-05-01 PROCEDURE — 80053 COMPREHEN METABOLIC PANEL: CPT

## 2024-05-01 PROCEDURE — 36415 COLL VENOUS BLD VENIPUNCTURE: CPT

## 2024-05-02 VITALS
DIASTOLIC BLOOD PRESSURE: 80 MMHG | SYSTOLIC BLOOD PRESSURE: 142 MMHG | HEIGHT: 64 IN | OXYGEN SATURATION: 94 % | RESPIRATION RATE: 10 BRPM | BODY MASS INDEX: 29.55 KG/M2 | WEIGHT: 173.06 LBS | TEMPERATURE: 99.2 F | HEART RATE: 81 BPM

## 2024-05-02 LAB
HOLD SPECIMEN: NORMAL
HOLD SPECIMEN: NORMAL
WHOLE BLOOD HOLD COAG: NORMAL

## 2024-05-02 NOTE — ED NOTES
Per ems, Patient coming from senior living. Patient fell out of chair. C/o left hip pain. No loc, no shortening or rotation noticed. No blood thinners.

## 2024-05-02 NOTE — ED PROVIDER NOTES
Time: 12:21 AM EDT  Date of encounter:  5/1/2024  Independent Historian/Clinical History and Information was obtained by:   Patient    History is limited by: N/A    Chief Complaint: Left hip pain      History of Present Illness:  Patient is a 89 y.o. year old female who presents to the emergency department for evaluation of left hip pain following sliding out of her chair at her living facility.  Patient denies hitting her head or losing consciousness.  Patient denies chest pain shortness of breath.  Patient denies nausea/vomiting.  Patient states she was not on ground but for more than a few minutes.    HPI    Patient Care Team  Primary Care Provider: Madison Ortiz APRN    Past Medical History:     Allergies   Allergen Reactions    Paxil [Paroxetine Hcl] Palpitations    Aricept [Donepezil Hcl] Other (See Comments)     Nightmares    Levaquin [Levofloxacin] Diarrhea    Reglan [Metoclopramide] Other (See Comments)     Jitters    Statins Myalgia     Pravachol, Lipitor, Livalo     Past Medical History:   Diagnosis Date    Asthma     Benign essential tremor 9/23/2021    Carpal tunnel syndrome     Contusion     small on scalp. skin intact. D/T fall on 10/18/2019    Depression     Diabetes mellitus     GERD (gastroesophageal reflux disease) 9/23/2021    HTN (hypertension) 10/18/2019    Hyperlipidemia     Hypertension     Mitral valve problem     Polymyalgia rheumatica     Stroke     short term memory loss    Ulcerative colitis 9/23/2021    Urinary incontinence      Past Surgical History:   Procedure Laterality Date    ADENOIDECTOMY      APPENDECTOMY      BREAST SURGERY      ( L4-L5)    COLON SURGERY      colon resection    HYSTERECTOMY      OOPHORECTOMY      TONSILLECTOMY       History reviewed. No pertinent family history.    Home Medications:  Prior to Admission medications    Medication Sig Start Date End Date Taking? Authorizing Provider   albuterol sulfate  (90 Base) MCG/ACT inhaler Inhale 2 puffs  Every 4 (Four) Hours As Needed for Wheezing. PRN 3/7/22   Angela Doherty MD   aspirin 81 MG EC tablet Take 1 tablet by mouth Daily. After your stroke, take both aspirin and Plavix daily for 21 days; then just continue with Plavix 75 mg daily thereafter. 5/23/23   Jose Luis Moran PA   calcium carb-cholecalciferol (Calcium + D3) 600-800 MG-UNIT tablet Take 1 tablet by mouth Daily. 3/7/22   Angela Doherty MD   clopidogrel (PLAVIX) 75 MG tablet Take 1 tablet by mouth Daily. After your stroke take both aspirin and Plavix daily for 21 days; then just continue with Plavix 75 mg daily thereafter. 5/23/23   Jose Luis Moran PA   dapagliflozin Propanediol (Farxiga) 10 MG tablet Take 10 mg by mouth Daily.    ProviderAwais MD   dilTIAZem CD (CARDIZEM CD) 240 MG 24 hr capsule Take 1 capsule by mouth every night at bedtime. 3/7/22   Angela Doherty MD   Insulin Glargine (Lantus SoloStar) 100 UNIT/ML injection pen Inject 20 Units under the skin into the appropriate area as directed Daily. 5/29/23   Nestor Louie MD   Januvia 50 MG tablet Take 1 tablet by mouth every night at bedtime. 4/24/23   ProviderAwais MD   levETIRAcetam (KEPPRA) 500 MG tablet Take 1 tablet by mouth 2 (Two) Times a Day for 30 days. 6/3/23 7/3/23  Erich Reynolds,    melatonin 5 MG tablet tablet Take 1 tablet by mouth Every Night. 5/23/23   Jose Luis Moran PA   memantine (Namenda) 10 MG tablet Take 1 tablet by mouth 2 (Two) Times a Day. 3/3/22   Angela Doherty MD   metoprolol succinate XL (TOPROL-XL) 25 MG 24 hr tablet Take 1 tablet by mouth Daily. 3/7/22   Angela Doherty MD   mometasone-formoterol (DULERA 200) 200-5 MCG/ACT inhaler Inhale 2 puffs 2 (Two) Times a Day.    ProviderAwais MD   montelukast (SINGULAIR) 10 MG tablet Take 1 tablet by mouth Every Night. 3/7/22   Angela Doherty MD   multivitamin with minerals tablet tablet Take 1 tablet by mouth Daily.    Provider, MD Awais   pantoprazole (PROTONIX) 40 MG  "EC tablet Take 1 tablet by mouth Daily. 5/29/23   Nestor Louie MD   polycarbophil 625 MG tablet tablet Take 1 tablet by mouth 2 (Two) Times a Day.    ProviderAwais MD   polyethylene glycol (MIRALAX) 17 g packet Take 17 g by mouth 2 (Two) Times a Day As Needed (constipation). 5/29/23   Nestor Louie MD   rosuvastatin (CRESTOR) 20 MG tablet Take 1 tablet by mouth Every Night. 5/29/23   Nestor Louie MD   sertraline (ZOLOFT) 50 MG tablet Take 1 tablet by mouth Daily. 4/17/23   ProviderAwais MD   traZODone (DESYREL) 50 MG tablet Take 0.5 tablets by mouth Every Night. 5/23/23   Jose Luis Moran PA        Social History:   Social History     Tobacco Use    Smoking status: Former     Types: Cigarettes     Passive exposure: Past    Smokeless tobacco: Never   Vaping Use    Vaping status: Never Used   Substance Use Topics    Alcohol use: No    Drug use: No         Review of Systems:  Review of Systems   Constitutional:  Negative for chills and fever.   HENT:  Negative for congestion, rhinorrhea and sore throat.    Eyes:  Negative for pain and visual disturbance.   Respiratory:  Negative for apnea, cough, chest tightness and shortness of breath.    Cardiovascular:  Negative for chest pain and palpitations.   Gastrointestinal:  Negative for abdominal pain, diarrhea, nausea and vomiting.   Genitourinary:  Negative for difficulty urinating and dysuria.   Musculoskeletal:  Positive for arthralgias. Negative for joint swelling and myalgias.   Skin:  Negative for color change.   Neurological:  Negative for seizures and headaches.   Psychiatric/Behavioral: Negative.     All other systems reviewed and are negative.       Physical Exam:  /80   Pulse 81   Temp 99.2 °F (37.3 °C) (Oral)   Resp 10   Ht 162.6 cm (64\")   Wt 78.5 kg (173 lb 1 oz)   SpO2 94%   BMI 29.71 kg/m²     Physical Exam  Vitals and nursing note reviewed.   Constitutional:       General: She is not in acute distress.     Appearance: " Normal appearance. She is not toxic-appearing.   HENT:      Head: Normocephalic and atraumatic.      Jaw: There is normal jaw occlusion.   Eyes:      General: Lids are normal.      Extraocular Movements: Extraocular movements intact.      Conjunctiva/sclera: Conjunctivae normal.      Pupils: Pupils are equal, round, and reactive to light.   Cardiovascular:      Rate and Rhythm: Normal rate and regular rhythm.      Pulses: Normal pulses.      Heart sounds: Normal heart sounds.   Pulmonary:      Effort: Pulmonary effort is normal. No respiratory distress.      Breath sounds: Normal breath sounds. No wheezing or rhonchi.   Abdominal:      General: Abdomen is flat.      Palpations: Abdomen is soft.      Tenderness: There is no abdominal tenderness. There is no guarding or rebound.   Musculoskeletal:         General: Tenderness (Mild tenderness appreciated upon palpation to left hip.) present. Normal range of motion.      Cervical back: Normal range of motion and neck supple.      Right lower leg: No edema.      Left lower leg: No edema.   Skin:     General: Skin is warm and dry.   Neurological:      Mental Status: She is alert and oriented to person, place, and time. Mental status is at baseline.   Psychiatric:         Mood and Affect: Mood normal.                  Procedures:  Procedures      Medical Decision Making:      Comorbidities that affect care:    Diabetes, hyperlipidemia, hypertension    External Notes reviewed:          The following orders were placed and all results were independently analyzed by me:  Orders Placed This Encounter   Procedures    XR Hip With or Without Pelvis 2 - 3 View Left    Comprehensive Metabolic Panel    CK    CBC Auto Differential    CBC & Differential    Extra Tubes    Gold Top - SST    Green Top (Gel)    Light Blue Top       Medications Given in the Emergency Department:  Medications - No data to display     ED Course:         Labs:    Lab Results (last 24 hours)       Procedure  Component Value Units Date/Time    Comprehensive Metabolic Panel [[id number]]  (Abnormal) Collected: 05/01/24 2308    Specimen: Blood Updated: 05/01/24 2333     Glucose 154 mg/dL      BUN 14 mg/dL      Creatinine 0.67 mg/dL      Sodium 142 mmol/L      Potassium 3.6 mmol/L      Chloride 108 mmol/L      CO2 23.1 mmol/L      Calcium 9.9 mg/dL      Total Protein 6.0 g/dL      Albumin 3.4 g/dL      ALT (SGPT) 9 U/L      AST (SGOT) 6 U/L      Alkaline Phosphatase 108 U/L      Total Bilirubin 0.2 mg/dL      Globulin 2.6 gm/dL      A/G Ratio 1.3 g/dL      BUN/Creatinine Ratio 20.9     Anion Gap 10.9 mmol/L      eGFR 84.2 mL/min/1.73     Narrative:      GFR Normal >60  Chronic Kidney Disease <60  Kidney Failure <15    The GFR formula is only valid for adults with stable renal function between ages 18 and 70.    CBC & Differential [[id number]]  (Abnormal) Collected: 05/01/24 2308    Specimen: Blood from Hand, Left Updated: 05/01/24 2355    Narrative:      The following orders were created for panel order CBC & Differential.  Procedure                               Abnormality         Status                     ---------                               -----------         ------                     CBC Auto Differential[[id number]]        Abnormal            Final result               Scan Slide[[id number]]                                                                    Please view results for these tests on the individual orders.    CK [[id number]]  (Normal) Collected: 05/01/24 2308    Specimen: Blood Updated: 05/01/24 2333     Creatine Kinase 23 U/L     CBC Auto Differential [[id number]]  (Abnormal) Collected: 05/01/24 2343    Specimen: Blood from Hand, Left Updated: 05/01/24 2355     WBC 7.05 10*3/mm3      RBC 4.29 10*6/mm3      Hemoglobin 11.2 g/dL      Hematocrit 35.7 %      MCV 83.2 fL      MCH 26.1 pg      MCHC 31.4 g/dL      RDW 16.0 %      RDW-SD 48.2 fl      MPV 9.0 fL      Platelets 205 10*3/mm3      Neutrophil % 62.4 %       Lymphocyte % 18.3 %      Monocyte % 15.7 %      Eosinophil % 2.7 %      Basophil % 0.6 %      Immature Grans % 0.3 %      Neutrophils, Absolute 4.40 10*3/mm3      Lymphocytes, Absolute 1.29 10*3/mm3      Monocytes, Absolute 1.11 10*3/mm3      Eosinophils, Absolute 0.19 10*3/mm3      Basophils, Absolute 0.04 10*3/mm3      Immature Grans, Absolute 0.02 10*3/mm3      nRBC 0.0 /100 WBC              Imaging:    XR Hip With or Without Pelvis 2 - 3 View Left    Result Date: 5/1/2024  XR HIP W OR WO PELVIS 2-3 VIEW LEFT-  Date of Exam: 5/1/2024 10:20 PM  Indication: fall  Comparison: None available.  Findings: Moderate degenerative changes are present in the hips. No fractures, dislocations or acute osseous abnormalities are identified in the pelvis or left hip. Image detail is somewhat limited by body habitus.      Impression: Degenerative change. No acute osseous abnormalities are identified. Suggest a follow-up examination if symptoms persist.   Electronically Signed By-NUBIA ANDREWS MD On:5/1/2024 10:43 PM         Differential Diagnosis and Discussion:    Extremity Pain: Differential diagnosis includes but is not limited to soft tissue sprain, tendonitis, tendon injury, dislocation, fracture, deep vein thrombosis, arterial insufficiency, osteoarthritis, bursitis, and ligamentous damage.    All labs were reviewed and interpreted by me.  All X-rays impressions were independently interpreted by me.    MDM     X-ray hip shows degenerative change but no acute osseous abnormality.  CK within normal limits.  Patient is resting comfortably at this time.  Patient denies pain medication.  Instructed patient and family to return to the ED if they develop any new or worsening symptoms.  Family and patient state they understand and agree with plan of care and will follow-up with PCP otherwise.          Patient Care Considerations:          Consultants/Shared Management Plan:    None    Social Determinants of Health:    Patient  is a nursing home/assisted living resident and has reliable access to care.      Disposition and Care Coordination:    Discharged: The patient is suitable and stable for discharge with no need for consideration of admission.    I have explained the patient´s condition, diagnoses and treatment plan based on the information available to me at this time. I have answered questions and addressed any concerns. The patient has a good  understanding of the patient´s diagnosis, condition, and treatment plan as can be expected at this point. The vital signs have been stable. The patient´s condition is stable and appropriate for discharge from the emergency department.      The patient will pursue further outpatient evaluation with the primary care physician or other designated or consulting physician as outlined in the discharge instructions. They are agreeable to this plan of care and follow-up instructions have been explained in detail. The patient has received these instructions in written format and has expressed an understanding of the discharge instructions. The patient is aware that any significant change in condition or worsening of symptoms should prompt an immediate return to this or the closest emergency department or call to 911.  I have explained discharge medications and the need for follow up with the patient/caretakers. This was also printed in the discharge instructions. Patient was discharged with the following medications and follow up:      Medication List      No changes were made to your prescriptions during this visit.      Madison Ortiz, APRN  [address] [address] [phone]    Call in 1 day  To schedule follow-up appointment       Final diagnoses:   Hip strain, left, initial encounter   Fall, initial encounter        ED Disposition       ED Disposition   Discharge    Condition   Stable    Comment   --               This medical record created using voice  recognition software.             Erich Ma PA-C  05/02/24 0024

## 2024-06-06 ENCOUNTER — LAB REQUISITION (OUTPATIENT)
Dept: LAB | Facility: HOSPITAL | Age: 89
End: 2024-06-06
Payer: MEDICARE

## 2024-06-06 DIAGNOSIS — R53.1 WEAKNESS: ICD-10-CM

## 2024-06-06 DIAGNOSIS — R30.0 DYSURIA: ICD-10-CM

## 2024-06-06 DIAGNOSIS — N39.0 URINARY TRACT INFECTION, SITE NOT SPECIFIED: ICD-10-CM

## 2024-06-06 PROCEDURE — 87086 URINE CULTURE/COLONY COUNT: CPT | Performed by: NURSE PRACTITIONER

## 2024-06-06 PROCEDURE — 81003 URINALYSIS AUTO W/O SCOPE: CPT | Performed by: NURSE PRACTITIONER

## 2024-06-08 LAB — BACTERIA SPEC AEROBE CULT: NO GROWTH

## 2024-09-14 ENCOUNTER — APPOINTMENT (OUTPATIENT)
Dept: CT IMAGING | Facility: HOSPITAL | Age: 89
DRG: 065 | End: 2024-09-14
Payer: MEDICARE

## 2024-09-14 ENCOUNTER — APPOINTMENT (OUTPATIENT)
Dept: GENERAL RADIOLOGY | Facility: HOSPITAL | Age: 89
DRG: 065 | End: 2024-09-14
Payer: MEDICARE

## 2024-09-14 ENCOUNTER — HOSPITAL ENCOUNTER (INPATIENT)
Facility: HOSPITAL | Age: 89
LOS: 5 days | Discharge: NURSING, REHAB OR LONG-TERM CARE | DRG: 065 | End: 2024-09-19
Attending: EMERGENCY MEDICINE | Admitting: STUDENT IN AN ORGANIZED HEALTH CARE EDUCATION/TRAINING PROGRAM
Payer: MEDICARE

## 2024-09-14 DIAGNOSIS — Z74.09 IMPAIRED MOBILITY AND ADLS: ICD-10-CM

## 2024-09-14 DIAGNOSIS — I63.9 CEREBROVASCULAR ACCIDENT (CVA), UNSPECIFIED MECHANISM: Primary | ICD-10-CM

## 2024-09-14 DIAGNOSIS — Z78.9 IMPAIRED MOBILITY AND ADLS: ICD-10-CM

## 2024-09-14 DIAGNOSIS — R26.2 DIFFICULTY IN WALKING: ICD-10-CM

## 2024-09-14 DIAGNOSIS — R13.12 OROPHARYNGEAL DYSPHAGIA: ICD-10-CM

## 2024-09-14 LAB
ABO GROUP BLD: NORMAL
ALBUMIN SERPL-MCNC: 3.6 G/DL (ref 3.5–5.2)
ALBUMIN/GLOB SERPL: 1.3 G/DL
ALP SERPL-CCNC: 187 U/L (ref 39–117)
ALT SERPL W P-5'-P-CCNC: 38 U/L (ref 1–33)
ANION GAP SERPL CALCULATED.3IONS-SCNC: 8.3 MMOL/L (ref 5–15)
APTT PPP: 28.9 SECONDS (ref 24.2–34.2)
AST SERPL-CCNC: 26 U/L (ref 1–32)
BASOPHILS # BLD AUTO: 0.04 10*3/MM3 (ref 0–0.2)
BASOPHILS NFR BLD AUTO: 0.5 % (ref 0–1.5)
BILIRUB SERPL-MCNC: 0.3 MG/DL (ref 0–1.2)
BILIRUB UR QL STRIP: NEGATIVE
BLD GP AB SCN SERPL QL: NEGATIVE
BUN SERPL-MCNC: 16 MG/DL (ref 8–23)
BUN/CREAT SERPL: 20.8 (ref 7–25)
CALCIUM SPEC-SCNC: 10.3 MG/DL (ref 8.6–10.5)
CHLORIDE SERPL-SCNC: 108 MMOL/L (ref 98–107)
CLARITY UR: CLEAR
CO2 SERPL-SCNC: 24.7 MMOL/L (ref 22–29)
COLOR UR: YELLOW
CREAT SERPL-MCNC: 0.77 MG/DL (ref 0.57–1)
DEPRECATED RDW RBC AUTO: 46.4 FL (ref 37–54)
EGFRCR SERPLBLD CKD-EPI 2021: 74.3 ML/MIN/1.73
EOSINOPHIL # BLD AUTO: 0.19 10*3/MM3 (ref 0–0.4)
EOSINOPHIL NFR BLD AUTO: 2.5 % (ref 0.3–6.2)
ERYTHROCYTE [DISTWIDTH] IN BLOOD BY AUTOMATED COUNT: 15.5 % (ref 12.3–15.4)
GLOBULIN UR ELPH-MCNC: 2.8 GM/DL
GLUCOSE SERPL-MCNC: 104 MG/DL (ref 65–99)
GLUCOSE UR STRIP-MCNC: ABNORMAL MG/DL
HCT VFR BLD AUTO: 39.1 % (ref 34–46.6)
HGB BLD-MCNC: 12.3 G/DL (ref 12–15.9)
HGB UR QL STRIP.AUTO: NEGATIVE
HOLD SPECIMEN: NORMAL
HOLD SPECIMEN: NORMAL
IMM GRANULOCYTES # BLD AUTO: 0.03 10*3/MM3 (ref 0–0.05)
IMM GRANULOCYTES NFR BLD AUTO: 0.4 % (ref 0–0.5)
INR PPP: 1.04 (ref 0.86–1.15)
KETONES UR QL STRIP: NEGATIVE
LEUKOCYTE ESTERASE UR QL STRIP.AUTO: NEGATIVE
LYMPHOCYTES # BLD AUTO: 1.37 10*3/MM3 (ref 0.7–3.1)
LYMPHOCYTES NFR BLD AUTO: 17.9 % (ref 19.6–45.3)
MCH RBC QN AUTO: 25.8 PG (ref 26.6–33)
MCHC RBC AUTO-ENTMCNC: 31.5 G/DL (ref 31.5–35.7)
MCV RBC AUTO: 82.1 FL (ref 79–97)
MONOCYTES # BLD AUTO: 1.01 10*3/MM3 (ref 0.1–0.9)
MONOCYTES NFR BLD AUTO: 13.2 % (ref 5–12)
NEUTROPHILS NFR BLD AUTO: 5.03 10*3/MM3 (ref 1.7–7)
NEUTROPHILS NFR BLD AUTO: 65.5 % (ref 42.7–76)
NITRITE UR QL STRIP: NEGATIVE
NRBC BLD AUTO-RTO: 0 /100 WBC (ref 0–0.2)
PH UR STRIP.AUTO: 7.5 [PH] (ref 5–8)
PLATELET # BLD AUTO: 221 10*3/MM3 (ref 140–450)
PMV BLD AUTO: 8.9 FL (ref 6–12)
POTASSIUM SERPL-SCNC: 4 MMOL/L (ref 3.5–5.2)
PROT SERPL-MCNC: 6.4 G/DL (ref 6–8.5)
PROT UR QL STRIP: NEGATIVE
PROTHROMBIN TIME: 13.8 SECONDS (ref 11.8–14.9)
RBC # BLD AUTO: 4.76 10*6/MM3 (ref 3.77–5.28)
RH BLD: POSITIVE
SODIUM SERPL-SCNC: 141 MMOL/L (ref 136–145)
SP GR UR STRIP: >1.03 (ref 1–1.03)
T&S EXPIRATION DATE: NORMAL
TROPONIN T SERPL HS-MCNC: 11 NG/L
UROBILINOGEN UR QL STRIP: ABNORMAL
WBC NRBC COR # BLD AUTO: 7.67 10*3/MM3 (ref 3.4–10.8)
WHOLE BLOOD HOLD COAG: NORMAL
WHOLE BLOOD HOLD SPECIMEN: NORMAL

## 2024-09-14 PROCEDURE — 25810000003 SODIUM CHLORIDE 0.9 % SOLUTION: Performed by: STUDENT IN AN ORGANIZED HEALTH CARE EDUCATION/TRAINING PROGRAM

## 2024-09-14 PROCEDURE — 99291 CRITICAL CARE FIRST HOUR: CPT

## 2024-09-14 PROCEDURE — 71045 X-RAY EXAM CHEST 1 VIEW: CPT

## 2024-09-14 PROCEDURE — 36415 COLL VENOUS BLD VENIPUNCTURE: CPT

## 2024-09-14 PROCEDURE — 80053 COMPREHEN METABOLIC PANEL: CPT | Performed by: EMERGENCY MEDICINE

## 2024-09-14 PROCEDURE — 25510000001 IOPAMIDOL PER 1 ML: Performed by: EMERGENCY MEDICINE

## 2024-09-14 PROCEDURE — 86850 RBC ANTIBODY SCREEN: CPT | Performed by: EMERGENCY MEDICINE

## 2024-09-14 PROCEDURE — 94799 UNLISTED PULMONARY SVC/PX: CPT

## 2024-09-14 PROCEDURE — 99222 1ST HOSP IP/OBS MODERATE 55: CPT | Performed by: STUDENT IN AN ORGANIZED HEALTH CARE EDUCATION/TRAINING PROGRAM

## 2024-09-14 PROCEDURE — 70498 CT ANGIOGRAPHY NECK: CPT

## 2024-09-14 PROCEDURE — 86900 BLOOD TYPING SEROLOGIC ABO: CPT | Performed by: EMERGENCY MEDICINE

## 2024-09-14 PROCEDURE — 93005 ELECTROCARDIOGRAM TRACING: CPT | Performed by: EMERGENCY MEDICINE

## 2024-09-14 PROCEDURE — 70450 CT HEAD/BRAIN W/O DYE: CPT

## 2024-09-14 PROCEDURE — 70496 CT ANGIOGRAPHY HEAD: CPT

## 2024-09-14 PROCEDURE — 82948 REAGENT STRIP/BLOOD GLUCOSE: CPT

## 2024-09-14 PROCEDURE — 86901 BLOOD TYPING SEROLOGIC RH(D): CPT | Performed by: EMERGENCY MEDICINE

## 2024-09-14 PROCEDURE — 85025 COMPLETE CBC W/AUTO DIFF WBC: CPT | Performed by: EMERGENCY MEDICINE

## 2024-09-14 PROCEDURE — 94761 N-INVAS EAR/PLS OXIMETRY MLT: CPT

## 2024-09-14 PROCEDURE — 84484 ASSAY OF TROPONIN QUANT: CPT | Performed by: EMERGENCY MEDICINE

## 2024-09-14 PROCEDURE — 85610 PROTHROMBIN TIME: CPT | Performed by: EMERGENCY MEDICINE

## 2024-09-14 PROCEDURE — 81003 URINALYSIS AUTO W/O SCOPE: CPT | Performed by: EMERGENCY MEDICINE

## 2024-09-14 PROCEDURE — 0042T HC CT CEREBRAL PERFUSION W/WO CONTRAST: CPT

## 2024-09-14 PROCEDURE — P9612 CATHETERIZE FOR URINE SPEC: HCPCS

## 2024-09-14 PROCEDURE — 85730 THROMBOPLASTIN TIME PARTIAL: CPT | Performed by: EMERGENCY MEDICINE

## 2024-09-14 RX ORDER — ASPIRIN 81 MG
2 TABLET, DELAYED RELEASE (ENTERIC COATED) ORAL
COMMUNITY

## 2024-09-14 RX ORDER — SODIUM CHLORIDE 0.9 % (FLUSH) 0.9 %
10 SYRINGE (ML) INJECTION AS NEEDED
Status: DISCONTINUED | OUTPATIENT
Start: 2024-09-14 | End: 2024-09-20 | Stop reason: HOSPADM

## 2024-09-14 RX ORDER — SODIUM CHLORIDE 0.9 % (FLUSH) 0.9 %
10 SYRINGE (ML) INJECTION EVERY 12 HOURS SCHEDULED
Status: DISCONTINUED | OUTPATIENT
Start: 2024-09-14 | End: 2024-09-20 | Stop reason: HOSPADM

## 2024-09-14 RX ORDER — SODIUM CHLORIDE 9 MG/ML
75 INJECTION, SOLUTION INTRAVENOUS CONTINUOUS
Status: DISCONTINUED | OUTPATIENT
Start: 2024-09-14 | End: 2024-09-15

## 2024-09-14 RX ORDER — DESVENLAFAXINE 100 MG/1
1 TABLET, EXTENDED RELEASE ORAL DAILY
COMMUNITY

## 2024-09-14 RX ORDER — INSULIN LISPRO 100 [IU]/ML
INJECTION, SOLUTION INTRAVENOUS; SUBCUTANEOUS
COMMUNITY
End: 2024-09-19 | Stop reason: HOSPADM

## 2024-09-14 RX ORDER — PEN NEEDLE, DIABETIC 32GX 5/32"
NEEDLE, DISPOSABLE MISCELLANEOUS
COMMUNITY
Start: 2024-08-23

## 2024-09-14 RX ORDER — ASPIRIN 300 MG/1
300 SUPPOSITORY RECTAL DAILY
Status: DISCONTINUED | OUTPATIENT
Start: 2024-09-15 | End: 2024-09-15

## 2024-09-14 RX ORDER — IOPAMIDOL 755 MG/ML
150 INJECTION, SOLUTION INTRAVASCULAR
Status: COMPLETED | OUTPATIENT
Start: 2024-09-14 | End: 2024-09-14

## 2024-09-14 RX ORDER — CLOPIDOGREL BISULFATE 75 MG/1
75 TABLET ORAL DAILY
Status: DISCONTINUED | OUTPATIENT
Start: 2024-09-15 | End: 2024-09-20 | Stop reason: HOSPADM

## 2024-09-14 RX ORDER — SODIUM CHLORIDE 9 MG/ML
40 INJECTION, SOLUTION INTRAVENOUS AS NEEDED
Status: DISCONTINUED | OUTPATIENT
Start: 2024-09-14 | End: 2024-09-20 | Stop reason: HOSPADM

## 2024-09-14 RX ORDER — ACETAMINOPHEN 325 MG/1
650 TABLET ORAL EVERY 4 HOURS PRN
Status: DISCONTINUED | OUTPATIENT
Start: 2024-09-14 | End: 2024-09-20 | Stop reason: HOSPADM

## 2024-09-14 RX ORDER — ATORVASTATIN CALCIUM 40 MG/1
80 TABLET, FILM COATED ORAL NIGHTLY
Status: DISCONTINUED | OUTPATIENT
Start: 2024-09-14 | End: 2024-09-20 | Stop reason: HOSPADM

## 2024-09-14 RX ORDER — DOCUSATE SODIUM 100 MG/1
100 CAPSULE, LIQUID FILLED ORAL 2 TIMES DAILY
COMMUNITY

## 2024-09-14 RX ORDER — LABETALOL HYDROCHLORIDE 5 MG/ML
10 INJECTION, SOLUTION INTRAVENOUS
Status: DISCONTINUED | OUTPATIENT
Start: 2024-09-14 | End: 2024-09-16

## 2024-09-14 RX ORDER — INSULIN LISPRO 100 [IU]/ML
INJECTION, SOLUTION INTRAVENOUS; SUBCUTANEOUS
COMMUNITY
Start: 2024-04-25 | End: 2024-09-19 | Stop reason: HOSPADM

## 2024-09-14 RX ORDER — ASPIRIN 81 MG/1
81 TABLET, CHEWABLE ORAL DAILY
Status: DISCONTINUED | OUTPATIENT
Start: 2024-09-15 | End: 2024-09-20 | Stop reason: HOSPADM

## 2024-09-14 RX ORDER — ACETAMINOPHEN 650 MG/1
650 SUPPOSITORY RECTAL EVERY 4 HOURS PRN
Status: DISCONTINUED | OUTPATIENT
Start: 2024-09-14 | End: 2024-09-20 | Stop reason: HOSPADM

## 2024-09-14 RX ORDER — SERTRALINE HYDROCHLORIDE 25 MG/1
25 TABLET, FILM COATED ORAL DAILY
Status: ON HOLD | COMMUNITY
End: 2024-09-19

## 2024-09-14 RX ADMIN — SODIUM CHLORIDE 75 ML/HR: 9 INJECTION, SOLUTION INTRAVENOUS at 20:45

## 2024-09-14 RX ADMIN — IOPAMIDOL 150 ML: 755 INJECTION, SOLUTION INTRAVENOUS at 15:37

## 2024-09-14 RX ADMIN — Medication 10 ML: at 21:41

## 2024-09-15 ENCOUNTER — APPOINTMENT (OUTPATIENT)
Dept: MRI IMAGING | Facility: HOSPITAL | Age: 89
DRG: 065 | End: 2024-09-15
Payer: MEDICARE

## 2024-09-15 ENCOUNTER — APPOINTMENT (OUTPATIENT)
Dept: CARDIOLOGY | Facility: HOSPITAL | Age: 89
DRG: 065 | End: 2024-09-15
Payer: MEDICARE

## 2024-09-15 LAB
ABO GROUP BLD: NORMAL
CHOLEST SERPL-MCNC: 148 MG/DL (ref 0–200)
GLUCOSE BLDC GLUCOMTR-MCNC: 103 MG/DL (ref 70–99)
GLUCOSE BLDC GLUCOMTR-MCNC: 87 MG/DL (ref 70–99)
HBA1C MFR BLD: 6 % (ref 4.8–5.6)
HDLC SERPL-MCNC: 44 MG/DL (ref 40–60)
LDLC SERPL CALC-MCNC: 81 MG/DL (ref 0–100)
LDLC/HDLC SERPL: 1.78 {RATIO}
RH BLD: POSITIVE
TRIGL SERPL-MCNC: 128 MG/DL (ref 0–150)
VLDLC SERPL-MCNC: 23 MG/DL (ref 5–40)

## 2024-09-15 PROCEDURE — 80061 LIPID PANEL: CPT | Performed by: STUDENT IN AN ORGANIZED HEALTH CARE EDUCATION/TRAINING PROGRAM

## 2024-09-15 PROCEDURE — 99233 SBSQ HOSP IP/OBS HIGH 50: CPT | Performed by: INTERNAL MEDICINE

## 2024-09-15 PROCEDURE — 99233 SBSQ HOSP IP/OBS HIGH 50: CPT | Performed by: FAMILY MEDICINE

## 2024-09-15 PROCEDURE — 86901 BLOOD TYPING SEROLOGIC RH(D): CPT

## 2024-09-15 PROCEDURE — 93306 TTE W/DOPPLER COMPLETE: CPT | Performed by: STUDENT IN AN ORGANIZED HEALTH CARE EDUCATION/TRAINING PROGRAM

## 2024-09-15 PROCEDURE — 82948 REAGENT STRIP/BLOOD GLUCOSE: CPT

## 2024-09-15 PROCEDURE — 70551 MRI BRAIN STEM W/O DYE: CPT

## 2024-09-15 PROCEDURE — 97165 OT EVAL LOW COMPLEX 30 MIN: CPT

## 2024-09-15 PROCEDURE — 25810000003 SODIUM CHLORIDE 0.9 % SOLUTION: Performed by: STUDENT IN AN ORGANIZED HEALTH CARE EDUCATION/TRAINING PROGRAM

## 2024-09-15 PROCEDURE — 82948 REAGENT STRIP/BLOOD GLUCOSE: CPT | Performed by: STUDENT IN AN ORGANIZED HEALTH CARE EDUCATION/TRAINING PROGRAM

## 2024-09-15 PROCEDURE — 25010000002 SULFUR HEXAFLUORIDE MICROSPH 60.7-25 MG RECONSTITUTED SUSPENSION: Performed by: INTERNAL MEDICINE

## 2024-09-15 PROCEDURE — 36415 COLL VENOUS BLD VENIPUNCTURE: CPT | Performed by: EMERGENCY MEDICINE

## 2024-09-15 PROCEDURE — 83036 HEMOGLOBIN GLYCOSYLATED A1C: CPT | Performed by: STUDENT IN AN ORGANIZED HEALTH CARE EDUCATION/TRAINING PROGRAM

## 2024-09-15 PROCEDURE — 92610 EVALUATE SWALLOWING FUNCTION: CPT

## 2024-09-15 PROCEDURE — 86900 BLOOD TYPING SEROLOGIC ABO: CPT

## 2024-09-15 PROCEDURE — 93306 TTE W/DOPPLER COMPLETE: CPT

## 2024-09-15 RX ORDER — IBUPROFEN 600 MG/1
1 TABLET ORAL
Status: DISCONTINUED | OUTPATIENT
Start: 2024-09-15 | End: 2024-09-20 | Stop reason: HOSPADM

## 2024-09-15 RX ORDER — DEXTROSE MONOHYDRATE 25 G/50ML
25 INJECTION, SOLUTION INTRAVENOUS
Status: DISCONTINUED | OUTPATIENT
Start: 2024-09-15 | End: 2024-09-20 | Stop reason: HOSPADM

## 2024-09-15 RX ORDER — LEVETIRACETAM 500 MG/1
500 TABLET ORAL EVERY 12 HOURS SCHEDULED
Status: DISCONTINUED | OUTPATIENT
Start: 2024-09-15 | End: 2024-09-20 | Stop reason: HOSPADM

## 2024-09-15 RX ORDER — NICOTINE POLACRILEX 4 MG
15 LOZENGE BUCCAL
Status: DISCONTINUED | OUTPATIENT
Start: 2024-09-15 | End: 2024-09-20 | Stop reason: HOSPADM

## 2024-09-15 RX ADMIN — SODIUM CHLORIDE 75 ML/HR: 9 INJECTION, SOLUTION INTRAVENOUS at 10:13

## 2024-09-15 RX ADMIN — CLOPIDOGREL BISULFATE 75 MG: 75 TABLET ORAL at 10:13

## 2024-09-15 RX ADMIN — ASPIRIN 81 MG: 81 TABLET, CHEWABLE ORAL at 10:13

## 2024-09-15 RX ADMIN — SULFUR HEXAFLUORIDE 2 ML: KIT at 11:15

## 2024-09-15 RX ADMIN — ACETAMINOPHEN 650 MG: 325 TABLET ORAL at 21:04

## 2024-09-15 RX ADMIN — Medication 10 ML: at 21:03

## 2024-09-15 RX ADMIN — ATORVASTATIN CALCIUM 80 MG: 40 TABLET, FILM COATED ORAL at 21:03

## 2024-09-15 RX ADMIN — LEVETIRACETAM 500 MG: 500 TABLET, FILM COATED ORAL at 21:03

## 2024-09-16 LAB
ANION GAP SERPL CALCULATED.3IONS-SCNC: 12.7 MMOL/L (ref 5–15)
BUN SERPL-MCNC: 11 MG/DL (ref 8–23)
BUN/CREAT SERPL: 16.9 (ref 7–25)
CALCIUM SPEC-SCNC: 9.8 MG/DL (ref 8.6–10.5)
CHLORIDE SERPL-SCNC: 105 MMOL/L (ref 98–107)
CO2 SERPL-SCNC: 20.3 MMOL/L (ref 22–29)
CREAT SERPL-MCNC: 0.65 MG/DL (ref 0.57–1)
DEPRECATED RDW RBC AUTO: 46.3 FL (ref 37–54)
EGFRCR SERPLBLD CKD-EPI 2021: 84.8 ML/MIN/1.73
ERYTHROCYTE [DISTWIDTH] IN BLOOD BY AUTOMATED COUNT: 15.5 % (ref 12.3–15.4)
GLUCOSE SERPL-MCNC: 124 MG/DL (ref 65–99)
HCT VFR BLD AUTO: 43.2 % (ref 34–46.6)
HGB BLD-MCNC: 13.7 G/DL (ref 12–15.9)
MCH RBC QN AUTO: 26.2 PG (ref 26.6–33)
MCHC RBC AUTO-ENTMCNC: 31.7 G/DL (ref 31.5–35.7)
MCV RBC AUTO: 82.8 FL (ref 79–97)
PLATELET # BLD AUTO: 230 10*3/MM3 (ref 140–450)
PMV BLD AUTO: 9.3 FL (ref 6–12)
POTASSIUM SERPL-SCNC: 3.4 MMOL/L (ref 3.5–5.2)
RBC # BLD AUTO: 5.22 10*6/MM3 (ref 3.77–5.28)
SODIUM SERPL-SCNC: 138 MMOL/L (ref 136–145)
WBC NRBC COR # BLD AUTO: 7.85 10*3/MM3 (ref 3.4–10.8)

## 2024-09-16 PROCEDURE — 25010000002 HEPARIN (PORCINE) PER 1000 UNITS: Performed by: INTERNAL MEDICINE

## 2024-09-16 PROCEDURE — 80048 BASIC METABOLIC PNL TOTAL CA: CPT | Performed by: INTERNAL MEDICINE

## 2024-09-16 PROCEDURE — 97161 PT EVAL LOW COMPLEX 20 MIN: CPT

## 2024-09-16 PROCEDURE — 94799 UNLISTED PULMONARY SVC/PX: CPT

## 2024-09-16 PROCEDURE — 99232 SBSQ HOSP IP/OBS MODERATE 35: CPT | Performed by: INTERNAL MEDICINE

## 2024-09-16 PROCEDURE — 85027 COMPLETE CBC AUTOMATED: CPT | Performed by: INTERNAL MEDICINE

## 2024-09-16 PROCEDURE — 99232 SBSQ HOSP IP/OBS MODERATE 35: CPT | Performed by: PSYCHIATRY & NEUROLOGY

## 2024-09-16 RX ORDER — MEMANTINE HYDROCHLORIDE 10 MG/1
10 TABLET ORAL EVERY 12 HOURS SCHEDULED
Status: DISCONTINUED | OUTPATIENT
Start: 2024-09-16 | End: 2024-09-20 | Stop reason: HOSPADM

## 2024-09-16 RX ORDER — HEPARIN SODIUM 5000 [USP'U]/ML
5000 INJECTION, SOLUTION INTRAVENOUS; SUBCUTANEOUS EVERY 8 HOURS SCHEDULED
Status: DISCONTINUED | OUTPATIENT
Start: 2024-09-16 | End: 2024-09-20 | Stop reason: HOSPADM

## 2024-09-16 RX ORDER — METOPROLOL SUCCINATE 25 MG/1
25 TABLET, EXTENDED RELEASE ORAL
Status: DISCONTINUED | OUTPATIENT
Start: 2024-09-16 | End: 2024-09-18

## 2024-09-16 RX ADMIN — MEMANTINE 10 MG: 10 TABLET ORAL at 21:41

## 2024-09-16 RX ADMIN — ATORVASTATIN CALCIUM 80 MG: 40 TABLET, FILM COATED ORAL at 21:41

## 2024-09-16 RX ADMIN — CLOPIDOGREL BISULFATE 75 MG: 75 TABLET ORAL at 08:03

## 2024-09-16 RX ADMIN — MEMANTINE 10 MG: 10 TABLET ORAL at 08:03

## 2024-09-16 RX ADMIN — SERTRALINE HYDROCHLORIDE 50 MG: 50 TABLET ORAL at 08:03

## 2024-09-16 RX ADMIN — LEVETIRACETAM 500 MG: 500 TABLET, FILM COATED ORAL at 08:03

## 2024-09-16 RX ADMIN — METOPROLOL SUCCINATE 25 MG: 25 TABLET, FILM COATED, EXTENDED RELEASE ORAL at 08:03

## 2024-09-16 RX ADMIN — ASPIRIN 81 MG: 81 TABLET, CHEWABLE ORAL at 08:03

## 2024-09-16 RX ADMIN — Medication 10 ML: at 21:42

## 2024-09-16 RX ADMIN — HEPARIN SODIUM 5000 UNITS: 5000 INJECTION INTRAVENOUS; SUBCUTANEOUS at 21:42

## 2024-09-16 RX ADMIN — LEVETIRACETAM 500 MG: 500 TABLET, FILM COATED ORAL at 21:41

## 2024-09-16 RX ADMIN — ACETAMINOPHEN 650 MG: 325 TABLET ORAL at 07:49

## 2024-09-16 RX ADMIN — Medication 10 ML: at 08:03

## 2024-09-16 RX ADMIN — ACETAMINOPHEN 650 MG: 325 TABLET ORAL at 03:02

## 2024-09-16 RX ADMIN — HEPARIN SODIUM 5000 UNITS: 5000 INJECTION INTRAVENOUS; SUBCUTANEOUS at 13:04

## 2024-09-17 LAB
ANION GAP SERPL CALCULATED.3IONS-SCNC: 13.6 MMOL/L (ref 5–15)
BH CV ECHO MEAS - AO MAX PG: 6.7 MMHG
BH CV ECHO MEAS - AO MEAN PG: 4.1 MMHG
BH CV ECHO MEAS - AO ROOT DIAM: 3.3 CM
BH CV ECHO MEAS - AO V2 MAX: 129.7 CM/SEC
BH CV ECHO MEAS - AO V2 VTI: 25.7 CM
BH CV ECHO MEAS - AVA(I,D): 1.44 CM2
BH CV ECHO MEAS - EDV(CUBED): 99.1 ML
BH CV ECHO MEAS - EDV(MOD-SP2): 106 ML
BH CV ECHO MEAS - EDV(MOD-SP4): 87.1 ML
BH CV ECHO MEAS - EF(MOD-SP2): 68.4 %
BH CV ECHO MEAS - EF(MOD-SP4): 62.1 %
BH CV ECHO MEAS - ESV(CUBED): 21.2 ML
BH CV ECHO MEAS - ESV(MOD-SP2): 33.5 ML
BH CV ECHO MEAS - ESV(MOD-SP4): 33 ML
BH CV ECHO MEAS - FS: 40.2 %
BH CV ECHO MEAS - IVS/LVPW: 0.86 CM
BH CV ECHO MEAS - IVSD: 1.06 CM
BH CV ECHO MEAS - LA DIMENSION: 3.1 CM
BH CV ECHO MEAS - LV DIASTOLIC VOL/BSA (35-75): 48.3 CM2
BH CV ECHO MEAS - LV MASS(C)D: 193.9 GRAMS
BH CV ECHO MEAS - LV MAX PG: 1.65 MMHG
BH CV ECHO MEAS - LV MEAN PG: 1.03 MMHG
BH CV ECHO MEAS - LV SYSTOLIC VOL/BSA (12-30): 18.3 CM2
BH CV ECHO MEAS - LV V1 MAX: 64.3 CM/SEC
BH CV ECHO MEAS - LV V1 VTI: 11.7 CM
BH CV ECHO MEAS - LVIDD: 4.6 CM
BH CV ECHO MEAS - LVIDS: 2.8 CM
BH CV ECHO MEAS - LVOT AREA: 3.2 CM2
BH CV ECHO MEAS - LVOT DIAM: 2 CM
BH CV ECHO MEAS - LVPWD: 1.23 CM
BH CV ECHO MEAS - MV A MAX VEL: 87.4 CM/SEC
BH CV ECHO MEAS - MV DEC SLOPE: 335.7 CM/SEC2
BH CV ECHO MEAS - MV DEC TIME: 0.15 SEC
BH CV ECHO MEAS - MV E MAX VEL: 51.4 CM/SEC
BH CV ECHO MEAS - MV E/A: 0.59
BH CV ECHO MEAS - MV MEAN PG: 1.68 MMHG
BH CV ECHO MEAS - MV V2 VTI: 19 CM
BH CV ECHO MEAS - MVA(VTI): 1.95 CM2
BH CV ECHO MEAS - RVDD: 3 CM
BH CV ECHO MEAS - SV(LVOT): 36.9 ML
BH CV ECHO MEAS - SV(MOD-SP2): 72.5 ML
BH CV ECHO MEAS - SV(MOD-SP4): 54.1 ML
BH CV ECHO MEAS - SVI(LVOT): 20.5 ML/M2
BH CV ECHO MEAS - SVI(MOD-SP2): 40.2 ML/M2
BH CV ECHO MEAS - SVI(MOD-SP4): 30 ML/M2
BUN SERPL-MCNC: 16 MG/DL (ref 8–23)
BUN/CREAT SERPL: 20.3 (ref 7–25)
CALCIUM SPEC-SCNC: 9.7 MG/DL (ref 8.6–10.5)
CHLORIDE SERPL-SCNC: 104 MMOL/L (ref 98–107)
CO2 SERPL-SCNC: 18.4 MMOL/L (ref 22–29)
CREAT SERPL-MCNC: 0.79 MG/DL (ref 0.57–1)
EGFRCR SERPLBLD CKD-EPI 2021: 72.1 ML/MIN/1.73
GLUCOSE BLDC GLUCOMTR-MCNC: 214 MG/DL (ref 70–99)
GLUCOSE SERPL-MCNC: 162 MG/DL (ref 65–99)
IVRT: 61 MS
POTASSIUM SERPL-SCNC: 3.7 MMOL/L (ref 3.5–5.2)
SODIUM SERPL-SCNC: 136 MMOL/L (ref 136–145)
WHOLE BLOOD HOLD SPECIMEN: NORMAL

## 2024-09-17 PROCEDURE — 99232 SBSQ HOSP IP/OBS MODERATE 35: CPT | Performed by: INTERNAL MEDICINE

## 2024-09-17 PROCEDURE — 92526 ORAL FUNCTION THERAPY: CPT

## 2024-09-17 PROCEDURE — 97530 THERAPEUTIC ACTIVITIES: CPT

## 2024-09-17 PROCEDURE — 82948 REAGENT STRIP/BLOOD GLUCOSE: CPT

## 2024-09-17 PROCEDURE — 97110 THERAPEUTIC EXERCISES: CPT

## 2024-09-17 PROCEDURE — 25010000002 HEPARIN (PORCINE) PER 1000 UNITS: Performed by: INTERNAL MEDICINE

## 2024-09-17 PROCEDURE — 80048 BASIC METABOLIC PNL TOTAL CA: CPT | Performed by: INTERNAL MEDICINE

## 2024-09-17 RX ORDER — POLYETHYLENE GLYCOL 3350 17 G/17G
17 POWDER, FOR SOLUTION ORAL DAILY PRN
Status: DISCONTINUED | OUTPATIENT
Start: 2024-09-17 | End: 2024-09-20 | Stop reason: HOSPADM

## 2024-09-17 RX ORDER — BISACODYL 10 MG
10 SUPPOSITORY, RECTAL RECTAL DAILY PRN
Status: DISCONTINUED | OUTPATIENT
Start: 2024-09-17 | End: 2024-09-20 | Stop reason: HOSPADM

## 2024-09-17 RX ORDER — AMOXICILLIN 250 MG
2 CAPSULE ORAL 2 TIMES DAILY
Status: DISCONTINUED | OUTPATIENT
Start: 2024-09-17 | End: 2024-09-20 | Stop reason: HOSPADM

## 2024-09-17 RX ORDER — BISACODYL 5 MG/1
5 TABLET, DELAYED RELEASE ORAL DAILY PRN
Status: DISCONTINUED | OUTPATIENT
Start: 2024-09-17 | End: 2024-09-20 | Stop reason: HOSPADM

## 2024-09-17 RX ADMIN — HEPARIN SODIUM 5000 UNITS: 5000 INJECTION INTRAVENOUS; SUBCUTANEOUS at 13:38

## 2024-09-17 RX ADMIN — Medication 10 ML: at 09:14

## 2024-09-17 RX ADMIN — LEVETIRACETAM 500 MG: 500 TABLET, FILM COATED ORAL at 09:14

## 2024-09-17 RX ADMIN — LEVETIRACETAM 500 MG: 500 TABLET, FILM COATED ORAL at 20:53

## 2024-09-17 RX ADMIN — METOPROLOL SUCCINATE 25 MG: 25 TABLET, FILM COATED, EXTENDED RELEASE ORAL at 09:13

## 2024-09-17 RX ADMIN — MEMANTINE 10 MG: 10 TABLET ORAL at 20:53

## 2024-09-17 RX ADMIN — ATORVASTATIN CALCIUM 80 MG: 40 TABLET, FILM COATED ORAL at 20:54

## 2024-09-17 RX ADMIN — Medication 10 ML: at 20:54

## 2024-09-17 RX ADMIN — ASPIRIN 81 MG: 81 TABLET, CHEWABLE ORAL at 09:13

## 2024-09-17 RX ADMIN — SENNOSIDES AND DOCUSATE SODIUM 2 TABLET: 50; 8.6 TABLET ORAL at 20:54

## 2024-09-17 RX ADMIN — HEPARIN SODIUM 5000 UNITS: 5000 INJECTION INTRAVENOUS; SUBCUTANEOUS at 21:00

## 2024-09-17 RX ADMIN — BISACODYL 5 MG: 5 TABLET, COATED ORAL at 13:38

## 2024-09-17 RX ADMIN — SERTRALINE HYDROCHLORIDE 50 MG: 50 TABLET ORAL at 09:13

## 2024-09-17 RX ADMIN — HEPARIN SODIUM 5000 UNITS: 5000 INJECTION INTRAVENOUS; SUBCUTANEOUS at 05:31

## 2024-09-17 RX ADMIN — SENNOSIDES AND DOCUSATE SODIUM 2 TABLET: 50; 8.6 TABLET ORAL at 09:13

## 2024-09-17 RX ADMIN — MEMANTINE 10 MG: 10 TABLET ORAL at 09:14

## 2024-09-17 RX ADMIN — CLOPIDOGREL BISULFATE 75 MG: 75 TABLET ORAL at 09:14

## 2024-09-18 LAB — GLUCOSE BLDC GLUCOMTR-MCNC: 101 MG/DL (ref 70–99)

## 2024-09-18 PROCEDURE — 97530 THERAPEUTIC ACTIVITIES: CPT

## 2024-09-18 PROCEDURE — 97110 THERAPEUTIC EXERCISES: CPT

## 2024-09-18 PROCEDURE — 25010000002 HEPARIN (PORCINE) PER 1000 UNITS: Performed by: INTERNAL MEDICINE

## 2024-09-18 PROCEDURE — 25010000002 ONDANSETRON PER 1 MG: Performed by: STUDENT IN AN ORGANIZED HEALTH CARE EDUCATION/TRAINING PROGRAM

## 2024-09-18 PROCEDURE — 99232 SBSQ HOSP IP/OBS MODERATE 35: CPT | Performed by: INTERNAL MEDICINE

## 2024-09-18 RX ORDER — METOPROLOL SUCCINATE 50 MG/1
50 TABLET, EXTENDED RELEASE ORAL
Status: DISCONTINUED | OUTPATIENT
Start: 2024-09-18 | End: 2024-09-18

## 2024-09-18 RX ORDER — ONDANSETRON 2 MG/ML
4 INJECTION INTRAMUSCULAR; INTRAVENOUS EVERY 4 HOURS PRN
Status: DISCONTINUED | OUTPATIENT
Start: 2024-09-18 | End: 2024-09-20 | Stop reason: HOSPADM

## 2024-09-18 RX ORDER — METOPROLOL TARTRATE 25 MG/1
25 TABLET, FILM COATED ORAL EVERY 12 HOURS SCHEDULED
Status: DISCONTINUED | OUTPATIENT
Start: 2024-09-18 | End: 2024-09-20 | Stop reason: HOSPADM

## 2024-09-18 RX ADMIN — ATORVASTATIN CALCIUM 80 MG: 40 TABLET, FILM COATED ORAL at 21:40

## 2024-09-18 RX ADMIN — METOPROLOL TARTRATE 25 MG: 25 TABLET, FILM COATED ORAL at 08:17

## 2024-09-18 RX ADMIN — SENNOSIDES AND DOCUSATE SODIUM 2 TABLET: 50; 8.6 TABLET ORAL at 21:40

## 2024-09-18 RX ADMIN — ASPIRIN 81 MG: 81 TABLET, CHEWABLE ORAL at 08:17

## 2024-09-18 RX ADMIN — LEVETIRACETAM 500 MG: 500 TABLET, FILM COATED ORAL at 21:40

## 2024-09-18 RX ADMIN — ONDANSETRON 4 MG: 2 INJECTION INTRAMUSCULAR; INTRAVENOUS at 06:22

## 2024-09-18 RX ADMIN — LEVETIRACETAM 500 MG: 500 TABLET, FILM COATED ORAL at 08:17

## 2024-09-18 RX ADMIN — Medication 10 ML: at 21:40

## 2024-09-18 RX ADMIN — HEPARIN SODIUM 5000 UNITS: 5000 INJECTION INTRAVENOUS; SUBCUTANEOUS at 15:12

## 2024-09-18 RX ADMIN — Medication 10 ML: at 08:18

## 2024-09-18 RX ADMIN — HEPARIN SODIUM 5000 UNITS: 5000 INJECTION INTRAVENOUS; SUBCUTANEOUS at 21:40

## 2024-09-18 RX ADMIN — HEPARIN SODIUM 5000 UNITS: 5000 INJECTION INTRAVENOUS; SUBCUTANEOUS at 05:10

## 2024-09-18 RX ADMIN — SERTRALINE HYDROCHLORIDE 50 MG: 50 TABLET ORAL at 08:18

## 2024-09-18 RX ADMIN — CLOPIDOGREL BISULFATE 75 MG: 75 TABLET ORAL at 08:17

## 2024-09-18 RX ADMIN — METOPROLOL TARTRATE 25 MG: 25 TABLET, FILM COATED ORAL at 21:40

## 2024-09-18 RX ADMIN — SENNOSIDES AND DOCUSATE SODIUM 2 TABLET: 50; 8.6 TABLET ORAL at 08:17

## 2024-09-18 RX ADMIN — MEMANTINE 10 MG: 10 TABLET ORAL at 08:17

## 2024-09-18 RX ADMIN — MEMANTINE 10 MG: 10 TABLET ORAL at 21:40

## 2024-09-19 VITALS
RESPIRATION RATE: 18 BRPM | WEIGHT: 165.12 LBS | HEART RATE: 97 BPM | HEIGHT: 64 IN | OXYGEN SATURATION: 92 % | TEMPERATURE: 98.1 F | SYSTOLIC BLOOD PRESSURE: 154 MMHG | BODY MASS INDEX: 28.19 KG/M2 | DIASTOLIC BLOOD PRESSURE: 88 MMHG

## 2024-09-19 LAB
ANION GAP SERPL CALCULATED.3IONS-SCNC: 10.5 MMOL/L (ref 5–15)
BUN SERPL-MCNC: 16 MG/DL (ref 8–23)
BUN/CREAT SERPL: 21.3 (ref 7–25)
CALCIUM SPEC-SCNC: 9.4 MG/DL (ref 8.6–10.5)
CHLORIDE SERPL-SCNC: 104 MMOL/L (ref 98–107)
CO2 SERPL-SCNC: 21.5 MMOL/L (ref 22–29)
CREAT SERPL-MCNC: 0.75 MG/DL (ref 0.57–1)
EGFRCR SERPLBLD CKD-EPI 2021: 76.7 ML/MIN/1.73
GLUCOSE SERPL-MCNC: 174 MG/DL (ref 65–99)
POTASSIUM SERPL-SCNC: 3.5 MMOL/L (ref 3.5–5.2)
SODIUM SERPL-SCNC: 136 MMOL/L (ref 136–145)
WHOLE BLOOD HOLD SPECIMEN: NORMAL

## 2024-09-19 PROCEDURE — 97110 THERAPEUTIC EXERCISES: CPT

## 2024-09-19 PROCEDURE — 99239 HOSP IP/OBS DSCHRG MGMT >30: CPT | Performed by: INTERNAL MEDICINE

## 2024-09-19 PROCEDURE — 80048 BASIC METABOLIC PNL TOTAL CA: CPT | Performed by: INTERNAL MEDICINE

## 2024-09-19 PROCEDURE — 92526 ORAL FUNCTION THERAPY: CPT

## 2024-09-19 PROCEDURE — 25010000002 HEPARIN (PORCINE) PER 1000 UNITS: Performed by: INTERNAL MEDICINE

## 2024-09-19 RX ORDER — METOPROLOL TARTRATE 25 MG/1
50 TABLET, FILM COATED ORAL EVERY 12 HOURS SCHEDULED
Start: 2024-09-19

## 2024-09-19 RX ORDER — LEVETIRACETAM 500 MG/1
500 TABLET ORAL 2 TIMES DAILY
Start: 2024-09-19 | End: 2024-10-19

## 2024-09-19 RX ORDER — SERTRALINE HYDROCHLORIDE 25 MG/1
25 TABLET, FILM COATED ORAL DAILY
Start: 2024-09-19 | End: 2024-09-30

## 2024-09-19 RX ORDER — INSULIN GLARGINE 100 [IU]/ML
10 INJECTION, SOLUTION SUBCUTANEOUS DAILY
Start: 2024-09-19

## 2024-09-19 RX ADMIN — ATORVASTATIN CALCIUM 80 MG: 40 TABLET, FILM COATED ORAL at 21:14

## 2024-09-19 RX ADMIN — CLOPIDOGREL BISULFATE 75 MG: 75 TABLET ORAL at 08:23

## 2024-09-19 RX ADMIN — METOPROLOL TARTRATE 25 MG: 25 TABLET, FILM COATED ORAL at 08:23

## 2024-09-19 RX ADMIN — MEMANTINE 10 MG: 10 TABLET ORAL at 08:23

## 2024-09-19 RX ADMIN — LEVETIRACETAM 500 MG: 500 TABLET, FILM COATED ORAL at 08:23

## 2024-09-19 RX ADMIN — SENNOSIDES AND DOCUSATE SODIUM 2 TABLET: 50; 8.6 TABLET ORAL at 21:14

## 2024-09-19 RX ADMIN — ASPIRIN 81 MG: 81 TABLET, CHEWABLE ORAL at 08:23

## 2024-09-19 RX ADMIN — Medication 10 ML: at 21:14

## 2024-09-19 RX ADMIN — Medication 10 ML: at 08:24

## 2024-09-19 RX ADMIN — HEPARIN SODIUM 5000 UNITS: 5000 INJECTION INTRAVENOUS; SUBCUTANEOUS at 05:36

## 2024-09-19 RX ADMIN — SENNOSIDES AND DOCUSATE SODIUM 2 TABLET: 50; 8.6 TABLET ORAL at 08:23

## 2024-09-19 RX ADMIN — MEMANTINE 10 MG: 10 TABLET ORAL at 21:14

## 2024-09-19 RX ADMIN — HEPARIN SODIUM 5000 UNITS: 5000 INJECTION INTRAVENOUS; SUBCUTANEOUS at 14:39

## 2024-09-19 RX ADMIN — HEPARIN SODIUM 5000 UNITS: 5000 INJECTION INTRAVENOUS; SUBCUTANEOUS at 21:15

## 2024-09-19 RX ADMIN — METOPROLOL TARTRATE 25 MG: 25 TABLET, FILM COATED ORAL at 21:14

## 2024-09-19 RX ADMIN — LEVETIRACETAM 500 MG: 500 TABLET, FILM COATED ORAL at 21:14

## 2024-09-19 RX ADMIN — SERTRALINE HYDROCHLORIDE 50 MG: 50 TABLET ORAL at 08:23

## 2024-09-22 LAB
QT INTERVAL: 416 MS
QTC INTERVAL: 481 MS

## 2024-10-09 ENCOUNTER — NURSING HOME (OUTPATIENT)
Dept: INTERNAL MEDICINE | Age: 89
End: 2024-10-09
Payer: MEDICARE

## 2024-10-09 DIAGNOSIS — I10 HYPERTENSION, ESSENTIAL: ICD-10-CM

## 2024-10-09 DIAGNOSIS — I69.314 FRONTAL LOBE AND EXECUTIVE FUNCTION DEFICIT FOLLOWING CEREBRAL INFARCTION: ICD-10-CM

## 2024-10-09 DIAGNOSIS — E11.59 TYPE 2 DIABETES MELLITUS WITH OTHER CIRCULATORY COMPLICATION, WITH LONG-TERM CURRENT USE OF INSULIN: ICD-10-CM

## 2024-10-09 DIAGNOSIS — F34.1 PERSISTENT DEPRESSIVE DISORDER: ICD-10-CM

## 2024-10-09 DIAGNOSIS — R41.89 COGNITIVE IMPAIRMENT: ICD-10-CM

## 2024-10-09 DIAGNOSIS — R56.9 SEIZURE: ICD-10-CM

## 2024-10-09 DIAGNOSIS — E78.2 MIXED HYPERLIPIDEMIA: ICD-10-CM

## 2024-10-09 DIAGNOSIS — K21.9 GASTROESOPHAGEAL REFLUX DISEASE, UNSPECIFIED WHETHER ESOPHAGITIS PRESENT: ICD-10-CM

## 2024-10-09 DIAGNOSIS — K59.00 CONSTIPATION, UNSPECIFIED CONSTIPATION TYPE: ICD-10-CM

## 2024-10-09 DIAGNOSIS — I63.9 CEREBROVASCULAR ACCIDENT (CVA), UNSPECIFIED MECHANISM: Primary | ICD-10-CM

## 2024-10-09 DIAGNOSIS — Z79.4 TYPE 2 DIABETES MELLITUS WITH OTHER CIRCULATORY COMPLICATION, WITH LONG-TERM CURRENT USE OF INSULIN: ICD-10-CM

## 2024-10-09 PROCEDURE — 99306 1ST NF CARE HIGH MDM 50: CPT | Performed by: INTERNAL MEDICINE

## 2024-10-09 NOTE — PROGRESS NOTES
Nursing Home History and Physical Note      Abel Mathews MD  [x]  [address] [facility], [address]  [place] [address]  Phone: [phone]  Fax: [phone]     PATIENT NAME: Tita Jiménez                                                                          YOB: 1935            DATE OF SERVICE: 10/09/2024  FACILITY:   [] [facility]   [x] [facility]  [] [facility]  [] Other      HISTORY OF PRESENT ILLNESS: Patient is a pleasant 89-year-old female who was admitted to our facility this week with a stroke, she is very lethargic but arouses to voice command, she can talk but is very weak soft-spoken, she is a very dry mouth she does have 2 cups of liquids sitting on her bedside table, she says she is thirsty, she is in no distress, she opens her eyes a little bit at times during exam, she says she had a stroke, she is a poor historian.  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      her hospital course shows she has a history of stroke with residual left-sided hemiparesis and seizures and had been on Keppra, she presented from a nursing home with worsening facial droop and worsening left-sided weakness, her CAT scan was initially negative, there was some concern for ischemia on her repeat study in the right MCA distribution, a CT angiogram of the head and neck showed no hemodynamically significant stenosis no evidence of A-fib on the monitor, neurology saw the patient, recommended continued aspirin and Plavix high intensity statin, they adjusted medications for her sugars, and they adjusted her depression medication, they did put her on metoprolol twice a day and made some  adjustments to her insulin regimen,      PAST MEDICAL & SURGICAL HISTORY:   Past Medical History:   Diagnosis Date    Asthma     Benign essential tremor 9/23/2021    Carpal tunnel syndrome     Contusion     small on scalp. skin intact. D/T fall on 10/18/2019    Depression     Diabetes mellitus     GERD (gastroesophageal reflux disease) 9/23/2021    HTN (hypertension) 10/18/2019    Hyperlipidemia     Hypertension     Mitral valve problem     Polymyalgia rheumatica     Stroke     short term memory loss    Ulcerative colitis 9/23/2021    Urinary incontinence       Past Surgical History:   Procedure Laterality Date    ADENOIDECTOMY      APPENDECTOMY      BREAST SURGERY      ( L4-L5)    COLON SURGERY      colon resection    HYSTERECTOMY      OOPHORECTOMY      TONSILLECTOMY            MEDICATIONS:  I have reviewed and reconciled the patients medication list in the patients chart at the [facility] nursing [facility] today.      ALLERGIES:  Allergies   Allergen Reactions    Paxil [Paroxetine Hcl] Palpitations    Aricept [Donepezil Hcl] Other (See Comments)     Nightmares    Levaquin [Levofloxacin] Diarrhea    Reglan [Metoclopramide] Other (See Comments)     Jitters    Statins Myalgia     Pravachol, Lipitor, Livalo         SOCIAL HISTORY:  Social History     Socioeconomic History    Marital status:    Tobacco Use    Smoking status: Former     Types: Cigarettes     Passive exposure: Past    Smokeless tobacco: Never   Vaping Use    Vaping status: Never Used   Substance and Sexual Activity    Alcohol use: No    Drug use: No    Sexual activity: Not Currently       FAMILY HISTORY:  Noncontributory to this admission    PHYSICAL EXAMINATION:   VITAL SIGNS: 157/78 sats 90% on room air respiratory rate 18 temperature 97.4 pulse 78, previous blood pressures 154/85 140/78, blood sugars been in the 137, 158 range, weight 156.4 pounds on admission    PHYSICAL EXAM: She is awake but somewhat drowsy she has dry mouth, it is unclear  whether she has significant facial droop although it appears she does a little bit more on the left than the right her skin is dry and pale her neck is supple without supraclavicular swelling or adenopathy her abdomen is soft nontender slightly obese her lungs are clear laterally and anteriorly with diminished breath sounds cardiac exam reveals a regular rhythm, she has 3+ DP pulses bilateral lower legs with no edema skin is dry she can move her right arm to command she moves it slowly she cannot move the left arm there is dense flaccid hemiparesis of the left upper extremity and she cannot move the left lower leg at all, she tries to move the right leg I believe at times but it is difficult to know, based on her following commands etc.      RECORDS REVIEW:   Orders Reviewed.  Labs Reviewed.      ICD-10-CM ICD-9-CM   1. Cerebrovascular accident (CVA), unspecified mechanism  I63.9 434.91   2. 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 Gastroesophageal reflux disease, unspecified whether esophagitis present  K21.9 530.81   4. Type 2 diabetes mellitus with other circulatory complication, with long-term current use of insulin  E11.59 250.70    Z79.4 V58.67   5. Persistent depressive disorder  F34.1 300.4   6. Cognitive impairment  R41.89 294.9   7. Mixed hyperlipidemia  E78.2 272.2   8. Hypertension, essential  I10 401.9   9. Seizure  R56.9 780.39   10. Constipation, unspecified constipation type  K59.00 564.00       ASSESSMENT/PLAN    Diagnoses and all orders for this visit:    1. Cerebrovascular accident (CVA), unspecified mechanism (Primary)    2. 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 Gastroesophageal reflux disease, unspecified whether esophagitis present    4. Type 2 diabetes mellitus with other circulatory complication, with long-term current use of insulin    5. Persistent depressive disorder    6. Cognitive  impairment    7. Mixed hyperlipidemia    8. Hypertension, essential    9. Seizure    10. Constipation, unspecified constipation type       Recurrent acute stroke right basal ganglia,, MRI shows asymmetric linear band hyperintense signal involving the right basal ganglia and right mesial temporal lobe appearance was concerning for acute ischemia, there is also areas of chronic ischemic changes and diffuse cortical atrophy and bilateral mastoid effusions,, there is encephalomalacia within the right frontal lobe due to prior infarct, no intracranial hemorrhage, continues aspirin 81 mg daily Plavix 75 mg daily  Drowsiness, continues Provigil 100 mg daily    Hyperlipidemia continues rosuvastatin 20 mg daily    Seizure disorder, continues Keppra 500 mg twice a day    Hypertension, continues diltiazem extended release 240 mg daily, metoprolol 50 mg twice a day    Constipation continue stool softeners twice a day as needed Dulcolax,, MiraLAX twice a day as needed Senokot twice a day    Diabetes, continues Farxiga 10 mg daily and Jardiance, plus, insulin sliding scale, Lantus 10 units daily, Januvia 50 mg daily, Onglyza 5 mg daily, will stop this since she is already on Januvia, will also stop Farxiga October 9, 2024    Respiratory issues continues on a Breo inhaler once daily, Dulera inhaler twice a day, she does not need to be on both a Breo and Dulera, albuterol inhaler as needed,, stopping Breo October 9, 2024, continues Singulair 10 mg daily    Depression, continues Effexor XR 75 mg daily Pristiq 100 mg daily, will stop Effexor at this point, Zoloft 25 mg daily which will be stopped also since she is on Pristiq and other antidepressants already    Dementia, continues on memantine 10 mg twice a day    GERD, continues Prevacid 30 mg daily    Plan is to continue rehab efforts monitor labs, watch blood sugars, adjust medications as above,    Abel Mathews MD

## 2024-11-14 ENCOUNTER — NURSING HOME (OUTPATIENT)
Dept: INTERNAL MEDICINE | Age: 89
End: 2024-11-14
Payer: MEDICARE

## 2024-11-14 DIAGNOSIS — Z79.4 TYPE 2 DIABETES MELLITUS WITH OTHER CIRCULATORY COMPLICATION, WITH LONG-TERM CURRENT USE OF INSULIN: ICD-10-CM

## 2024-11-14 DIAGNOSIS — I63.9 CEREBROVASCULAR ACCIDENT (CVA), UNSPECIFIED MECHANISM: Primary | ICD-10-CM

## 2024-11-14 DIAGNOSIS — R41.89 COGNITIVE IMPAIRMENT: ICD-10-CM

## 2024-11-14 DIAGNOSIS — E78.2 MIXED HYPERLIPIDEMIA: ICD-10-CM

## 2024-11-14 DIAGNOSIS — E11.59 TYPE 2 DIABETES MELLITUS WITH OTHER CIRCULATORY COMPLICATION, WITH LONG-TERM CURRENT USE OF INSULIN: ICD-10-CM

## 2024-11-14 DIAGNOSIS — I10 PRIMARY HYPERTENSION: ICD-10-CM

## 2024-11-14 DIAGNOSIS — F34.1 PERSISTENT DEPRESSIVE DISORDER: ICD-10-CM

## 2024-11-14 PROCEDURE — 99309 SBSQ NF CARE MODERATE MDM 30: CPT | Performed by: INTERNAL MEDICINE

## 2024-11-14 NOTE — PROGRESS NOTES
Nursing Home Follow-Up Note      Abel Mathews MD  [x]  [address], [address]  [place] [address]  Phone: [phone]  Fax: [phone]     PATIENT NAME: Tita Jiménez                                                                          YOB: 1935            DATE OF SERVICE: 11/14/2024  FACILITY:   [] [facility]   [x] [facility]  [] [facility]  [] Other      HISTORY OF PRESENT ILLNESS: Patient was seen today for routine rounds she is in good spirits she says she still cannot use her left side well at all but otherwise she is doing okay she knows she needs to drink and eat more, she was lying in bed flat without distress, she says she is participating with therapy      PAST MEDICAL & SURGICAL HISTORY:   Past Medical History:   Diagnosis Date    Asthma     Benign essential tremor 9/23/2021    Carpal tunnel syndrome     Contusion     small on scalp. skin intact. D/T fall on 10/18/2019    Depression     Diabetes mellitus     GERD (gastroesophageal reflux disease) 9/23/2021    HTN (hypertension) 10/18/2019    Hyperlipidemia     Hypertension     Mitral valve problem     Polymyalgia rheumatica     Stroke     short term memory loss    Ulcerative colitis 9/23/2021    Urinary incontinence       Past Surgical History:   Procedure Laterality Date    ADENOIDECTOMY      APPENDECTOMY      BREAST SURGERY      ( L4-L5)    COLON SURGERY      colon resection    HYSTERECTOMY      OOPHORECTOMY      TONSILLECTOMY            MEDICATIONS:  I have reviewed and reconciled the patients medication list in the patients chart at the skilled nursing facility today.        PHYSICAL EXAMINATION:   VITAL SIGNS: 146/70 pulse 78 temperature 97.6 respiratory rate 18 sat 97% on room air,  Blood sugars 188, 204, 150    PHYSICAL EXAM: Her mouth shows dry mucosa her lips are little bit blue cyanotic in color but she is alert pleasant she is  talkative she communicates well, she has a some facial weakness noted to the left lower face, her left arm is very weak she can move the hand just a little bit to command but cannot really raise it up off the edge of the bed.  She can move her left foot just a little bit, she has edema to her lower legs bilaterally 1-2+ especially on the left 1+ on the right, her abdomen is soft nontender her lungs are clear cardiac exam reveals a regular rhythm with a 1/6 to 2/6 systolic murmur heard at the left upper sternal border only neck shows no supraclavicular adenopathy      RECORDS REVIEW:   Orders Reviewed.  Labs Reviewed.      ICD-10-CM ICD-9-CM   1. Cerebrovascular accident (CVA), unspecified mechanism  I63.9 434.91   2. Primary hypertension  I10 401.9   3. Mixed hyperlipidemia  E78.2 272.2   4. Persistent depressive disorder  F34.1 300.4   5. Type 2 diabetes mellitus with other circulatory complication, with long-term current use of insulin  E11.59 250.70    Z79.4 V58.67   6. Cognitive impairment  R41.89 294.9       ASSESSMENT/PLAN    Diagnoses and all orders for this visit:    1. Cerebrovascular accident (CVA), unspecified mechanism (Primary)    2. Primary hypertension    3. Mixed hyperlipidemia    4. Persistent depressive disorder    5. Type 2 diabetes mellitus with other circulatory complication, with long-term current use of insulin    6. Cognitive impairment        Recurrent acute stroke ---right basal ganglia,, MRI shows asymmetric linear band hyperintense signal involving the right basal ganglia and right mesial temporal lobe appearance was concerning for acute ischemia, there is also areas of chronic ischemic changes and diffuse cortical atrophy and bilateral mastoid effusions,, there is encephalomalacia within the right frontal lobe due to prior infarct, no intracranial hemorrhage, continues aspirin 81 mg daily Plavix 75 mg daily    Drowsiness, continues Provigil 100 mg daily    Hyperlipidemia continues  rosuvastatin 20 mg daily    Seizure disorder, continues Keppra 500 mg twice a day    Hypertension, continues diltiazem extended release 240 mg daily, metoprolol 50 mg twice a day    Constipation continue stool softeners twice a day as needed Dulcolax,, MiraLAX twice a day as needed, milk of magnesia as needed    Diabetes, continues Jardiance 12.5 mg daily, insulin sliding scale, Lantus 10 units daily, Januvia 50 mg daily,     Respiratory issues continues Dulera inhaler twice a day, albuterol inhaler as needed,,      Depression, continues  Pristiq 100 mg daily,,     Dementia, continues on memantine 10 mg twice a day    GERD, continues Protonix 40 mg daily    Encourage patient to continue therapy if possible continue drinking and eating as much as she can she appears to be a little bit dry,  Abel Mathews MD

## 2024-12-16 ENCOUNTER — NURSING HOME (OUTPATIENT)
Dept: INTERNAL MEDICINE | Age: 89
End: 2024-12-16
Payer: MEDICARE

## 2024-12-16 DIAGNOSIS — Z79.4 TYPE 2 DIABETES MELLITUS WITH OTHER CIRCULATORY COMPLICATION, WITH LONG-TERM CURRENT USE OF INSULIN: ICD-10-CM

## 2024-12-16 DIAGNOSIS — K21.9 GASTROESOPHAGEAL REFLUX DISEASE, UNSPECIFIED WHETHER ESOPHAGITIS PRESENT: ICD-10-CM

## 2024-12-16 DIAGNOSIS — F33.1 MAJOR DEPRESSIVE DISORDER, RECURRENT, MODERATE: ICD-10-CM

## 2024-12-16 DIAGNOSIS — I10 PRIMARY HYPERTENSION: ICD-10-CM

## 2024-12-16 DIAGNOSIS — I10 HYPERTENSION, ESSENTIAL: ICD-10-CM

## 2024-12-16 DIAGNOSIS — I63.9 CEREBROVASCULAR ACCIDENT (CVA), UNSPECIFIED MECHANISM: Primary | ICD-10-CM

## 2024-12-16 DIAGNOSIS — E78.2 MIXED HYPERLIPIDEMIA: ICD-10-CM

## 2024-12-16 DIAGNOSIS — G25.0 BENIGN ESSENTIAL TREMOR: ICD-10-CM

## 2024-12-16 DIAGNOSIS — I69.314 FRONTAL LOBE AND EXECUTIVE FUNCTION DEFICIT FOLLOWING CEREBRAL INFARCTION: ICD-10-CM

## 2024-12-16 DIAGNOSIS — E11.59 TYPE 2 DIABETES MELLITUS WITH OTHER CIRCULATORY COMPLICATION, WITH LONG-TERM CURRENT USE OF INSULIN: ICD-10-CM

## 2024-12-16 DIAGNOSIS — R41.89 COGNITIVE IMPAIRMENT: ICD-10-CM

## 2024-12-16 DIAGNOSIS — R53.1 WEAKNESS: ICD-10-CM

## 2024-12-16 DIAGNOSIS — R26.2 DIFFICULTY WALKING: ICD-10-CM

## 2024-12-16 PROCEDURE — 99309 SBSQ NF CARE MODERATE MDM 30: CPT | Performed by: INTERNAL MEDICINE

## 2024-12-16 NOTE — PROGRESS NOTES
Nursing Home Follow-Up Note      Abel Mathews MD  [x]  [address], [address]  [place] [address]  Phone: [phone]  Fax: [phone]     PATIENT NAME: Tita Jiménez                                                                          YOB: 1935            DATE OF SERVICE: 12/16/2024  FACILITY:   [] [facility]   [x] [facility]  [] [facility]  [] Other      HISTORY OF PRESENT ILLNESS: Patient was seen today for routine rounds, when I walked in her lights were on, she is in no distress she is somewhat drowsy but alert rouses easily, she follows simple commands, she is pleasant she is in no distress and there nurses report no new issues      PAST MEDICAL & SURGICAL HISTORY:   Past Medical History:   Diagnosis Date    Asthma     Benign essential tremor 9/23/2021    Carpal tunnel syndrome     Contusion     small on scalp. skin intact. D/T fall on 10/18/2019    Depression     Diabetes mellitus     GERD (gastroesophageal reflux disease) 9/23/2021    HTN (hypertension) 10/18/2019    Hyperlipidemia     Hypertension     Mitral valve problem     Polymyalgia rheumatica     Stroke     short term memory loss    Ulcerative colitis 9/23/2021    Urinary incontinence       Past Surgical History:   Procedure Laterality Date    ADENOIDECTOMY      APPENDECTOMY      BREAST SURGERY      ( L4-L5)    COLON SURGERY      colon resection    HYSTERECTOMY      OOPHORECTOMY      TONSILLECTOMY            MEDICATIONS:  I have reviewed and reconciled the patients medication list in the patients chart at the skilled nursing facility today.        PHYSICAL EXAMINATION:   VITAL SIGNS: 129/74 pulse of 80 blood sugar 133, 123, up to 218, she is afebrile respiratory rates around 16    PHYSICAL EXAM: Her neck is supple her mouth shows dry mucosa she opens her eyes a little bit she will converse she will follow commands her abdomen is slightly  obese soft nontender her lungs are clear laterally and anteriorly cardiac exam regular rhythm without appreciable murmur, she has left upper extremity left lower extremity hemiparesis, she moves her right arm well she can raise it up to command, she has no edema in her lower legs      RECORDS REVIEW:   Orders Reviewed.  Labs Reviewed.      ICD-10-CM ICD-9-CM   1. Cerebrovascular accident (CVA), unspecified mechanism  I63.9 434.91   2. 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 Weakness  R53.1 780.79   4. Primary hypertension  I10 401.9   5. Mixed hyperlipidemia  E78.2 272.2   6. Gastroesophageal reflux disease, unspecified whether esophagitis present  K21.9 530.81   7. Difficulty walking  R26.2 719.7   8. Cognitive impairment  R41.89 294.9   9. Benign essential tremor  G25.0 333.1   10. Hypertension, essential  I10 401.9   11. Major depressive disorder, recurrent, moderate  F33.1 296.32   12. Type 2 diabetes mellitus with other circulatory complication, with long-term current use of insulin  E11.59 250.70    Z79.4 V58.67       ASSESSMENT/PLAN    Diagnoses and all orders for this visit:    1. Cerebrovascular accident (CVA), unspecified mechanism (Primary)    2. 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 Weakness    4. Primary hypertension    5. Mixed hyperlipidemia    6. Gastroesophageal reflux disease, unspecified whether esophagitis present    7. Difficulty walking    8. Cognitive impairment    9. Benign essential tremor    10. Hypertension, essential    11. Major depressive disorder, recurrent, moderate    12. Type 2 diabetes mellitus with other circulatory complication, with long-term current use of insulin        Recurrent acute stroke ---right basal ganglia,, MRI shows asymmetric linear band hyperintense signal involving the right basal ganglia and right mesial temporal lobe appearance was concerning for acute ischemia,  there is also areas of chronic ischemic changes and diffuse cortical atrophy and bilateral mastoid effusions,, there is encephalomalacia within the right frontal lobe due to prior infarct, no intracranial hemorrhage, continues aspirin 81 mg daily Plavix 75 mg daily    Hyperlipidemia continues rosuvastatin 20 mg daily    Seizure disorder, continues Keppra 500 mg twice a day    Hypertension, continues diltiazem extended release 240 mg daily, metoprolol 50 mg twice a day    Constipation continue stool softeners twice a day as needed Dulcolax,, MiraLAX twice a day as needed, milk of magnesia as needed    Diabetes, continues Jardiance 12.5 mg daily, insulin sliding scale, Lantus 10 units daily,     Respiratory issues continues Dulera inhaler twice a day, albuterol inhaler as needed,,      Depression, continues  Pristiq 100 mg daily,,     Dementia, continues  memantine 10 mg twice a day    GERD, continues Protonix 40 mg daily    Abel Mathews MD

## 2025-01-16 ENCOUNTER — NURSING HOME (OUTPATIENT)
Dept: INTERNAL MEDICINE | Age: 89
End: 2025-01-16
Payer: MEDICARE

## 2025-01-16 DIAGNOSIS — Z79.4 TYPE 2 DIABETES MELLITUS WITH OTHER CIRCULATORY COMPLICATION, WITH LONG-TERM CURRENT USE OF INSULIN: ICD-10-CM

## 2025-01-16 DIAGNOSIS — I10 HYPERTENSION, ESSENTIAL: ICD-10-CM

## 2025-01-16 DIAGNOSIS — E78.2 MIXED HYPERLIPIDEMIA: ICD-10-CM

## 2025-01-16 DIAGNOSIS — F33.1 MAJOR DEPRESSIVE DISORDER, RECURRENT, MODERATE: ICD-10-CM

## 2025-01-16 DIAGNOSIS — R41.89 COGNITIVE IMPAIRMENT: ICD-10-CM

## 2025-01-16 DIAGNOSIS — I63.9 CEREBROVASCULAR ACCIDENT (CVA), UNSPECIFIED MECHANISM: Primary | ICD-10-CM

## 2025-01-16 DIAGNOSIS — R56.9 SEIZURE: ICD-10-CM

## 2025-01-16 DIAGNOSIS — G25.0 BENIGN ESSENTIAL TREMOR: ICD-10-CM

## 2025-01-16 DIAGNOSIS — F41.1 ANXIETY, GENERALIZED: ICD-10-CM

## 2025-01-16 DIAGNOSIS — E11.59 TYPE 2 DIABETES MELLITUS WITH OTHER CIRCULATORY COMPLICATION, WITH LONG-TERM CURRENT USE OF INSULIN: ICD-10-CM

## 2025-01-16 PROCEDURE — 99309 SBSQ NF CARE MODERATE MDM 30: CPT | Performed by: INTERNAL MEDICINE

## 2025-01-16 NOTE — PROGRESS NOTES
Nursing Home Follow-Up Note      Abel Mathews MD  [x]  [address], [address]  [place] [address]  Phone: [phone]  Fax: [phone]     PATIENT NAME: Tita Jiménez                                                                          YOB: 1935            DATE OF SERVICE: 01/16/2025  FACILITY:   [] [facility]   [x] [facility]  [] [facility]  [] Other      HISTORY OF PRESENT ILLNESS: Patient was seen for routine rounds today she is in no distress she is lying in bed flat without respiratory compromise she is awake alert but confused, she has a left sided neglect especially with her visual fields and her attention to her head is turned to the right,      PAST MEDICAL & SURGICAL HISTORY:   Past Medical History:   Diagnosis Date    Asthma     Benign essential tremor 9/23/2021    Carpal tunnel syndrome     Contusion     small on scalp. skin intact. D/T fall on 10/18/2019    Depression     Diabetes mellitus     GERD (gastroesophageal reflux disease) 9/23/2021    HTN (hypertension) 10/18/2019    Hyperlipidemia     Hypertension     Mitral valve problem     Polymyalgia rheumatica     Stroke     short term memory loss    Ulcerative colitis 9/23/2021    Urinary incontinence       Past Surgical History:   Procedure Laterality Date    ADENOIDECTOMY      APPENDECTOMY      BREAST SURGERY      ( L4-L5)    COLON SURGERY      colon resection    HYSTERECTOMY      OOPHORECTOMY      TONSILLECTOMY            MEDICATIONS:  I have reviewed and reconciled the patients medication list in the patients chart at the skilled nursing facility today.        PHYSICAL EXAMINATION:   VITAL SIGNS: 128/80 pulse of 80 blood sugars 120 and 138, respiratory rates 16-18 no reported temperatures    PHYSICAL EXAM:'s, skin is dry throughout, mouth shows dry mucosa she has a left-sided neglect she has left hemiparesis flaccid on the left arm she  can only move the right toes just a little bit to command her lower legs show no edema dry skin throughout abdomen is soft nontender nondistended cardiac exam regular rhythm with a 1/6 to 2/6 midsystolic murmur her lungs are clear laterally and anteriorly with diminished breath sounds, she moves her right arm just slightly to command      RECORDS REVIEW:   Orders Reviewed.  Labs Reviewed.      ICD-10-CM ICD-9-CM   1. Cerebrovascular accident (CVA), unspecified mechanism  I63.9 434.91   2. Major depressive disorder, recurrent, moderate  F33.1 296.32   3. Hypertension, essential  I10 401.9   4. Mixed hyperlipidemia  E78.2 272.2   5. Benign essential tremor  G25.0 333.1   6. Cognitive impairment  R41.89 294.9   7. Seizure  R56.9 780.39   8. Type 2 diabetes mellitus with other circulatory complication, with long-term current use of insulin  E11.59 250.70    Z79.4 V58.67   9. Anxiety, generalized  F41.1 300.02       ASSESSMENT/PLAN    Diagnoses and all orders for this visit:    1. Cerebrovascular accident (CVA), unspecified mechanism (Primary)    2. Major depressive disorder, recurrent, moderate    3. Hypertension, essential    4. Mixed hyperlipidemia    5. Benign essential tremor    6. Cognitive impairment    7. Seizure    8. Type 2 diabetes mellitus with other circulatory complication, with long-term current use of insulin    9. Anxiety, generalized        Recurrent acute stroke ---right basal ganglia,, MRI shows asymmetric linear band hyperintense signal involving the right basal ganglia and right mesial temporal lobe appearance was concerning for acute ischemia, there is also areas of chronic ischemic changes and diffuse cortical atrophy and bilateral mastoid effusions,, there is encephalomalacia within the right frontal lobe due to prior infarct, no intracranial hemorrhage, continues aspirin 81 mg daily Plavix 75 mg daily    Hyperlipidemia continues rosuvastatin 20 mg daily    Seizure disorder, continues Keppra 500  mg twice a day    Hypertension, continues diltiazem extended release 240 mg daily, metoprolol 50 mg twice a day    Constipation continue stool softeners twice a day as needed Dulcolax,, MiraLAX twice a day as needed, milk of magnesia as needed    Diabetes, continues Jardiance 12.5 mg daily,  Lantus 10 units daily,     Respiratory issues continues Dulera inhaler twice a day, albuterol inhaler as needed,,      Depression, continues  Pristiq 100 mg daily,,, BuSpar added 2.5 mg 3 times a day November 25, 2024    Dementia, continues  memantine 10 mg twice a day    GERD, continues Protonix 40 mg daily    Abel Mathews MD

## 2025-01-29 ENCOUNTER — APPOINTMENT (OUTPATIENT)
Dept: GENERAL RADIOLOGY | Facility: HOSPITAL | Age: 89
End: 2025-01-29
Payer: MEDICARE

## 2025-01-29 ENCOUNTER — HOSPITAL ENCOUNTER (EMERGENCY)
Facility: HOSPITAL | Age: 89
Discharge: NURSING, REHAB OR LONG-TERM CARE | End: 2025-01-29
Attending: EMERGENCY MEDICINE | Admitting: EMERGENCY MEDICINE
Payer: MEDICARE

## 2025-01-29 VITALS
SYSTOLIC BLOOD PRESSURE: 119 MMHG | OXYGEN SATURATION: 96 % | WEIGHT: 144.18 LBS | TEMPERATURE: 98.2 F | HEIGHT: 64 IN | BODY MASS INDEX: 24.62 KG/M2 | HEART RATE: 92 BPM | DIASTOLIC BLOOD PRESSURE: 81 MMHG | RESPIRATION RATE: 16 BRPM

## 2025-01-29 DIAGNOSIS — R11.10 VOMITING, UNSPECIFIED VOMITING TYPE, UNSPECIFIED WHETHER NAUSEA PRESENT: ICD-10-CM

## 2025-01-29 DIAGNOSIS — K59.00 CONSTIPATION, UNSPECIFIED CONSTIPATION TYPE: Primary | ICD-10-CM

## 2025-01-29 LAB
ALBUMIN SERPL-MCNC: 2.9 G/DL (ref 3.5–5.2)
ALBUMIN/GLOB SERPL: 1 G/DL
ALP SERPL-CCNC: 114 U/L (ref 39–117)
ALT SERPL W P-5'-P-CCNC: 11 U/L (ref 1–33)
ANION GAP SERPL CALCULATED.3IONS-SCNC: 10.4 MMOL/L (ref 5–15)
AST SERPL-CCNC: 23 U/L (ref 1–32)
BASOPHILS # BLD AUTO: 0.03 10*3/MM3 (ref 0–0.2)
BASOPHILS NFR BLD AUTO: 0.4 % (ref 0–1.5)
BILIRUB SERPL-MCNC: 0.4 MG/DL (ref 0–1.2)
BUN SERPL-MCNC: 13 MG/DL (ref 8–23)
BUN/CREAT SERPL: 21.7 (ref 7–25)
CALCIUM SPEC-SCNC: 9.6 MG/DL (ref 8.6–10.5)
CHLORIDE SERPL-SCNC: 105 MMOL/L (ref 98–107)
CO2 SERPL-SCNC: 23.6 MMOL/L (ref 22–29)
CREAT SERPL-MCNC: 0.6 MG/DL (ref 0.57–1)
D-LACTATE SERPL-SCNC: 1.5 MMOL/L (ref 0.5–2)
DEPRECATED RDW RBC AUTO: 49.7 FL (ref 37–54)
EGFRCR SERPLBLD CKD-EPI 2021: 86.5 ML/MIN/1.73
EOSINOPHIL # BLD AUTO: 0.15 10*3/MM3 (ref 0–0.4)
EOSINOPHIL NFR BLD AUTO: 1.8 % (ref 0.3–6.2)
ERYTHROCYTE [DISTWIDTH] IN BLOOD BY AUTOMATED COUNT: 15.7 % (ref 12.3–15.4)
GLOBULIN UR ELPH-MCNC: 2.8 GM/DL
GLUCOSE SERPL-MCNC: 150 MG/DL (ref 65–99)
HCT VFR BLD AUTO: 42.2 % (ref 34–46.6)
HGB BLD-MCNC: 12.9 G/DL (ref 12–15.9)
HOLD SPECIMEN: NORMAL
HOLD SPECIMEN: NORMAL
IMM GRANULOCYTES # BLD AUTO: 0.03 10*3/MM3 (ref 0–0.05)
IMM GRANULOCYTES NFR BLD AUTO: 0.4 % (ref 0–0.5)
LIPASE SERPL-CCNC: 33 U/L (ref 13–60)
LYMPHOCYTES # BLD AUTO: 1.63 10*3/MM3 (ref 0.7–3.1)
LYMPHOCYTES NFR BLD AUTO: 19 % (ref 19.6–45.3)
MCH RBC QN AUTO: 26.5 PG (ref 26.6–33)
MCHC RBC AUTO-ENTMCNC: 30.6 G/DL (ref 31.5–35.7)
MCV RBC AUTO: 86.8 FL (ref 79–97)
MONOCYTES # BLD AUTO: 0.96 10*3/MM3 (ref 0.1–0.9)
MONOCYTES NFR BLD AUTO: 11.2 % (ref 5–12)
NEUTROPHILS NFR BLD AUTO: 5.77 10*3/MM3 (ref 1.7–7)
NEUTROPHILS NFR BLD AUTO: 67.2 % (ref 42.7–76)
NRBC BLD AUTO-RTO: 0 /100 WBC (ref 0–0.2)
PLATELET # BLD AUTO: 212 10*3/MM3 (ref 140–450)
PMV BLD AUTO: 9.3 FL (ref 6–12)
POTASSIUM SERPL-SCNC: 3.4 MMOL/L (ref 3.5–5.2)
PROT SERPL-MCNC: 5.7 G/DL (ref 6–8.5)
RBC # BLD AUTO: 4.86 10*6/MM3 (ref 3.77–5.28)
SODIUM SERPL-SCNC: 139 MMOL/L (ref 136–145)
WBC NRBC COR # BLD AUTO: 8.57 10*3/MM3 (ref 3.4–10.8)
WHOLE BLOOD HOLD COAG: NORMAL
WHOLE BLOOD HOLD SPECIMEN: NORMAL

## 2025-01-29 PROCEDURE — 85025 COMPLETE CBC W/AUTO DIFF WBC: CPT

## 2025-01-29 PROCEDURE — 99283 EMERGENCY DEPT VISIT LOW MDM: CPT

## 2025-01-29 PROCEDURE — 74018 RADEX ABDOMEN 1 VIEW: CPT

## 2025-01-29 PROCEDURE — 96374 THER/PROPH/DIAG INJ IV PUSH: CPT

## 2025-01-29 PROCEDURE — 25010000002 ONDANSETRON PER 1 MG

## 2025-01-29 PROCEDURE — 71045 X-RAY EXAM CHEST 1 VIEW: CPT

## 2025-01-29 PROCEDURE — 80053 COMPREHEN METABOLIC PANEL: CPT | Performed by: EMERGENCY MEDICINE

## 2025-01-29 PROCEDURE — 83605 ASSAY OF LACTIC ACID: CPT | Performed by: EMERGENCY MEDICINE

## 2025-01-29 PROCEDURE — 83690 ASSAY OF LIPASE: CPT | Performed by: EMERGENCY MEDICINE

## 2025-01-29 RX ORDER — ONDANSETRON 2 MG/ML
4 INJECTION INTRAMUSCULAR; INTRAVENOUS ONCE
Status: COMPLETED | OUTPATIENT
Start: 2025-01-29 | End: 2025-01-29

## 2025-01-29 RX ORDER — SODIUM CHLORIDE 0.9 % (FLUSH) 0.9 %
10 SYRINGE (ML) INJECTION AS NEEDED
Status: DISCONTINUED | OUTPATIENT
Start: 2025-01-29 | End: 2025-01-29 | Stop reason: HOSPADM

## 2025-01-29 RX ADMIN — ONDANSETRON 4 MG: 2 INJECTION INTRAMUSCULAR; INTRAVENOUS at 03:22

## 2025-01-29 NOTE — ED TRIAGE NOTES
Pt arrives to ED [facility] from [facility] with report of vomiting x once, brown in appearance. Pt has baseline AMS, and is alert and oriented to self at baseline and in triage. Pt denies pain of any specific location but does endorse nausea. Pt VSS, NAD. No vomiting in triage.

## 2025-01-29 NOTE — ED PROVIDER NOTES
Time: 4:47 AM EST  Date of encounter:  1/29/2025  Independent Historian/Clinical History and Information was obtained by:   EMS    History is limited by: Dementia    Chief Complaint: vomiting      History of Present Illness:  Patient is a 89 y.o. year old female who presents to the emergency department for evaluation of Vomiting.  Patient was sent from nursing facility with 1 episode of emesis.  Per report emesis was brown.  Patient does report nausea.  Patient does have dementia and per report is currently at baseline mental status.       Patient Care Team  Primary Care Provider: Madison Ortiz APRN    Past Medical History:     Allergies   Allergen Reactions    Paxil [Paroxetine Hcl] Palpitations    Aricept [Donepezil Hcl] Other (See Comments)     Nightmares    Levaquin [Levofloxacin] Diarrhea    Reglan [Metoclopramide] Other (See Comments)     Jitters    Statins Myalgia     Pravachol, Lipitor, Livalo     Past Medical History:   Diagnosis Date    Asthma     Benign essential tremor 9/23/2021    Carpal tunnel syndrome     Contusion     small on scalp. skin intact. D/T fall on 10/18/2019    Depression     Diabetes mellitus     GERD (gastroesophageal reflux disease) 9/23/2021    HTN (hypertension) 10/18/2019    Hyperlipidemia     Hypertension     Mitral valve problem     Polymyalgia rheumatica     Stroke     short term memory loss    Ulcerative colitis 9/23/2021    Urinary incontinence      Past Surgical History:   Procedure Laterality Date    ADENOIDECTOMY      APPENDECTOMY      BREAST SURGERY      ( L4-L5)    COLON SURGERY      colon resection    HYSTERECTOMY      OOPHORECTOMY      TONSILLECTOMY       No family history on file.    Home Medications:  Prior to Admission medications    Medication Sig Start Date End Date Taking? Authorizing Provider   albuterol sulfate  (90 Base) MCG/ACT inhaler Inhale 2 puffs Every 4 (Four) Hours As Needed for Wheezing. PRN 3/7/22   Angela Doherty MD   aspirin 81 MG  EC tablet Take 1 tablet by mouth Daily. After your stroke, take both aspirin and Plavix daily for 21 days; then just continue with Plavix 75 mg daily thereafter. 5/23/23   Jose Luis Moran PA   calcium carb-cholecalciferol (Calcium + D3) 600-800 MG-UNIT tablet Take 1 tablet by mouth Daily. 3/7/22   Angela Doherty MD   clopidogrel (PLAVIX) 75 MG tablet Take 1 tablet by mouth Daily. After your stroke take both aspirin and Plavix daily for 21 days; then just continue with Plavix 75 mg daily thereafter. 5/23/23   Jose Luis Moran PA   dapagliflozin Propanediol (Farxiga) 10 MG tablet Take 10 mg by mouth Daily.    ProviderAwais MD   desvenlafaxine (PRISTIQ) 100 MG 24 hr tablet Take 1 tablet by mouth Daily.    Awais Stephens MD   docusate sodium (COLACE) 100 MG capsule Take 1 capsule by mouth 2 (Two) Times a Day.    Provider, Historical, MD   GNP UltiCare Pen Needles 32G X 6 MM misc USE WITH insulin five TIMES DAILY 8/23/24   Awais Stephens MD   Insulin Glargine (Lantus SoloStar) 100 UNIT/ML injection pen Inject 10 Units under the skin into the appropriate area as directed Daily. 9/19/24   Jerry Zhou DO   Januvia 50 MG tablet Take 1 tablet by mouth every night at bedtime. 4/24/23   Awais Stephens MD   levETIRAcetam (KEPPRA) 500 MG tablet Take 1 tablet by mouth 2 (Two) Times a Day for 30 days. 9/19/24 10/19/24  Jerry Zhou DO   melatonin 5 MG tablet tablet Take 1 tablet by mouth Every Night. 5/23/23   Jose Luis Moran PA   memantine (Namenda) 10 MG tablet Take 1 tablet by mouth 2 (Two) Times a Day. 3/3/22   Angela Doherty MD   metoprolol tartrate (LOPRESSOR) 25 MG tablet Take 2 tablets by mouth Every 12 (Twelve) Hours. 9/19/24   Jerry Zhou DO   mometasone-formoterol (DULERA 200) 200-5 MCG/ACT inhaler Inhale 2 puffs 2 (Two) Times a Day.    Awais Stephens MD   montelukast (SINGULAIR) 10 MG tablet Take 1 tablet by mouth Every Night. 3/7/22   Angela Doherty MD   multivitamin  "with minerals tablet tablet Take 1 tablet by mouth Daily.    Provider, MD Awais   pantoprazole (PROTONIX) 40 MG EC tablet Take 1 tablet by mouth Daily. 5/29/23   Nestor Louie MD   polyethylene glycol (MIRALAX) 17 g packet Take 17 g by mouth 2 (Two) Times a Day As Needed (constipation). 5/29/23   Nestor Louie MD   rosuvastatin (CRESTOR) 20 MG tablet Take 1 tablet by mouth Every Night. 5/29/23   Nestor Louie MD   Senna Plus 8.6-50 MG per tablet Take 2 tablets by mouth every night at bedtime.    Provider, MD Awais   sertraline (ZOLOFT) 25 MG tablet Take 1 tablet by mouth Daily for 11 days. 9/19/24 9/30/24  Jerry Zhou DO        Social History:   Social History     Tobacco Use    Smoking status: Former     Types: Cigarettes     Passive exposure: Past    Smokeless tobacco: Never   Vaping Use    Vaping status: Never Used   Substance Use Topics    Alcohol use: No    Drug use: No         Review of Systems:  Review of Systems   Unable to perform ROS: Dementia   Gastrointestinal:  Positive for vomiting.        Physical Exam:  /81 (BP Location: Right arm, Patient Position: Sitting)   Pulse 92   Temp 98.2 °F (36.8 °C) (Oral)   Resp 16   Ht 162.6 cm (64\")   Wt 65.4 kg (144 lb 2.9 oz)   SpO2 96%   BMI 24.75 kg/m²     Physical Exam  HENT:      Head: Normocephalic.      Right Ear: External ear normal.      Left Ear: External ear normal.      Mouth/Throat:      Mouth: Mucous membranes are moist.   Eyes:      Extraocular Movements: Extraocular movements intact.      Conjunctiva/sclera: Conjunctivae normal.   Cardiovascular:      Rate and Rhythm: Normal rate.   Pulmonary:      Effort: Pulmonary effort is normal. No respiratory distress.   Abdominal:      General: There is no distension.      Tenderness: There is no abdominal tenderness.   Skin:     General: Skin is warm.      Capillary Refill: Capillary refill takes less than 2 seconds.   Neurological:      Mental Status: She is alert.      " Comments: Left sided weakness from previous CVA                    Medical Decision Making:      Comorbidities that affect care:    CVA, UC, Diabetes, Hypertension    External Notes reviewed:    Previous Clinic Note: Patient was admitted on 9/14/24 and discharged on 9/19/2024 after acute stroke involving the right basal ganglia.      The following orders were placed and all results were independently analyzed by me:  Orders Placed This Encounter   Procedures    XR Abdomen KUB    XR Chest 1 View    [place] Draw    Comprehensive Metabolic Panel    Lipase    Urinalysis With Microscopic If Indicated (No Culture) - Urine, Clean Catch    Lactic Acid, Plasma    CBC Auto Differential    NPO Diet NPO Type: Strict NPO    Undress & Gown    Insert Peripheral IV    CBC & Differential    Green Top (Gel)    Lavender Top    Gold Top - SST    Light Blue Top       Medications Given in the Emergency Department:  Medications   sodium chloride 0.9 % flush 10 mL (has no administration in time range)   ondansetron (ZOFRAN) injection 4 mg (4 mg Intravenous Given 1/29/25 0322)        ED Course:         Labs:    Lab Results (last 24 hours)       Procedure Component Value Units Date/Time    CBC & Differential [[id number]]  (Abnormal) Collected: 01/29/25 0318    Specimen: Blood Updated: 01/29/25 0325    Narrative:      The following orders were created for panel order CBC & Differential.  Procedure                               Abnormality         Status                     ---------                               -----------         ------                     CBC Auto Differential[[id number]]        Abnormal            Final result                 Please view results for these tests on the individual orders.    Comprehensive Metabolic Panel [[id number]]  (Abnormal) Collected: 01/29/25 0318    Specimen: Blood Updated: 01/29/25 0342     Glucose 150 mg/dL      BUN 13 mg/dL      Creatinine 0.60 mg/dL      Sodium 139 mmol/L      Potassium 3.4 mmol/L       Chloride 105 mmol/L      CO2 23.6 mmol/L      Calcium 9.6 mg/dL      Total Protein 5.7 g/dL      Albumin 2.9 g/dL      ALT (SGPT) 11 U/L      AST (SGOT) 23 U/L      Alkaline Phosphatase 114 U/L      Total Bilirubin 0.4 mg/dL      Globulin 2.8 gm/dL      A/G Ratio 1.0 g/dL      BUN/Creatinine Ratio 21.7     Anion Gap 10.4 mmol/L      eGFR 86.5 mL/min/1.73     Narrative:      GFR Categories in Chronic Kidney Disease (CKD)      GFR Category          GFR (mL/min/1.73)    Interpretation  G1                     90 or greater         Normal or high (1)  G2                      60-89                Mild decrease (1)  G3a                   45-59                Mild to moderate decrease  G3b                   30-44                Moderate to severe decrease  G4                    15-29                Severe decrease  G5                    14 or less           Kidney failure          (1)In the absence of evidence of kidney disease, neither GFR category G1 or G2 fulfill the criteria for CKD.    eGFR calculation 2021 CKD-EPI creatinine equation, which does not include race as a factor    Lipase [[id number]]  (Normal) Collected: 01/29/25 0318    Specimen: Blood Updated: 01/29/25 0342     Lipase 33 U/L     Lactic Acid, Plasma [[id number]]  (Normal) Collected: 01/29/25 0318    Specimen: Blood Updated: 01/29/25 0337     Lactate 1.5 mmol/L     CBC Auto Differential [[id number]]  (Abnormal) Collected: 01/29/25 0318    Specimen: Blood Updated: 01/29/25 0325     WBC 8.57 10*3/mm3      RBC 4.86 10*6/mm3      Hemoglobin 12.9 g/dL      Hematocrit 42.2 %      MCV 86.8 fL      MCH 26.5 pg      MCHC 30.6 g/dL      RDW 15.7 %      RDW-SD 49.7 fl      MPV 9.3 fL      Platelets 212 10*3/mm3      Neutrophil % 67.2 %      Lymphocyte % 19.0 %      Monocyte % 11.2 %      Eosinophil % 1.8 %      Basophil % 0.4 %      Immature Grans % 0.4 %      Neutrophils, Absolute 5.77 10*3/mm3      Lymphocytes, Absolute 1.63 10*3/mm3      Monocytes, Absolute 0.96  10*3/mm3      Eosinophils, Absolute 0.15 10*3/mm3      Basophils, Absolute 0.03 10*3/mm3      Immature Grans, Absolute 0.03 10*3/mm3      nRBC 0.0 /100 WBC              Imaging:    XR Abdomen KUB    Result Date: 1/29/2025  XR ABDOMEN KUB-  Date of exam: 1/29/2025, 5:10 A.M.  Indication: vomiting  Comparisons: 1/29/2025 (CXR); 5/26/2023 (KUB).  FINDINGS: Two (2) AP supine views of the abdomen and pelvis (i.e., 2 KUBs) reveal no mechanical bowel obstruction. A large stool burden is suggested. External artifacts are noted. Arterial calcifications are seen. There are pelvic phleboliths. Surgical clips are projected over the sacrum. Degenerative changes involve the imaged spine, bilateral sacroiliac joints, bilateral hip joints, and pubic symphysis. No definite nephrolithiasis.       No mechanical bowel obstruction is suspected. However, a large stool burden is suggested. Constipation is possible. Fecal impaction cannot be excluded.   Portions of this note were completed with a voice recognition program.  Electronically Signed By-Jack Johnston MD On:1/29/2025 5:27 AM      XR Chest 1 View    Result Date: 1/29/2025  XR CHEST 1 VW-  Date of exam: 1/29/2025, 5:16 a.m.  Indication: Cough, persistent.  Comparison: 9/14/2024.  FINDINGS: A single AP (or PA) upright portable chest radiograph was performed. Mild cardiac enlargement is seen. No acute infiltrate is appreciated. No pleural effusion or pneumothorax is identified. External artifacts obscure detail. The thoracic aorta is prominent, tortuous, and calcified. Atherosclerotic changes are seen elsewhere. Degenerative changes are present throughout the imaged spine. No significant interval change is seen since the prior study (or studies).       No acute infiltrate is appreciated.    Portions of this note were completed with a voice recognition program.  Electronically Signed ByAlbert Johnston MD On:1/29/2025 5:23 AM         Differential Diagnosis and  Discussion:    Vomiting: Differential diagnosis includes but is not limited to migraine, labyrinthine disorders, psychogenic, metabolic and endocrine causes, peptic ulcer, gastric outlet obstruction, gastritis, gastroenteritis, appendicitis, intestinal obstruction, paralytic ileus, food poisoning, cholecystitis, acute hepatitis, acute pancreatitis, acute febrile illness, and myocardial infarction.    PROCEDURES:    Labs were collected in the emergency department and all labs were reviewed and interpreted by me.  X-ray were performed in the emergency department and all X-ray impressions were independently interpreted by me.    No orders to display       Procedures    MDM  Number of Diagnoses or Management Options  Diagnosis management comments: Patient is afebrile nontoxic-appearing.  Vital signs stable. She is oriented to self only which per reports baseline.  She denies any abdominal pain.  No abdominal tenderness on exam.  Labs show no significant abnormality.  No vomiting while in the emergency department.  X-ray did showed large stool burden.  Patient is already on MiraLAX.  Patient appears to be at baseline.  Patient will be sent back to nursing facility.  Recommend follow-up with her PCP.          Amount and/or Complexity of Data Reviewed  Clinical lab tests: reviewed  Tests in the radiology section of CPT®: reviewed  Review and summarize past medical records: yes  Independent visualization of images, tracings, or specimens: yes    Risk of Complications, Morbidity, and/or Mortality  Presenting problems: moderate  Management options: moderate                       Patient Care Considerations:    SEPSIS was considered but is NOT present in the emergency department as SIRS criteria is not present.      Consultants/Shared Management Plan:    None    Social Determinants of Health:    Patient is a nursing home/assisted living resident and has reliable access to care.      Disposition and Care  Coordination:    Discharged: The patient is suitable and stable for discharge with no need for consideration of admission.    I have explained the patient´s condition, diagnoses and treatment plan based on the information available to me at this time. I have answered questions and addressed any concerns. The patient has a good  understanding of the patient´s diagnosis, condition, and treatment plan as can be expected at this point. The vital signs have been stable. The patient´s condition is stable and appropriate for discharge from the emergency department.      The patient will pursue further outpatient evaluation with the primary care physician or other designated or consulting physician as outlined in the discharge instructions. They are agreeable to this plan of care and follow-up instructions have been explained in detail. The patient has received these instructions in written format and has expressed an understanding of the discharge instructions. The patient is aware that any significant change in condition or worsening of symptoms should prompt an immediate return to this or the closest emergency department or call to 911.  I have explained discharge medications and the need for follow up with the patient/caretakers. This was also printed in the discharge instructions. Patient was discharged with the following medications and follow up:      Medication List      No changes were made to your prescriptions during this visit.      Madison Ortiz, APRN  [address] [address]  [phone]    In 2 days         Final diagnoses:   Constipation, unspecified constipation type   Vomiting, unspecified vomiting type, unspecified whether nausea present        ED Disposition       ED Disposition   Discharge    Condition   Stable    Comment   --               This medical record created using voice recognition software.             Geovanny Vaca MD  01/29/25 [id number]

## 2025-02-13 ENCOUNTER — NURSING HOME (OUTPATIENT)
Dept: INTERNAL MEDICINE | Age: 89
End: 2025-02-13
Payer: MEDICARE

## 2025-02-13 DIAGNOSIS — E11.59 TYPE 2 DIABETES MELLITUS WITH OTHER CIRCULATORY COMPLICATION, WITH LONG-TERM CURRENT USE OF INSULIN: ICD-10-CM

## 2025-02-13 DIAGNOSIS — F41.1 ANXIETY, GENERALIZED: ICD-10-CM

## 2025-02-13 DIAGNOSIS — K21.9 GASTROESOPHAGEAL REFLUX DISEASE, UNSPECIFIED WHETHER ESOPHAGITIS PRESENT: ICD-10-CM

## 2025-02-13 DIAGNOSIS — E78.2 MIXED HYPERLIPIDEMIA: ICD-10-CM

## 2025-02-13 DIAGNOSIS — K59.00 CONSTIPATION, UNSPECIFIED CONSTIPATION TYPE: ICD-10-CM

## 2025-02-13 DIAGNOSIS — Z79.4 TYPE 2 DIABETES MELLITUS WITH OTHER CIRCULATORY COMPLICATION, WITH LONG-TERM CURRENT USE OF INSULIN: ICD-10-CM

## 2025-02-13 DIAGNOSIS — R41.89 COGNITIVE IMPAIRMENT: ICD-10-CM

## 2025-02-13 DIAGNOSIS — R56.9 SEIZURE: ICD-10-CM

## 2025-02-13 DIAGNOSIS — F33.1 MAJOR DEPRESSIVE DISORDER, RECURRENT, MODERATE: ICD-10-CM

## 2025-02-13 DIAGNOSIS — R53.1 WEAKNESS: ICD-10-CM

## 2025-02-13 DIAGNOSIS — I63.9 CEREBROVASCULAR ACCIDENT (CVA), UNSPECIFIED MECHANISM: Primary | ICD-10-CM

## 2025-02-13 DIAGNOSIS — I10 PRIMARY HYPERTENSION: ICD-10-CM

## 2025-02-13 PROCEDURE — 99309 SBSQ NF CARE MODERATE MDM 30: CPT | Performed by: INTERNAL MEDICINE

## 2025-02-13 NOTE — PROGRESS NOTES
Nursing Home Follow-Up Note      Abel Mathews MD  [x]  [address], [address]  [place] [address]  Phone: [phone]  Fax: [phone]     PATIENT NAME: Tita Jiménez                                                                          YOB: 1935            DATE OF SERVICE: 02/13/2025  FACILITY:   [] [facility]   [x] [facility]  [] [facility]  [] Other      HISTORY OF PRESENT ILLNESS: Patient was seen today, no distress, she is pleasant talkative alert, being seen for routine rounds, no reported nursing issues      PAST MEDICAL & SURGICAL HISTORY:   Past Medical History:   Diagnosis Date    Asthma     Benign essential tremor 9/23/2021    Carpal tunnel syndrome     Contusion     small on scalp. skin intact. D/T fall on 10/18/2019    Depression     Diabetes mellitus     GERD (gastroesophageal reflux disease) 9/23/2021    HTN (hypertension) 10/18/2019    Hyperlipidemia     Hypertension     Mitral valve problem     Polymyalgia rheumatica     Stroke     short term memory loss    Ulcerative colitis 9/23/2021    Urinary incontinence       Past Surgical History:   Procedure Laterality Date    ADENOIDECTOMY      APPENDECTOMY      BREAST SURGERY      ( L4-L5)    COLON SURGERY      colon resection    HYSTERECTOMY      OOPHORECTOMY      TONSILLECTOMY            MEDICATIONS:  I have reviewed and reconciled the patients medication list in the patients chart at the skilled nursing facility today.        PHYSICAL EXAMINATION:   VITAL SIGNS: 134/91, blood pressure, pulse 92 respiratory rate 18 afebrile,, sat 95% on room air blood sugars 142 and 173, 139, 138    PHYSICAL EXAM:, She has left facial weakness, but she arouses easily she is alert pleasant following simple commands she cannot move the left lower leg or the left upper extremity to command, she moves the right leg and the right arm well, she has no edema in her  lower legs or abdomen soft nontender cardiac exam regular rhythm her lungs are clear laterally and anteriorly her neck shows no supra supraclavicular adenopathy her mouth shows dry mucosa, she is generally weak appearing but unchanged from last visit      RECORDS REVIEW:   Orders Reviewed.  Labs Reviewed.      ICD-10-CM ICD-9-CM   1. Cerebrovascular accident (CVA), unspecified mechanism  I63.9 434.91   2. Weakness  R53.1 780.79   3. Seizure  R56.9 780.39   4. Anxiety, generalized  F41.1 300.02   5. Constipation, unspecified constipation type  K59.00 564.00   6. Primary hypertension  I10 401.9   7. Mixed hyperlipidemia  E78.2 272.2   8. Major depressive disorder, recurrent, moderate  F33.1 296.32   9. Type 2 diabetes mellitus with other circulatory complication, with long-term current use of insulin  E11.59 250.70    Z79.4 V58.67   10. Cognitive impairment  R41.89 294.9   11. Gastroesophageal reflux disease, unspecified whether esophagitis present  K21.9 530.81       ASSESSMENT/PLAN    Diagnoses and all orders for this visit:    1. Cerebrovascular accident (CVA), unspecified mechanism (Primary)    2. Weakness    3. Seizure    4. Anxiety, generalized    5. Constipation, unspecified constipation type    6. Primary hypertension    7. Mixed hyperlipidemia    8. Major depressive disorder, recurrent, moderate    9. Type 2 diabetes mellitus with other circulatory complication, with long-term current use of insulin    10. Cognitive impairment    11. Gastroesophageal reflux disease, unspecified whether esophagitis present        Recurrent acute stroke ---right basal ganglia,, MRI shows asymmetric linear band hyperintense signal involving the right basal ganglia and right mesial temporal lobe appearance was concerning for acute ischemia, there is also areas of chronic ischemic changes and diffuse cortical atrophy and bilateral mastoid effusions,, there is encephalomalacia within the right frontal lobe due to prior infarct, no  intracranial hemorrhage, continues aspirin 81 mg daily Plavix 75 mg daily    Hyperlipidemia continues rosuvastatin 20 mg daily    Seizure disorder, continues Keppra 500 mg twice a day    Hypertension, continues diltiazem extended release 240 mg daily, metoprolol 50 mg twice a day    Constipation continue stool softeners twice a day as needed Dulcolax,, MiraLAX twice a day as needed, milk of magnesia as needed    Diabetes, continues Jardiance 12.5 mg daily,  Lantus 10 units daily,     Respiratory issues continues Dulera inhaler twice a day, albuterol inhaler as needed,,      Depression, continues  Pristiq 100 mg daily,,, BuSpar added 2.5 mg 3 times a day November 25, 2024    Dementia, continues  memantine 10 mg twice a day    GERD, continues Protonix 40 mg daily  Abel Mathews MD

## 2025-03-13 ENCOUNTER — NURSING HOME (OUTPATIENT)
Dept: INTERNAL MEDICINE | Age: 89
End: 2025-03-13
Payer: MEDICARE

## 2025-03-13 DIAGNOSIS — R56.9 SEIZURE: ICD-10-CM

## 2025-03-13 DIAGNOSIS — F34.1 PERSISTENT DEPRESSIVE DISORDER: ICD-10-CM

## 2025-03-13 DIAGNOSIS — K21.9 GASTROESOPHAGEAL REFLUX DISEASE, UNSPECIFIED WHETHER ESOPHAGITIS PRESENT: ICD-10-CM

## 2025-03-13 DIAGNOSIS — I63.9 CEREBROVASCULAR ACCIDENT (CVA), UNSPECIFIED MECHANISM: Primary | ICD-10-CM

## 2025-03-13 DIAGNOSIS — F41.1 ANXIETY, GENERALIZED: ICD-10-CM

## 2025-03-13 DIAGNOSIS — R53.1 WEAKNESS: ICD-10-CM

## 2025-03-13 DIAGNOSIS — F33.1 MAJOR DEPRESSIVE DISORDER, RECURRENT, MODERATE: ICD-10-CM

## 2025-03-13 DIAGNOSIS — E78.2 MIXED HYPERLIPIDEMIA: ICD-10-CM

## 2025-03-13 DIAGNOSIS — I10 PRIMARY HYPERTENSION: ICD-10-CM

## 2025-03-13 DIAGNOSIS — E11.59 TYPE 2 DIABETES MELLITUS WITH OTHER CIRCULATORY COMPLICATION, WITH LONG-TERM CURRENT USE OF INSULIN: ICD-10-CM

## 2025-03-13 DIAGNOSIS — Z79.4 TYPE 2 DIABETES MELLITUS WITH OTHER CIRCULATORY COMPLICATION, WITH LONG-TERM CURRENT USE OF INSULIN: ICD-10-CM

## 2025-03-13 NOTE — PROGRESS NOTES
Nursing Home Follow-Up Note      Abel Mathews MD  [x]  [address], [address]  [place] [address]  Phone: [phone]  Fax: [phone]     PATIENT NAME: Tita Jiménez                                                                          YOB: 1935            DATE OF SERVICE: 03/13/2025  FACILITY:   [] [facility]   [x] [facility]  [] [facility]  [] Other      HISTORY OF PRESENT ILLNESS: Patient was seen for routine rounds she is in good spirits no complaints no new issues reported she is talkative she actually tells me she feels much better, blood sugars are running 134 144, 152, 129      PAST MEDICAL & SURGICAL HISTORY:   Past Medical History:   Diagnosis Date    Asthma     Benign essential tremor 9/23/2021    Carpal tunnel syndrome     Contusion     small on scalp. skin intact. D/T fall on 10/18/2019    Depression     Diabetes mellitus     GERD (gastroesophageal reflux disease) 9/23/2021    HTN (hypertension) 10/18/2019    Hyperlipidemia     Hypertension     Mitral valve problem     Polymyalgia rheumatica     Stroke     short term memory loss    Ulcerative colitis 9/23/2021    Urinary incontinence       Past Surgical History:   Procedure Laterality Date    ADENOIDECTOMY      APPENDECTOMY      BREAST SURGERY      ( L4-L5)    COLON SURGERY      colon resection    HYSTERECTOMY      OOPHORECTOMY      TONSILLECTOMY            MEDICATIONS:  I have reviewed and reconciled the patients medication list in the patients chart at the skilled nursing facility today.        PHYSICAL EXAMINATION:   VITAL SIGNS: Blood pressure 120/78 pulse 92 respiratory rate 18 afebrile    PHYSICAL EXAM: Her neck is supple, she is alert pleasant she is got some left facial weakness mainly on the lower facial area cheek, she moves her right arm and right leg to command well, she has no edema in her lower leg on the right or the left,  she has flaccid paralysis of the left arm and cannot move the left leg her abdomen is soft nondistended nontender lungs are clear cardiac exam regular rhythm, neck is supple otherwise, skin is dry she is pleasant talkative      RECORDS REVIEW:   Orders Reviewed.  Labs Reviewed.      ICD-10-CM ICD-9-CM   1. Cerebrovascular accident (CVA), unspecified mechanism  I63.9 434.91   2. Major depressive disorder, recurrent, moderate  F33.1 296.32   3. Primary hypertension  I10 401.9   4. Mixed hyperlipidemia  E78.2 272.2   5. Gastroesophageal reflux disease, unspecified whether esophagitis present  K21.9 530.81   6. Persistent depressive disorder  F34.1 300.4   7. Anxiety, generalized  F41.1 300.02   8. Type 2 diabetes mellitus with other circulatory complication, with long-term current use of insulin  E11.59 250.70    Z79.4 V58.67   9. Weakness  R53.1 780.79   10. Seizure  R56.9 780.39       ASSESSMENT/PLAN    Diagnoses and all orders for this visit:    1. Cerebrovascular accident (CVA), unspecified mechanism (Primary)    2. Major depressive disorder, recurrent, moderate    3. Primary hypertension    4. Mixed hyperlipidemia    5. Gastroesophageal reflux disease, unspecified whether esophagitis present    6. Persistent depressive disorder    7. Anxiety, generalized    8. Type 2 diabetes mellitus with other circulatory complication, with long-term current use of insulin    9. Weakness    10. Seizure    HSV 1 cold sore, has Abreva now ordered on a as needed basis    Recurrent acute stroke ---right basal ganglia,, MRI shows asymmetric linear band hyperintense signal involving the right basal ganglia and right mesial temporal lobe appearance was concerning for acute ischemia, there is also areas of chronic ischemic changes and diffuse cortical atrophy and bilateral mastoid effusions,, there is encephalomalacia within the right frontal lobe due to prior infarct, no intracranial hemorrhage, continues aspirin 81 mg daily Plavix 75  mg daily unclear why aspirin and Plavix have been stopped she will need at least restart aspirin due to the stroke    Hyperlipidemia stopped rosuvastatin 20 mg daily remarkable need to restart statins given stroke high risk for cardiovascular issues,    Seizure disorder, continues Keppra 500 mg twice a day    Hypertension, continues diltiazem extended release 240 mg daily, metoprolol 50 mg twice a day    Constipation continue stool softeners twice a day as needed Dulcolax,, MiraLAX twice a day as needed, milk of magnesia as needed    Diabetes, continues Jardiance 12.5 mg daily,  Lantus 10 units daily, blood sugars under good control    Depression, continues  Pristiq 100 mg daily, BuSpar 2.5 mg 3 times a day     Dementia,     GERD, continues Protonix 40 mg daily    Abel Mathews MD

## 2025-03-26 ENCOUNTER — NURSING HOME (OUTPATIENT)
Dept: INTERNAL MEDICINE | Age: 89
End: 2025-03-26
Payer: MEDICARE

## 2025-03-26 DIAGNOSIS — B02.9 HERPES ZOSTER WITHOUT COMPLICATION: Primary | ICD-10-CM

## 2025-03-26 DIAGNOSIS — R41.89 COGNITIVE IMPAIRMENT: ICD-10-CM

## 2025-03-26 NOTE — PROGRESS NOTES
Nursing Home Follow-Up Note      Abel Mathews MD  [x]  [address], [address]  [place] [address]  Phone: [phone]  Fax: [phone]     PATIENT NAME: Tita Jiménez                                                                          YOB: 1935            DATE OF SERVICE: 03/26/2025  FACILITY:   [] [facility]   [x] [facility]  [] [facility]  [] Other      HISTORY OF PRESENT ILLNESS: Patient is being seen because of nursing concerns about a rash on her side, patient is confused in bed she is awake she is alert but she does not really respond or answer questions about the rash, she will try to turn over for me in the bed he is in no distress no fevers no pain reported, rash supposedly has been there for about 10 to 12 days,?      PAST MEDICAL & SURGICAL HISTORY:   Past Medical History:   Diagnosis Date    Asthma     Benign essential tremor 9/23/2021    Carpal tunnel syndrome     Contusion     small on scalp. skin intact. D/T fall on 10/18/2019    Depression     Diabetes mellitus     GERD (gastroesophageal reflux disease) 9/23/2021    HTN (hypertension) 10/18/2019    Hyperlipidemia     Hypertension     Mitral valve problem     Polymyalgia rheumatica     Stroke     short term memory loss    Ulcerative colitis 9/23/2021    Urinary incontinence       Past Surgical History:   Procedure Laterality Date    ADENOIDECTOMY      APPENDECTOMY      BREAST SURGERY      ( L4-L5)    COLON SURGERY      colon resection    HYSTERECTOMY      OOPHORECTOMY      TONSILLECTOMY            MEDICATIONS:  I have reviewed and reconciled the patients medication list in the patients chart at the skilled nursing facility today.        PHYSICAL EXAMINATION:   VITAL SIGNS: Blood sugars 157 temperature 97.9 respiratory rate 18 pulse 97 blood pressure 123/93    PHYSICAL EXAM: On exam she is awake her mouth shows dry mucosa around the lips  she will try to turn over in bed her abdomen is soft she is got some dried pinkish scabbed lesions on the left anterior chest wall just at the costal margin and upper abdomen that extend to the side and a few little red spots on the back area that are more pinkish and color on the left mid back region      RECORDS REVIEW:   Orders Reviewed.  Labs Reviewed.      ICD-10-CM ICD-9-CM   1. Herpes zoster without complication  B02.9 053.9   2. Cognitive impairment  R41.89 294.9       ASSESSMENT/PLAN    Diagnoses and all orders for this visit:    1. Herpes zoster without complication (Primary)    2. Cognitive impairment      Resolving herpes zoster left chest back area, discussed and reassured with nursing staff no treatment currently patient is in no distress no pain, will follow clinically,, March 26, 2025    Continue more palliative care protocol given patient's current medical situation, will follow clinically discussed with nursing staff, March 26, 2025    Abel Mathews MD

## 2025-04-29 ENCOUNTER — NURSING HOME (OUTPATIENT)
Dept: INTERNAL MEDICINE | Age: 89
End: 2025-04-29
Payer: MEDICARE

## 2025-04-29 DIAGNOSIS — I10 HYPERTENSION, ESSENTIAL: ICD-10-CM

## 2025-04-29 DIAGNOSIS — B02.9 HERPES ZOSTER WITHOUT COMPLICATION: ICD-10-CM

## 2025-04-29 DIAGNOSIS — R41.89 COGNITIVE IMPAIRMENT: ICD-10-CM

## 2025-04-29 DIAGNOSIS — F33.1 MAJOR DEPRESSIVE DISORDER, RECURRENT, MODERATE: ICD-10-CM

## 2025-04-29 DIAGNOSIS — Z51.5 ENCOUNTER FOR END OF LIFE CARE: Primary | ICD-10-CM

## 2025-04-29 DIAGNOSIS — K59.00 CONSTIPATION, UNSPECIFIED CONSTIPATION TYPE: ICD-10-CM

## 2025-04-29 NOTE — PROGRESS NOTES
Nursing Home Follow-Up Note      Abel Mathews MD  [x]  [address], [address]  [place] [address]  Phone: [phone]  Fax: [phone]     PATIENT NAME: Tita Jiménez                                                                          YOB: 1935            DATE OF SERVICE: 04/29/2025  FACILITY:   [] [facility]   [x] [facility]  [] [facility]  [] Other      HISTORY OF PRESENT ILLNESS: Patient was seen today for routine rounds she is quiet she is asleep she is breathing comfortably in her bed she does not arouse to my exam, there is been no reported nursing issues      PAST MEDICAL & SURGICAL HISTORY:   Past Medical History:   Diagnosis Date    Asthma     Benign essential tremor 9/23/2021    Carpal tunnel syndrome     Contusion     small on scalp. skin intact. D/T fall on 10/18/2019    Depression     Diabetes mellitus     GERD (gastroesophageal reflux disease) 9/23/2021    HTN (hypertension) 10/18/2019    Hyperlipidemia     Hypertension     Mitral valve problem     Polymyalgia rheumatica     Stroke     short term memory loss    Ulcerative colitis 9/23/2021    Urinary incontinence       Past Surgical History:   Procedure Laterality Date    ADENOIDECTOMY      APPENDECTOMY      BREAST SURGERY      ( L4-L5)    COLON SURGERY      colon resection    HYSTERECTOMY      OOPHORECTOMY      TONSILLECTOMY            MEDICATIONS:  I have reviewed and reconciled the patients medication list in the patients chart at the skilled nursing facility today.        PHYSICAL EXAMINATION:   VITAL SIGNS: 127/74 pulse of 86 blood sugar 149, no reported temperatures respiratory rates around 16-18    PHYSICAL EXAM: Her neck is supple skin is dry pale throughout she does not open her eyes her abdomen is soft nondistended lungs are clear laterally and anteriorly cardiac exam regular rhythm her lower legs showed no edema floated on  pillows today,      RECORDS REVIEW:   Orders Reviewed.  Labs Reviewed.      ICD-10-CM ICD-9-CM   1. Encounter for end of life care  Z51.5 V66.7   2. Hypertension, essential  I10 401.9   3. Constipation, unspecified constipation type  K59.00 564.00   4. Cognitive impairment  R41.89 294.9   5. Major depressive disorder, recurrent, moderate  F33.1 296.32   6. Herpes zoster without complication  B02.9 053.9       ASSESSMENT/PLAN    Diagnoses and all orders for this visit:    1. Encounter for end of life care (Primary)    2. Hypertension, essential    3. Constipation, unspecified constipation type    4. Cognitive impairment    5. Major depressive disorder, recurrent, moderate    6. Herpes zoster without complication    End of life care at this point, medications reviewed agree with current plan of care    Resolving herpes zoster left chest back area, discussed and reassured with nursing staff no treatment currently, patient is in no distress no pain, will follow clinically,, March 26, 2025    Continue more palliative care protocol given patient's current medical situation, will follow clinically discussed with nursing staff, March 26, 2025, has Roxanol ordered 5 mg every 3 hours as needed as needed,    Recurrent  stroke ---right basal ganglia,, MRI shows asymmetric linear band hyperintense signal involving the right basal ganglia and right mesial temporal lobe appearance was concerning for acute ischemia, there is also areas of chronic ischemic changes and diffuse cortical atrophy and bilateral mastoid effusions,, there is encephalomalacia within the right frontal lobe due to prior infarct, no intracranial hemorrhage, continues aspirin 81 mg daily Plavix 75 mg daily unclear why aspirin and Plavix have been stopped she will need at least restart aspirin due to the stroke, now on palliative care track which is the reason the aspirin and Plavix have been stopped as of April 29, 2025    Hyperlipidemia stopped rosuvastatin 20  mg daily due to palliative care    Seizure disorder, continues Keppra 500 mg twice a day    Hypertension, have stopped diltiazem, continues metoprolol    Constipation continue stool softeners twice a day, milk of magnesia as needed    Diabetes, continues  Lantus 10 units daily, blood sugars under good control    Depression, has stopped all unnecessary medications including Pristiq and BuSpar for now April 2025    Dementia,     GERD, off Protonix  Abel Mathews MD

## 2025-06-02 ENCOUNTER — NURSING HOME (OUTPATIENT)
Dept: INTERNAL MEDICINE | Age: 89
End: 2025-06-02
Payer: MEDICARE

## 2025-06-02 DIAGNOSIS — Z51.5 ENCOUNTER FOR END OF LIFE CARE: Primary | ICD-10-CM

## 2025-06-02 DIAGNOSIS — F41.1 ANXIETY, GENERALIZED: ICD-10-CM

## 2025-06-02 DIAGNOSIS — I63.9 CEREBROVASCULAR ACCIDENT (CVA), UNSPECIFIED MECHANISM: ICD-10-CM

## 2025-06-02 PROCEDURE — 99308 SBSQ NF CARE LOW MDM 20: CPT | Performed by: INTERNAL MEDICINE

## 2025-06-02 NOTE — PROGRESS NOTES
Nursing Home Follow-Up Note      Abel Mathews MD  [x]  [address], [address]  [place] [address]  Phone: [phone]  Fax: [phone]     PATIENT NAME: Tita Jiménez                                                                          YOB: 1935            DATE OF SERVICE: 06/02/2025  FACILITY:   [] [facility]   [x] [facility]  [] [facility]  [] Other      HISTORY OF PRESENT ILLNESS: Patient was seen for routine monthly rounds she is now under hospice palliative and hospice care, she is really not doing much as far as functional status, she is in bed she does have her eyes open she mumbles a few words but otherwise looks very weak frail appearing,      PAST MEDICAL & SURGICAL HISTORY:   Past Medical History:   Diagnosis Date    Asthma     Benign essential tremor 9/23/2021    Carpal tunnel syndrome     Contusion     small on scalp. skin intact. D/T fall on 10/18/2019    Depression     Diabetes mellitus     GERD (gastroesophageal reflux disease) 9/23/2021    HTN (hypertension) 10/18/2019    Hyperlipidemia     Hypertension     Mitral valve problem     Polymyalgia rheumatica     Stroke     short term memory loss    Ulcerative colitis 9/23/2021    Urinary incontinence       Past Surgical History:   Procedure Laterality Date    ADENOIDECTOMY      APPENDECTOMY      BREAST SURGERY      ( L4-L5)    COLON SURGERY      colon resection    HYSTERECTOMY      OOPHORECTOMY      TONSILLECTOMY            MEDICATIONS:  I have reviewed and reconciled the patients medication list in the patients chart at the skilled nursing facility today.        PHYSICAL EXAMINATION:   VITAL SIGNS: 136/76 pulse 76 blood sugar 141, 122    PHYSICAL EXAM: Her neck is supple her skin is somewhat pale throughout, her abdomen is soft her lungs were clear with diminished breath sounds cardiac exam regular rhythm distant heart tones, lower  extremities no edema, she has her eyes open but she is very weak appearing arms are folded across her chest she does not follow commands well,      RECORDS REVIEW:   Orders Reviewed.  Labs Reviewed.      ICD-10-CM ICD-9-CM   1. Encounter for end of life care  Z51.5 V66.7   2. Cerebrovascular accident (CVA), unspecified mechanism  I63.9 434.91   3. Anxiety, generalized  F41.1 300.02       ASSESSMENT/PLAN    Diagnoses and all orders for this visit:    1. Encounter for end of life care (Primary)    2. Cerebrovascular accident (CVA), unspecified mechanism    3. Anxiety, generalized      End of life care at this point, medications reviewed agree with current plan of care, shingles seems to have been resolved, no reported nursing issues June 2, 2025    Resolving herpes zoster left chest back area,     Continue more palliative care protocol given patient's current medical situation, will follow clinically discussed with nursing staff, March 26, 2025, has Roxanol ordered 5 mg every 3 hours as needed as needed,    Recurrent  stroke ---right basal ganglia,, MRI shows asymmetric linear band hyperintense signal involving the right basal ganglia and right mesial temporal lobe appearance was concerning for acute ischemia, there is also areas of chronic ischemic changes and diffuse cortical atrophy and bilateral mastoid effusions,, there is encephalomalacia within the right frontal lobe due to prior infarct, no intracranial hemorrhage, palliative care measures intact, off aspirin, Plavix for now,    Hyperlipidemia stopped rosuvastatin 20 mg daily due to palliative care    Seizure disorder, continues Keppra 500 mg twice a day    Hypertension, have stopped diltiazem, continues metoprolol    Constipation continue stool softeners twice a day, milk of magnesia as needed    Diabetes, continues  Lantus 10 units daily, blood sugars under good control    Depression, has stopped all unnecessary medications including Pristiq and BuSpar for  now April 2025    Dementia,     GERD, off Protonix    Abel Mathews MD

## 2025-07-14 ENCOUNTER — NURSING HOME (OUTPATIENT)
Dept: INTERNAL MEDICINE | Age: 89
End: 2025-07-14
Payer: MEDICARE

## 2025-07-14 DIAGNOSIS — I63.9 CEREBROVASCULAR ACCIDENT (CVA), UNSPECIFIED MECHANISM: ICD-10-CM

## 2025-07-14 DIAGNOSIS — Z51.5 ENCOUNTER FOR END OF LIFE CARE: Primary | ICD-10-CM

## 2025-07-14 DIAGNOSIS — I10 PRIMARY HYPERTENSION: ICD-10-CM

## 2025-07-14 DIAGNOSIS — R41.89 COGNITIVE IMPAIRMENT: ICD-10-CM

## 2025-07-14 DIAGNOSIS — R56.9 SEIZURE: ICD-10-CM

## 2025-07-14 PROCEDURE — 99308 SBSQ NF CARE LOW MDM 20: CPT | Performed by: INTERNAL MEDICINE

## 2025-07-14 NOTE — PROGRESS NOTES
Nursing Home Follow-Up Note      Abel Mathews MD  [x]  [address] beds at the [facility], [address]  [place] [address]  Phone: [phone]  Fax: [phone]     PATIENT NAME: Tita Jiménez                                                                          YOB: 1935            DATE OF SERVICE: 07/14/2025  FACILITY:   [] [facility]   [x] [facility]  [] [facility]  [] Other      HISTORY OF PRESENT ILLNESS: Patient was seen today for palliative care visit, she is in no distress she was lying in bed, she was breathing comfortably, she does not arouse easily to my voice commands but tries to open her eye some, she is otherwise in no distress      PAST MEDICAL & SURGICAL HISTORY:   Past Medical History:   Diagnosis Date    Asthma     Benign essential tremor 9/23/2021    Carpal tunnel syndrome     Contusion     small on scalp. skin intact. D/T fall on 10/18/2019    Depression     Diabetes mellitus     GERD (gastroesophageal reflux disease) 9/23/2021    HTN (hypertension) 10/18/2019    Hyperlipidemia     Hypertension     Mitral valve problem     Polymyalgia rheumatica     Stroke     short term memory loss    Ulcerative colitis 9/23/2021    Urinary incontinence       Past Surgical History:   Procedure Laterality Date    ADENOIDECTOMY      APPENDECTOMY      BREAST SURGERY      ( L4-L5)    COLON SURGERY      colon resection    HYSTERECTOMY      OOPHORECTOMY      TONSILLECTOMY            MEDICATIONS:  I have reviewed and reconciled the patients medication list in the patients chart at the skilled nursing facility today.        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  Labs Reviewed.      ICD-10-CM ICD-9-CM   1. Encounter for end of life care  Z51.5 V66.7   2. Cerebrovascular accident (CVA), unspecified mechanism  I63.9 434.91   3. Cognitive impairment  R41.89 294.9   4. Seizure  R56.9 780.39   5. Primary hypertension  I10 401.9       ASSESSMENT/PLAN    Diagnoses and all orders for this visit:    1. Encounter for end of life care (Primary)    2. Cerebrovascular accident (CVA), unspecified mechanism    3. Cognitive impairment    4. Seizure    5. Primary hypertension        End of life care at this point, medications reviewed agree with current plan of care, shingles seems to have been resolved,    Resolving herpes zoster left chest back area,     Continue more palliative care protocol given patient's current medical situation, will follow clinically discussed with nursing staff, March 26, 2025, has Roxanol ordered 5 mg every 3 hours as needed as needed,, and also Roxanol ordered 5 mg twice daily scheduled    Recurrent  stroke ---right basal ganglia,, MRI shows asymmetric linear band hyperintense signal involving the right basal ganglia and right mesial temporal lobe appearance was concerning for acute ischemia, there is also areas of chronic ischemic changes and diffuse cortical atrophy and bilateral mastoid effusions,, there is encephalomalacia within the right frontal lobe due to prior infarct, no intracranial hemorrhage, palliative care measures intact, off aspirin, Plavix for now,    Hyperlipidemia stopped rosuvastatin 20 mg daily due to palliative care    Seizure disorder, continues Keppra 500 mg twice a day    Hypertension, have stopped diltiazem, continues  metoprolol    Constipation continue stool softeners twice a day, milk of magnesia as needed    Diabetes, continues  Lantus 10 units daily, blood sugars under good control    Depression, has stopped all unnecessary medications including Pristiq and BuSpar for now April 2025    Dementia,     GERD, off Protonix  Abel Mathews MD

## 2025-07-22 NOTE — ASSESSMENT & PLAN NOTE
Lipid panel is adequate.  Plan: Continue atorvastatin 20 mg at bedtime.  Has myalgias with anything higher.  Has had myalgias with Lipitor Livalo and Pravachol and higher doses.   cyclobenzaprine (FLEXERIL) 5 MG tablet    Received request via: Pharmacy    Was the patient seen in the last year in this department? Yes    Does the patient have an active prescription (recently filled or refills available) for medication(s) requested? Yes    Pharmacy Name: [id number]    Does the patient have long term Plus and need 100-day supply? (This applies to ALL medications) Patient does not have SCP  ------------------------------------------------------    gabapentin (NEURONTIN) 300 MG Cap     Received request via: Pharmacy    Was the patient seen in the last year in this department? Yes    Does the patient have an active prescription (recently filled or refills available) for medication(s) requested? Yes    Pharmacy Name: [id number]    Does the patient have long term Plus and need 100-day supply? (This applies to ALL medications) Patient does not have SCP

## 2025-08-21 ENCOUNTER — NURSING HOME (OUTPATIENT)
Dept: INTERNAL MEDICINE | Age: 89
End: 2025-08-21
Payer: MEDICARE

## 2025-08-21 DIAGNOSIS — I63.9 CEREBROVASCULAR ACCIDENT (CVA), UNSPECIFIED MECHANISM: ICD-10-CM

## 2025-08-21 DIAGNOSIS — R53.1 WEAKNESS: Primary | ICD-10-CM

## 2025-08-21 DIAGNOSIS — Z51.5 ENCOUNTER FOR END OF LIFE CARE: ICD-10-CM

## 2025-08-21 DIAGNOSIS — F41.1 ANXIETY, GENERALIZED: ICD-10-CM
